# Patient Record
Sex: MALE | Race: WHITE | NOT HISPANIC OR LATINO | URBAN - METROPOLITAN AREA
[De-identification: names, ages, dates, MRNs, and addresses within clinical notes are randomized per-mention and may not be internally consistent; named-entity substitution may affect disease eponyms.]

---

## 2017-05-25 ENCOUNTER — OUTPATIENT (OUTPATIENT)
Dept: OUTPATIENT SERVICES | Facility: HOSPITAL | Age: 82
LOS: 1 days | End: 2017-05-25
Payer: MEDICARE

## 2017-05-25 DIAGNOSIS — Z95.1 PRESENCE OF AORTOCORONARY BYPASS GRAFT: Chronic | ICD-10-CM

## 2017-05-25 DIAGNOSIS — K86.89 OTHER SPECIFIED DISEASES OF PANCREAS: ICD-10-CM

## 2017-05-25 DIAGNOSIS — R63.4 ABNORMAL WEIGHT LOSS: ICD-10-CM

## 2017-05-25 DIAGNOSIS — Z98.89 OTHER SPECIFIED POSTPROCEDURAL STATES: Chronic | ICD-10-CM

## 2017-05-25 DIAGNOSIS — R86.8 OTHER ABNORMAL FINDINGS IN SPECIMENS FROM MALE GENITAL ORGANS: ICD-10-CM

## 2017-05-25 PROCEDURE — 74176 CT ABD & PELVIS W/O CONTRAST: CPT

## 2017-05-25 PROCEDURE — 74176 CT ABD & PELVIS W/O CONTRAST: CPT | Mod: 26

## 2020-01-17 ENCOUNTER — INPATIENT (INPATIENT)
Facility: HOSPITAL | Age: 85
LOS: 4 days | Discharge: NURSING, REHAB OR LONG-TERM CARE | DRG: 177 | End: 2020-01-22
Attending: FAMILY MEDICINE | Admitting: INTERNAL MEDICINE
Payer: MEDICARE

## 2020-01-17 VITALS
SYSTOLIC BLOOD PRESSURE: 170 MMHG | DIASTOLIC BLOOD PRESSURE: 100 MMHG | HEIGHT: 67 IN | RESPIRATION RATE: 14 BRPM | OXYGEN SATURATION: 97 % | WEIGHT: 149.91 LBS | HEART RATE: 87 BPM | TEMPERATURE: 98 F

## 2020-01-17 DIAGNOSIS — J44.9 CHRONIC OBSTRUCTIVE PULMONARY DISEASE, UNSPECIFIED: ICD-10-CM

## 2020-01-17 DIAGNOSIS — Z95.1 PRESENCE OF AORTOCORONARY BYPASS GRAFT: Chronic | ICD-10-CM

## 2020-01-17 DIAGNOSIS — N40.0 BENIGN PROSTATIC HYPERPLASIA WITHOUT LOWER URINARY TRACT SYMPTOMS: ICD-10-CM

## 2020-01-17 DIAGNOSIS — J18.9 PNEUMONIA, UNSPECIFIED ORGANISM: ICD-10-CM

## 2020-01-17 DIAGNOSIS — Z79.899 OTHER LONG TERM (CURRENT) DRUG THERAPY: ICD-10-CM

## 2020-01-17 DIAGNOSIS — I10 ESSENTIAL (PRIMARY) HYPERTENSION: ICD-10-CM

## 2020-01-17 DIAGNOSIS — I25.10 ATHEROSCLEROTIC HEART DISEASE OF NATIVE CORONARY ARTERY WITHOUT ANGINA PECTORIS: ICD-10-CM

## 2020-01-17 DIAGNOSIS — Z29.9 ENCOUNTER FOR PROPHYLACTIC MEASURES, UNSPECIFIED: ICD-10-CM

## 2020-01-17 DIAGNOSIS — E78.5 HYPERLIPIDEMIA, UNSPECIFIED: ICD-10-CM

## 2020-01-17 DIAGNOSIS — K21.9 GASTRO-ESOPHAGEAL REFLUX DISEASE WITHOUT ESOPHAGITIS: ICD-10-CM

## 2020-01-17 DIAGNOSIS — Z98.89 OTHER SPECIFIED POSTPROCEDURAL STATES: Chronic | ICD-10-CM

## 2020-01-17 DIAGNOSIS — F32.9 MAJOR DEPRESSIVE DISORDER, SINGLE EPISODE, UNSPECIFIED: ICD-10-CM

## 2020-01-17 LAB
ALBUMIN SERPL ELPH-MCNC: 2.5 G/DL — LOW (ref 3.3–5)
ALP SERPL-CCNC: 106 U/L — SIGNIFICANT CHANGE UP (ref 40–120)
ALT FLD-CCNC: 17 U/L — SIGNIFICANT CHANGE UP (ref 12–78)
ANION GAP SERPL CALC-SCNC: 9 MMOL/L — SIGNIFICANT CHANGE UP (ref 5–17)
APPEARANCE UR: CLEAR — SIGNIFICANT CHANGE UP
AST SERPL-CCNC: 24 U/L — SIGNIFICANT CHANGE UP (ref 15–37)
BASOPHILS # BLD AUTO: 0.04 K/UL — SIGNIFICANT CHANGE UP (ref 0–0.2)
BASOPHILS NFR BLD AUTO: 0.3 % — SIGNIFICANT CHANGE UP (ref 0–2)
BILIRUB SERPL-MCNC: 0.5 MG/DL — SIGNIFICANT CHANGE UP (ref 0.2–1.2)
BILIRUB UR-MCNC: NEGATIVE — SIGNIFICANT CHANGE UP
BUN SERPL-MCNC: 41 MG/DL — HIGH (ref 7–23)
CALCIUM SERPL-MCNC: 9 MG/DL — SIGNIFICANT CHANGE UP (ref 8.5–10.1)
CHLORIDE SERPL-SCNC: 109 MMOL/L — HIGH (ref 96–108)
CO2 SERPL-SCNC: 26 MMOL/L — SIGNIFICANT CHANGE UP (ref 22–31)
COLOR SPEC: YELLOW — SIGNIFICANT CHANGE UP
CREAT SERPL-MCNC: 2.1 MG/DL — HIGH (ref 0.5–1.3)
DIFF PNL FLD: NEGATIVE — SIGNIFICANT CHANGE UP
EOSINOPHIL # BLD AUTO: 0.11 K/UL — SIGNIFICANT CHANGE UP (ref 0–0.5)
EOSINOPHIL NFR BLD AUTO: 0.9 % — SIGNIFICANT CHANGE UP (ref 0–6)
FLU A RESULT: SIGNIFICANT CHANGE UP
FLU A RESULT: SIGNIFICANT CHANGE UP
FLUAV AG NPH QL: SIGNIFICANT CHANGE UP
FLUBV AG NPH QL: SIGNIFICANT CHANGE UP
GLUCOSE SERPL-MCNC: 141 MG/DL — HIGH (ref 70–99)
GLUCOSE UR QL: NEGATIVE — SIGNIFICANT CHANGE UP
HCT VFR BLD CALC: 44.2 % — SIGNIFICANT CHANGE UP (ref 39–50)
HGB BLD-MCNC: 13.8 G/DL — SIGNIFICANT CHANGE UP (ref 13–17)
IMM GRANULOCYTES NFR BLD AUTO: 0.3 % — SIGNIFICANT CHANGE UP (ref 0–1.5)
KETONES UR-MCNC: NEGATIVE — SIGNIFICANT CHANGE UP
LACTATE SERPL-SCNC: 1.7 MMOL/L — SIGNIFICANT CHANGE UP (ref 0.7–2)
LEUKOCYTE ESTERASE UR-ACNC: NEGATIVE — SIGNIFICANT CHANGE UP
LIDOCAIN IGE QN: 67 U/L — LOW (ref 73–393)
LYMPHOCYTES # BLD AUTO: 1.04 K/UL — SIGNIFICANT CHANGE UP (ref 1–3.3)
LYMPHOCYTES # BLD AUTO: 8.3 % — LOW (ref 13–44)
MCHC RBC-ENTMCNC: 27.3 PG — SIGNIFICANT CHANGE UP (ref 27–34)
MCHC RBC-ENTMCNC: 31.2 GM/DL — LOW (ref 32–36)
MCV RBC AUTO: 87.4 FL — SIGNIFICANT CHANGE UP (ref 80–100)
MONOCYTES # BLD AUTO: 1.19 K/UL — HIGH (ref 0–0.9)
MONOCYTES NFR BLD AUTO: 9.6 % — SIGNIFICANT CHANGE UP (ref 2–14)
NEUTROPHILS # BLD AUTO: 10.04 K/UL — HIGH (ref 1.8–7.4)
NEUTROPHILS NFR BLD AUTO: 80.6 % — HIGH (ref 43–77)
NITRITE UR-MCNC: NEGATIVE — SIGNIFICANT CHANGE UP
NRBC # BLD: 0 /100 WBCS — SIGNIFICANT CHANGE UP (ref 0–0)
PH UR: 5 — SIGNIFICANT CHANGE UP (ref 5–8)
PLATELET # BLD AUTO: 196 K/UL — SIGNIFICANT CHANGE UP (ref 150–400)
POTASSIUM SERPL-MCNC: 4.4 MMOL/L — SIGNIFICANT CHANGE UP (ref 3.5–5.3)
POTASSIUM SERPL-SCNC: 4.4 MMOL/L — SIGNIFICANT CHANGE UP (ref 3.5–5.3)
PROT SERPL-MCNC: 7.1 G/DL — SIGNIFICANT CHANGE UP (ref 6–8.3)
PROT UR-MCNC: 30 MG/DL
RBC # BLD: 5.06 M/UL — SIGNIFICANT CHANGE UP (ref 4.2–5.8)
RBC # FLD: 14.5 % — SIGNIFICANT CHANGE UP (ref 10.3–14.5)
RSV RESULT: SIGNIFICANT CHANGE UP
RSV RNA RESP QL NAA+PROBE: SIGNIFICANT CHANGE UP
SODIUM SERPL-SCNC: 144 MMOL/L — SIGNIFICANT CHANGE UP (ref 135–145)
SP GR SPEC: 1.01 — SIGNIFICANT CHANGE UP (ref 1.01–1.02)
UROBILINOGEN FLD QL: NEGATIVE — SIGNIFICANT CHANGE UP
WBC # BLD: 12.46 K/UL — HIGH (ref 3.8–10.5)
WBC # FLD AUTO: 12.46 K/UL — HIGH (ref 3.8–10.5)

## 2020-01-17 PROCEDURE — 93010 ELECTROCARDIOGRAM REPORT: CPT

## 2020-01-17 PROCEDURE — 71045 X-RAY EXAM CHEST 1 VIEW: CPT | Mod: 26

## 2020-01-17 PROCEDURE — 71250 CT THORAX DX C-: CPT | Mod: 26

## 2020-01-17 PROCEDURE — 70450 CT HEAD/BRAIN W/O DYE: CPT | Mod: 26

## 2020-01-17 PROCEDURE — 99284 EMERGENCY DEPT VISIT MOD MDM: CPT

## 2020-01-17 RX ORDER — AZTREONAM 2 G
VIAL (EA) INJECTION
Refills: 0 | Status: DISCONTINUED | OUTPATIENT
Start: 2020-01-17 | End: 2020-01-18

## 2020-01-17 RX ORDER — IPRATROPIUM/ALBUTEROL SULFATE 18-103MCG
3 AEROSOL WITH ADAPTER (GRAM) INHALATION
Refills: 0 | Status: COMPLETED | OUTPATIENT
Start: 2020-01-17 | End: 2020-01-17

## 2020-01-17 RX ORDER — AMLODIPINE BESYLATE 2.5 MG/1
5 TABLET ORAL DAILY
Refills: 0 | Status: DISCONTINUED | OUTPATIENT
Start: 2020-01-17 | End: 2020-01-21

## 2020-01-17 RX ORDER — FINASTERIDE 5 MG/1
5 TABLET, FILM COATED ORAL DAILY
Refills: 0 | Status: DISCONTINUED | OUTPATIENT
Start: 2020-01-17 | End: 2020-01-22

## 2020-01-17 RX ORDER — ACETAMINOPHEN 500 MG
650 TABLET ORAL EVERY 6 HOURS
Refills: 0 | Status: DISCONTINUED | OUTPATIENT
Start: 2020-01-17 | End: 2020-01-22

## 2020-01-17 RX ORDER — AZITHROMYCIN 500 MG/1
TABLET, FILM COATED ORAL
Refills: 0 | Status: DISCONTINUED | OUTPATIENT
Start: 2020-01-17 | End: 2020-01-18

## 2020-01-17 RX ORDER — VANCOMYCIN HCL 1 G
1000 VIAL (EA) INTRAVENOUS ONCE
Refills: 0 | Status: COMPLETED | OUTPATIENT
Start: 2020-01-17 | End: 2020-01-17

## 2020-01-17 RX ORDER — CILOSTAZOL 100 MG/1
50 TABLET ORAL
Refills: 0 | Status: DISCONTINUED | OUTPATIENT
Start: 2020-01-17 | End: 2020-01-20

## 2020-01-17 RX ORDER — AZTREONAM 2 G
1000 VIAL (EA) INJECTION EVERY 8 HOURS
Refills: 0 | Status: DISCONTINUED | OUTPATIENT
Start: 2020-01-18 | End: 2020-01-18

## 2020-01-17 RX ORDER — AZTREONAM 2 G
1000 VIAL (EA) INJECTION ONCE
Refills: 0 | Status: COMPLETED | OUTPATIENT
Start: 2020-01-17 | End: 2020-01-17

## 2020-01-17 RX ORDER — HEPARIN SODIUM 5000 [USP'U]/ML
5000 INJECTION INTRAVENOUS; SUBCUTANEOUS EVERY 12 HOURS
Refills: 0 | Status: DISCONTINUED | OUTPATIENT
Start: 2020-01-17 | End: 2020-01-22

## 2020-01-17 RX ORDER — ACETAMINOPHEN 500 MG
650 TABLET ORAL ONCE
Refills: 0 | Status: COMPLETED | OUTPATIENT
Start: 2020-01-17 | End: 2020-01-17

## 2020-01-17 RX ORDER — ATORVASTATIN CALCIUM 80 MG/1
80 TABLET, FILM COATED ORAL AT BEDTIME
Refills: 0 | Status: DISCONTINUED | OUTPATIENT
Start: 2020-01-17 | End: 2020-01-22

## 2020-01-17 RX ORDER — AZITHROMYCIN 500 MG/1
500 TABLET, FILM COATED ORAL EVERY 24 HOURS
Refills: 0 | Status: DISCONTINUED | OUTPATIENT
Start: 2020-01-18 | End: 2020-01-18

## 2020-01-17 RX ORDER — TAMSULOSIN HYDROCHLORIDE 0.4 MG/1
0.4 CAPSULE ORAL AT BEDTIME
Refills: 0 | Status: DISCONTINUED | OUTPATIENT
Start: 2020-01-17 | End: 2020-01-22

## 2020-01-17 RX ORDER — AZITHROMYCIN 500 MG/1
500 TABLET, FILM COATED ORAL ONCE
Refills: 0 | Status: COMPLETED | OUTPATIENT
Start: 2020-01-17 | End: 2020-01-17

## 2020-01-17 RX ORDER — SODIUM CHLORIDE 9 MG/ML
2000 INJECTION INTRAMUSCULAR; INTRAVENOUS; SUBCUTANEOUS ONCE
Refills: 0 | Status: COMPLETED | OUTPATIENT
Start: 2020-01-17 | End: 2020-01-17

## 2020-01-17 RX ADMIN — AZITHROMYCIN 255 MILLIGRAM(S): 500 TABLET, FILM COATED ORAL at 21:39

## 2020-01-17 RX ADMIN — SODIUM CHLORIDE 2000 MILLILITER(S): 9 INJECTION INTRAMUSCULAR; INTRAVENOUS; SUBCUTANEOUS at 16:45

## 2020-01-17 RX ADMIN — Medication 50 MILLIGRAM(S): at 19:12

## 2020-01-17 RX ADMIN — Medication 650 MILLIGRAM(S): at 16:45

## 2020-01-17 RX ADMIN — Medication 250 MILLIGRAM(S): at 19:42

## 2020-01-17 RX ADMIN — Medication 125 MILLIGRAM(S): at 18:23

## 2020-01-17 RX ADMIN — Medication 3 MILLILITER(S): at 19:12

## 2020-01-17 RX ADMIN — Medication 3 MILLILITER(S): at 18:27

## 2020-01-17 NOTE — H&P ADULT - PROBLEM SELECTOR PLAN 2
Unable to verify medications as wife and aide forgot medication list at home. [facility] pharmacy does not have history of medication refill since June 2019. Unable to verify medications as wife and aide forgot medication list at home. [facility] pharmacy does not have history of medication refill since June 2019. Called wife and aide to verify medications. Wife and aide will bring in medications in AM. Unable to verify medications as wife and aide forgot medication list at home. [facility] pharmacy does not have history of medication refill since June 2019. Called wife and aide to verify medications, however they could not provide a complete list. Wife and aide will bring in medications in AM. Unable to verify medications as wife and aide forgot medication list at home. [facility] pharmacy does not have history of medication refill since June 2019. Called wife and aide to verify medications, however they could not provide a complete list.   -Spoke with Wife and aide on 1/18 and was only presented with 3 bottles: Amlodipine 5mg, Rosuvastatin 10mg, and Cilastazol 50mg BID, all filled >5 months ago. Unclear how often medication is taken, but as per aide, wife is usually picking up medications for the patient and is giving it to him, but often forgets at times and is not consistent.

## 2020-01-17 NOTE — ED PROVIDER NOTE - CARE PLAN
Principal Discharge DX:	Pneumonia of right lower lobe due to infectious organism  Secondary Diagnosis:	Chronic obstructive pulmonary disease, unspecified COPD type

## 2020-01-17 NOTE — ED ADULT NURSE NOTE - OBJECTIVE STATEMENT
Received pt in bed alert and oriented.  C/O increased weakness and confusion since Monday.  Pt has had increased falls at home.  EKG complete.  Pt on monitor.  Ongoing nursing care and safety maintained.

## 2020-01-17 NOTE — H&P ADULT - NSICDXPASTMEDICALHX_GEN_ALL_CORE_FT
PAST MEDICAL HISTORY:  Benign prostatic hypertrophy     CAD (Coronary Artery Disease) MI, s/p CABG with 2 cardiac stents    COPD (chronic obstructive pulmonary disease)     Depression     GERD (gastroesophageal reflux disease)     Hyperlipidemia     Hypertension     Myocardial infarction     Osteoporosis     Peripheral Neuropathy     Seasonal allergies

## 2020-01-17 NOTE — H&P ADULT - HISTORY OF PRESENT ILLNESS
86 yo male with PMHx of Benign prostatic hypertrophy CAD  MI, s/p CABG with 2 cardiac stents COPD Depression GERD Hyperlipidemia  Hypertension, Osteoporosis Peripheral Neuropathy Dementia GERD    ED vitals: T: 98.1, HR: 98, BP: 157/70 RR: 17 O2: 93 on RA   Labs significant for: WBC: 12.46 with left shift, Chloride: 109, BUN: 41, Cr: 2.1, EGFR: 28   X-ray:  The cardiomediastinal silhouette is similar to the previous exam with dilated tortuous thoracic aorta. There is decreased right lung volume with shift of the mediastinum to the right side. There is right perihilar scarring again noted. No pleural effusion  CT Chest:  Right lower lobe pneumonia  CT Head: No acute intracranial findings INCOMPLETE   This is an 84 yo male with PMHx of Benign prostatic hypertrophy, CAD, MI, s/p CABG with 2 cardiac stents, COPD, Depressio, GERD, Hyperlipidemia  Hypertension, Osteoporosis, Peripheral Neuropathy, Dementia who presented to the ED with increased confusion, cough, fall    ED vitals: T: 98.1, HR: 98, BP: 157/70 RR: 17 O2: 93 on RA   Labs significant for: WBC: 12.46 with left shift, Chloride: 109, BUN: 41, Cr: 2.1, EGFR: 28   X-ray:  The cardiomediastinal silhouette is similar to the previous exam with dilated tortuous thoracic aorta. There is decreased right lung volume with shift of the mediastinum to the right side. There is right perihilar scarring again noted. No pleural effusion  CT Chest:  Right lower lobe pneumonia  CT Head: No acute intracranial findings  In the ED patient was given: Azactam and Vancomycin X1 INCOMPLETE   This is an 86 yo male with PMHx of Benign prostatic hypertrophy, CAD, MI, s/p CABG with 2 cardiac stents, COPD, Depression, GERD, Hyperlipidemia  Hypertension, Osteoporosis, Peripheral Neuropathy, Dementia who presented to the ED with increased confusion, cough, fall...    ED vitals: T: 98.1, HR: 98, BP: 157/70 RR: 17 O2: 93 on RA   Labs significant for: WBC: 12.46 with left shift, Chloride: 109, BUN: 41, Cr: 2.1, EGFR: 28   X-ray:  The cardiomediastinal silhouette is similar to the previous exam with dilated tortuous thoracic aorta. There is decreased right lung volume with shift of the mediastinum to the right side. There is right perihilar scarring again noted. No pleural effusion  CT Chest:  Right lower lobe pneumonia  CT Head: No acute intracranial findings  In the ED patient was given: Azactam and Vancomycin X1, tylenol, IVF bolus X1, albuterol/ipratropium X1, Solumederol 125 X1 86 yo male with PMHx of Benign prostatic hypertrophy, CAD, MI, s/p CABG with 2 cardiac stents, COPD, Depression, GERD, Hyperlipidemia  Hypertension, Osteoporosis, Peripheral Neuropathy, Dementia who presented to the ED s/p fall and increased confusion x1 day. Patient is accompanied by wife and health aide. Patient is a limited historian. Per aide, patient has a history of frequent falls, most recent fall was yesterday. Aide reports chronic cough for the past 3 months. Aide and wife noticed increasing confusion. Wife states "he could not remember how to make his way to the bedroom." Patient was brought to the ED for further evaluation. Patient admits to chills. Admits to cough x3 months, for which he takes cough syrup. Aide endorses frequent episodes of stool and urine incontinence. Patient lives with wife and 24 hour aid. He ambulates with walker and cane. Denies fever, chest pain, shortness of breath, abdominal pain, nausea or vomiting.     ED vitals: T: 98.1, HR: 98, BP: 157/70 RR: 17 O2: 93 on RA   Labs significant for: WBC: 12.46 with left shift, Chloride: 109, BUN: 41, Cr: 2.1, EGFR: 28   X-ray:  The cardiomediastinal silhouette is similar to the previous exam with dilated tortuous thoracic aorta. There is decreased right lung volume with shift of the mediastinum to the right side. There is right perihilar scarring again noted. No pleural effusion  CT Chest:  Right lower lobe pneumonia  CT Head: No acute intracranial findings  In the ED patient was given: Azactam and Vancomycin X1, tylenol, IVF bolus X1, albuterol/ipratropium X1, Solumederol 125 X1 86 yo male with PMHx of Benign prostatic hypertrophy, CAD, MI, s/p CABG with 2 cardiac stents, COPD, Depression, GERD, Hyperlipidemia  Hypertension, Osteoporosis, Peripheral Neuropathy, Dementia who presented to the ED s/p fall and increased confusion x1 day. Patient is accompanied by wife and health aide. Patient is a limited historian. Per aide, patient has a history of frequent falls, most recent fall was yesterday. Aide reports chronic cough for the past 3 months. Aide and wife noticed increasing confusion. Wife states "he could not remember how to make his way to the bedroom." Patient was brought to the ED for further evaluation. Per wife and aide, patient's mental status has returned to baseline. Patient admits to chills. Admits to cough x3 months, for which he takes cough syrup. Aide endorses frequent episodes of stool and urine incontinence. Patient lives with wife and 24 hour aid. He ambulates with walker and cane. Denies fever, chest pain, shortness of breath, abdominal pain, nausea or vomiting.     ED vitals: T: 98.1, HR: 98, BP: 157/70 RR: 17 O2: 93 on RA   Labs significant for: WBC: 12.46 with left shift, Chloride: 109, BUN: 41, Cr: 2.1, EGFR: 28   X-ray:  The cardiomediastinal silhouette is similar to the previous exam with dilated tortuous thoracic aorta. There is decreased right lung volume with shift of the mediastinum to the right side. There is right perihilar scarring again noted. No pleural effusion  CT Chest:  Right lower lobe pneumonia  CT Head: No acute intracranial findings  In the ED patient was given: Azactam and Vancomycin X1, tylenol, IVF bolus X1, albuterol/ipratropium X1, Solumederol 125 X1

## 2020-01-17 NOTE — H&P ADULT - NSICDXFAMILYHX_GEN_ALL_CORE_FT
FAMILY HISTORY:  Family history of amyotrophic lateral sclerosis,  at 67  Family history of MI (myocardial infarction),  at 65  Family history of stroke,  at 65  Family history of stroke,  at 67    Sibling  Still living? No  Family history of renal failure, Age at diagnosis: Age Unknown

## 2020-01-17 NOTE — H&P ADULT - PROBLEM SELECTOR PLAN 10
DVT PPx: Heparin 5000 mg subQ q12h    IMPROVE VTE Individual Risk Assessment          RISK                                                          Points  [  ] Previous VTE                                                3  [  ] Thrombophilia                                             2  [  ] Lower limb paralysis                                   2        (unable to hold up >15 seconds)    [  ] Current Cancer                                             2         (within 6 months)  [  ] Immobilization > 24 hrs                              1  [  ] ICU/CCU stay > 24 hours                             1  [ x ] Age > 60                                                         1    IMPROVE VTE Score:         [    1     ]

## 2020-01-17 NOTE — H&P ADULT - PROBLEM SELECTOR PLAN 3
Nebs, Oxygen. Nebs, Oxygen.  - Need to verify medications. Wife and aide will bring in medications in AM Nebs, Oxygen.  - Wife and aide only brought in 3 medications, none of them being inhalers and unclear whether patient is using any inhalers at home.

## 2020-01-17 NOTE — H&P ADULT - PROBLEM SELECTOR PLAN 1
Patient presents s/p fall and confusion x1 day  - WBC: 12.46 with left shift  - CT Chest: Right lower lobe pneumonia  - Start Azactam and Zithro, given allergies  - F/u BCx, UCx, sputum cx, strep pneumo, legionella  - Tylenol for fever  - F/u AM labs Patient presents s/p fall and confusion x1 day  - WBC: 12.46 with left shift  - CT Chest: Right lower lobe pneumonia  - Start Azactam and Zithro, given allergies  - F/u BCx, UCx, sputum cx, strep pneumo, legionella, MRSA  - Tylenol for fever  - F/u AM labs

## 2020-01-17 NOTE — H&P ADULT - PROBLEM SELECTOR PLAN 9
DVT PPx: Heparin 5000 mg subQ q12h    IMPROVE VTE Individual Risk Assessment          RISK                                                          Points  [  ] Previous VTE                                                3  [  ] Thrombophilia                                             2  [  ] Lower limb paralysis                                   2        (unable to hold up >15 seconds)    [  ] Current Cancer                                             2         (within 6 months)  [  ] Immobilization > 24 hrs                              1  [  ] ICU/CCU stay > 24 hours                             1  [ x ] Age > 60                                                         1    IMPROVE VTE Score:         [    1     ] - Need to verify medications. Wife and aide will bring in medications in AM - Aide was unable to verify medication. Unclear if patient is taking medications for GERD

## 2020-01-17 NOTE — H&P ADULT - NSHPREVIEWOFSYSTEMS_GEN_ALL_CORE
Patient is a limited historian.   Constitutional: denies fever, chills, diaphoresis   HEENT: denies blurry vision, double vision, eye pain, difficulty hearing  Respiratory: admits cough, denies SOB, sputum production  Cardiovascular: denies CP, palpitations, edema  Gastrointestinal: denies nausea, vomiting, diarrhea, constipation, abdominal pain  Genitourinary: denies dysuria, frequency, urgency, hematuria   Skin/Breast: denies rash, itching  Neurologic: denies headache, weakness, dizziness, paresthesias, numbness/tingling  Psychiatric: denies anxiety, depression, suicidal, homicidal thoughts  ROS negative except as noted above

## 2020-01-17 NOTE — ED PROVIDER NOTE - ATTENDING CONTRIBUTION TO CARE
86 y/o M with c/o fever, cough, lethargy and fall x 1 day.    PE: septic workup, XRay, CT  + PNA Rigth lobe.      admit for IV antibiotics, monitoring

## 2020-01-17 NOTE — H&P ADULT - ASSESSMENT
his is an 84 yo male with PMHx of Benign prostatic hypertrophy, CAD, MI, s/p CABG with 2 cardiac stents, COPD, Depression, GERD, Hyperlipidemia  Hypertension, Osteoporosis, Peripheral Neuropathy, Dementia who presented to the ED with ___________ admitted with ALEX HAYES. 86 yo male with PMHx of Benign prostatic hypertrophy, CAD, MI, s/p CABG with 2 cardiac stents, COPD, Depression, GERD, Hyperlipidemia  Hypertension, Osteoporosis, Peripheral Neuropathy, Dementia who presented to the ED s/p fall and increased confusion x1 day admitted with RLL pneumonia.

## 2020-01-17 NOTE — H&P ADULT - PROBLEM SELECTOR PLAN 8
- Need to verify medications. Wife and aide will bring in medications in AM - Aide was unable to verify medication. Unclear if patient is taking medications for Depression

## 2020-01-17 NOTE — H&P ADULT - NSHPSOCIALHISTORY_GEN_ALL_CORE
Marital Status:  ( x  )    (   ) Single    (   )    (  )   Lives with: (  ) alone  (  ) children   ( x ) spouse   (  ) parents  (  ) other  Recent Travel: No recent travel  Occupation: Retired  Mobility: Uses walker and cane  Substance Use (street drugs): ( x ) never used  (  ) other:  Tobacco Usage:  Quit smoking 1987, smoked ~1/2 ppd for 10 years  Alcohol Usage: Occasional

## 2020-01-17 NOTE — H&P ADULT - PROBLEM SELECTOR PLAN 6
Chronic, stable  - Continue home med Finasteride and Silodosin  - Need to verify medications. Wife and aide will bring in medications in AM Chronic, stable  - Continue home med Finasteride and Silodosin  - Aide was unable to verify medication. Unclear if patient is taking medications for BPH

## 2020-01-17 NOTE — H&P ADULT - NSHPPHYSICALEXAM_GEN_ALL_CORE
T(C): 36.7 (01-17-20 @ 19:44), Max: 38.4 (01-17-20 @ 16:05)  HR: 98 (01-17-20 @ 19:44) (87 - 98)  BP: 157/70 (01-17-20 @ 19:44) (157/70 - 170/100)  RR: 17 (01-17-20 @ 19:44) (14 - 17)  SpO2: 93% (01-17-20 @ 19:44) (93% - 97%)    GENERAL: patient appears well, no acute distress, appropriate, pleasant  EYES: sclera clear, no exudates  ENMT: oropharynx clear without erythema, no exudates, moist mucous membranes  NECK: supple, soft, no thyromegaly noted  LUNGS: decreased breath sounds R>L, no wheezing or rhonchi appreciated  HEART: S1/S2, regular rate and rhythm, no murmurs noted, no lower extremity edema  GASTROINTESTINAL: abdomen is soft, nontender, nondistended, normoactive bowel sounds, no palpable masses  INTEGUMENT: good skin turgor, no lesions noted  MUSCULOSKELETAL: no clubbing or cyanosis, no obvious deformity  NEUROLOGIC: awake, alert, oriented x3, good muscle tone in 4 extremities, no obvious sensory deficits  PSYCHIATRIC: mood is good, affect is congruent, linear and logical thought process  HEME/LYMPH: no palpable supraclavicular nodules, no obvious ecchymosis or petechiae

## 2020-01-17 NOTE — H&P ADULT - NSICDXPASTSURGICALHX_GEN_ALL_CORE_FT
PAST SURGICAL HISTORY:  S/P appendectomy     S/P AVR (Aortic Valve Replacement) Pig valve (bioprosthetic); done 2010 at [place]    S/P CABG (coronary artery bypass graft) Done in 1996; 2 cardiac stents in 1998    S/P inguinal hernia repair On right side
detailed exam

## 2020-01-17 NOTE — H&P ADULT - ATTENDING COMMENTS
Patient seen and examined.   85 M with pmhx cad s/p stents, htn, hld, COPD, depression presenting with confusion s/p fall, noted to have pneumonia on CT chest.   Currently patient AAO x3, reports cough.     T(C): 36.7 (01-17-20 @ 19:44), Max: 38.4 (01-17-20 @ 16:05)  HR: 98 (01-17-20 @ 19:44) (87 - 98)  BP: 157/70 (01-17-20 @ 19:44) (157/70 - 170/100)  RR: 17 (01-17-20 @ 19:44) (14 - 17)  SpO2: 93% (01-17-20 @ 19:44) (93% - 97%)  Reviewed labs, imaging  CT head negative for CVA/ Bleed     Patient allergic to Levaquin, PCN, will continue with Aztreonam, add Azithromax, received Vanc x 1 dose in the ed.   Follow up Blood cultures.  YOU on ckd 3 unknown baseline Creatinine, monitor renal function.

## 2020-01-17 NOTE — H&P ADULT - NSHPSOURCEINFORD_GEN_ALL_CORE
Patient/Chart(s) Patient/Spouse/Significant Other/Home Health Aide or Other Non-Family Caregiver/Chart(s)

## 2020-01-17 NOTE — H&P ADULT - PROBLEM SELECTOR PLAN 7
Chronic, stable  - Home med Rosuvastatin 80 mg qd - therapeutic interchange Atorvastatin 80 mg qd  - Need to verify medications. Wife and aide will bring in medications in AM Chronic, stable  - Home med Rosuvastatin 80 mg qd - therapeutic interchange Atorvastatin 80 mg qd  - Aide did show Rosuvastatin 10mg daily, but was last filled 5+ months ago

## 2020-01-17 NOTE — ED PROVIDER NOTE - CLINICAL SUMMARY MEDICAL DECISION MAKING FREE TEXT BOX
Pt is a 84 yo male with fever weakness confusion but A&OX3 in er will get lab cultures flu cxr given fluids tylenol neb tx and steroids     cxr abnormal will get ct

## 2020-01-17 NOTE — ED PROVIDER NOTE - OBJECTIVE STATEMENT
Pt is a 84 yo male with pmhx of Benign prostatic hypertrophy CAD  MI, s/p CABG with 2 cardiac stents COPD Depression GERD Hyperlipidemia  Hypertension    Osteoporosis Peripheral Neuropathy Dementia GERD brought in by wife and aid from home for increased confusion cough and fall yesterday. Pt was found by closet yesterday unsure how he fell pt urinated and had bm on himself no episodes after that no loc no blood thinners. Pt denies any pain no nvd abdominal pain chest pain sob no fever noted at home.     pcp [facility] rosenstein

## 2020-01-17 NOTE — ED ADULT NURSE NOTE - NSIMPLEMENTINTERV_GEN_ALL_ED
Implemented All Fall with Harm Risk Interventions:  [place] to call system. Call bell, personal items and telephone within reach. Instruct patient to call for assistance. Room bathroom lighting operational. Non-slip footwear when patient is off stretcher. Physically safe environment: no spills, clutter or unnecessary equipment. Stretcher in lowest position, wheels locked, appropriate side rails in place. Provide visual cue, wrist band, yellow gown, etc. Monitor gait and stability. Monitor for mental status changes and reorient to person, place, and time. Review medications for side effects contributing to fall risk. Reinforce activity limits and safety measures with patient and family. Provide visual clues: red socks.

## 2020-01-17 NOTE — ED PROVIDER NOTE - PROGRESS NOTE DETAILS
wbc 12 bun cr elevated cxr abnormal ct scan right lower lobe pna will admit with copd pna and ams fever   given abx

## 2020-01-17 NOTE — H&P ADULT - PROBLEM SELECTOR PLAN 5
Chronic, stable  - Home med Rosuvastatin 80 mg qd - therapeutic interchange Atorvastatin 80 mg qd  - Continue home med Cilostazol   - Need to verify medications. Wife and aide will bring in medications in AM Chronic, stable  - Home med Rosuvastatin 80 mg qd - therapeutic interchange Atorvastatin 80 mg qd  - Continue home med Cilostazol   - Aide did show Cilastazol 10mg BID, but was last filled 5+ months ago

## 2020-01-18 DIAGNOSIS — R41.0 DISORIENTATION, UNSPECIFIED: ICD-10-CM

## 2020-01-18 DIAGNOSIS — J30.2 OTHER SEASONAL ALLERGIC RHINITIS: ICD-10-CM

## 2020-01-18 DIAGNOSIS — R91.1 SOLITARY PULMONARY NODULE: ICD-10-CM

## 2020-01-18 DIAGNOSIS — J44.9 CHRONIC OBSTRUCTIVE PULMONARY DISEASE, UNSPECIFIED: ICD-10-CM

## 2020-01-18 LAB
ALBUMIN SERPL ELPH-MCNC: 2.2 G/DL — LOW (ref 3.3–5)
ALP SERPL-CCNC: 96 U/L — SIGNIFICANT CHANGE UP (ref 40–120)
ALT FLD-CCNC: 19 U/L — SIGNIFICANT CHANGE UP (ref 12–78)
ANION GAP SERPL CALC-SCNC: 9 MMOL/L — SIGNIFICANT CHANGE UP (ref 5–17)
AST SERPL-CCNC: 17 U/L — SIGNIFICANT CHANGE UP (ref 15–37)
BASE EXCESS BLDV CALC-SCNC: -1.1 MMOL/L — SIGNIFICANT CHANGE UP (ref -2–2)
BASOPHILS # BLD AUTO: 0.01 K/UL — SIGNIFICANT CHANGE UP (ref 0–0.2)
BASOPHILS NFR BLD AUTO: 0.1 % — SIGNIFICANT CHANGE UP (ref 0–2)
BILIRUB SERPL-MCNC: 0.2 MG/DL — SIGNIFICANT CHANGE UP (ref 0.2–1.2)
BLOOD GAS COMMENTS, VENOUS: SIGNIFICANT CHANGE UP
BUN SERPL-MCNC: 39 MG/DL — HIGH (ref 7–23)
CALCIUM SERPL-MCNC: 9.4 MG/DL — SIGNIFICANT CHANGE UP (ref 8.5–10.1)
CHLORIDE SERPL-SCNC: 112 MMOL/L — HIGH (ref 96–108)
CO2 SERPL-SCNC: 23 MMOL/L — SIGNIFICANT CHANGE UP (ref 22–31)
CREAT SERPL-MCNC: 2 MG/DL — HIGH (ref 0.5–1.3)
EOSINOPHIL # BLD AUTO: 0 K/UL — SIGNIFICANT CHANGE UP (ref 0–0.5)
EOSINOPHIL NFR BLD AUTO: 0 % — SIGNIFICANT CHANGE UP (ref 0–6)
GLUCOSE SERPL-MCNC: 182 MG/DL — HIGH (ref 70–99)
HCO3 BLDV-SCNC: 23 MMOL/L — SIGNIFICANT CHANGE UP (ref 21–29)
HCT VFR BLD CALC: 39.8 % — SIGNIFICANT CHANGE UP (ref 39–50)
HGB BLD-MCNC: 12.3 G/DL — LOW (ref 13–17)
HOROWITZ INDEX BLDV+IHG-RTO: 21 — SIGNIFICANT CHANGE UP
IMM GRANULOCYTES NFR BLD AUTO: 0.4 % — SIGNIFICANT CHANGE UP (ref 0–1.5)
LYMPHOCYTES # BLD AUTO: 0.5 K/UL — LOW (ref 1–3.3)
LYMPHOCYTES # BLD AUTO: 5.5 % — LOW (ref 13–44)
MCHC RBC-ENTMCNC: 27.1 PG — SIGNIFICANT CHANGE UP (ref 27–34)
MCHC RBC-ENTMCNC: 30.9 GM/DL — LOW (ref 32–36)
MCV RBC AUTO: 87.7 FL — SIGNIFICANT CHANGE UP (ref 80–100)
MONOCYTES # BLD AUTO: 0.11 K/UL — SIGNIFICANT CHANGE UP (ref 0–0.9)
MONOCYTES NFR BLD AUTO: 1.2 % — LOW (ref 2–14)
MRSA PCR RESULT.: SIGNIFICANT CHANGE UP
NEUTROPHILS # BLD AUTO: 8.5 K/UL — HIGH (ref 1.8–7.4)
NEUTROPHILS NFR BLD AUTO: 92.8 % — HIGH (ref 43–77)
NRBC # BLD: 0 /100 WBCS — SIGNIFICANT CHANGE UP (ref 0–0)
PCO2 BLDV: 45 MMHG — SIGNIFICANT CHANGE UP (ref 35–50)
PH BLDV: 7.34 — LOW (ref 7.35–7.45)
PLATELET # BLD AUTO: 186 K/UL — SIGNIFICANT CHANGE UP (ref 150–400)
PO2 BLDV: 57 MMHG — HIGH (ref 25–45)
POTASSIUM SERPL-MCNC: 5 MMOL/L — SIGNIFICANT CHANGE UP (ref 3.5–5.3)
POTASSIUM SERPL-SCNC: 5 MMOL/L — SIGNIFICANT CHANGE UP (ref 3.5–5.3)
PROT SERPL-MCNC: 6.5 G/DL — SIGNIFICANT CHANGE UP (ref 6–8.3)
RBC # BLD: 4.54 M/UL — SIGNIFICANT CHANGE UP (ref 4.2–5.8)
RBC # FLD: 14.5 % — SIGNIFICANT CHANGE UP (ref 10.3–14.5)
S AUREUS DNA NOSE QL NAA+PROBE: SIGNIFICANT CHANGE UP
SAO2 % BLDV: 87 % — SIGNIFICANT CHANGE UP (ref 67–88)
SODIUM SERPL-SCNC: 144 MMOL/L — SIGNIFICANT CHANGE UP (ref 135–145)
WBC # BLD: 9.16 K/UL — SIGNIFICANT CHANGE UP (ref 3.8–10.5)
WBC # FLD AUTO: 9.16 K/UL — SIGNIFICANT CHANGE UP (ref 3.8–10.5)

## 2020-01-18 PROCEDURE — 99233 SBSQ HOSP IP/OBS HIGH 50: CPT

## 2020-01-18 RX ORDER — DONEPEZIL HYDROCHLORIDE 10 MG/1
10 TABLET, FILM COATED ORAL AT BEDTIME
Refills: 0 | Status: DISCONTINUED | OUTPATIENT
Start: 2020-01-18 | End: 2020-01-22

## 2020-01-18 RX ORDER — IPRATROPIUM/ALBUTEROL SULFATE 18-103MCG
3 AEROSOL WITH ADAPTER (GRAM) INHALATION EVERY 4 HOURS
Refills: 0 | Status: DISCONTINUED | OUTPATIENT
Start: 2020-01-18 | End: 2020-01-22

## 2020-01-18 RX ORDER — CEFTRIAXONE 500 MG/1
1000 INJECTION, POWDER, FOR SOLUTION INTRAMUSCULAR; INTRAVENOUS EVERY 24 HOURS
Refills: 0 | Status: DISCONTINUED | OUTPATIENT
Start: 2020-01-18 | End: 2020-01-21

## 2020-01-18 RX ADMIN — Medication 50 MILLIGRAM(S): at 06:15

## 2020-01-18 RX ADMIN — HEPARIN SODIUM 5000 UNIT(S): 5000 INJECTION INTRAVENOUS; SUBCUTANEOUS at 18:27

## 2020-01-18 RX ADMIN — Medication 50 MILLIGRAM(S): at 13:27

## 2020-01-18 RX ADMIN — CILOSTAZOL 50 MILLIGRAM(S): 100 TABLET ORAL at 18:27

## 2020-01-18 RX ADMIN — DONEPEZIL HYDROCHLORIDE 10 MILLIGRAM(S): 10 TABLET, FILM COATED ORAL at 21:34

## 2020-01-18 RX ADMIN — HEPARIN SODIUM 5000 UNIT(S): 5000 INJECTION INTRAVENOUS; SUBCUTANEOUS at 05:45

## 2020-01-18 RX ADMIN — CEFTRIAXONE 100 MILLIGRAM(S): 500 INJECTION, POWDER, FOR SOLUTION INTRAMUSCULAR; INTRAVENOUS at 18:27

## 2020-01-18 RX ADMIN — FINASTERIDE 5 MILLIGRAM(S): 5 TABLET, FILM COATED ORAL at 12:01

## 2020-01-18 RX ADMIN — TAMSULOSIN HYDROCHLORIDE 0.4 MILLIGRAM(S): 0.4 CAPSULE ORAL at 21:34

## 2020-01-18 RX ADMIN — ATORVASTATIN CALCIUM 80 MILLIGRAM(S): 80 TABLET, FILM COATED ORAL at 21:34

## 2020-01-18 RX ADMIN — CILOSTAZOL 50 MILLIGRAM(S): 100 TABLET ORAL at 05:44

## 2020-01-18 RX ADMIN — AMLODIPINE BESYLATE 5 MILLIGRAM(S): 2.5 TABLET ORAL at 05:46

## 2020-01-18 NOTE — CONSULT NOTE ADULT - SUBJECTIVE AND OBJECTIVE BOX
Date/Time Patient Seen:  		  Referring MD:   Data Reviewed	       Patient is a 85y old  Male who presents with a chief complaint of Pneumonia (2020 09:31)      Subjective/HPI    in bed  seen and examined  vs and meds reviewed    labs reviewed    h and p reviewed    er provider note reviewed    ex smoker    History and Physical:   Source of Information	Chart(s), Patient, Spouse/Significant Other, Home Health Aide or Other Non-Family Caregiver  Outpatient Providers	PCP: Dank Ty   Pulm: [facility]       History of Present Illness:  Reason for Admission: Pneumonia  History of Present Illness:   84 yo male with PMHx of Benign prostatic hypertrophy, CAD, MI, s/p CABG with 2 cardiac stents, COPD, Depression, GERD, Hyperlipidemia  Hypertension, Osteoporosis, Peripheral Neuropathy, Dementia who presented to the ED s/p fall and increased confusion x1 day. Patient is accompanied by wife and health aide. Patient is a limited historian. Per aide, patient has a history of frequent falls, most recent fall was yesterday. Aide reports chronic cough for the past 3 months. Aide and wife noticed increasing confusion. Wife states "he could not remember how to make his way to the bedroom." Patient was brought to the ED for further evaluation. Per wife and aide, patient's mental status has returned to baseline. Patient admits to chills. Admits to cough x3 months, for which he takes cough syrup. Aide endorses frequent episodes of stool and urine incontinence. Patient lives with wife and 24 hour aid. He ambulates with walker and cane. Denies fever, chest pain, shortness of breath, abdominal pain, nausea or vomiting.     ED vitals: T: 98.1, HR: 98, BP: 157/70 RR: 17 O2: 93 on RA   Labs significant for: WBC: 12.46 with left shift, Chloride: 109, BUN: 41, Cr: 2.1, EGFR: 28   X-ray:  The cardiomediastinal silhouette is similar to the previous exam with dilated tortuous thoracic aorta. There is decreased right lung volume with shift of the mediastinum to the right side. There is right perihilar scarring again noted. No pleural effusion  CT Chest:  Right lower lobe pneumonia  CT Head: No acute intracranial findings  In the ED patient was given: Azactam and Vancomycin X1, tylenol, IVF bolus X1, albuterol/ipratropium X1, Solumederol 125 X1     FAMILY HISTORY:  Family history of amyotrophic lateral sclerosis,  at 67  Family history of MI (myocardial infarction),  at 65  Family history of stroke,  at 65  Family history of stroke,  at 67    Sibling  Still living? No  Family history of renal failure, Age at diagnosis: Age Unknown.     Social History:  Social History (marital status, living situation, occupation, tobacco use, alcohol and drug use, and sexual history): Marital Status:  ( x  )    (   ) Single    (   )    (  )   	Lives with: (  ) alone  (  ) children   ( x ) spouse   (  ) parents  (  ) other  	Recent Travel: No recent travel  	Occupation: Retired  	Mobility: Uses walker and cane  	Substance Use (street drugs): ( x ) never used  (  ) other:  	Tobacco Usage:  Quit smoking , smoked ~1/2 ppd for 10 years  Alcohol Usage: Occasional     Tobacco Screening:  · Core Measure Site	Yes  · Has the patient used tobacco in the past 30 days?	No    Risk Assessment:    Present on Admission:  Deep Venous Thrombosis	no  Pulmonary Embolus	no     Heart Failure:  Does this patient have a history of or has been diagnosed with heart failure? no.     PAST MEDICAL & SURGICAL HISTORY:  Myocardial infarction  COPD (chronic obstructive pulmonary disease)  Seasonal allergies  GERD (gastroesophageal reflux disease)  Benign prostatic hypertrophy  Hyperlipidemia  Hypertension  Asthma: Also COPD component  Depression  Peripheral Neuropathy  Asthma  S/P AVR (Aortic Valve Replacement)  S/P CABG (Coronary Artery Bypass Graft)  Osteoporosis  CAD (Coronary Artery Disease): MI, s/p CABG with 2 cardiac stents  HTN (Hypertension)  Dyslipidemia  S/P inguinal hernia repair: On right side  S/P appendectomy  S/P CABG (coronary artery bypass graft): Done in ; 2 cardiac stents in   S/P CABG (Coronary Artery Bypass Graft)  S/P AVR (Aortic Valve Replacement): Pig valve (bioprosthetic); done  at [place]        Medication list         MEDICATIONS  (STANDING):  amLODIPine   Tablet 5 milliGRAM(s) Oral daily  atorvastatin 80 milliGRAM(s) Oral at bedtime  azithromycin  IVPB 500 milliGRAM(s) IV Intermittent every 24 hours  azithromycin  IVPB      aztreonam  IVPB      aztreonam  IVPB 1000 milliGRAM(s) IV Intermittent every 8 hours  cilostazol 50 milliGRAM(s) Oral two times a day  finasteride 5 milliGRAM(s) Oral daily  heparin  Injectable 5000 Unit(s) SubCutaneous every 12 hours  tamsulosin 0.4 milliGRAM(s) Oral at bedtime    MEDICATIONS  (PRN):  acetaminophen   Tablet .. 650 milliGRAM(s) Oral every 6 hours PRN Temp greater or equal to 38C (100.4F)         Vitals log        ICU Vital Signs Last 24 Hrs  T(C): 36.3 (2020 07:43), Max: 38.4 (2020 16:05)  T(F): 97.4 (2020 07:43), Max: 101.1 (2020 16:05)  HR: 60 (2020 07:43) (60 - 98)  BP: 131/78 (2020 07:43) (117/57 - 170/100)  BP(mean): --  ABP: --  ABP(mean): --  RR: 16 (2020 07:43) (14 - 17)  SpO2: 93% (2020 07:43) (93% - 97%)           Input and Output:  I&O's Detail    2020 07:01  -  2020 07:00  --------------------------------------------------------  IN:  Total IN: 0 mL    OUT:    Voided: 175 mL  Total OUT: 175 mL    Total NET: -175 mL          Lab Data                        12.3   9.16  )-----------( 186      ( 2020 07:09 )             39.8     -18    144  |  112<H>  |  39<H>  ----------------------------<  182<H>  5.0   |  23  |  2.00<H>    Ca    9.4      2020 07:09    TPro  6.5  /  Alb  2.2<L>  /  TBili  0.2  /  DBili  x   /  AST  17  /  ALT  19  /  AlkPhos  96              Review of Systems	  on room air      Objective     Physical Examination    heart s1s2  lung dec BS  abd dec BS       Pertinent Lab findings & Imaging      Ava:  NO   Adequate UO     I&O's Detail    2020 07:01  -  2020 07:00  --------------------------------------------------------  IN:  Total IN: 0 mL    OUT:    Voided: 175 mL  Total OUT: 175 mL    Total NET: -175 mL               Discussed with:     Cultures:	        Radiology    EXAM:  CT CHEST                            PROCEDURE DATE:  2020          INTERPRETATION:  .    CLINICAL INFORMATION: Cough. Fever. Evaluate for pneumonia.    TECHNIQUE: Helical axial images of the chest were obtained from the thoracic inlet to the adrenal glands without the administration of intravenous contrast. Sagittal and coronal reformations were obtained from the source data.     COMPARISON: Prior chest CT examination from 2015. Prior chest x-ray examination from earlier today.    FINDINGS: There is no axillary, mediastinal, or hilar lymphadenopathy.    The heart size is at the upper limits of normal. The pericardium appears unremarkable. The patient is status post coronary artery bypass graft. The patient is status post median sternotomy. There are atherosclerotic calcifications of the imaged coronary arteries, aorta, and branch vessels. The imaged portions of the aorta are normal in caliber. Aortic valve replacement is noted.    Small amount of layering secretions are noted within the trachea. There is patchy consolidation within the right lower lobe. Scattered areas of linear atelectasis versus scarring are notable throughout both lungs, most pronounced within the right upper lobe. Emphysema is noted. An 8 mm left lower lobe pulmonary nodule appears unchanged (series 2, image 87). A 2 mm left lower lobe pulmonary nodule also appears unchanged (series 2, image 61). There is a 5 mm triangular shaped nodule within the right fissure (series 2, image 61). This may represent intrapulmonary lymph node and appears unchanged when compared to the prior study.    Cholelithiasis is notable. A left-sided renal cyst is noted. There is a subcentimeter rounded hypodense focus within the right kidney which is too small to characterize.    The stomach is underdistended, limiting evaluation.    There is generalized osteopenia. Multilevel degenerative changes are noted within the imaged potions of the spine. An old T10 vertebral body compression deformity appears unchanged.    IMPRESSION: Right lower lobe pneumonia. Recommend imaging follow-up to resolution.    Similar-appearing 8 mm dominant left lower lobe pulmonary nodule compared with 2015.                ASTRID RICKS M.D., ATTENDING RADIOLOGIST  This document has been electronically signed. 2020  6:21PM

## 2020-01-18 NOTE — CONSULT NOTE ADULT - PROBLEM SELECTOR RECOMMENDATION 9
pulm nodule - 8 mm - ex smoker - COPD - pna  cad - hx -   duoneb PRN  emp ABX -   likely asp pna - swallow eval pending  CT chest reviewed  supportive medical regimen and [facility] rx regimen  will need outpatient follow up with his Pulm - for Nodule monitoring and CT chest repeat in 6 - 12 months  pt is on room air - vs and HD and Sat reviewed  will follow
certainly concerning with pt being born in [place] in the 1930s and having 4 mos of cough with 50lb weight loss but localization and acute worsening most suggestive of bacterial PNA. Not a compelling PCN allergy so will change pt to ceftriaxone with recs to follow regarding ultimate duration

## 2020-01-18 NOTE — PROGRESS NOTE ADULT - SUBJECTIVE AND OBJECTIVE BOX
Patient is a 85y old  Male who presents with a chief complaint of Pneumonia (17 Jan 2020 19:47)      INTERVAL HPI/OVERNIGHT EVENTS: 86 yo male with PMHx of Benign prostatic hypertrophy, CAD, MI, s/p CABG with 2 cardiac stents, COPD, Depression, GERD, Hyperlipidemia  Hypertension, Osteoporosis, Peripheral Neuropathy, Dementia who presented to the ED s/p fall and increased confusion x1 day admitted with RLL pneumonia. Seen and examined at bedside. C/o cough. Denies chest pain, palpitation, sob       MEDICATIONS  (STANDING):  amLODIPine   Tablet 5 milliGRAM(s) Oral daily  atorvastatin 80 milliGRAM(s) Oral at bedtime  azithromycin  IVPB 500 milliGRAM(s) IV Intermittent every 24 hours  azithromycin  IVPB      aztreonam  IVPB      aztreonam  IVPB 1000 milliGRAM(s) IV Intermittent every 8 hours  cilostazol 50 milliGRAM(s) Oral two times a day  finasteride 5 milliGRAM(s) Oral daily  heparin  Injectable 5000 Unit(s) SubCutaneous every 12 hours  tamsulosin 0.4 milliGRAM(s) Oral at bedtime    MEDICATIONS  (PRN):  acetaminophen   Tablet .. 650 milliGRAM(s) Oral every 6 hours PRN Temp greater or equal to 38C (100.4F)      Allergies    amoxicillin (Unknown)  Levaquin (Unknown)  penicillin (Unknown)    Intolerances        REVIEW OF SYSTEMS:  CONSTITUTIONAL: No fever,  or fatigue  EYES: No eye pain, visual disturbances, or discharge  ENMT:  No difficulty hearing, tinnitus, vertigo; No sinus or throat pain  NECK: No pain or stiffness  RESPIRATORY: + cough, No wheezing, chills or hemoptysis; No shortness of breath  CARDIOVASCULAR: No chest pain, palpitations, dizziness, or leg swelling  GASTROINTESTINAL: No abdominal or epigastric pain. No nausea, vomiting, or hematemesis; No diarrhea or constipation.   GENITOURINARY: No dysuria, frequency, hematuria, or incontinence  NEUROLOGICAL: No headaches,  loss of strength, numbness, or tremors  SKIN: No itching, burning, rashes, or lesions   LYMPH NODES: No enlarged glands  ENDOCRINE: No heat or cold intolerance; No hair loss; No polydipsia or polyuria  MUSCULOSKELETAL: No joint pain or swelling; No muscle, back, or extremity pain  HEME/LYMPH: No easy bruising, or bleeding gums  ALLERGY AND IMMUNOLOGIC: No hives or eczema    Vital Signs Last 24 Hrs  T(C): 36.3 (18 Jan 2020 07:43), Max: 38.4 (17 Jan 2020 16:05)  T(F): 97.4 (18 Jan 2020 07:43), Max: 101.1 (17 Jan 2020 16:05)  HR: 60 (18 Jan 2020 07:43) (60 - 98)  BP: 131/78 (18 Jan 2020 07:43) (117/57 - 170/100)  BP(mean): --  RR: 16 (18 Jan 2020 07:43) (14 - 17)  SpO2: 93% (18 Jan 2020 07:43) (93% - 97%)    PHYSICAL EXAM:  GENERAL: NAD, Awake, Alert   HEAD:  Atraumatic, Normocephalic  EYES: EOMI, PERRLA, conjunctiva and sclera clear  ENMT: No tonsillar erythema, exudates, or enlargement; Moist mucous membranes  NECK: Supple, No JVD, Normal thyroid  NERVOUS SYSTEM:  Alert & Oriented X3,  Motor Strength 5/5 B/L upper and lower extremities  CHEST/LUNG: Clear to auscultation bilaterally; No rales, rhonchi, wheezing, or rubs  HEART: S1S2+, Regular rate and rhythm  ABDOMEN: Soft, Nontender, Nondistended; Bowel sounds present  EXTREMITIES:  2+ Peripheral Pulses, No clubbing, cyanosis, or edema  LYMPH: No lymphadenopathy noted  SKIN: No rashes or lesions    LABS:                        12.3   9.16  )-----------( 186      ( 18 Jan 2020 07:09 )             39.8     18 Jan 2020 07:09    144    |  112    |  39     ----------------------------<  182    5.0     |  23     |  2.00     Ca    9.4        18 Jan 2020 07:09    TPro  6.5    /  Alb  2.2    /  TBili  0.2    /  DBili  x      /  AST  17     /  ALT  19     /  AlkPhos  96     18 Jan 2020 07:09      CAPILLARY BLOOD GLUCOSE        BLOOD CULTURE    RADIOLOGY & ADDITIONAL TESTS:    Imaging Personally Reviewed:  [ ] YES     Consultant(s) Notes Reviewed:      Care Discussed with Consultants/Other Providers:

## 2020-01-18 NOTE — PROGRESS NOTE ADULT - PROBLEM SELECTOR PLAN 3
Nebs, Oxygen.  - Need to verify medications. Wife and aide will bring in medications in AM Suspect acute encephalopathy due to pneumonia  CT head negative for acute findings  No focal motor/ sensory deficits   Neuro consult Suspect acute encephalopathy due to pneumonia.  Wife reports h/o dementia, not on meds needs refill prescription from neuro.  CT head negative for acute findings  No focal motor/ sensory deficits   Neuro consult

## 2020-01-18 NOTE — PROGRESS NOTE ADULT - PROBLEM SELECTOR PLAN 6
Chronic, stable  - Continue home med Finasteride and Silodosin  - Need to verify medications. Wife and aide will bring in medications in AM Chronic, stable  - Home med Rosuvastatin 80 mg qd - therapeutic interchange Atorvastatin 80 mg qd  - Continue home med Cilostazol   - Need to verify medications. Wife and aide will bring in medications in AM

## 2020-01-18 NOTE — PROGRESS NOTE ADULT - PROBLEM SELECTOR PLAN 4
Chronic, stable  - Continue home med Amlodipine  - Monitor routine hemodynamics Nebs, Oxygen.  - Need to verify medications. Wife and aide will bring in medications in AM

## 2020-01-18 NOTE — PROGRESS NOTE ADULT - PROBLEM SELECTOR PLAN 5
Chronic, stable  - Home med Rosuvastatin 80 mg qd - therapeutic interchange Atorvastatin 80 mg qd  - Continue home med Cilostazol   - Need to verify medications. Wife and aide will bring in medications in AM Chronic, stable  - Continue home med Amlodipine  - Monitor routine hemodynamics

## 2020-01-18 NOTE — SWALLOW BEDSIDE ASSESSMENT ADULT - ASR SWALLOW ASPIRATION MONITOR
oral hygiene/fever/pneumonia/throat clearing/gurgly voice/cough/upper respiratory infection/change of breathing pattern/position upright (89Y)

## 2020-01-18 NOTE — PROGRESS NOTE ADULT - PROBLEM SELECTOR PLAN 9
DVT PPx: Heparin 5000 mg subQ q12h    IMPROVE VTE Individual Risk Assessment          RISK                                                          Points  [  ] Previous VTE                                                3  [  ] Thrombophilia                                             2  [  ] Lower limb paralysis                                   2        (unable to hold up >15 seconds)    [  ] Current Cancer                                             2         (within 6 months)  [  ] Immobilization > 24 hrs                              1  [  ] ICU/CCU stay > 24 hours                             1  [ x ] Age > 60                                                         1    IMPROVE VTE Score:         [    1     ] - Need to verify medications. Wife and aide will bring in medications in AM

## 2020-01-18 NOTE — SWALLOW BEDSIDE ASSESSMENT ADULT - SWALLOW EVAL: DIAGNOSIS
Oral & pharyngeal stages WFL, no overt s/s penetration/aspiration.  However, given chest CT findings noted above, pt would benefit from MBS for objective view of pharyngeal stage to rule out silent aspiration.

## 2020-01-18 NOTE — CONSULT NOTE ADULT - ASSESSMENT
86 yo male with PMHx of Benign prostatic hypertrophy, CAD, MI, s/p CABG with 2 cardiac stents, COPD, Depression, GERD, Hyperlipidemia  Hypertension, Osteoporosis, Peripheral Neuropathy, Dementia who presented to the ED s/p fall and increased confusion x1 day. with cough x3 months, with associated 50lb weight loss and RLL localization on imaging and exam

## 2020-01-18 NOTE — PROGRESS NOTE ADULT - SUBJECTIVE AND OBJECTIVE BOX
neuro cons dict  AMS LIKELY  METABOLIC  YOU  PNEUMONIA  DEMENTIA  START ARICEPT 10 MG HS  WOULD FOLLOW UP,

## 2020-01-18 NOTE — CONSULT NOTE ADULT - PROBLEM SELECTOR RECOMMENDATION 3
suspect etiology of long term cough    Thank you for consulting us and involving us in the management of this most interesting and challenging case.     We will follow along in the care of this patient.

## 2020-01-18 NOTE — SWALLOW BEDSIDE ASSESSMENT ADULT - COMMENTS
Chart reviewed, order received for swallow evaluation.  Pt seen this morning for initial swallow assessment, received in bed A&A Ox4, on RA, pain scale 0/10 pre & post eval.  Pt left as received NAD, RN & MD notified of rx's.  Will follow for MBS as schedule permits.    As per charting, pt is a "84 yo male with PMHx of Benign prostatic hypertrophy, CAD, MI, s/p CABG with 2 cardiac stents, COPD, Depression, GERD, Hyperlipidemia  Hypertension, Osteoporosis, Peripheral Neuropathy, Dementia who presented to the ED s/p fall and increased confusion x1 day admitted with RLL pneumonia. Seen and examined at bedside. C/o cough. Denies chest pain, palpitation, sob".    CT Chest 1/17/20: "Right lower lobe pneumonia..."

## 2020-01-18 NOTE — PROGRESS NOTE ADULT - PROBLEM SELECTOR PLAN 7
- Need to verify medications. Wife and aide will bring in medications in AM Chronic, stable  - Continue home med Finasteride and Silodosin  - Need to verify medications. Wife and aide will bring in medications in AM

## 2020-01-18 NOTE — CONSULT NOTE ADULT - SUBJECTIVE AND OBJECTIVE BOX
HPI:  86 yo male with PMHx of Benign prostatic hypertrophy, CAD, MI, s/p CABG with 2 cardiac stents, COPD, Depression, GERD, Hyperlipidemia  Hypertension, Osteoporosis, Peripheral Neuropathy, Dementia who presented to the ED s/p fall and increased confusion x1 day. Patient is accompanied by wife and health aide. Per aide, patient has a history of frequent falls, most recent fall was yesterday. Aide reports chronic cough for the past 3 months. Aide and wife noticed increasing confusion. Wife states "he could not remember how to make his way to the bedroom." Patient was brought to the ED for further evaluation. Per wife and aide, patient's mental status has returned to baseline. Patient admits to chills. Admits to cough x3 months, with associated 50lb weight loss.      PAST MEDICAL & SURGICAL HISTORY:  Myocardial infarction  COPD (chronic obstructive pulmonary disease)  Seasonal allergies  GERD (gastroesophageal reflux disease)  Benign prostatic hypertrophy  Hyperlipidemia  Hypertension  Depression  Peripheral Neuropathy  Osteoporosis  CAD (Coronary Artery Disease): MI, s/p CABG with 2 cardiac stents  S/P inguinal hernia repair: On right side  S/P appendectomy  S/P CABG (coronary artery bypass graft): Done in ; 2 cardiac stents in   S/P AVR (Aortic Valve Replacement): Pig valve (bioprosthetic); done  at [place]      Antimicrobials  azithromycin  IVPB 500 milliGRAM(s) IV Intermittent every 24 hours  azithromycin  IVPB      aztreonam  IVPB      aztreonam  IVPB 1000 milliGRAM(s) IV Intermittent every 8 hours      Immunological      Other  acetaminophen   Tablet .. 650 milliGRAM(s) Oral every 6 hours PRN  albuterol/ipratropium for Nebulization 3 milliLiter(s) Nebulizer every 4 hours PRN  amLODIPine   Tablet 5 milliGRAM(s) Oral daily  atorvastatin 80 milliGRAM(s) Oral at bedtime  cilostazol 50 milliGRAM(s) Oral two times a day  finasteride 5 milliGRAM(s) Oral daily  heparin  Injectable 5000 Unit(s) SubCutaneous every 12 hours  tamsulosin 0.4 milliGRAM(s) Oral at bedtime      Allergies    amoxicillin (Unknown)  Levaquin (Unknown)  penicillin (Unknown)    Intolerances        SOCIAL HISTORY:  Marital Status:  ( x  )    (   ) Single    (   )    (  )   Lives with: (  ) alone  (  ) children   ( x ) spouse   (  ) parents  (  ) other  Recent Travel: No recent travel  Occupation: Retired  Mobility: Uses walker and cane  Substance Use (street drugs): ( x ) never used  (  ) other:  Tobacco Usage:  Quit smoking , smoked ~1/2 ppd for 10 years  Alcohol Usage: Occasional (2020 19:47)      FAMILY HISTORY:  Family history of renal failure (Sibling)  Family history of MI (myocardial infarction):  at 65  Family history of stroke:  at 65  Family history of amyotrophic lateral sclerosis:  at 67  Family history of stroke:  at 67      ROS:    EYES:  Negative  blurry vision or double vision  GASTROINTESTINAL:  Negative for nausea, vomiting, diarrhea  -otherwise negative except for subjective    Vital Signs Last 24 Hrs  T(C): 36.3 (2020 07:43), Max: 38.4 (2020 16:05)  T(F): 97.4 (2020 07:43), Max: 101.1 (2020 16:05)  HR: 60 (2020 07:43) (60 - 98)  BP: 131/78 (2020 07:43) (117/57 - 170/100)  BP(mean): --  RR: 16 (2020 07:43) (14 - 17)  SpO2: 93% (2020 07:43) (93% - 97%)    PE:  WDWN in no distress  HEENT:  NC, PERRL, sclerae anicteric, conjunctivae clear, EOMI.  Sinuses nontender, no nasal exudate.  No buccal or pharyngeal lesions, erythema or exudate  Neck:  Supple, no adenopathy  Lungs:  Noted crackle and ronchi in RLL  Cor:  RRR, S1, S2, no murmur appreciated  Abd:  Symmetric, normoactive BS.  Soft, nontender, no masses, guarding or rebound.  Liver and spleen not enlarged  Extrem:  No cyanosis or edema  Skin:  No rashes.  Neuro: grossly intact  Musc: moving all limbs freely, no focal deficits    LABS:                        12.3   9.16  )-----------( 186      ( 2020 07:09 )             39.8       WBC Count: 9.16 K/uL (20 @ 07:09)  WBC Count: 12.46 K/uL (20 @ 16:44)          144  |  112<H>  |  39<H>  ----------------------------<  182<H>  5.0   |  23  |  2.00<H>    Ca    9.4      2020 07:09    TPro  6.5  /  Alb  2.2<L>  /  TBili  0.2  /  DBili  x   /  AST  17  /  ALT  19  /  AlkPhos  96        Creatinine, Serum: 2.00 mg/dL (20 @ 07:09)  Creatinine, Serum: 2.10 mg/dL (20 @ 16:44)      Urinalysis Basic - ( 2020 16:45 )    Color: Yellow / Appearance: Clear / S.015 / pH: x  Gluc: x / Ketone: Negative  / Bili: Negative / Urobili: Negative   Blood: x / Protein: 30 mg/dL / Nitrite: Negative   Leuk Esterase: Negative / RBC: x / WBC 0-2   Sq Epi: x / Non Sq Epi: Few / Bacteria: Few      MICROBIOLOGY:      RADIOLOGY & ADDITIONAL STUDIES:    --< from: CT Chest No Cont (20 @ 18:13) >    EXAM:  CT CHEST                            PROCEDURE DATE:  2020          INTERPRETATION:  .    CLINICAL INFORMATION: Cough. Fever. Evaluate for pneumonia.    TECHNIQUE: Helical axial images of the chest were obtained from the thoracic inlet to the adrenal glands without the administration of intravenous contrast. Sagittal and coronal reformations were obtained from the source data.     COMPARISON: Prior chest CT examination from 2015. Prior chest x-ray examination from earlier today.    FINDINGS: There is no axillary, mediastinal, or hilar lymphadenopathy.    The heart size is at the upper limits of normal. The pericardium appears unremarkable. The patient is status post coronary artery bypass graft. The patient is status post median sternotomy. There are atherosclerotic calcifications of the imaged coronary arteries, aorta, and branch vessels. The imaged portions of the aorta are normal in caliber. Aortic valve replacement is noted.    Small amount of layering secretions are noted within the trachea. There is patchy consolidation within the right lower lobe. Scattered areas of linear atelectasis versus scarring are notable throughout both lungs, most pronounced within the right upper lobe. Emphysema is noted. An 8 mm left lower lobe pulmonary nodule appears unchanged (series 2, image 87). A 2 mm left lower lobe pulmonary nodule also appears unchanged (series 2, image 61). There is a 5 mm triangular shaped nodule within the right fissure (series 2, image 61). This may represent intrapulmonary lymph node and appears unchanged when compared to the prior study.    Cholelithiasis is notable. A left-sided renal cyst is noted. There is a subcentimeter rounded hypodense focus within the right kidney which is too small to characterize.    The stomach is underdistended, limiting evaluation.    There is generalized osteopenia. Multilevel degenerative changes are noted within the imaged potions of the spine. An old T10 vertebral body compression deformity appears unchanged.    IMPRESSION: Right lower lobe pneumonia. Recommend imaging follow-up to resolution.    Similar-appearing 8 mm dominant left lower lobe pulmonary nodule compared with 2015.

## 2020-01-18 NOTE — CHART NOTE - NSCHARTNOTEFT_GEN_A_CORE
Spoke with Wife and aide on 1/18 and was only presented with 3 bottles: Amlodipine 5mg, Rosuvastatin 10mg, and Cilastazol 50mg BID, all filled >5 months ago. Unclear how often medication is taken, but as per aide, wife is usually picking up medications for the patient and is giving it to him, but often forgets at times and is not consistent. Checked outpatient Spoke with Wife and aide on 1/18 and was only presented with 3 bottles: Amlodipine 5mg, Rosuvastatin 10mg, and Cilastazol 50mg BID, all filled >5 months ago. Unclear how often medication is taken, but as per aide, wife is usually picking up medications for the patient and is giving it to him, but often forgets at times and is not consistent. Checked outpatient record and shows Albuterol inhaler picked up in March 2019. Called [facility], and there are no records of medications other than Promethazine cough syrup being filled within the past 5 months.

## 2020-01-18 NOTE — PATIENT PROFILE ADULT - VISION (WITH CORRECTIVE LENSES IF THE PATIENT USUALLY WEARS THEM):
Wears Glasses/Partially impaired: cannot see medication labels or newsprint, but can see obstacles in path, and the surrounding layout; can count fingers at arm's length

## 2020-01-18 NOTE — PROGRESS NOTE ADULT - ATTENDING COMMENTS
F/u cultures  PT evaluation.  Continue antibiotics  Lung  nodule. Pulm consult F/u cultures  PT evaluation.  Continue antibiotics  Lung  nodule. Pulm consult  D/w wife, all questions answered

## 2020-01-18 NOTE — PROGRESS NOTE ADULT - ASSESSMENT
84 yo male with PMHx of Benign prostatic hypertrophy, CAD, MI, s/p CABG with 2 cardiac stents, COPD, Depression, GERD, Hyperlipidemia  Hypertension, Osteoporosis, Peripheral Neuropathy, Dementia who presented to the ED s/p fall and increased confusion x1 day admitted with RLL pneumonia. 84 yo male with PMHx of Benign prostatic hypertrophy, CAD, MI, s/p CABG with 2 cardiac stents, COPD, Depression, GERD, Hyperlipidemia  Hypertension, Osteoporosis, Peripheral Neuropathy, Dementia who presented to the ED s/p fall and increased confusion x1 day admitted with RLL pneumonia, confusion suspect encephalopathy

## 2020-01-19 LAB
ANION GAP SERPL CALC-SCNC: 8 MMOL/L — SIGNIFICANT CHANGE UP (ref 5–17)
BUN SERPL-MCNC: 58 MG/DL — HIGH (ref 7–23)
CALCIUM SERPL-MCNC: 8.8 MG/DL — SIGNIFICANT CHANGE UP (ref 8.5–10.1)
CHLORIDE SERPL-SCNC: 114 MMOL/L — HIGH (ref 96–108)
CK MB BLD-MCNC: 6.4 % — HIGH (ref 0–3.5)
CK MB CFR SERPL CALC: 1.4 NG/ML — SIGNIFICANT CHANGE UP (ref 0–3.6)
CK SERPL-CCNC: 22 U/L — LOW (ref 26–308)
CO2 SERPL-SCNC: 23 MMOL/L — SIGNIFICANT CHANGE UP (ref 22–31)
CREAT SERPL-MCNC: 1.9 MG/DL — HIGH (ref 0.5–1.3)
GLUCOSE SERPL-MCNC: 125 MG/DL — HIGH (ref 70–99)
GRAM STN FLD: SIGNIFICANT CHANGE UP
HCT VFR BLD CALC: 36.2 % — LOW (ref 39–50)
HGB BLD-MCNC: 11.5 G/DL — LOW (ref 13–17)
LEGIONELLA AG UR QL: NEGATIVE — SIGNIFICANT CHANGE UP
MCHC RBC-ENTMCNC: 27.4 PG — SIGNIFICANT CHANGE UP (ref 27–34)
MCHC RBC-ENTMCNC: 31.8 GM/DL — LOW (ref 32–36)
MCV RBC AUTO: 86.4 FL — SIGNIFICANT CHANGE UP (ref 80–100)
NRBC # BLD: 0 /100 WBCS — SIGNIFICANT CHANGE UP (ref 0–0)
PLATELET # BLD AUTO: 222 K/UL — SIGNIFICANT CHANGE UP (ref 150–400)
POTASSIUM SERPL-MCNC: 4.4 MMOL/L — SIGNIFICANT CHANGE UP (ref 3.5–5.3)
POTASSIUM SERPL-SCNC: 4.4 MMOL/L — SIGNIFICANT CHANGE UP (ref 3.5–5.3)
RBC # BLD: 4.19 M/UL — LOW (ref 4.2–5.8)
RBC # FLD: 14.8 % — HIGH (ref 10.3–14.5)
SODIUM SERPL-SCNC: 145 MMOL/L — SIGNIFICANT CHANGE UP (ref 135–145)
SPECIMEN SOURCE: SIGNIFICANT CHANGE UP
TROPONIN I SERPL-MCNC: 0.16 NG/ML — HIGH (ref 0.01–0.04)
WBC # BLD: 15.67 K/UL — HIGH (ref 3.8–10.5)
WBC # FLD AUTO: 15.67 K/UL — HIGH (ref 3.8–10.5)

## 2020-01-19 PROCEDURE — 99233 SBSQ HOSP IP/OBS HIGH 50: CPT

## 2020-01-19 PROCEDURE — 93010 ELECTROCARDIOGRAM REPORT: CPT

## 2020-01-19 RX ORDER — ONDANSETRON 8 MG/1
4 TABLET, FILM COATED ORAL ONCE
Refills: 0 | Status: COMPLETED | OUTPATIENT
Start: 2020-01-19 | End: 2020-01-19

## 2020-01-19 RX ORDER — CALCIUM CARBONATE 500(1250)
2 TABLET ORAL EVERY 6 HOURS
Refills: 0 | Status: DISCONTINUED | OUTPATIENT
Start: 2020-01-19 | End: 2020-01-22

## 2020-01-19 RX ORDER — SIMETHICONE 80 MG/1
80 TABLET, CHEWABLE ORAL ONCE
Refills: 0 | Status: COMPLETED | OUTPATIENT
Start: 2020-01-19 | End: 2020-01-19

## 2020-01-19 RX ADMIN — CILOSTAZOL 50 MILLIGRAM(S): 100 TABLET ORAL at 05:26

## 2020-01-19 RX ADMIN — HEPARIN SODIUM 5000 UNIT(S): 5000 INJECTION INTRAVENOUS; SUBCUTANEOUS at 18:03

## 2020-01-19 RX ADMIN — HEPARIN SODIUM 5000 UNIT(S): 5000 INJECTION INTRAVENOUS; SUBCUTANEOUS at 05:26

## 2020-01-19 RX ADMIN — FINASTERIDE 5 MILLIGRAM(S): 5 TABLET, FILM COATED ORAL at 11:15

## 2020-01-19 RX ADMIN — CEFTRIAXONE 100 MILLIGRAM(S): 500 INJECTION, POWDER, FOR SOLUTION INTRAMUSCULAR; INTRAVENOUS at 18:03

## 2020-01-19 RX ADMIN — CILOSTAZOL 50 MILLIGRAM(S): 100 TABLET ORAL at 18:28

## 2020-01-19 RX ADMIN — TAMSULOSIN HYDROCHLORIDE 0.4 MILLIGRAM(S): 0.4 CAPSULE ORAL at 21:41

## 2020-01-19 RX ADMIN — Medication 2 TABLET(S): at 17:29

## 2020-01-19 RX ADMIN — DONEPEZIL HYDROCHLORIDE 10 MILLIGRAM(S): 10 TABLET, FILM COATED ORAL at 21:41

## 2020-01-19 RX ADMIN — ATORVASTATIN CALCIUM 80 MILLIGRAM(S): 80 TABLET, FILM COATED ORAL at 21:41

## 2020-01-19 RX ADMIN — ONDANSETRON 4 MILLIGRAM(S): 8 TABLET, FILM COATED ORAL at 17:29

## 2020-01-19 RX ADMIN — AMLODIPINE BESYLATE 5 MILLIGRAM(S): 2.5 TABLET ORAL at 05:26

## 2020-01-19 NOTE — PROGRESS NOTE ADULT - SUBJECTIVE AND OBJECTIVE BOX
Date/Time Patient Seen:  		  Referring MD:   Data Reviewed	       Patient is a 85y old  Male who presents with a chief complaint of Pneumonia (18 Jan 2020 16:43)      Subjective/HPI     PAST MEDICAL & SURGICAL HISTORY:  Myocardial infarction  COPD (chronic obstructive pulmonary disease)  Seasonal allergies  GERD (gastroesophageal reflux disease)  Benign prostatic hypertrophy  Hyperlipidemia  Hypertension  Asthma: Also COPD component  Depression  Peripheral Neuropathy  Asthma  S/P AVR (Aortic Valve Replacement)  S/P CABG (Coronary Artery Bypass Graft)  Osteoporosis  CAD (Coronary Artery Disease): MI, s/p CABG with 2 cardiac stents  HTN (Hypertension)  Dyslipidemia  S/P inguinal hernia repair: On right side  S/P appendectomy  S/P CABG (coronary artery bypass graft): Done in 1996; 2 cardiac stents in 1998  S/P CABG (Coronary Artery Bypass Graft)  S/P AVR (Aortic Valve Replacement): Pig valve (bioprosthetic); done 2010 at [place]        Medication list         MEDICATIONS  (STANDING):  amLODIPine   Tablet 5 milliGRAM(s) Oral daily  atorvastatin 80 milliGRAM(s) Oral at bedtime  cefTRIAXone   IVPB 1000 milliGRAM(s) IV Intermittent every 24 hours  cilostazol 50 milliGRAM(s) Oral two times a day  donepezil 10 milliGRAM(s) Oral at bedtime  finasteride 5 milliGRAM(s) Oral daily  heparin  Injectable 5000 Unit(s) SubCutaneous every 12 hours  tamsulosin 0.4 milliGRAM(s) Oral at bedtime    MEDICATIONS  (PRN):  acetaminophen   Tablet .. 650 milliGRAM(s) Oral every 6 hours PRN Temp greater or equal to 38C (100.4F)  albuterol/ipratropium for Nebulization 3 milliLiter(s) Nebulizer every 4 hours PRN Shortness of Breath and/or Wheezing         Vitals log        ICU Vital Signs Last 24 Hrs  T(C): 36.3 (19 Jan 2020 00:19), Max: 36.4 (18 Jan 2020 15:32)  T(F): 97.3 (19 Jan 2020 00:19), Max: 97.5 (18 Jan 2020 15:32)  HR: 73 (19 Jan 2020 05:25) (60 - 73)  BP: 128/86 (19 Jan 2020 05:25) (114/66 - 131/78)  BP(mean): --  ABP: --  ABP(mean): --  RR: 17 (19 Jan 2020 05:25) (16 - 18)  SpO2: 92% (19 Jan 2020 05:25) (91% - 93%)           Input and Output:  I&O's Detail    17 Jan 2020 07:01  -  18 Jan 2020 07:00  --------------------------------------------------------  IN:  Total IN: 0 mL    OUT:    Voided: 175 mL  Total OUT: 175 mL    Total NET: -175 mL      18 Jan 2020 07:01  -  19 Jan 2020 06:07  --------------------------------------------------------  IN:    Oral Fluid: 240 mL  Total IN: 240 mL    OUT:    Voided: 500 mL  Total OUT: 500 mL    Total NET: -260 mL          Lab Data                        12.3   9.16  )-----------( 186      ( 18 Jan 2020 07:09 )             39.8     01-18    144  |  112<H>  |  39<H>  ----------------------------<  182<H>  5.0   |  23  |  2.00<H>    Ca    9.4      18 Jan 2020 07:09    TPro  6.5  /  Alb  2.2<L>  /  TBili  0.2  /  DBili  x   /  AST  17  /  ALT  19  /  AlkPhos  96  01-18            Review of Systems	      Objective     Physical Examination    heart s1s2  lung dec BS  abd soft  on room air      Pertinent Lab findings & Imaging      Ava:  NO   Adequate UO     I&O's Detail    17 Jan 2020 07:01  -  18 Jan 2020 07:00  --------------------------------------------------------  IN:  Total IN: 0 mL    OUT:    Voided: 175 mL  Total OUT: 175 mL    Total NET: -175 mL      18 Jan 2020 07:01  -  19 Jan 2020 06:07  --------------------------------------------------------  IN:    Oral Fluid: 240 mL  Total IN: 240 mL    OUT:    Voided: 500 mL  Total OUT: 500 mL    Total NET: -260 mL               Discussed with:     Cultures:	        Radiology

## 2020-01-19 NOTE — PHYSICAL THERAPY INITIAL EVALUATION ADULT - CRITERIA FOR SKILLED THERAPEUTIC INTERVENTIONS
anticipated discharge recommendation/risk reduction/prevention/rehab potential/impairments found/functional limitations in following categories/therapy frequency/predicted duration of therapy intervention

## 2020-01-19 NOTE — PROGRESS NOTE ADULT - SUBJECTIVE AND OBJECTIVE BOX
Neurology Follow up note    ARABELLA ESPARZA85yMale    HPI:  84 yo male with PMHx of Benign prostatic hypertrophy, CAD, MI, s/p CABG with 2 cardiac stents, COPD, Depression, GERD, Hyperlipidemia  Hypertension, Osteoporosis, Peripheral Neuropathy, Dementia who presented to the ED s/p fall and increased confusion x1 day. Patient is accompanied by wife and health aide. Patient is a limited historian. Per aide, patient has a history of frequent falls, most recent fall was yesterday. Aide reports chronic cough for the past 3 months. Aide and wife noticed increasing confusion. Wife states "he could not remember how to make his way to the bedroom." Patient was brought to the ED for further evaluation. Per wife and aide, patient's mental status has returned to baseline. Patient admits to chills. Admits to cough x3 months, for which he takes cough syrup. Aide endorses frequent episodes of stool and urine incontinence. Patient lives with wife and 24 hour aid. He ambulates with walker and cane. Denies fever, chest pain, shortness of breath, abdominal pain, nausea or vomiting.     ED vitals: T: 98.1, HR: 98, BP: 157/70 RR: 17 O2: 93 on RA   Labs significant for: WBC: 12.46 with left shift, Chloride: 109, BUN: 41, Cr: 2.1, EGFR: 28   X-ray:  The cardiomediastinal silhouette is similar to the previous exam with dilated tortuous thoracic aorta. There is decreased right lung volume with shift of the mediastinum to the right side. There is right perihilar scarring again noted. No pleural effusion  CT Chest:  Right lower lobe pneumonia  CT Head: No acute intracranial findings  In the ED patient was given: Azactam and Vancomycin X1, tylenol, IVF bolus X1, albuterol/ipratropium X1, Solumederol 125 X1 (17 Jan 2020 19:47)      Interval History -no new events    Patient is seen, chart was reviewed and case was discussed with the treatment team.  Pt is not in any distress.   Lying on bed comfortably.   No events reported overnight.   No clinical seizure was reported.      Vital Signs Last 24 Hrs  T(C): 36.4 (19 Jan 2020 16:05), Max: 36.4 (19 Jan 2020 16:05)  T(F): 97.5 (19 Jan 2020 16:05), Max: 97.5 (19 Jan 2020 16:05)  HR: 79 (19 Jan 2020 16:05) (62 - 79)  BP: 142/81 (19 Jan 2020 16:05) (111/61 - 142/81)  BP(mean): --  RR: 17 (19 Jan 2020 16:05) (17 - 18)  SpO2: 94% (19 Jan 2020 16:05) (91% - 94%)        REVIEW OF SYSTEMS:    Constitutional: No fever, weight loss or fatigue  Eyes: No eye pain, visual disturbances, or discharge  ENT:  No difficulty hearing, tinnitus, vertigo; No sinus or throat pain  Neck: No pain or stiffness  Respiratory: No cough, wheezing, chills or hemoptysis  Cardiovascular: No chest pain, palpitations, shortness of breath, dizziness or leg swelling  Gastrointestinal: No abdominal or epigastric pain. No nausea, vomiting or hematemesis;   Genitourinary: No dysuria, frequency, hematuria or incontinence  Neurological: No headaches, memory loss, loss of strength, numbness or tremors  Psychiatric: No depression, anxiety, mood swings or difficulty sleeping  Musculoskeletal: No joint pain or swelling; No muscle, back or extremity pain  Skin: No itching, burning, rashes or lesions   Lymph Nodes: No enlarged glands  Endocrine: No heat or cold intolerance;   Allergy and Immunologic: No hives or eczema    On Neurological Examination:    Mental Status - Pt is alert, awake, oriented X2   Follows commands well and able to answer questions appropriately  .Mood and affect  normal    Speech -  Normal.     Cranial Nerves - Pupils 3 mm equal and reactive to light, extraocular eye movements intact.   Pt has no facial asymmetry. Facial sensation is intact.Tongue - is in midline.    Muscle tone - is normal all over. Moves all extremities equally.     Motor Exam - 5/5 of UE  LE 4+/5    Sensory Exam -  Pt withdraws all extremities equally on stimulation. No asymmetry seen. No complaints of tingling, numbness.    coordination:    Finger to nose: normal      Deep tendon Reflexes - 2 plus all over.        Neck Supple -  Yes.     MEDICATIONS    acetaminophen   Tablet .. 650 milliGRAM(s) Oral every 6 hours PRN  albuterol/ipratropium for Nebulization 3 milliLiter(s) Nebulizer every 4 hours PRN  amLODIPine   Tablet 5 milliGRAM(s) Oral daily  atorvastatin 80 milliGRAM(s) Oral at bedtime  calcium carbonate    500 mG (Tums) Chewable 2 Tablet(s) Chew every 6 hours PRN  cefTRIAXone   IVPB 1000 milliGRAM(s) IV Intermittent every 24 hours  cilostazol 50 milliGRAM(s) Oral two times a day  donepezil 10 milliGRAM(s) Oral at bedtime  finasteride 5 milliGRAM(s) Oral daily  heparin  Injectable 5000 Unit(s) SubCutaneous every 12 hours  ondansetron Injectable 4 milliGRAM(s) IV Push once  tamsulosin 0.4 milliGRAM(s) Oral at bedtime      Allergies    amoxicillin (Unknown)  Levaquin (Unknown)  penicillin (Unknown)    Intolerances        LABS:  CBC Full  -  ( 19 Jan 2020 06:26 )  WBC Count : 15.67 K/uL  RBC Count : 4.19 M/uL  Hemoglobin : 11.5 g/dL  Hematocrit : 36.2 %  Platelet Count - Automated : 222 K/uL  Mean Cell Volume : 86.4 fl  Mean Cell Hemoglobin : 27.4 pg  : x  Auto Basophil % : x      01-19    145  |  114<H>  |  58<H>  ----------------------------<  125<H>  4.4   |  23  |  1.90<H>    Ca    8.8      19 Jan 2020 06:26    TPro  6.5  /  Alb  2.2<L>  /  TBili  0.2  /  DBili  x   /  AST  17  /  ALT  19  /  AlkPhos  96  01-18    Hemoglobin A1C:     Vitamin B12     RADIOLOGY    ASSESSMENT AND PLAN:      SEEN FOR AMS; LIKELY METABOLIC ENCEPHALOPATHY  DEMENTIA  PNEUMONIA    ANTIBIOTIC AS MEDICAL TEAM  CONTINUE ARICEPT.  Physical therapy evaluation.  OOB to chair/ambulation with assistance only  Pain is accessed and addressed.  Plan of care was discussed with family. Questions answered.  Would continue to follow.

## 2020-01-19 NOTE — PROGRESS NOTE ADULT - PROBLEM SELECTOR PLAN 6
Chronic, stable  - Home med Rosuvastatin 80 mg qd - therapeutic interchange Atorvastatin 80 mg qd  - Continue home med Cilostazol   - Need to verify medications. Wife and aide will bring in medications in AM

## 2020-01-19 NOTE — PROGRESS NOTE ADULT - PROBLEM SELECTOR PLAN 3
Suspect acute encephalopathy due to pneumonia.  Wife reports h/o dementia, not on meds needs refill prescription from neuro. On Aricept now.   CT head negative for acute findings  No focal motor/ sensory deficits   Neuro consult appreciated

## 2020-01-19 NOTE — PROGRESS NOTE ADULT - SUBJECTIVE AND OBJECTIVE BOX
infectious diseases progress note:    ARABELLA ESPARZA is a 85y y. o. Male patient    Patient reports: "just really tired because I did not sleep last night"    ROS:    EYES:  Negative  blurry vision or double vision  GASTROINTESTINAL:  Negative for nausea, vomiting, diarrhea  -otherwise negative except for subjective    Allergies    amoxicillin (Unknown)  Levaquin (Unknown)  penicillin (Unknown)    Intolerances        ANTIBIOTICS/RELEVANT:  antimicrobials  cefTRIAXone   IVPB 1000 milliGRAM(s) IV Intermittent every 24 hours    immunologic:    OTHER:  acetaminophen   Tablet .. 650 milliGRAM(s) Oral every 6 hours PRN  albuterol/ipratropium for Nebulization 3 milliLiter(s) Nebulizer every 4 hours PRN  amLODIPine   Tablet 5 milliGRAM(s) Oral daily  atorvastatin 80 milliGRAM(s) Oral at bedtime  cilostazol 50 milliGRAM(s) Oral two times a day  donepezil 10 milliGRAM(s) Oral at bedtime  finasteride 5 milliGRAM(s) Oral daily  heparin  Injectable 5000 Unit(s) SubCutaneous every 12 hours  tamsulosin 0.4 milliGRAM(s) Oral at bedtime      Objective:  Vital Signs Last 24 Hrs  T(C): 36.3 (2020 07:33), Max: 36.4 (2020 15:32)  T(F): 97.4 (2020 07:33), Max: 97.5 (2020 15:32)  HR: 62 (2020 07:33) (62 - 73)  BP: 111/61 (2020 07:33) (111/61 - 128/86)  BP(mean): --  RR: 18 (2020 07:33) (16 - 18)  SpO2: 93% (2020 07:33) (91% - 93%)    T(C): 36.3 (20 @ 07:33), Max: 38.4 (20 @ 16:05)  T(C): 36.3 (20 @ 07:33), Max: 38.4 (20 @ 16:05)  T(C): 36.3 (20 @ 07:33), Max: 38.4 (20 @ 16:05)    PHYSICAL EXAM:  Constitutional: Well-developed, well nourished  Eyes: PERRLA, EOMI  Ear/Nose/Throat: oropharynx normal	  Neck: no JVD, no lymphadenopathy, supple  Respiratory: no accessory muscle use  Cardiovascular: RRR,   Gastrointestinal: soft, NT  Extremities: no clubbing, no cyanosis, edema absent      LABS:                        11.5   15.67 )-----------( 222      ( 2020 06:26 )             36.2       15.67  @ 06:26  9.16  @ 07:09  12.46  @ 16:44          145  |  114<H>  |  58<H>  ----------------------------<  125<H>  4.4   |  23  |  1.90<H>    Ca    8.8      2020 06:26    TPro  6.5  /  Alb  2.2<L>  /  TBili  0.2  /  DBili  x   /  AST  17  /  ALT  19  /  AlkPhos  96        Creatinine, Serum: 1.90 mg/dL (20 @ 06:26)  Creatinine, Serum: 2.00 mg/dL (20 @ 07:09)  Creatinine, Serum: 2.10 mg/dL (20 @ 16:44)        Urinalysis Basic - ( 2020 16:45 )    Color: Yellow / Appearance: Clear / S.015 / pH: x  Gluc: x / Ketone: Negative  / Bili: Negative / Urobili: Negative   Blood: x / Protein: 30 mg/dL / Nitrite: Negative   Leuk Esterase: Negative / RBC: x / WBC 0-2   Sq Epi: x / Non Sq Epi: Few / Bacteria: Few            MICROBIOLOGY:    MRSA/MSSA PCR (20 @ 11:20)    MRSA PCR Result.: NotDetec:    Staph Aureus PCR Result: NotDetec        RADIOLOGY & ADDITIONAL STUDIES:

## 2020-01-19 NOTE — PROGRESS NOTE ADULT - ASSESSMENT
84 yo male with PMHx of Benign prostatic hypertrophy, CAD, MI, s/p CABG with 2 cardiac stents, COPD, Depression, GERD, Hyperlipidemia  Hypertension, Osteoporosis, Peripheral Neuropathy, Dementia who presented to the ED s/p fall and increased confusion x1 day admitted with RLL pneumonia, confusion suspect encephalopathy, now resolved

## 2020-01-19 NOTE — PHYSICAL THERAPY INITIAL EVALUATION ADULT - PERTINENT HX OF CURRENT PROBLEM, REHAB EVAL
Pt is 85 y.o. M with hx benign prostatic hypertrophy presented to ED s/p fall with increased confusion x1day, hx frequent falls, chronic cough ~3 months, admitted with RLL PNA.

## 2020-01-19 NOTE — CHART NOTE - NSCHARTNOTEFT_GEN_A_CORE
Called by RN for Pt c/o chest discomfort. Patient stated that he was drinking from a can of ginger ale when he felt a mild chest discomfort. Patient states that the pain is non-radiating, with no other symptoms besides chest discomfort. Patient states that this pain is new, and feels like a pressure on his mid-sternum. Patient with cardiac history of CAD, MI, s/p CABG with 2 cardiac stents.        T(C): 36.6 (01-19-20 @ 22:53), Max: 36.6 (01-19-20 @ 22:53)  HR: 88 (01-19-20 @ 22:53) (62 - 88)  BP: 156/87 (01-19-20 @ 22:53) (111/61 - 156/87)  RR: 17 (01-19-20 @ 22:53) (17 - 18)  SpO2: 94% (01-19-20 @ 22:53) (91% - 94%)  Wt(kg): --    Physical :  Gen- NAD, ncat  Cardio - s+1,s+2, rrr, no murmur  Lung - cta b/l, no wheeze, no rhonchi, no rales   Abdomen- +BS, NT/ND, no guarding, no rebound, no masses  Ext- no edema, 2+ pulses b/l  Neuro- CN grossly intact, strength 5/5 b/l extrem    LABS:                        11.5   15.67 )-----------( 222      ( 19 Jan 2020 06:26 )             36.2     01-19    145  |  114<H>  |  58<H>  ----------------------------<  125<H>  4.4   |  23  |  1.90<H>    Ca    8.8      19 Jan 2020 06:26    TPro  6.5  /  Alb  2.2<L>  /  TBili  0.2  /  DBili  x   /  AST  17  /  ALT  19  /  AlkPhos  96  01-18                Assessment/Plan  85yMale admitted for   1. Called by RN for Pt c/o chest discomfort. Patient stated that he was drinking from a can of ginger ale when he felt a mild chest discomfort. Patient states that the pain is non-radiating, with no other symptoms besides chest discomfort. Patient states that this pain is new, and feels like a pressure on his mid-sternum. Patient with cardiac history of CAD, MI, s/p CABG with 2 cardiac stents.        T(C): 36.6 (01-19-20 @ 22:53), Max: 36.6 (01-19-20 @ 22:53)  HR: 88 (01-19-20 @ 22:53) (62 - 88)  BP: 156/87 (01-19-20 @ 22:53) (111/61 - 156/87)  RR: 17 (01-19-20 @ 22:53) (17 - 18)  SpO2: 94% (01-19-20 @ 22:53) (91% - 94%)  Wt(kg): --    Physical :  Gen- NAD, ncat  Cardio - s+1,s+2, rrr, no murmur  Lung - cta b/l, no wheeze, no rhonchi, no rales   Abdomen- +BS, NT/ND, no guarding, no rebound, no masses  Ext- no edema, 2+ pulses b/l      Assessment/Plan  85yMale admitted for RLL pneumonia w/ encephalopathy, now resolved    1. Chest discomfort. R/o new MI vs. acid reflux  - EKG shows NSR with infrequent PVCs. Unchanged from prior EKG  - Cardiac enzymes ordered, will f/u  - Patient with hx of GERD, patient has not received home dose medications. Day team to reverify.   - Simethicone ordered and given. Patient instructed to reduce fizzy drinks like soda.  - RN to call with any changes. Called by RN for Pt c/o chest discomfort. Patient stated that he was drinking from a can of ginger ale when he felt a mild chest discomfort. Patient states that the pain is non-radiating, with no other symptoms besides chest discomfort. Patient states that this pain is new, and feels like a pressure on his mid-sternum. Patient with cardiac history of CAD, MI, s/p CABG with 2 cardiac stents.        T(C): 36.6 (01-19-20 @ 22:53), Max: 36.6 (01-19-20 @ 22:53)  HR: 88 (01-19-20 @ 22:53) (62 - 88)  BP: 156/87 (01-19-20 @ 22:53) (111/61 - 156/87)  RR: 17 (01-19-20 @ 22:53) (17 - 18)  SpO2: 94% (01-19-20 @ 22:53) (91% - 94%)  Wt(kg): --    Physical :  Gen- NAD, ncat  Cardio - s+1,s+2, rrr, no murmur  Lung - cta b/l, no wheeze, no rhonchi, no rales   Abdomen- +BS, NT/ND, no guarding, no rebound, no masses  Ext- no edema, 2+ pulses b/l      Assessment/Plan  85yMale admitted for RLL pneumonia w/ encephalopathy, now resolved    1. Chest discomfort. R/o new MI vs. acid reflux  - EKG shows NSR with infrequent PVCs. Unchanged from prior EKG  - Cardiac enzymes ordered. Will continue to trend.   - Patient with hx of GERD, patient has not received home dose Protonix. Day team to reverify.   - Simethicone ordered and given. Patient instructed to reduce fizzy drinks like soda.  - RN to call with any changes.    Addendum: Called by RN for elevated cardiac enzymes.   - Will continue to trend cardiac enzymes  - Full dose Aspirin  - Cardiology to be consulted in the AM  - If chest discomfort worsens, can give nitroglycerin.   - Will continue to monitor. Called by RN for Pt c/o chest discomfort. Patient stated that he was drinking from a can of ginger ale when he felt a mild chest discomfort. Patient states that the pain is non-radiating, with no other symptoms besides chest discomfort. Patient states that this pain is new, and feels like a pressure on his mid-sternum. Patient denies SOB, Abdominal pain, no N/V/D, fever, chills.        T(C): 36.6 (01-19-20 @ 22:53), Max: 36.6 (01-19-20 @ 22:53)  HR: 88 (01-19-20 @ 22:53) (62 - 88)  BP: 156/87 (01-19-20 @ 22:53) (111/61 - 156/87)  RR: 17 (01-19-20 @ 22:53) (17 - 18)  SpO2: 94% (01-19-20 @ 22:53) (91% - 94%)  Wt(kg): --    Physical :  Gen- NAD, ncat  Cardio - s+1,s+2, rrr, no murmur  Lung - cta b/l, no wheeze, no rhonchi, no rales   Abdomen- +BS, NT/ND, no guarding, no rebound, no masses  Ext- no edema, 2+ pulses b/l      Assessment/Plan  85yMale admitted with cardiac history of CAD, MI, s/p CABG with 2 cardiac stents. admitted for RLL pneumonia w/ encephalopathy now resolved    1. Chest discomfort. R/o new MI vs. acid reflux  - EKG shows NSR with infrequent PVCs. Unchanged from prior EKG  - Cardiac enzymes ordered. Will continue to trend.   - Patient with hx of GERD, patient has not received home dose Protonix. Day team to reverify.   - Simethicone ordered and given. Patient instructed to reduce fizzy drinks like soda.  - RN to call with any changes.      Addendum: Called by RN for elevated cardiac enzymes.   - Will continue to trend cardiac enzymes  - Full dose Aspirin  - Cardiology to be consulted in the AM  - If chest discomfort worsens, can give nitroglycerin.   - Will continue to monitor. Called by RN for Pt c/o chest discomfort. Patient stated that he was drinking from a can of ginger ale when he felt a mild chest discomfort. Patient states that the pain is non-radiating, with no other symptoms besides chest discomfort. Patient states that this pain is new, and feels like a pressure on his mid-sternum. Patient denies SOB, Abdominal pain, no N/V/D, fever, chills.        T(C): 36.6 (01-19-20 @ 22:53), Max: 36.6 (01-19-20 @ 22:53)  HR: 88 (01-19-20 @ 22:53) (62 - 88)  BP: 156/87 (01-19-20 @ 22:53) (111/61 - 156/87)  RR: 17 (01-19-20 @ 22:53) (17 - 18)  SpO2: 94% (01-19-20 @ 22:53) (91% - 94%)  Wt(kg): --    Physical :  Gen- NAD, ncat  Cardio - s+1,s+2, rrr, no murmur  Lung - cta b/l, no wheeze, no rhonchi, no rales   Abdomen- +BS, NT/ND, no guarding, no rebound, no masses  Ext- no edema, 2+ pulses b/l      Assessment/Plan  85yMale admitted with cardiac history of CAD, MI, s/p CABG with 2 cardiac stents. admitted for RLL pneumonia w/ encephalopathy now resolved    1. Chest discomfort. R/o new MI vs. acid reflux  - EKG shows NSR with infrequent PVCs. Unchanged from prior EKG  - Cardiac enzymes ordered. Will continue to trend.   - Patient with hx of GERD, patient has not received home dose Protonix. Day team to reverify.   - Simethicone ordered and given. Patient instructed to reduce fizzy drinks like soda.  - RN to call with any changes.      Addendum: Called by RN for elevated cardiac enzymes.   - Will continue to trend cardiac enzymes q6 hours  - Full dose Aspirin given x 1   - Ordered remote telemetry monitoring  - [facility] group consulted in the AM  - If chest discomfort worsens, can give nitroglycerin.   - Will continue to monitor. Called by RN for Pt c/o chest discomfort. Patient stated that he was drinking from a can of ginger ale when he felt a mild chest discomfort. Patient states that the pain is non-radiating, with no other symptoms besides chest discomfort. Patient states that this pain is new, and feels like a pressure on his mid-sternum. Patient denies SOB, Abdominal pain, no N/V/D, fever, chills.        T(C): 36.6 (01-19-20 @ 22:53), Max: 36.6 (01-19-20 @ 22:53)  HR: 88 (01-19-20 @ 22:53) (62 - 88)  BP: 156/87 (01-19-20 @ 22:53) (111/61 - 156/87)  RR: 17 (01-19-20 @ 22:53) (17 - 18)  SpO2: 94% (01-19-20 @ 22:53) (91% - 94%)  Wt(kg): --    Physical :  Gen- NAD, ncat  Cardio - s+1,s+2, rrr, no murmur  Lung - cta b/l, no wheeze, no rhonchi, no rales   Abdomen- +BS, NT/ND, no guarding, no rebound, no masses  Ext- no edema, 2+ pulses b/l      Assessment/Plan  85yMale admitted with cardiac history of CAD, MI, s/p CABG with 2 cardiac stents. admitted for RLL pneumonia w/ encephalopathy now resolved    1. Chest discomfort. R/o new MI vs. acid reflux  - EKG shows NSR with infrequent PVCs. Unchanged from prior EKG  - Cardiac enzymes ordered. Will continue to trend.   - Patient with hx of GERD, patient has not received home dose Protonix. Day team to reverify.   - Simethicone ordered and given. Patient instructed to reduce fizzy drinks like soda.  - RN to call with any changes.      Addendum: Called by RN for elevated cardiac enzymes.   - On reexamination of patient, patient states that chest pain is better and the "pressure" pain is nearly absent.  - Will continue to trend cardiac enzymes q6 hours  - Full dose Aspirin given x 1   - Transfer to telemetry  - [facility] group consulted in the AM  - If chest discomfort worsens, can give nitroglycerin.   - Will continue to monitor.

## 2020-01-19 NOTE — PROGRESS NOTE ADULT - PROBLEM SELECTOR PLAN 7
Chronic, stable  - Continue home med Finasteride and Silodosin  - Need to verify medications. Wife and aide will bring in medications in AM

## 2020-01-19 NOTE — PROGRESS NOTE ADULT - SUBJECTIVE AND OBJECTIVE BOX
Patient is a 85y old  Male who presents with a chief complaint of Pneumonia (19 Jan 2020 06:07)       INTERVAL HPI/OVERNIGHT EVENTS: 86 yo male with PMHx of Benign prostatic hypertrophy, CAD, MI, s/p CABG with 2 cardiac stents, COPD, Depression, GERD, Hyperlipidemia  Hypertension, Osteoporosis, Peripheral Neuropathy, Dementia who presented to the ED s/p fall and increased confusion x1 day admitted with RLL pneumonia, confusion suspect encephalopathy, now resolved . Seen and examined at bedside. Denies any new symptoms, complaints, events       MEDICATIONS  (STANDING):  amLODIPine   Tablet 5 milliGRAM(s) Oral daily  atorvastatin 80 milliGRAM(s) Oral at bedtime  cefTRIAXone   IVPB 1000 milliGRAM(s) IV Intermittent every 24 hours  cilostazol 50 milliGRAM(s) Oral two times a day  donepezil 10 milliGRAM(s) Oral at bedtime  finasteride 5 milliGRAM(s) Oral daily  heparin  Injectable 5000 Unit(s) SubCutaneous every 12 hours  tamsulosin 0.4 milliGRAM(s) Oral at bedtime    MEDICATIONS  (PRN):  acetaminophen   Tablet .. 650 milliGRAM(s) Oral every 6 hours PRN Temp greater or equal to 38C (100.4F)  albuterol/ipratropium for Nebulization 3 milliLiter(s) Nebulizer every 4 hours PRN Shortness of Breath and/or Wheezing      Allergies    amoxicillin (Unknown)  Levaquin (Unknown)  penicillin (Unknown)    Intolerances        REVIEW OF SYSTEMS:  CONSTITUTIONAL: No fever,  or fatigue  EYES: No eye pain, visual disturbances, or discharge  ENMT:  No difficulty hearing, tinnitus, vertigo; No sinus or throat pain  NECK: No pain or stiffness  RESPIRATORY: No cough, wheezing, chills or hemoptysis; No shortness of breath  CARDIOVASCULAR: No chest pain, palpitations, dizziness, or leg swelling  GASTROINTESTINAL: No abdominal or epigastric pain. No nausea, vomiting, or hematemesis; No diarrhea or constipation.   GENITOURINARY: No dysuria, frequency, hematuria, or incontinence  NEUROLOGICAL: No headaches,  loss of strength, numbness, or tremors  SKIN: No itching, burning, rashes, or lesions   LYMPH NODES: No enlarged glands  ENDOCRINE: No heat or cold intolerance; No hair loss; No polydipsia or polyuria  MUSCULOSKELETAL: No joint pain or swelling; No muscle, back, or extremity pain  HEME/LYMPH: No easy bruising, or bleeding gums  ALLERGY AND IMMUNOLOGIC: No hives or eczema    Vital Signs Last 24 Hrs  T(C): 36.3 (19 Jan 2020 07:33), Max: 36.4 (18 Jan 2020 15:32)  T(F): 97.4 (19 Jan 2020 07:33), Max: 97.5 (18 Jan 2020 15:32)  HR: 62 (19 Jan 2020 07:33) (62 - 73)  BP: 111/61 (19 Jan 2020 07:33) (111/61 - 128/86)  BP(mean): --  RR: 18 (19 Jan 2020 07:33) (16 - 18)  SpO2: 93% (19 Jan 2020 07:33) (91% - 93%)    PHYSICAL EXAM:  GENERAL: NAD, Awake, Alert   HEAD:  Atraumatic, Normocephalic  EYES: EOMI, PERRLA, conjunctiva and sclera clear  ENMT: No tonsillar erythema, exudates, or enlargement; Moist mucous membranes  NECK: Supple, No JVD, Normal thyroid  NERVOUS SYSTEM:  Alert & Awake,  Motor Strength 5/5 B/L upper and lower extremities  CHEST/LUNG: Clear to auscultation bilaterally; No rales, rhonchi, wheezing, or rubs  HEART: S1S2+,  Regular rate and rhythm  ABDOMEN: Soft, Nontender, Nondistended; Bowel sounds present  EXTREMITIES:  2+ Peripheral Pulses, No clubbing, cyanosis  LYMPH: No lymphadenopathy noted    LABS:                        11.5   15.67 )-----------( 222      ( 19 Jan 2020 06:26 )             36.2     19 Jan 2020 06:26    145    |  114    |  58     ----------------------------<  125    4.4     |  23     |  1.90     Ca    8.8        19 Jan 2020 06:26        CAPILLARY BLOOD GLUCOSE        BLOOD CULTURE  01-17 @ 22:25   No growth to date.  --  --    RADIOLOGY & ADDITIONAL TESTS:    Imaging Personally Reviewed:  [ ] YES     Consultant(s) Notes Reviewed:      Care Discussed with Consultants/Other Providers:

## 2020-01-19 NOTE — PHYSICAL THERAPY INITIAL EVALUATION ADULT - ADDITIONAL COMMENTS
Pt states he lives with wife on 3rd floor apt with elevator access, uses SAC and RW intermittently, walk in shower with grab bars, was independent with transfers and ambulation, states he required help putting on socks and certain ADL's.

## 2020-01-19 NOTE — PROGRESS NOTE ADULT - ATTENDING COMMENTS
- Noted worsening leukocytosis, likely secondary to steroid given in ER  - CT Chest: Right lower lobe pneumonia  - On ceftriaxone now, no allergic reaction noted   - Blood cultures are negative so far   - Tylenol for fever  - F/u AM labs

## 2020-01-20 ENCOUNTER — TRANSCRIPTION ENCOUNTER (OUTPATIENT)
Age: 85
End: 2020-01-20

## 2020-01-20 DIAGNOSIS — R07.89 OTHER CHEST PAIN: ICD-10-CM

## 2020-01-20 LAB
ANION GAP SERPL CALC-SCNC: 8 MMOL/L — SIGNIFICANT CHANGE UP (ref 5–17)
BUN SERPL-MCNC: 56 MG/DL — HIGH (ref 7–23)
CALCIUM SERPL-MCNC: 8.4 MG/DL — LOW (ref 8.5–10.1)
CHLORIDE SERPL-SCNC: 116 MMOL/L — HIGH (ref 96–108)
CK MB BLD-MCNC: <5.6 % — HIGH (ref 0–3.5)
CK MB CFR SERPL CALC: <1 NG/ML — SIGNIFICANT CHANGE UP (ref 0–3.6)
CK SERPL-CCNC: 18 U/L — LOW (ref 26–308)
CO2 SERPL-SCNC: 23 MMOL/L — SIGNIFICANT CHANGE UP (ref 22–31)
CREAT SERPL-MCNC: 1.9 MG/DL — HIGH (ref 0.5–1.3)
CULTURE RESULTS: NO GROWTH — SIGNIFICANT CHANGE UP
GLUCOSE SERPL-MCNC: 85 MG/DL — SIGNIFICANT CHANGE UP (ref 70–99)
HCT VFR BLD CALC: 37 % — LOW (ref 39–50)
HGB BLD-MCNC: 11.8 G/DL — LOW (ref 13–17)
MCHC RBC-ENTMCNC: 27 PG — SIGNIFICANT CHANGE UP (ref 27–34)
MCHC RBC-ENTMCNC: 31.9 GM/DL — LOW (ref 32–36)
MCV RBC AUTO: 84.7 FL — SIGNIFICANT CHANGE UP (ref 80–100)
NRBC # BLD: 0 /100 WBCS — SIGNIFICANT CHANGE UP (ref 0–0)
PLATELET # BLD AUTO: 225 K/UL — SIGNIFICANT CHANGE UP (ref 150–400)
POTASSIUM SERPL-MCNC: 4.1 MMOL/L — SIGNIFICANT CHANGE UP (ref 3.5–5.3)
POTASSIUM SERPL-SCNC: 4.1 MMOL/L — SIGNIFICANT CHANGE UP (ref 3.5–5.3)
RBC # BLD: 4.37 M/UL — SIGNIFICANT CHANGE UP (ref 4.2–5.8)
RBC # FLD: 14.8 % — HIGH (ref 10.3–14.5)
S PNEUM AG UR QL: NEGATIVE — SIGNIFICANT CHANGE UP
SODIUM SERPL-SCNC: 147 MMOL/L — HIGH (ref 135–145)
SPECIMEN SOURCE: SIGNIFICANT CHANGE UP
TROPONIN I SERPL-MCNC: 0.1 NG/ML — HIGH (ref 0.01–0.04)
TROPONIN I SERPL-MCNC: 0.16 NG/ML — HIGH (ref 0.01–0.04)
WBC # BLD: 6.9 K/UL — SIGNIFICANT CHANGE UP (ref 3.8–10.5)
WBC # FLD AUTO: 6.9 K/UL — SIGNIFICANT CHANGE UP (ref 3.8–10.5)

## 2020-01-20 PROCEDURE — 93306 TTE W/DOPPLER COMPLETE: CPT | Mod: 26

## 2020-01-20 PROCEDURE — 99223 1ST HOSP IP/OBS HIGH 75: CPT

## 2020-01-20 PROCEDURE — 93010 ELECTROCARDIOGRAM REPORT: CPT

## 2020-01-20 PROCEDURE — 99233 SBSQ HOSP IP/OBS HIGH 50: CPT

## 2020-01-20 RX ORDER — ASPIRIN/CALCIUM CARB/MAGNESIUM 324 MG
81 TABLET ORAL DAILY
Refills: 0 | Status: DISCONTINUED | OUTPATIENT
Start: 2020-01-20 | End: 2020-01-22

## 2020-01-20 RX ORDER — ASPIRIN/CALCIUM CARB/MAGNESIUM 324 MG
325 TABLET ORAL ONCE
Refills: 0 | Status: COMPLETED | OUTPATIENT
Start: 2020-01-20 | End: 2020-01-20

## 2020-01-20 RX ORDER — PANTOPRAZOLE SODIUM 20 MG/1
40 TABLET, DELAYED RELEASE ORAL
Refills: 0 | Status: DISCONTINUED | OUTPATIENT
Start: 2020-01-20 | End: 2020-01-22

## 2020-01-20 RX ADMIN — DONEPEZIL HYDROCHLORIDE 10 MILLIGRAM(S): 10 TABLET, FILM COATED ORAL at 21:14

## 2020-01-20 RX ADMIN — AMLODIPINE BESYLATE 5 MILLIGRAM(S): 2.5 TABLET ORAL at 05:54

## 2020-01-20 RX ADMIN — FINASTERIDE 5 MILLIGRAM(S): 5 TABLET, FILM COATED ORAL at 11:24

## 2020-01-20 RX ADMIN — HEPARIN SODIUM 5000 UNIT(S): 5000 INJECTION INTRAVENOUS; SUBCUTANEOUS at 17:12

## 2020-01-20 RX ADMIN — CILOSTAZOL 50 MILLIGRAM(S): 100 TABLET ORAL at 05:54

## 2020-01-20 RX ADMIN — ATORVASTATIN CALCIUM 80 MILLIGRAM(S): 80 TABLET, FILM COATED ORAL at 21:14

## 2020-01-20 RX ADMIN — HEPARIN SODIUM 5000 UNIT(S): 5000 INJECTION INTRAVENOUS; SUBCUTANEOUS at 05:53

## 2020-01-20 RX ADMIN — Medication 325 MILLIGRAM(S): at 00:28

## 2020-01-20 RX ADMIN — Medication 81 MILLIGRAM(S): at 11:24

## 2020-01-20 RX ADMIN — CEFTRIAXONE 100 MILLIGRAM(S): 500 INJECTION, POWDER, FOR SOLUTION INTRAMUSCULAR; INTRAVENOUS at 18:05

## 2020-01-20 RX ADMIN — TAMSULOSIN HYDROCHLORIDE 0.4 MILLIGRAM(S): 0.4 CAPSULE ORAL at 21:14

## 2020-01-20 RX ADMIN — SIMETHICONE 80 MILLIGRAM(S): 80 TABLET, CHEWABLE ORAL at 00:29

## 2020-01-20 NOTE — PROGRESS NOTE ADULT - SUBJECTIVE AND OBJECTIVE BOX
infectious diseases progress note:    ARABELLA ESPARZA is a 85y y. o. Male patient    Patient reports: feeling better    ROS:    EYES:  Negative  blurry vision or double vision  GASTROINTESTINAL:  Negative for nausea, vomiting, diarrhea  -otherwise negative except for subjective    Allergies    amoxicillin (Unknown)  Levaquin (Unknown)  penicillin (Unknown)    Intolerances        ANTIBIOTICS/RELEVANT:  antimicrobials  cefTRIAXone   IVPB 1000 milliGRAM(s) IV Intermittent every 24 hours    immunologic:    OTHER:  acetaminophen   Tablet .. 650 milliGRAM(s) Oral every 6 hours PRN  albuterol/ipratropium for Nebulization 3 milliLiter(s) Nebulizer every 4 hours PRN  amLODIPine   Tablet 5 milliGRAM(s) Oral daily  aspirin  chewable 81 milliGRAM(s) Oral daily  atorvastatin 80 milliGRAM(s) Oral at bedtime  calcium carbonate    500 mG (Tums) Chewable 2 Tablet(s) Chew every 6 hours PRN  donepezil 10 milliGRAM(s) Oral at bedtime  finasteride 5 milliGRAM(s) Oral daily  heparin  Injectable 5000 Unit(s) SubCutaneous every 12 hours  pantoprazole    Tablet 40 milliGRAM(s) Oral before breakfast  tamsulosin 0.4 milliGRAM(s) Oral at bedtime      Objective:  Last 24-Vital Signs Last 24 Hrs  T(C): 36.3 (20 Jan 2020 11:42), Max: 36.7 (19 Jan 2020 23:38)  T(F): 97.4 (20 Jan 2020 11:42), Max: 98 (19 Jan 2020 23:38)  HR: 78 (20 Jan 2020 11:42) (77 - 88)  BP: 149/96 (20 Jan 2020 11:42) (124/74 - 156/87)  BP(mean): --  RR: 23 (20 Jan 2020 11:42) (16 - 23)  SpO2: 95% (20 Jan 2020 11:42) (90% - 95%)    T(C): 36.3 (01-20-20 @ 11:42), Max: 36.7 (01-19-20 @ 23:38)  T(F): 97.4 (01-20-20 @ 11:42), Max: 98 (01-19-20 @ 23:38)  T(C): 36.3 (01-20-20 @ 11:42), Max: 38.4 (01-17-20 @ 16:05)  T(F): 97.4 (01-20-20 @ 11:42), Max: 101.1 (01-17-20 @ 16:05)  T(C): 36.3 (01-20-20 @ 11:42), Max: 38.4 (01-17-20 @ 16:05)  T(F): 97.4 (01-20-20 @ 11:42), Max: 101.1 (01-17-20 @ 16:05)    PHYSICAL EXAM:  Constitutional: Well-developed, well nourished  Eyes: PERRLA, EOMI  Ear/Nose/Throat: oropharynx normal	  Neck: no JVD, no lymphadenopathy, supple  Respiratory: no accessory muscle use, lung fields bilaterally decreased in bases but improved  Cardiovascular: RRR, normal S1, S2 no m/r/g  Gastrointestinal: soft, NT, no HSM, BS-normal  Extremities: no clubbing, no cyanosis, edema absent  Neuro: patient alert, oriented and appropriate  Skin: no sig lesions      LABS:                        11.8   6.90  )-----------( 225      ( 20 Jan 2020 06:04 )             37.0       WBC 6.90  01-20 @ 06:04  WBC 15.67  01-19 @ 06:26  WBC 9.16  01-18 @ 07:09  WBC 12.46  01-17 @ 16:44      01-20    147<H>  |  115<H>  |  56<H>  ----------------------------<  83  4.0   |  23  |  1.90<H>    Ca    8.3<L>      20 Jan 2020 06:14    TPro  5.6<L>  /  Alb  2.0<L>  /  TBili  0.2  /  DBili  x   /  AST  22  /  ALT  24  /  AlkPhos  78  01-20      Creatinine, Serum: 1.90 mg/dL (01-20-20 @ 06:14)  Creatinine, Serum: 1.90 mg/dL (01-20-20 @ 06:04)  Creatinine, Serum: 1.90 mg/dL (01-19-20 @ 06:26)  Creatinine, Serum: 2.00 mg/dL (01-18-20 @ 07:09)  Creatinine, Serum: 2.10 mg/dL (01-17-20 @ 16:44)      MICROBIOLOGY:        RADIOLOGY & ADDITIONAL STUDIES:

## 2020-01-20 NOTE — DISCHARGE NOTE PROVIDER - HOSPITAL COURSE
84 yo male with PMHx of Benign prostatic hypertrophy, CAD, MI, s/p CABG with 2 cardiac stents, COPD, Depression, GERD, Hyperlipidemia  Hypertension, Osteoporosis, Peripheral Neuropathy, Dementia who presented to the ED s/p fall and increased confusion x1 day admitted with RLL pneumonia, confusion suspect metabolic encephalopathy, now resolved. Blood cultures are negative. Seen by ID. Was treated with IV Ceftriaxone, without any allergic reaction. Had an episode of chest discomfort with mild troponin leak. Cardio saw patient suggested not ACS. TTE done showed 86 yo male with PMHx of Benign prostatic hypertrophy, CAD, MI, s/p CABG with 2 cardiac stents, COPD, Depression, GERD, Hyperlipidemia  Hypertension, Osteoporosis, Peripheral Neuropathy, Dementia who presented to the ED s/p fall and increased confusion x1 day admitted with RLL pneumonia, confusion suspect acute metabolic encephalopathy,    later  resolved    after iv abx  tt . Blood cultures are negative , no sepsis this time . Seen by ID. Was treated with IV Ceftriaxone, without any allergic reaction. Had an episode of chest discomfort with mild troponin leak  possible demand ischemia . Cardio saw patient suggested not ACS. TTE done showed ef wnl ,  Lung nodule seen on imaging study.   8mm nodule noted on CT.     Pulm Dr. Isidro  .      physical exam  1-22-20 day of dc  Vital Signs Last 24 Hrs    T(C): 36.3 (22 Jan 2020 10:06), Max: 36.6 (22 Jan 2020 04:20)    T(F): 97.4 (22 Jan 2020 10:06), Max: 97.9 (22 Jan 2020 08:06)    HR: 68 (22 Jan 2020 10:06) (54 - 91)    BP: 155/90 (22 Jan 2020 10:06) (132/83 - 175/89)    BP(mean): --    RR: 16 (22 Jan 2020 10:06) (16 - 19)    SpO2: 96% (22 Jan 2020 10:06) (94% - 96%)GEN no distress , HEENT nt/nc , perrla , CVS s1s2 no tachy , CHEST bl air entery  no  wheezing present  , CNS aao/3  , no focal deficit , motor  intact 5/5 all 4ext  , GI soft , bs present ,  intact , EXT no edema, pedal pulse present ,

## 2020-01-20 NOTE — CONSULT NOTE ADULT - SUBJECTIVE AND OBJECTIVE BOX
[facility] - Serina Poole, Jesse, Rich, Mayelin, Antony Morris  Office Number: [phone]    Initial Consult Note    CHIEF COMPLAINT: Patient is a 85y old  Male who presents with a chief complaint of Pneumonia (2020 10:03)      HPI:  86 yo male with PMHx of Benign prostatic hypertrophy, CAD, MI, s/p CABG with 2 cardiac stents, COPD, Depression, GERD, Hyperlipidemia  Hypertension, Osteoporosis, Peripheral Neuropathy, Dementia who presented to the ED s/p fall and increased confusion x1 day. Patient is accompanied by wife and health aide. Patient is a limited historian. Per aide, patient has a history of frequent falls, most recent fall was yesterday. Aide reports chronic cough for the past 3 months. Aide and wife noticed increasing confusion. Wife states "he could not remember how to make his way to the bedroom." Patient was brought to the ED for further evaluation. Per wife and aide, patient's mental status has returned to baseline. Patient admits to chills. Admits to cough x3 months, for which he takes cough syrup. Aide endorses frequent episodes of stool and urine incontinence. Patient lives with wife and 24 hour aid. He ambulates with walker and cane. Denies fever, chest pain, shortness of breath, abdominal pain, nausea or vomiting.     ED vitals: T: 98.1, HR: 98, BP: 157/70 RR: 17 O2: 93 on RA   Labs significant for: WBC: 12.46 with left shift, Chloride: 109, BUN: 41, Cr: 2.1, EGFR: 28   X-ray:  The cardiomediastinal silhouette is similar to the previous exam with dilated tortuous thoracic aorta. There is decreased right lung volume with shift of the mediastinum to the right side. There is right perihilar scarring again noted. No pleural effusion  CT Chest:  Right lower lobe pneumonia  CT Head: No acute intracranial findings  In the ED patient was given: Azactam and Vancomycin X1, tylenol, IVF bolus X1, albuterol/ipratropium X1, Solumederol 125 X1 (2020 19:47)    Called to evaluate for an episode of chest pain last night. He was drinking ginger ale prior to the pain, which has now resolved.  Per records he had an echo in  that was not interpretable.  He is feeling better this morning.  He had myocardial infarction at some point, for which PCI was performed.  He had coronary bypass surgery in  and bioprosthetic aortic valve replacement in .  According to his cardiologist, he was recatheterized prior to aortic valve replacement, with some stents having been found to be patent, some stents with significant disease, but overall he was found to have predominantly small vessel disease.  He is now incompletely revascularized.    PAST MEDICAL & SURGICAL HISTORY:  Myocardial infarction  COPD (chronic obstructive pulmonary disease)  Seasonal allergies  GERD (gastroesophageal reflux disease)  Benign prostatic hypertrophy  Hyperlipidemia  Hypertension  Depression  Peripheral Neuropathy  Osteoporosis  CAD (Coronary Artery Disease): MI, s/p CABG with 2 cardiac stents  S/P inguinal hernia repair: On right side  S/P appendectomy  S/P CABG (coronary artery bypass graft): Done in ; 2 cardiac stents in   S/P AVR (Aortic Valve Replacement): Pig valve (bioprosthetic); done  at [place]      SOCIAL HISTORY:  No tobacco, ethanol, or drug abuse.    FAMILY HISTORY:  Family history of renal failure (Sibling)  Family history of MI (myocardial infarction):  at 65  Family history of stroke:  at 65  Family history of amyotrophic lateral sclerosis:  at 67  Family history of stroke:  at 67    MEDICATIONS  (STANDING):  amLODIPine   Tablet 5 milliGRAM(s) Oral daily  aspirin  chewable 81 milliGRAM(s) Oral daily  atorvastatin 80 milliGRAM(s) Oral at bedtime  cefTRIAXone   IVPB 1000 milliGRAM(s) IV Intermittent every 24 hours  cilostazol 50 milliGRAM(s) Oral two times a day  donepezil 10 milliGRAM(s) Oral at bedtime  finasteride 5 milliGRAM(s) Oral daily  heparin  Injectable 5000 Unit(s) SubCutaneous every 12 hours  pantoprazole    Tablet 40 milliGRAM(s) Oral before breakfast  tamsulosin 0.4 milliGRAM(s) Oral at bedtime    MEDICATIONS  (PRN):  acetaminophen   Tablet .. 650 milliGRAM(s) Oral every 6 hours PRN Temp greater or equal to 38C (100.4F)  albuterol/ipratropium for Nebulization 3 milliLiter(s) Nebulizer every 4 hours PRN Shortness of Breath and/or Wheezing  calcium carbonate    500 mG (Tums) Chewable 2 Tablet(s) Chew every 6 hours PRN Indigestion      Allergies    amoxicillin (Unknown)  Levaquin (Unknown)  penicillin (Unknown)    Intolerances        REVIEW OF SYSTEMS:    CONSTITUTIONAL: No weakness, fevers or chills  EYES/ENT: No visual changes;  No vertigo or throat pain   NECK: No pain or stiffness  RESPIRATORY: No cough, wheezing, hemoptysis; + shortness of breath  CARDIOVASCULAR: + chest pain, no palpitations  GASTROINTESTINAL: No abdominal pain. No nausea, vomiting, or hematemesis; No diarrhea or constipation. No melena or hematochezia.  GENITOURINARY: No dysuria, frequency or hematuria  NEUROLOGICAL: No numbness or weakness  SKIN: No itching or rash  All other review of systems is negative unless indicated above    VITAL SIGNS:   Vital Signs Last 24 Hrs  T(C): 36.6 (2020 07:30), Max: 36.7 (2020 23:38)  T(F): 97.8 (2020 07:30), Max: 98 (2020 23:38)  HR: 77 (2020 07:30) (77 - 88)  BP: 153/83 (2020 07:30) (124/74 - 156/87)  BP(mean): --  RR: 17 (2020 07:30) (16 - 17)  SpO2: 92% (2020 07:30) (90% - 94%)    I&O's Summary    2020 07:01  -  2020 07:00  --------------------------------------------------------  IN: 240 mL / OUT: 350 mL / NET: -110 mL    2020 07:01  -  2020 11:15  --------------------------------------------------------  IN: 240 mL / OUT: 0 mL / NET: 240 mL        On Exam:    Constitutional: NAD, alert and oriented x 2  Lungs:  Non-labored, breath sounds are clear bilaterally, No wheezing, rales or rhonchi  Cardiovascular: RRR.  S1 and S2 positive.  No murmurs, rubs, gallops or clicks  Gastrointestinal: Bowel Sounds present, soft, nontender.   Lymph: No peripheral edema. No cervical lymphadenopathy.  Neurological: Alert, no focal deficits  Skin: No rashes or ulcers   Psych:  Mood & affect appropriate.    LABS: All Labs Reviewed:                        11.8   6.90  )-----------( 225      ( 2020 06:04 )             37.0                         11.5   15.67 )-----------( 222      ( 2020 06:26 )             36.2                         12.3   9.16  )-----------( 186      ( 2020 07:09 )             39.8     2020 06:14    147    |  115    |  56     ----------------------------<  83     4.0     |  23     |  1.90   2020 06:04    147    |  116    |  56     ----------------------------<  85     4.1     |  23     |  1.90   2020 06:26    145    |  114    |  58     ----------------------------<  125    4.4     |  23     |  1.90     Ca    8.3        2020 06:14  Ca    8.4        2020 06:04  Ca    8.8        2020 06:26    TPro  5.6    /  Alb  2.0    /  TBili  0.2    /  DBili  x      /  AST  22     /  ALT  24     /  AlkPhos  78     2020 06:14  TPro  6.5    /  Alb  2.2    /  TBili  0.2    /  DBili  x      /  AST  17     /  ALT  19     /  AlkPhos  96     2020 07:09  TPro  7.1    /  Alb  2.5    /  TBili  0.5    /  DBili  x      /  AST  24     /  ALT  17     /  AlkPhos  106    2020 16:44      CARDIAC MARKERS ( 2020 06:14 )  .164 ng/mL / x     / 18 U/L / x     / <1.0 ng/mL  CARDIAC MARKERS ( 2020 23:34 )  .162 ng/mL / x     / 22 U/L / x     / 1.4 ng/mL      Blood Culture: Organism --  Gram Stain Blood -- Gram Stain   Few polymorphonuclear leukocytes per oil power field  Moderate Gram Negative Rods per oil power field  Few Gram Positive Cocci in Pairs and Chains per oil power field  Results consistent with oropharyngeal contamination  Specimen Source .Sputum Sputum  Culture-Blood --    Organism --  Gram Stain Blood -- Gram Stain --  Specimen Source .Urine Clean Catch (Midstream)  Culture-Blood --    Organism --  Gram Stain Blood -- Gram Stain --  Specimen Source .Blood Blood  Culture-Blood --            RADIOLOGY:    EKG: sr, rbbb, pvc  TELE: sr, apcs, vpcs

## 2020-01-20 NOTE — PROGRESS NOTE ADULT - ASSESSMENT
84 yo male with PMHx of Benign prostatic hypertrophy, CAD, MI, s/p CABG with 2 cardiac stents, COPD, Depression, GERD, Hyperlipidemia  Hypertension, Osteoporosis, Peripheral Neuropathy, Dementia who presented to the ED s/p fall and increased confusion x1 day. with cough x3 months, with associated 50lb weight loss and RLL localization on imaging and exam

## 2020-01-20 NOTE — DISCHARGE NOTE PROVIDER - CARE PROVIDER_API CALL
Sandeep Dixon; PhD)  Infectious Disease; Internal Medicine  [address] [place] [address]  Phone: [phone]  Fax: [phone]  Follow Up Time:

## 2020-01-20 NOTE — PROGRESS NOTE ADULT - ATTENDING COMMENTS
D/w ID , will continue IV Ceftriaxone for now  Check Quantiferon Gold test.  EKG with out any new changes. No chest pain now  + mild Trop noted.  Cardio Consult  baby aspirin, statin D/w ID , will continue IV Ceftriaxone for now  Check Quantiferon Gold test.  EKG with out any new changes. No chest pain now  + mild Trop noted.  Cardio Consult  baby aspirin, statin  D/w wife, ID

## 2020-01-20 NOTE — DISCHARGE NOTE PROVIDER - NSDCFUADDAPPT_GEN_ALL_CORE_FT
F/u with ID Dr. Dixon in 1 week  F/u with PCP with in 3 to 5 days  F/u with all your doctors F/u with ID Dr. Dixon ID to f/u results of QuantiFeron Gold test ( TB test )   F/u with PCP with in 3 to 5 days  F/u with all your doctors

## 2020-01-20 NOTE — DISCHARGE NOTE PROVIDER - NSDCCPCAREPLAN_GEN_ALL_CORE_FT
PRINCIPAL DISCHARGE DIAGNOSIS  Diagnosis: Pneumonia of right lower lobe due to infectious organism  Assessment and Plan of Treatment: Complete course of antibiotics as prescribed  F/u with PCP jade 3 to 5 days      SECONDARY DISCHARGE DIAGNOSES  Diagnosis: Chronic obstructive pulmonary disease, unspecified COPD type  Assessment and Plan of Treatment: Continue home meds if any  F/u with PCP PRINCIPAL DISCHARGE DIAGNOSIS  Diagnosis: Pneumonia of right lower lobe due to infectious organism  Assessment and Plan of Treatment: Complete course of antibiotics as prescribed, ceftin for 3 days  F/u with PCP witihn 3 to 5 days      SECONDARY DISCHARGE DIAGNOSES  Diagnosis: Chronic obstructive pulmonary disease, unspecified COPD type  Assessment and Plan of Treatment: Continue home meds if any  F/u with PCP

## 2020-01-20 NOTE — PROGRESS NOTE ADULT - PROBLEM SELECTOR PLAN 2
EKG with out any new changes. No chest pain now  + mild Trop noted.  Cardio Consult  baby aspirin, statin

## 2020-01-20 NOTE — DISCHARGE NOTE PROVIDER - NSDCMRMEDTOKEN_GEN_ALL_CORE_FT
amLODIPine 5 mg oral tablet: 1 tab(s) orally once a day  cilostazol 50 mg oral tablet: 1 tab(s) orally 2 times a day  finasteride 5 mg oral tablet: 1 tab(s) orally once a day  promethazine 6.25 mg/5 mL oral syrup: 5 milliliter(s) orally 4 times a day  rosuvastatin 40 mg oral tablet: 1 tab(s) orally once a day  silodosin 8 mg oral capsule: 1 cap(s) orally once a day acetaminophen 325 mg oral tablet: 2 tab(s) orally every 6 hours, As needed, Temp greater or equal to 38C (100.4F)  amLODIPine 5 mg oral tablet: 1 tab(s) orally once a day  aspirin 81 mg oral tablet, chewable: 1 tab(s) orally once a day  cefuroxime 500 mg oral tablet: 1 tab(s) orally every 12 hours  donepezil 10 mg oral tablet: 1 tab(s) orally once a day (at bedtime)  finasteride 5 mg oral tablet: 1 tab(s) orally once a day  ipratropium-albuterol 0.5 mg-2.5 mg/3 mLinhalation solution: 3 milliliter(s) inhaled every 4 hours, As needed, Shortness of Breath and/or Wheezing  metoprolol succinate 25 mg oral tablet, extended release: 1 tab(s) orally once a day  rosuvastatin 40 mg oral tablet: 1 tab(s) orally once a day  silodosin 8 mg oral capsule: 1 cap(s) orally once a day amLODIPine 5 mg oral tablet: 1 tab(s) orally once a day  aspirin 81 mg oral tablet, chewable: 1 tab(s) orally once a day  cefuroxime 500 mg oral tablet: 1 tab(s) orally every 12 hours  donepezil 10 mg oral tablet: 1 tab(s) orally once a day (at bedtime)  finasteride 5 mg oral tablet: 1 tab(s) orally once a day  ipratropium-albuterol 0.5 mg-2.5 mg/3 mLinhalation solution: 3 milliliter(s) inhaled every 4 hours, As needed, Shortness of Breath and/or Wheezing  metoprolol succinate 25 mg oral tablet, extended release: 1 tab(s) orally once a day  rosuvastatin 40 mg oral tablet: 1 tab(s) orally once a day  silodosin 8 mg oral capsule: 1 cap(s) orally once a day

## 2020-01-20 NOTE — PROGRESS NOTE ADULT - SUBJECTIVE AND OBJECTIVE BOX
Date/Time Patient Seen:  		  Referring MD:   Data Reviewed	       Patient is a 85y old  Male who presents with a chief complaint of Pneumonia (19 Jan 2020 17:29)      Subjective/HPI     PAST MEDICAL & SURGICAL HISTORY:  Myocardial infarction  COPD (chronic obstructive pulmonary disease)  Seasonal allergies  GERD (gastroesophageal reflux disease)  Benign prostatic hypertrophy  Hyperlipidemia  Hypertension  Asthma: Also COPD component  Depression  Peripheral Neuropathy  Asthma  S/P AVR (Aortic Valve Replacement)  S/P CABG (Coronary Artery Bypass Graft)  Osteoporosis  CAD (Coronary Artery Disease): MI, s/p CABG with 2 cardiac stents  HTN (Hypertension)  Dyslipidemia  S/P inguinal hernia repair: On right side  S/P appendectomy  S/P CABG (coronary artery bypass graft): Done in 1996; 2 cardiac stents in 1998  S/P CABG (Coronary Artery Bypass Graft)  S/P AVR (Aortic Valve Replacement): Pig valve (bioprosthetic); done 2010 at [employer]        Medication list         MEDICATIONS  (STANDING):  amLODIPine   Tablet 5 milliGRAM(s) Oral daily  atorvastatin 80 milliGRAM(s) Oral at bedtime  cefTRIAXone   IVPB 1000 milliGRAM(s) IV Intermittent every 24 hours  cilostazol 50 milliGRAM(s) Oral two times a day  donepezil 10 milliGRAM(s) Oral at bedtime  finasteride 5 milliGRAM(s) Oral daily  heparin  Injectable 5000 Unit(s) SubCutaneous every 12 hours  tamsulosin 0.4 milliGRAM(s) Oral at bedtime    MEDICATIONS  (PRN):  acetaminophen   Tablet .. 650 milliGRAM(s) Oral every 6 hours PRN Temp greater or equal to 38C (100.4F)  albuterol/ipratropium for Nebulization 3 milliLiter(s) Nebulizer every 4 hours PRN Shortness of Breath and/or Wheezing  calcium carbonate    500 mG (Tums) Chewable 2 Tablet(s) Chew every 6 hours PRN Indigestion         Vitals log        ICU Vital Signs Last 24 Hrs  T(C): 36.6 (20 Jan 2020 05:45), Max: 36.7 (19 Jan 2020 23:38)  T(F): 97.8 (20 Jan 2020 05:45), Max: 98 (19 Jan 2020 23:38)  HR: 83 (20 Jan 2020 05:45) (62 - 88)  BP: 152/74 (20 Jan 2020 05:45) (111/61 - 156/87)  BP(mean): --  ABP: --  ABP(mean): --  RR: 16 (20 Jan 2020 05:45) (16 - 18)  SpO2: 92% (20 Jan 2020 05:45) (90% - 94%)           Input and Output:  I&O's Detail    19 Jan 2020 07:01  -  20 Jan 2020 07:00  --------------------------------------------------------  IN:    Oral Fluid: 240 mL  Total IN: 240 mL    OUT:    Voided: 350 mL  Total OUT: 350 mL    Total NET: -110 mL          Lab Data                        11.8   6.90  )-----------( 225      ( 20 Jan 2020 06:04 )             37.0     01-20    147<H>  |  115<H>  |  56<H>  ----------------------------<  83  4.0   |  23  |  1.90<H>    Ca    8.3<L>      20 Jan 2020 06:14    TPro  5.6<L>  /  Alb  2.0<L>  /  TBili  0.2  /  DBili  x   /  AST  22  /  ALT  24  /  AlkPhos  78  01-20      CARDIAC MARKERS ( 20 Jan 2020 06:14 )  .164 ng/mL / x     / 18 U/L / x     / <1.0 ng/mL  CARDIAC MARKERS ( 19 Jan 2020 23:34 )  .162 ng/mL / x     / 22 U/L / x     / 1.4 ng/mL        Review of Systems	      Objective     Physical Examination    heart s1s2  lung dec BS  abd soft  head nc      Pertinent Lab findings & Imaging      Ava:  NO   Adequate UO     I&O's Detail    19 Jan 2020 07:01  -  20 Jan 2020 07:00  --------------------------------------------------------  IN:    Oral Fluid: 240 mL  Total IN: 240 mL    OUT:    Voided: 350 mL  Total OUT: 350 mL    Total NET: -110 mL               Discussed with:     Cultures:	  Culture Results:   No growth (01-19 @ 10:52)        Radiology

## 2020-01-20 NOTE — PROGRESS NOTE ADULT - SUBJECTIVE AND OBJECTIVE BOX
Neurology Follow up note    ARABELLA ESPARZA85yMale    HPI:  84 yo male with PMHx of Benign prostatic hypertrophy, CAD, MI, s/p CABG with 2 cardiac stents, COPD, Depression, GERD, Hyperlipidemia  Hypertension, Osteoporosis, Peripheral Neuropathy, Dementia who presented to the ED s/p fall and increased confusion x1 day. Patient is accompanied by wife and health aide. Patient is a limited historian. Per aide, patient has a history of frequent falls, most recent fall was yesterday. Aide reports chronic cough for the past 3 months. Aide and wife noticed increasing confusion. Wife states "he could not remember how to make his way to the bedroom." Patient was brought to the ED for further evaluation. Per wife and aide, patient's mental status has returned to baseline. Patient admits to chills. Admits to cough x3 months, for which he takes cough syrup. Aide endorses frequent episodes of stool and urine incontinence. Patient lives with wife and 24 hour aid. He ambulates with walker and cane. Denies fever, chest pain, shortness of breath, abdominal pain, nausea or vomiting.     ED vitals: T: 98.1, HR: 98, BP: 157/70 RR: 17 O2: 93 on RA   Labs significant for: WBC: 12.46 with left shift, Chloride: 109, BUN: 41, Cr: 2.1, EGFR: 28   X-ray:  The cardiomediastinal silhouette is similar to the previous exam with dilated tortuous thoracic aorta. There is decreased right lung volume with shift of the mediastinum to the right side. There is right perihilar scarring again noted. No pleural effusion  CT Chest:  Right lower lobe pneumonia  CT Head: No acute intracranial findings  In the ED patient was given: Azactam and Vancomycin X1, tylenol, IVF bolus X1, albuterol/ipratropium X1, Solumederol 125 X1 (17 Jan 2020 19:47)      Interval History -upgraded to Tel; c/o chest pain    Patient is seen, chart was reviewed and case was discussed with the treatment team.  Pt is not in any distress.   Lying on bed comfortably.   No events reported overnight.   No clinical seizure was reported.      Vital Signs Last 24 Hrs  T(C): 36.6 (20 Jan 2020 07:30), Max: 36.7 (19 Jan 2020 23:38)  T(F): 97.8 (20 Jan 2020 07:30), Max: 98 (19 Jan 2020 23:38)  HR: 77 (20 Jan 2020 07:30) (77 - 88)  BP: 153/83 (20 Jan 2020 07:30) (124/74 - 156/87)  BP(mean): --  RR: 17 (20 Jan 2020 07:30) (16 - 17)  SpO2: 92% (20 Jan 2020 07:30) (90% - 94%)        REVIEW OF SYSTEMS:    Constitutional: No fever, weight loss or fatigue  Eyes: No eye pain, visual disturbances, or discharge  ENT:  No difficulty hearing, tinnitus, vertigo; No sinus or throat pain  Neck: No pain or stiffness  Respiratory: No cough, wheezing, chills or hemoptysis  Cardiovascular: No chest pain, palpitations, shortness of breath, dizziness or leg swelling  Gastrointestinal: No abdominal or epigastric pain. No nausea, vomiting or hematemesis;   Genitourinary: No dysuria, frequency, hematuria or incontinence  Neurological: No headaches, memory loss, loss of strength, numbness or tremors  Psychiatric: No depression, anxiety, mood swings or difficulty sleeping  Musculoskeletal: No joint pain or swelling; No muscle, back or extremity pain  Skin: No itching, burning, rashes or lesions   Lymph Nodes: No enlarged glands  Endocrine: No heat or cold intolerance;   Allergy and Immunologic: No hives or eczema    On Neurological Examination:    Mental Status - Pt is alert, awake, oriented X2   Follows commands well and able to answer questions appropriately  .Mood and affect  normal    Speech -  Normal.     Cranial Nerves - Pupils 3 mm equal and reactive to light, extraocular eye movements intact.   Pt has no facial asymmetry. Facial sensation is intact.  Tongue - is in midline.    Muscle tone - is normal all over. Moves all extremities equally.     Motor Exam - 5/5 of UE  LE 4+/5    Sensory Exam -  Pt withdraws all extremities equally on stimulation. No asymmetry seen. No complaints of tingling, numbness.    coordination:    Finger to nose: normal      Deep tendon Reflexes - 2 plus all over.        Neck Supple -  Yes.     MEDICATIONS    acetaminophen   Tablet .. 650 milliGRAM(s) Oral every 6 hours PRN  albuterol/ipratropium for Nebulization 3 milliLiter(s) Nebulizer every 4 hours PRN  amLODIPine   Tablet 5 milliGRAM(s) Oral daily  atorvastatin 80 milliGRAM(s) Oral at bedtime  calcium carbonate    500 mG (Tums) Chewable 2 Tablet(s) Chew every 6 hours PRN  cefTRIAXone   IVPB 1000 milliGRAM(s) IV Intermittent every 24 hours  cilostazol 50 milliGRAM(s) Oral two times a day  donepezil 10 milliGRAM(s) Oral at bedtime  finasteride 5 milliGRAM(s) Oral daily  heparin  Injectable 5000 Unit(s) SubCutaneous every 12 hours  ondansetron Injectable 4 milliGRAM(s) IV Push once  tamsulosin 0.4 milliGRAM(s) Oral at bedtime      Allergies    amoxicillin (Unknown)  Levaquin (Unknown)  penicillin (Unknown)    Intolerances                              11.8   6.90  )-----------( 225      ( 20 Jan 2020 06:04 )             37.0       01-20    147<H>  |  115<H>  |  56<H>  ----------------------------<  83  4.0   |  23  |  1.90<H>    Ca    8.3<L>      20 Jan 2020 06:14    TPro  5.6<L>  /  Alb  2.0<L>  /  TBili  0.2  /  DBili  x   /  AST  22  /  ALT  24  /  AlkPhos  78  01-20      TPro  6.5  /  Alb  2.2<L>  /  TBili  0.2  /  DBili  x   /  AST  17  /  ALT  19  /  AlkPhos  96  01-18    Hemoglobin A1C:     Vitamin B12     RADIOLOGY    ASSESSMENT AND PLAN:      SEEN FOR AMS; LIKELY METABOLIC ENCEPHALOPATHY  DEMENTIA  PNEUMONIA    NO FURTHER NEURO COX.  ANTIBIOTIC AS MEDICAL TEAM  CONTINUE ARICEPT.  Physical therapy evaluation.  OOB to chair/ambulation with assistance only  Pain is accessed and addressed.  Plan of care was discussed with family. Questions answered.  Would continue to follow.                    Electronic Signatures:  John Avalos)  (Signed 19-Jan-2020 17:32)  	Authored: Progress Note, Reason for Admission, Subjective and Objective      Last Updated: 19-Jan-2020 17:32 by John Avalos)

## 2020-01-20 NOTE — PROGRESS NOTE ADULT - SUBJECTIVE AND OBJECTIVE BOX
Patient is a 85y old  Male who presents with a chief complaint of Pneumonia (20 Jan 2020 07:15)      INTERVAL HPI/OVERNIGHT EVENTS:84 yo male with PMHx of Benign prostatic hypertrophy, CAD, MI, s/p CABG with 2 cardiac stents, COPD, Depression, GERD, Hyperlipidemia  Hypertension, Osteoporosis, Peripheral Neuropathy, Dementia who presented to the ED s/p fall and increased confusion x1 day admitted with RLL pneumonia, confusion suspect encephalopathy, now resolved . Overnight events noted for chest discomfort with mildly + Trop. Denies chest pain now. Denies sob.       MEDICATIONS  (STANDING):  amLODIPine   Tablet 5 milliGRAM(s) Oral daily  atorvastatin 80 milliGRAM(s) Oral at bedtime  cefTRIAXone   IVPB 1000 milliGRAM(s) IV Intermittent every 24 hours  cilostazol 50 milliGRAM(s) Oral two times a day  donepezil 10 milliGRAM(s) Oral at bedtime  finasteride 5 milliGRAM(s) Oral daily  heparin  Injectable 5000 Unit(s) SubCutaneous every 12 hours  tamsulosin 0.4 milliGRAM(s) Oral at bedtime    MEDICATIONS  (PRN):  acetaminophen   Tablet .. 650 milliGRAM(s) Oral every 6 hours PRN Temp greater or equal to 38C (100.4F)  albuterol/ipratropium for Nebulization 3 milliLiter(s) Nebulizer every 4 hours PRN Shortness of Breath and/or Wheezing  calcium carbonate    500 mG (Tums) Chewable 2 Tablet(s) Chew every 6 hours PRN Indigestion      Allergies    amoxicillin (Unknown)  Levaquin (Unknown)  penicillin (Unknown)    Intolerances        REVIEW OF SYSTEMS:  CONSTITUTIONAL: No fever,  or fatigue  EYES: No eye pain, visual disturbances, or discharge  ENMT:  No difficulty hearing, tinnitus, vertigo; No sinus or throat pain  NECK: No pain or stiffness    RESPIRATORY: No cough, wheezing, chills or hemoptysis; No shortness of breath  CARDIOVASCULAR: No chest pain, palpitations, dizziness, or leg swelling  GASTROINTESTINAL: No abdominal or epigastric pain. No nausea, vomiting, or hematemesis; No diarrhea or constipation  GENITOURINARY: No dysuria, frequency, hematuria, or incontinence  NEUROLOGICAL: No headaches, loss of strength, numbness, or tremors  SKIN: No itching, burning, rashes, or lesions   LYMPH NODES: No enlarged glands  ENDOCRINE: No heat or cold intolerance; No hair loss; No polydipsia or polyuria  MUSCULOSKELETAL: No joint pain or swelling; No muscle, back, or extremity pain  PSYCHIATRIC: No depression, anxiety, mood swings, or difficulty sleeping  HEME/LYMPH: No easy bruising, or bleeding gums  ALLERGY AND IMMUNOLOGIC: No hives or eczema    Vital Signs Last 24 Hrs  T(C): 36.6 (20 Jan 2020 07:30), Max: 36.7 (19 Jan 2020 23:38)  T(F): 97.8 (20 Jan 2020 07:30), Max: 98 (19 Jan 2020 23:38)  HR: 77 (20 Jan 2020 07:30) (77 - 88)  BP: 153/83 (20 Jan 2020 07:30) (117/61 - 156/87)  BP(mean): --  RR: 17 (20 Jan 2020 07:30) (16 - 17)  SpO2: 92% (20 Jan 2020 07:30) (90% - 94%)    PHYSICAL EXAM:  GENERAL: NAD, Awake, Alert   HEAD:  Atraumatic, Normocephalic  EYES: EOMI, PERRLA, conjunctiva and sclera clear  ENMT: No tonsillar erythema, exudates, or enlargement; Moist mucous membranes  NECK: Supple, No JVD, Normal thyroid  NERVOUS SYSTEM:  Alert & Awake, confused at baseline, Motor Strength 5/5 B/L upper and lower extremities  CHEST/LUNG: Clear to auscultation bilaterally; No rales, rhonchi, wheezing, or rubs  HEART: Regular rate and rhythm; No murmurs, rubs, or gallops  ABDOMEN: Soft, Nontender, Nondistended; Bowel sounds present  EXTREMITIES:  2+ Peripheral Pulses, No clubbing, cyanosis, or edema  LYMPH: No lymphadenopathy noted  SKIN: No rashes or lesions    LABS:                        11.8   6.90  )-----------( 225      ( 20 Jan 2020 06:04 )             37.0     20 Jan 2020 06:14    147    |  115    |  56     ----------------------------<  83     4.0     |  23     |  1.90     Ca    8.3        20 Jan 2020 06:14    TPro  5.6    /  Alb  2.0    /  TBili  0.2    /  DBili  x      /  AST  22     /  ALT  24     /  AlkPhos  78     20 Jan 2020 06:14      CAPILLARY BLOOD GLUCOSE        BLOOD CULTURE  01-19 @ 10:52   No growth  --  --  01-17 @ 22:25   No growth to date.  --  --    RADIOLOGY & ADDITIONAL TESTS:    Imaging Personally Reviewed:  [ ] YES     Consultant(s) Notes Reviewed:      Care Discussed with Consultants/Other Providers:

## 2020-01-20 NOTE — CONSULT NOTE ADULT - ASSESSMENT
Mr. Shelley is an 85 year old male with CAD with stents, and CABG in 1996, bio avr in 2010 with predominantly small vessel disease at this time, HTN and COPD, who presents with fall and confusion.  CT with rll pneumonia, and he has been treated with antibiotics.    - chest pain is not typical in description, though he does have known incompletely revascularized CAD  - his troponin is mildly elevated, with a normal CK, suggestive against ACS.  - Ekg is a sr with rbbb, unchanged from 2015. He does have apcs and vpcs. Can add a low dose bb if this continues (per records, he was on toprol xl 25 in past)  - cont asa and statin for cad  - stop cilostazol in this setting  - continue to watch on telemetry    - can check echocardiogram. His last echo in 2015 was uninterpretable.  - his exam is not consistent with congestive failure.    - remains in a sr on telemetry with occasional ectopy  - continue to watch on telemetry    - BP acceptable. Continue with norvasc.    - watch creatinine and electrolytes. keep K>4, Mg>2  - will follow with you.

## 2020-01-21 LAB
ANION GAP SERPL CALC-SCNC: 8 MMOL/L — SIGNIFICANT CHANGE UP (ref 5–17)
BUN SERPL-MCNC: 44 MG/DL — HIGH (ref 7–23)
CALCIUM SERPL-MCNC: 9.1 MG/DL — SIGNIFICANT CHANGE UP (ref 8.5–10.1)
CHLORIDE SERPL-SCNC: 112 MMOL/L — HIGH (ref 96–108)
CO2 SERPL-SCNC: 26 MMOL/L — SIGNIFICANT CHANGE UP (ref 22–31)
CREAT SERPL-MCNC: 1.8 MG/DL — HIGH (ref 0.5–1.3)
CULTURE RESULTS: SIGNIFICANT CHANGE UP
GLUCOSE SERPL-MCNC: 121 MG/DL — HIGH (ref 70–99)
HCT VFR BLD CALC: 40.8 % — SIGNIFICANT CHANGE UP (ref 39–50)
HGB BLD-MCNC: 13.1 G/DL — SIGNIFICANT CHANGE UP (ref 13–17)
MCHC RBC-ENTMCNC: 27.1 PG — SIGNIFICANT CHANGE UP (ref 27–34)
MCHC RBC-ENTMCNC: 32.1 GM/DL — SIGNIFICANT CHANGE UP (ref 32–36)
MCV RBC AUTO: 84.5 FL — SIGNIFICANT CHANGE UP (ref 80–100)
NRBC # BLD: 0 /100 WBCS — SIGNIFICANT CHANGE UP (ref 0–0)
PLATELET # BLD AUTO: 276 K/UL — SIGNIFICANT CHANGE UP (ref 150–400)
POTASSIUM SERPL-MCNC: 4.4 MMOL/L — SIGNIFICANT CHANGE UP (ref 3.5–5.3)
POTASSIUM SERPL-SCNC: 4.4 MMOL/L — SIGNIFICANT CHANGE UP (ref 3.5–5.3)
RBC # BLD: 4.83 M/UL — SIGNIFICANT CHANGE UP (ref 4.2–5.8)
RBC # FLD: 14.7 % — HIGH (ref 10.3–14.5)
SODIUM SERPL-SCNC: 146 MMOL/L — HIGH (ref 135–145)
SPECIMEN SOURCE: SIGNIFICANT CHANGE UP
WBC # BLD: 8.56 K/UL — SIGNIFICANT CHANGE UP (ref 3.8–10.5)
WBC # FLD AUTO: 8.56 K/UL — SIGNIFICANT CHANGE UP (ref 3.8–10.5)

## 2020-01-21 PROCEDURE — 99232 SBSQ HOSP IP/OBS MODERATE 35: CPT

## 2020-01-21 PROCEDURE — 99233 SBSQ HOSP IP/OBS HIGH 50: CPT

## 2020-01-21 RX ORDER — IPRATROPIUM/ALBUTEROL SULFATE 18-103MCG
3 AEROSOL WITH ADAPTER (GRAM) INHALATION
Qty: 0 | Refills: 0 | DISCHARGE
Start: 2020-01-21

## 2020-01-21 RX ORDER — CILOSTAZOL 100 MG/1
1 TABLET ORAL
Qty: 0 | Refills: 0 | DISCHARGE

## 2020-01-21 RX ORDER — ASPIRIN/CALCIUM CARB/MAGNESIUM 324 MG
1 TABLET ORAL
Qty: 0 | Refills: 0 | DISCHARGE
Start: 2020-01-21

## 2020-01-21 RX ORDER — DONEPEZIL HYDROCHLORIDE 10 MG/1
1 TABLET, FILM COATED ORAL
Qty: 0 | Refills: 0 | DISCHARGE
Start: 2020-01-21

## 2020-01-21 RX ORDER — METOPROLOL TARTRATE 50 MG
1 TABLET ORAL
Qty: 0 | Refills: 0 | DISCHARGE
Start: 2020-01-21

## 2020-01-21 RX ORDER — CEFUROXIME AXETIL 250 MG
1 TABLET ORAL
Qty: 6 | Refills: 0
Start: 2020-01-21 | End: 2020-01-23

## 2020-01-21 RX ORDER — DONEPEZIL HYDROCHLORIDE 10 MG/1
1 TABLET, FILM COATED ORAL
Qty: 15 | Refills: 0
Start: 2020-01-21 | End: 2020-02-04

## 2020-01-21 RX ORDER — AMLODIPINE BESYLATE 2.5 MG/1
10 TABLET ORAL DAILY
Refills: 0 | Status: DISCONTINUED | OUTPATIENT
Start: 2020-01-22 | End: 2020-01-22

## 2020-01-21 RX ORDER — CEFUROXIME AXETIL 250 MG
1 TABLET ORAL
Qty: 0 | Refills: 0 | DISCHARGE
Start: 2020-01-21

## 2020-01-21 RX ORDER — METOPROLOL TARTRATE 50 MG
25 TABLET ORAL DAILY
Refills: 0 | Status: DISCONTINUED | OUTPATIENT
Start: 2020-01-21 | End: 2020-01-22

## 2020-01-21 RX ORDER — CEFTRIAXONE 500 MG/1
1000 INJECTION, POWDER, FOR SOLUTION INTRAMUSCULAR; INTRAVENOUS EVERY 24 HOURS
Refills: 0 | Status: COMPLETED | OUTPATIENT
Start: 2020-01-21 | End: 2020-01-21

## 2020-01-21 RX ORDER — ACETAMINOPHEN 500 MG
2 TABLET ORAL
Qty: 0 | Refills: 0 | DISCHARGE
Start: 2020-01-21

## 2020-01-21 RX ORDER — AMLODIPINE BESYLATE 2.5 MG/1
5 TABLET ORAL ONCE
Refills: 0 | Status: COMPLETED | OUTPATIENT
Start: 2020-01-21 | End: 2020-01-21

## 2020-01-21 RX ORDER — CEFUROXIME AXETIL 250 MG
500 TABLET ORAL EVERY 12 HOURS
Refills: 0 | Status: DISCONTINUED | OUTPATIENT
Start: 2020-01-22 | End: 2020-01-22

## 2020-01-21 RX ADMIN — Medication 25 MILLIGRAM(S): at 10:04

## 2020-01-21 RX ADMIN — PANTOPRAZOLE SODIUM 40 MILLIGRAM(S): 20 TABLET, DELAYED RELEASE ORAL at 05:26

## 2020-01-21 RX ADMIN — ATORVASTATIN CALCIUM 80 MILLIGRAM(S): 80 TABLET, FILM COATED ORAL at 21:02

## 2020-01-21 RX ADMIN — FINASTERIDE 5 MILLIGRAM(S): 5 TABLET, FILM COATED ORAL at 11:01

## 2020-01-21 RX ADMIN — DONEPEZIL HYDROCHLORIDE 10 MILLIGRAM(S): 10 TABLET, FILM COATED ORAL at 21:04

## 2020-01-21 RX ADMIN — TAMSULOSIN HYDROCHLORIDE 0.4 MILLIGRAM(S): 0.4 CAPSULE ORAL at 21:02

## 2020-01-21 RX ADMIN — AMLODIPINE BESYLATE 5 MILLIGRAM(S): 2.5 TABLET ORAL at 10:04

## 2020-01-21 RX ADMIN — CEFTRIAXONE 100 MILLIGRAM(S): 500 INJECTION, POWDER, FOR SOLUTION INTRAMUSCULAR; INTRAVENOUS at 10:52

## 2020-01-21 RX ADMIN — HEPARIN SODIUM 5000 UNIT(S): 5000 INJECTION INTRAVENOUS; SUBCUTANEOUS at 05:26

## 2020-01-21 RX ADMIN — AMLODIPINE BESYLATE 5 MILLIGRAM(S): 2.5 TABLET ORAL at 05:26

## 2020-01-21 RX ADMIN — HEPARIN SODIUM 5000 UNIT(S): 5000 INJECTION INTRAVENOUS; SUBCUTANEOUS at 17:28

## 2020-01-21 RX ADMIN — Medication 81 MILLIGRAM(S): at 11:01

## 2020-01-21 NOTE — PROGRESS NOTE ADULT - ASSESSMENT
85 year old male with CAD with stents, and CABG in 1996, bio avr in 2010 with predominantly small vessel disease at this time, HTN and COPD, who presents with fall and confusion.  CT with rll pneumonia, and he has been treated with antibiotics.    Atypical CP Hx CAD s/p CABG  - His CP chest pain is atypical in nature, more pleuritic, though he does have known incompletely revascularized CAD.  His CT chest showed RLL Pna  - He has troponin leaks with a normal CK, suggestive against ACS.  - EKG showed with rbbb, unchanged from 2015.  He does have apcs and vpcs.  Tele showed NSR  - Start Toprol XL 25 mg daily  - Continue ASA and statin for cad  - No need for Cilostazol  - Continue to watch on telemetry.  If no arrhythmias in 24 hrs, can discosntinue  - Follow up repeat echocardiogram. His last echo in 2015 was uninterpretable.  - No clinical evidence of volume overload  - Monitor creatinine and electrolytes. keep K>4, Mg>2  Bio AVR  - Follow up TTE.  No significant murmur noted that can indicate an elevated gradient across the valve    HTN  - BP is suboptimal at systolic 150-170.  Increase Norvasc to 10 mg daily and give an extra dose of 5 mg this morning  - Start Toprol XL 25 mg.    - Continue to monitor routine hemodynamics    Will follow with you.    Arlin Patterson DNP, NP-C  Cardiology   Spectra #[phone]

## 2020-01-21 NOTE — PROGRESS NOTE ADULT - SUBJECTIVE AND OBJECTIVE BOX
infectious diseases progress note:    ARABELLA ESPARZA is a 85y y. o. Male patient    Patient with no concerning overnight events    Allergies    amoxicillin (Unknown)  Levaquin (Unknown)  penicillin (Unknown)    Intolerances        ANTIBIOTICS/RELEVANT:  antimicrobials  cefTRIAXone   IVPB 1000 milliGRAM(s) IV Intermittent every 24 hours    immunologic:    OTHER:  acetaminophen   Tablet .. 650 milliGRAM(s) Oral every 6 hours PRN  albuterol/ipratropium for Nebulization 3 milliLiter(s) Nebulizer every 4 hours PRN  amLODIPine   Tablet 10 milliGRAM(s) Oral daily  amLODIPine   Tablet 5 milliGRAM(s) Oral once  aspirin  chewable 81 milliGRAM(s) Oral daily  atorvastatin 80 milliGRAM(s) Oral at bedtime  calcium carbonate    500 mG (Tums) Chewable 2 Tablet(s) Chew every 6 hours PRN  donepezil 10 milliGRAM(s) Oral at bedtime  finasteride 5 milliGRAM(s) Oral daily  heparin  Injectable 5000 Unit(s) SubCutaneous every 12 hours  metoprolol succinate ER 25 milliGRAM(s) Oral daily  pantoprazole    Tablet 40 milliGRAM(s) Oral before breakfast  tamsulosin 0.4 milliGRAM(s) Oral at bedtime      Objective:  Vital Signs Last 24 Hrs  T(C): 36.4 (21 Jan 2020 07:44), Max: 36.7 (20 Jan 2020 19:15)  T(F): 97.6 (21 Jan 2020 07:44), Max: 98 (20 Jan 2020 19:15)  HR: 86 (21 Jan 2020 07:44) (78 - 91)  BP: 170/95 (21 Jan 2020 07:44) (149/96 - 176/88)  BP(mean): --  RR: 19 (21 Jan 2020 07:44) (18 - 23)  SpO2: 94% (21 Jan 2020 07:44) (91% - 96%)    T(C): 36.4 (01-21-20 @ 07:44), Max: 36.7 (01-19-20 @ 23:38)  T(C): 36.4 (01-21-20 @ 07:44), Max: 36.7 (01-19-20 @ 23:38)  T(C): 36.4 (01-21-20 @ 07:44), Max: 38.4 (01-17-20 @ 16:05)    PHYSICAL EXAM:  Constitutional: Well-developed, well nourished  Eyes: PERRLA, EOMI  Ear/Nose/Throat: oropharynx normal	  Neck: no JVD, no lymphadenopathy, supple  Respiratory: no accessory muscle use  Cardiovascular: RRR,   Gastrointestinal: soft, NT  Extremities: no clubbing, no cyanosis, edema absent      LABS:                        13.1   8.56  )-----------( 276      ( 21 Jan 2020 06:31 )             40.8       8.56 01-21 @ 06:31  6.90 01-20 @ 06:04  15.67 01-19 @ 06:26  9.16 01-18 @ 07:09  12.46 01-17 @ 16:44      01-21    146<H>  |  112<H>  |  44<H>  ----------------------------<  121<H>  4.4   |  26  |  1.80<H>    Ca    9.1      21 Jan 2020 06:31    TPro  5.6<L>  /  Alb  2.0<L>  /  TBili  0.2  /  DBili  x   /  AST  22  /  ALT  24  /  AlkPhos  78  01-20      Creatinine, Serum: 1.80 mg/dL (01-21-20 @ 06:31)  Creatinine, Serum: 1.90 mg/dL (01-20-20 @ 06:14)  Creatinine, Serum: 1.90 mg/dL (01-20-20 @ 06:04)  Creatinine, Serum: 1.90 mg/dL (01-19-20 @ 06:26)  Creatinine, Serum: 2.00 mg/dL (01-18-20 @ 07:09)  Creatinine, Serum: 2.10 mg/dL (01-17-20 @ 16:44)                MICROBIOLOGY:              RADIOLOGY & ADDITIONAL STUDIES:

## 2020-01-21 NOTE — PROGRESS NOTE ADULT - SUBJECTIVE AND OBJECTIVE BOX
Patient is a 85y old  Male who presents with a chief complaint of Pneumonia (21 Jan 2020 09:31)       INTERVAL HPI/OVERNIGHT EVENTS: 84 yo male with PMHx of Benign prostatic hypertrophy, CAD, MI, s/p CABG with 2 cardiac stents, COPD, Depression, GERD, Hyperlipidemia  Hypertension, Osteoporosis, Peripheral Neuropathy, Dementia who presented to the ED s/p fall and increased confusion x1 day admitted with RLL pneumonia, confusion suspect encephalopathy, now resolved . Denies any new symptoms, complaints       MEDICATIONS  (STANDING):  amLODIPine   Tablet 10 milliGRAM(s) Oral daily  aspirin  chewable 81 milliGRAM(s) Oral daily  atorvastatin 80 milliGRAM(s) Oral at bedtime  donepezil 10 milliGRAM(s) Oral at bedtime  finasteride 5 milliGRAM(s) Oral daily  heparin  Injectable 5000 Unit(s) SubCutaneous every 12 hours  metoprolol succinate ER 25 milliGRAM(s) Oral daily  pantoprazole    Tablet 40 milliGRAM(s) Oral before breakfast  tamsulosin 0.4 milliGRAM(s) Oral at bedtime    MEDICATIONS  (PRN):  acetaminophen   Tablet .. 650 milliGRAM(s) Oral every 6 hours PRN Temp greater or equal to 38C (100.4F)  albuterol/ipratropium for Nebulization 3 milliLiter(s) Nebulizer every 4 hours PRN Shortness of Breath and/or Wheezing  calcium carbonate    500 mG (Tums) Chewable 2 Tablet(s) Chew every 6 hours PRN Indigestion      Allergies    amoxicillin (Unknown)  Levaquin (Unknown)  penicillin (Unknown)    Intolerances        REVIEW OF SYSTEMS:  CONSTITUTIONAL: No fever,  or fatigue  EYES: No eye pain, visual disturbances, or discharge  ENMT:  No difficulty hearing, tinnitus, vertigo; No sinus or throat pain  NECK: No pain or stiffness  RESPIRATORY: No cough, wheezing, chills or hemoptysis; No shortness of breath  CARDIOVASCULAR: No chest pain, palpitations, dizziness, or leg swelling  GASTROINTESTINAL: No abdominal or epigastric pain. No nausea, vomiting, or hematemesis; No diarrhea or constipation.   GENITOURINARY: No dysuria, frequency, hematuria, or incontinence  NEUROLOGICAL: No headaches,  loss of strength, numbness, or tremors  SKIN: No itching, burning, rashes, or lesions   LYMPH NODES: No enlarged glands  ENDOCRINE: No heat or cold intolerance; No hair loss; No polydipsia or polyuria  MUSCULOSKELETAL: No joint pain or swelling; No muscle, back, or extremity pain  PSYCHIATRIC: No depression, anxiety, mood swings, or difficulty sleeping  HEME/LYMPH: No easy bruising, or bleeding gums  ALLERGY AND IMMUNOLOGIC: No hives or eczema    Vital Signs Last 24 Hrs  T(C): 36.4 (21 Jan 2020 12:29), Max: 36.7 (20 Jan 2020 19:15)  T(F): 97.6 (21 Jan 2020 12:29), Max: 98 (20 Jan 2020 19:15)  HR: 91 (21 Jan 2020 12:29) (82 - 91)  BP: 156/96 (21 Jan 2020 12:29) (134/89 - 176/88)  BP(mean): --  RR: 18 (21 Jan 2020 12:29) (18 - 21)  SpO2: 96% (21 Jan 2020 12:29) (91% - 96%)    PHYSICAL EXAM:  GENERAL: NAD, Awake, Alert   HEAD:  Atraumatic, Normocephalic  EYES: EOMI, PERRLA, conjunctiva and sclera clear  ENMT: No tonsillar erythema, exudates, or enlargement; Moist mucous membranes  NECK: Supple, No JVD, Normal thyroid  NERVOUS SYSTEM:  Alert & Awake,  Motor Strength 5/5 B/L upper and lower extremities  CHEST/LUNG: Clear to auscultation bilaterally; No rales, rhonchi, wheezing, or rubs  HEART: S1S2+, Regular rate and rhythm  ABDOMEN: Soft, Nontender, Nondistended; Bowel sounds present  EXTREMITIES:  2+ Peripheral Pulses, No clubbing, cyanosis, or edema  LYMPH: No lymphadenopathy noted  SKIN: No rashes or lesions    LABS:                        13.1   8.56  )-----------( 276      ( 21 Jan 2020 06:31 )             40.8     21 Jan 2020 06:31    146    |  112    |  44     ----------------------------<  121    4.4     |  26     |  1.80     Ca    9.1        21 Jan 2020 06:31        CAPILLARY BLOOD GLUCOSE        BLOOD CULTURE  01-19 @ 15:03   Normal Respiratory Alisa present  --  --  01-19 @ 10:52   No growth  --  --  01-17 @ 22:25   No growth to date.  --  --    RADIOLOGY & ADDITIONAL TESTS:    Imaging Personally Reviewed:  [ ] YES     Consultant(s) Notes Reviewed:      Care Discussed with Consultants/Other Providers:

## 2020-01-21 NOTE — PROGRESS NOTE ADULT - SUBJECTIVE AND OBJECTIVE BOX
Date/Time Patient Seen:  		  Referring MD:   Data Reviewed	       Patient is a 85y old  Male who presents with a chief complaint of Pneumonia (20 Jan 2020 13:46)      Subjective/HPI     PAST MEDICAL & SURGICAL HISTORY:  Myocardial infarction  COPD (chronic obstructive pulmonary disease)  Seasonal allergies  GERD (gastroesophageal reflux disease)  Benign prostatic hypertrophy  Hyperlipidemia  Hypertension  Asthma: Also COPD component  Depression  Peripheral Neuropathy  Asthma  S/P AVR (Aortic Valve Replacement)  S/P CABG (Coronary Artery Bypass Graft)  Osteoporosis  CAD (Coronary Artery Disease): MI, s/p CABG with 2 cardiac stents  HTN (Hypertension)  Dyslipidemia  S/P inguinal hernia repair: On right side  S/P appendectomy  S/P CABG (coronary artery bypass graft): Done in 1996; 2 cardiac stents in 1998  S/P CABG (Coronary Artery Bypass Graft)  S/P AVR (Aortic Valve Replacement): Pig valve (bioprosthetic); done 2010 at [place]        Medication list         MEDICATIONS  (STANDING):  amLODIPine   Tablet 5 milliGRAM(s) Oral daily  aspirin  chewable 81 milliGRAM(s) Oral daily  atorvastatin 80 milliGRAM(s) Oral at bedtime  cefTRIAXone   IVPB 1000 milliGRAM(s) IV Intermittent every 24 hours  donepezil 10 milliGRAM(s) Oral at bedtime  finasteride 5 milliGRAM(s) Oral daily  heparin  Injectable 5000 Unit(s) SubCutaneous every 12 hours  pantoprazole    Tablet 40 milliGRAM(s) Oral before breakfast  tamsulosin 0.4 milliGRAM(s) Oral at bedtime    MEDICATIONS  (PRN):  acetaminophen   Tablet .. 650 milliGRAM(s) Oral every 6 hours PRN Temp greater or equal to 38C (100.4F)  albuterol/ipratropium for Nebulization 3 milliLiter(s) Nebulizer every 4 hours PRN Shortness of Breath and/or Wheezing  calcium carbonate    500 mG (Tums) Chewable 2 Tablet(s) Chew every 6 hours PRN Indigestion         Vitals log        ICU Vital Signs Last 24 Hrs  T(C): 36.7 (21 Jan 2020 04:15), Max: 36.7 (20 Jan 2020 19:15)  T(F): 98 (21 Jan 2020 04:15), Max: 98 (20 Jan 2020 19:15)  HR: 91 (21 Jan 2020 04:15) (77 - 91)  BP: 176/88 (21 Jan 2020 04:15) (149/96 - 176/88)  BP(mean): --  ABP: --  ABP(mean): --  RR: 18 (21 Jan 2020 04:15) (17 - 23)  SpO2: 95% (21 Jan 2020 04:15) (91% - 96%)           Input and Output:  I&O's Detail    20 Jan 2020 07:01  -  21 Jan 2020 07:00  --------------------------------------------------------  IN:    Oral Fluid: 760 mL    Solution: 50 mL  Total IN: 810 mL    OUT:    Estimated Blood Loss: 1 mL    Voided: 150 mL  Total OUT: 151 mL    Total NET: 659 mL          Lab Data                        13.1   8.56  )-----------( 276      ( 21 Jan 2020 06:31 )             40.8     01-21    146<H>  |  112<H>  |  44<H>  ----------------------------<  121<H>  4.4   |  26  |  1.80<H>    Ca    9.1      21 Jan 2020 06:31    TPro  5.6<L>  /  Alb  2.0<L>  /  TBili  0.2  /  DBili  x   /  AST  22  /  ALT  24  /  AlkPhos  78  01-20      CARDIAC MARKERS ( 20 Jan 2020 13:00 )  .105 ng/mL / x     / x     / x     / x      CARDIAC MARKERS ( 20 Jan 2020 06:14 )  .164 ng/mL / x     / 18 U/L / x     / <1.0 ng/mL  CARDIAC MARKERS ( 19 Jan 2020 23:34 )  .162 ng/mL / x     / 22 U/L / x     / 1.4 ng/mL        Review of Systems	      Objective     Physical Examination    heart s1s2  lung dec BS  abd soft  head nc      Pertinent Lab findings & Imaging      Ava:  NO   Adequate UO     I&O's Detail    20 Jan 2020 07:01  -  21 Jan 2020 07:00  --------------------------------------------------------  IN:    Oral Fluid: 760 mL    Solution: 50 mL  Total IN: 810 mL    OUT:    Estimated Blood Loss: 1 mL    Voided: 150 mL  Total OUT: 151 mL    Total NET: 659 mL               Discussed with:     Cultures:	        Radiology

## 2020-01-21 NOTE — PROGRESS NOTE ADULT - PROBLEM SELECTOR PLAN 2
EKG with out any new changes. No chest pain now. Likely atypical pleuritic chest pain   + mild Trop noted.  Cardio Consult appreciated   baby aspirin, statin

## 2020-01-21 NOTE — PROGRESS NOTE ADULT - PROBLEM SELECTOR PLAN 3
will follow  Thank you for consulting us and involving us in the management of this most interesting and challenging case.     Please Call with any further questions

## 2020-01-21 NOTE — PROGRESS NOTE ADULT - PROBLEM SELECTOR PLAN 4
Suspect metabolic  encephalopathy due to pneumonia.  Wife reports h/o dementia, not on meds needs refill prescription from neuro. On Aricept now.   CT head negative for acute findings  No focal motor/ sensory deficits   Neuro consult appreciated

## 2020-01-21 NOTE — PROGRESS NOTE ADULT - SUBJECTIVE AND OBJECTIVE BOX
[facility] -- Serina Poole, Rich Molina, Arturo Dick Savella, [place]  Office # [phone]    Follow Up:  Bio AVR, AMS, and Troponin leaks with CP    Subjective/Observations: Awake and alert, now oriented.  Comfortable on RA.  Denies any chest discomfort.  Noted to have dry cough    REVIEW OF SYSTEMS: All other review of systems is negative unless indicated above  PAST MEDICAL & SURGICAL HISTORY:  Myocardial infarction  COPD (chronic obstructive pulmonary disease)  Seasonal allergies  GERD (gastroesophageal reflux disease)  Benign prostatic hypertrophy  Hyperlipidemia  Hypertension  Depression  Peripheral Neuropathy  Osteoporosis  CAD (Coronary Artery Disease): MI, s/p CABG with 2 cardiac stents  S/P inguinal hernia repair: On right side  S/P appendectomy  S/P CABG (coronary artery bypass graft): Done in 1996; 2 cardiac stents in 1998  S/P AVR (Aortic Valve Replacement): Pig valve (bioprosthetic); done 2010 at [place]    MEDICATIONS  (STANDING):  amLODIPine   Tablet 10 milliGRAM(s) Oral daily  amLODIPine   Tablet 5 milliGRAM(s) Oral once  aspirin  chewable 81 milliGRAM(s) Oral daily  atorvastatin 80 milliGRAM(s) Oral at bedtime  cefTRIAXone   IVPB 1000 milliGRAM(s) IV Intermittent every 24 hours  donepezil 10 milliGRAM(s) Oral at bedtime  finasteride 5 milliGRAM(s) Oral daily  heparin  Injectable 5000 Unit(s) SubCutaneous every 12 hours  pantoprazole    Tablet 40 milliGRAM(s) Oral before breakfast  tamsulosin 0.4 milliGRAM(s) Oral at bedtime    MEDICATIONS  (PRN):  acetaminophen   Tablet .. 650 milliGRAM(s) Oral every 6 hours PRN Temp greater or equal to 38C (100.4F)  albuterol/ipratropium for Nebulization 3 milliLiter(s) Nebulizer every 4 hours PRN Shortness of Breath and/or Wheezing  calcium carbonate    500 mG (Tums) Chewable 2 Tablet(s) Chew every 6 hours PRN Indigestion    Allergies    amoxicillin (Unknown)  Levaquin (Unknown)  penicillin (Unknown)    Intolerances      Vital Signs Last 24 Hrs  T(C): 36.4 (21 Jan 2020 07:44), Max: 36.7 (20 Jan 2020 19:15)  T(F): 97.6 (21 Jan 2020 07:44), Max: 98 (20 Jan 2020 19:15)  HR: 86 (21 Jan 2020 07:44) (78 - 91)  BP: 170/95 (21 Jan 2020 07:44) (149/96 - 176/88)  BP(mean): --  RR: 19 (21 Jan 2020 07:44) (18 - 23)  SpO2: 94% (21 Jan 2020 07:44) (91% - 96%)  I&O's Summary    20 Jan 2020 07:01  -  21 Jan 2020 07:00  --------------------------------------------------------  IN: 810 mL / OUT: 151 mL / NET: 659 mL    PHYSICAL EXAM:  TELE: NSR  Constitutional: NAD, awake and alert, well-developed  HEENT: Moist Mucous Membranes, Anicteric  Pulmonary: Non-labored, breath sounds are equal bilaterally, No wheezing, rales.  right sided fine rhonchi  Cardiovascular: Regular, S1 and S2, + murmurs.  No rubs, gallops or clicks  Gastrointestinal: Bowel Sounds present, soft, nontender.   Lymph: No peripheral edema. No lymphadenopathy.  Skin: No visible rashes or ulcers.  Psych:  Mood & affect appropriate  LABS: All Labs Reviewed:                        13.1   8.56  )-----------( 276      ( 21 Jan 2020 06:31 )             40.8                         11.8   6.90  )-----------( 225      ( 20 Jan 2020 06:04 )             37.0                         11.5   15.67 )-----------( 222      ( 19 Jan 2020 06:26 )             36.2     21 Jan 2020 06:31    146    |  112    |  44     ----------------------------<  121    4.4     |  26     |  1.80   20 Jan 2020 06:14    147    |  115    |  56     ----------------------------<  83     4.0     |  23     |  1.90   20 Jan 2020 06:04    147    |  116    |  56     ----------------------------<  85     4.1     |  23     |  1.90     Ca    9.1        21 Jan 2020 06:31  Ca    8.3        20 Jan 2020 06:14  Ca    8.4        20 Jan 2020 06:04    TPro  5.6    /  Alb  2.0    /  TBili  0.2    /  DBili  x      /  AST  22     /  ALT  24     /  AlkPhos  78     20 Jan 2020 06:14    CARDIAC MARKERS ( 20 Jan 2020 13:00 )  .105 ng/mL / x     / x     / x     / x      CARDIAC MARKERS ( 20 Jan 2020 06:14 )  .164 ng/mL / x     / 18 U/L / x     / <1.0 ng/mL  CARDIAC MARKERS ( 19 Jan 2020 23:34 )  .162 ng/mL / x     / 22 U/L / x     / 1.4 ng/mL    < from: TTE Echo Doppler w/o Cont (06.18.14 @ 07:53) >     EXAM:  ECHO TTE W/O CON COMP W/DOPPLR           PROCEDURE DATE:  06/18/2014      INTERPRETATION:  INDICATION: aortic valve disease    Blood Pressure 136/84    Height 177     Weight 78       BSA 1.9    Dimensions:    LA           Normal Values: 2.0- 4.0 cm    Ao            Normal Values: 2.0 - 3.8 cm  SEPTUM           Normal Values: 0.6 - 1.2 cm  PWT           Normal Values: 0.6 - 1.1 cm  LVIDd             Normal Values: 3.0 - 5.6 cm  LVIDs             Normal Values: 1.8 - 4.0 cm    Derived Variables:  LVMI     g/m2  RWT      Fractional Short      Ejection Fraction        Doppler Peak v. AoV=   (m/sec)    OBSERVATIONS:    Mitral Valve: Not well visualized.  Mitral annular calcification is   present.  Aortic Valve/Aorta: Not well visualized  Tricuspid Valve: Not well visualized  Pulmonic Valve: Not well visualized  Left Atrium: Not well visualized  Right Atrium: Not well visualized  Left Ventricle: Not well visualized. Probably normal based on very   limited views.  Significant wall motion abnormalities cannot be excluded.  Right Ventricle: Not well visualized  Pericardium/Pleura: Not well visualized  Pulmonary/RV Pressure: Not well visualized. Unable to estimate PA   systolic pressure.  LV Diastolic Function: Mitral inflow pattern is consistent with diastolic   impairment.    This is a technically very limited study, with suboptimal endocardial   definition in all views. Within these very significant limitations, left   ventricle is probably normal in size with normal systolic function. A   bioprosthetic aortic valve is known to be present. Gradients across the   valve are not obtained.  Mitral annular calcification is present. Mitral   inflow pattern is consistent with diastolic impairment. Pulmonary artery   systolic pressure cannot be estimated.    ZENON REHMAN M.D., ATTENDING CARDIOLOGIST  This examination was interpreted on: Jun 18 2014  6:28P.  This document   has been electronically signed. Jun 18 2014  6:32P.       < end of copied text >    < from: CT Chest No Cont (01.17.20 @ 18:13) >    EXAM:  CT CHEST                            PROCEDURE DATE:  01/17/2020          INTERPRETATION:  .    CLINICAL INFORMATION: Cough. Fever. Evaluate for pneumonia.    TECHNIQUE: Helical axial images of the chest were obtained from the thoracic inlet to the adrenal glands without the administration of intravenous contrast. Sagittal and coronal reformations were obtained from the source data.     COMPARISON: Prior chest CT examination from 12/17/2015. Prior chest x-ray examination from earlier today.    FINDINGS: There is no axillary, mediastinal, or hilar lymphadenopathy.    The heart size is at the upper limits of normal. The pericardium appears unremarkable. The patient is status post coronary artery bypass graft. The patient is status post median sternotomy. There are atherosclerotic calcifications of the imaged coronary arteries, aorta, and branch vessels. The imaged portions of the aorta are normal in caliber. Aortic valve replacement is noted.    Small amount of layering secretions are noted within the trachea. There is patchy consolidation within the right lower lobe. Scattered areas of linear atelectasis versus scarring are notable throughout both lungs, most pronounced within the right upper lobe. Emphysema is noted. An 8 mm left lower lobe pulmonary nodule appears unchanged (series 2, image 87). A 2 mm left lower lobe pulmonary nodule also appears unchanged (series 2, image 61). There is a 5 mm triangular shaped nodule within the right fissure (series 2, image 61). This may represent intrapulmonary lymph node and appears unchanged when compared to the prior study.    Cholelithiasis is notable. A left-sided renal cyst is noted. There is a subcentimeter rounded hypodense focus within the right kidney which is too small to characterize.    The stomach is underdistended, limiting evaluation.    There is generalized osteopenia. Multilevel degenerative changes are noted within the imaged potions of the spine. An old T10 vertebral body compression deformity appears unchanged.    IMPRESSION: Right lower lobe pneumonia. Recommend imaging follow-up to resolution.    Similar-appearing 8 mm dominant left lower lobe pulmonary nodule compared with 12/17/2015.    ASTRID RICKS M.D., ATTENDING RADIOLOGIST  This document has been electronically signed. Jan 17 2020  6:21PM      < end of copied text >    < from: 12 Lead ECG (01.20.20 @ 08:59) >    Ventricular Rate 92 BPM    Atrial Rate 92 BPM    P-R Interval 202 ms    QRS Duration 124 ms    Q-T Interval 390 ms    QTC Calculation(Bezet) 482 ms    P Axis 34 degrees    R Axis -17 degrees    T Axis 49 degrees    Diagnosis Line Sinus rhythm with occasional premature ventricular complexes and premature atrial complexes  Right bundle branch block  Nonspecific ST abnormality  Abnormal ECG  Confirmed by elle Hardy ([id number]) on 1/20/2020 3:22:37 PM    < end of copied text >

## 2020-01-21 NOTE — PROGRESS NOTE ADULT - ATTENDING COMMENTS
DC planning to [facility]  D/w wife Larisa, agreed for [facility] discharge. D/w LIOR Werner will talk to wife ti get choices

## 2020-01-22 ENCOUNTER — TRANSCRIPTION ENCOUNTER (OUTPATIENT)
Age: 85
End: 2020-01-22

## 2020-01-22 VITALS
OXYGEN SATURATION: 93 % | TEMPERATURE: 97 F | RESPIRATION RATE: 18 BRPM | HEART RATE: 62 BPM | SYSTOLIC BLOOD PRESSURE: 136 MMHG | DIASTOLIC BLOOD PRESSURE: 84 MMHG

## 2020-01-22 LAB
ANION GAP SERPL CALC-SCNC: 7 MMOL/L — SIGNIFICANT CHANGE UP (ref 5–17)
BUN SERPL-MCNC: 37 MG/DL — HIGH (ref 7–23)
CALCIUM SERPL-MCNC: 9.3 MG/DL — SIGNIFICANT CHANGE UP (ref 8.5–10.1)
CHLORIDE SERPL-SCNC: 111 MMOL/L — HIGH (ref 96–108)
CO2 SERPL-SCNC: 26 MMOL/L — SIGNIFICANT CHANGE UP (ref 22–31)
CREAT SERPL-MCNC: 1.8 MG/DL — HIGH (ref 0.5–1.3)
CULTURE RESULTS: SIGNIFICANT CHANGE UP
CULTURE RESULTS: SIGNIFICANT CHANGE UP
GLUCOSE SERPL-MCNC: 140 MG/DL — HIGH (ref 70–99)
POTASSIUM SERPL-MCNC: 4 MMOL/L — SIGNIFICANT CHANGE UP (ref 3.5–5.3)
POTASSIUM SERPL-SCNC: 4 MMOL/L — SIGNIFICANT CHANGE UP (ref 3.5–5.3)
SODIUM SERPL-SCNC: 144 MMOL/L — SIGNIFICANT CHANGE UP (ref 135–145)
SPECIMEN SOURCE: SIGNIFICANT CHANGE UP
SPECIMEN SOURCE: SIGNIFICANT CHANGE UP

## 2020-01-22 PROCEDURE — 99232 SBSQ HOSP IP/OBS MODERATE 35: CPT

## 2020-01-22 PROCEDURE — 94640 AIRWAY INHALATION TREATMENT: CPT

## 2020-01-22 PROCEDURE — 82803 BLOOD GASES ANY COMBINATION: CPT

## 2020-01-22 PROCEDURE — 92610 EVALUATE SWALLOWING FUNCTION: CPT

## 2020-01-22 PROCEDURE — 84145 PROCALCITONIN (PCT): CPT

## 2020-01-22 PROCEDURE — 87086 URINE CULTURE/COLONY COUNT: CPT

## 2020-01-22 PROCEDURE — 81001 URINALYSIS AUTO W/SCOPE: CPT

## 2020-01-22 PROCEDURE — 87899 AGENT NOS ASSAY W/OPTIC: CPT

## 2020-01-22 PROCEDURE — 83036 HEMOGLOBIN GLYCOSYLATED A1C: CPT

## 2020-01-22 PROCEDURE — 96374 THER/PROPH/DIAG INJ IV PUSH: CPT

## 2020-01-22 PROCEDURE — 70450 CT HEAD/BRAIN W/O DYE: CPT

## 2020-01-22 PROCEDURE — 82550 ASSAY OF CK (CPK): CPT

## 2020-01-22 PROCEDURE — 71045 X-RAY EXAM CHEST 1 VIEW: CPT

## 2020-01-22 PROCEDURE — 97530 THERAPEUTIC ACTIVITIES: CPT

## 2020-01-22 PROCEDURE — 80048 BASIC METABOLIC PNL TOTAL CA: CPT

## 2020-01-22 PROCEDURE — 97116 GAIT TRAINING THERAPY: CPT

## 2020-01-22 PROCEDURE — 87070 CULTURE OTHR SPECIMN AEROBIC: CPT

## 2020-01-22 PROCEDURE — 85027 COMPLETE CBC AUTOMATED: CPT

## 2020-01-22 PROCEDURE — 82553 CREATINE MB FRACTION: CPT

## 2020-01-22 PROCEDURE — 99285 EMERGENCY DEPT VISIT HI MDM: CPT | Mod: 25

## 2020-01-22 PROCEDURE — 87641 MR-STAPH DNA AMP PROBE: CPT

## 2020-01-22 PROCEDURE — 99239 HOSP IP/OBS DSCHRG MGMT >30: CPT

## 2020-01-22 PROCEDURE — 87449 NOS EACH ORGANISM AG IA: CPT

## 2020-01-22 PROCEDURE — 93306 TTE W/DOPPLER COMPLETE: CPT

## 2020-01-22 PROCEDURE — 84484 ASSAY OF TROPONIN QUANT: CPT

## 2020-01-22 PROCEDURE — 80053 COMPREHEN METABOLIC PANEL: CPT

## 2020-01-22 PROCEDURE — 87040 BLOOD CULTURE FOR BACTERIA: CPT

## 2020-01-22 PROCEDURE — 93005 ELECTROCARDIOGRAM TRACING: CPT

## 2020-01-22 PROCEDURE — 36415 COLL VENOUS BLD VENIPUNCTURE: CPT

## 2020-01-22 PROCEDURE — 83690 ASSAY OF LIPASE: CPT

## 2020-01-22 PROCEDURE — 96375 TX/PRO/DX INJ NEW DRUG ADDON: CPT

## 2020-01-22 PROCEDURE — 87640 STAPH A DNA AMP PROBE: CPT

## 2020-01-22 PROCEDURE — 87631 RESP VIRUS 3-5 TARGETS: CPT

## 2020-01-22 PROCEDURE — 71250 CT THORAX DX C-: CPT

## 2020-01-22 PROCEDURE — 86480 TB TEST CELL IMMUN MEASURE: CPT

## 2020-01-22 PROCEDURE — 83605 ASSAY OF LACTIC ACID: CPT

## 2020-01-22 PROCEDURE — 97161 PT EVAL LOW COMPLEX 20 MIN: CPT

## 2020-01-22 RX ORDER — ACETAMINOPHEN 500 MG
650 TABLET ORAL EVERY 6 HOURS
Refills: 0 | Status: DISCONTINUED | OUTPATIENT
Start: 2020-01-22 | End: 2020-01-22

## 2020-01-22 RX ORDER — ACETAMINOPHEN 500 MG
650 TABLET ORAL ONCE
Refills: 0 | Status: COMPLETED | OUTPATIENT
Start: 2020-01-22 | End: 2020-01-22

## 2020-01-22 RX ADMIN — Medication 650 MILLIGRAM(S): at 08:51

## 2020-01-22 RX ADMIN — PANTOPRAZOLE SODIUM 40 MILLIGRAM(S): 20 TABLET, DELAYED RELEASE ORAL at 05:02

## 2020-01-22 RX ADMIN — Medication 650 MILLIGRAM(S): at 08:10

## 2020-01-22 RX ADMIN — Medication 650 MILLIGRAM(S): at 05:03

## 2020-01-22 RX ADMIN — Medication 81 MILLIGRAM(S): at 11:09

## 2020-01-22 RX ADMIN — AMLODIPINE BESYLATE 10 MILLIGRAM(S): 2.5 TABLET ORAL at 05:02

## 2020-01-22 RX ADMIN — Medication 25 MILLIGRAM(S): at 08:04

## 2020-01-22 RX ADMIN — Medication 500 MILLIGRAM(S): at 05:02

## 2020-01-22 RX ADMIN — HEPARIN SODIUM 5000 UNIT(S): 5000 INJECTION INTRAVENOUS; SUBCUTANEOUS at 05:02

## 2020-01-22 RX ADMIN — FINASTERIDE 5 MILLIGRAM(S): 5 TABLET, FILM COATED ORAL at 11:09

## 2020-01-22 RX ADMIN — Medication 650 MILLIGRAM(S): at 08:21

## 2020-01-22 NOTE — PROGRESS NOTE ADULT - SUBJECTIVE AND OBJECTIVE BOX
Neurology Follow up note    ARABELLA ESPARZA85yMale    HPI:  86 yo male with PMHx of Benign prostatic hypertrophy, CAD, MI, s/p CABG with 2 cardiac stents, COPD, Depression, GERD, Hyperlipidemia  Hypertension, Osteoporosis, Peripheral Neuropathy, Dementia who presented to the ED s/p fall and increased confusion x1 day. Patient is accompanied by wife and health aide. Patient is a limited historian. Per aide, patient has a history of frequent falls, most recent fall was yesterday. Aide reports chronic cough for the past 3 months. Aide and wife noticed increasing confusion. Wife states "he could not remember how to make his way to the bedroom." Patient was brought to the ED for further evaluation. Per wife and aide, patient's mental status has returned to baseline. Patient admits to chills. Admits to cough x3 months, for which he takes cough syrup. Aide endorses frequent episodes of stool and urine incontinence. Patient lives with wife and 24 hour aid. He ambulates with walker and cane. Denies fever, chest pain, shortness of breath, abdominal pain, nausea or vomiting.     ED vitals: T: 98.1, HR: 98, BP: 157/70 RR: 17 O2: 93 on RA   Labs significant for: WBC: 12.46 with left shift, Chloride: 109, BUN: 41, Cr: 2.1, EGFR: 28   X-ray:  The cardiomediastinal silhouette is similar to the previous exam with dilated tortuous thoracic aorta. There is decreased right lung volume with shift of the mediastinum to the right side. There is right perihilar scarring again noted. No pleural effusion  CT Chest:  Right lower lobe pneumonia  CT Head: No acute intracranial findings  In the ED patient was given: Azactam and Vancomycin X1, tylenol, IVF bolus X1, albuterol/ipratropium X1, Solumederol 125 X1 (17 Jan 2020 19:47)      Interval History - I am feeling tired.    Patient is seen, chart was reviewed and case was discussed with the treatment team.  Pt is not in any distress.   Lying on bed comfortably.       Vital Signs Last 24 Hrs  T(C): 36.6 (22 Jan 2020 08:06), Max: 36.6 (22 Jan 2020 04:20)  T(F): 97.9 (22 Jan 2020 08:06), Max: 97.9 (22 Jan 2020 08:06)  HR: 65 (22 Jan 2020 08:06) (54 - 91)  BP: 162/96 (22 Jan 2020 08:06) (132/83 - 175/89)  BP(mean): --  RR: 18 (22 Jan 2020 08:06) (18 - 19)  SpO2: 94% (22 Jan 2020 08:06) (94% - 96%)        REVIEW OF SYSTEMS:    Constitutional: No fever,   Eyes: No eye pain, visual  ENT:  No difficulty hearing,  Neck: No pain or stiffness  Cardiovascular: No chest pain, palpitations,   Gastrointestinal: No abdominal or epigastric pain. No nausea, vomiting or hematemesis;  Genitourinary: No dysuria, frequency, hematuria or incontinence  Neurological: No headaches,   Psychiatric: No  mood swings or difficulty sleeping  Musculoskeletal: No joint pain   Skin: No itching, burning, rashes or lesions   Lymph Nodes: No enlarged glands  Endocrine: No heat or cold intolerance;   Allergy and Immunologic: No hives or eczema    On Neurological Examination:    Mental Status - Pt is alert, awake, oriented X2   Follows commands well and able to answer questions appropriately.    Speech -  Normal.     Cranial Nerves - Pupils 3 mm equal and reactive to light, extraocular eye movements intact. Pt has no visual field deficit.  Pt has no facial asymmetry. Facial sensation is intact.  Tongue - is in midline.    Muscle tone - is normal all over.     Motor Exam - 5/5 of UE  LE 4/5    Sensory Exam -  Pt withdraws all extremities equally on stimulation. No asymmetry seen.       coordination:    Finger to nose: normal    Deep tendon Reflexes - 2 plus all over.       Neck Supple -  Yes.     MEDICATIONS    acetaminophen   Tablet .. 650 milliGRAM(s) Oral every 6 hours PRN  albuterol/ipratropium for Nebulization 3 milliLiter(s) Nebulizer every 4 hours PRN  amLODIPine   Tablet 10 milliGRAM(s) Oral daily  aspirin  chewable 81 milliGRAM(s) Oral daily  atorvastatin 80 milliGRAM(s) Oral at bedtime  calcium carbonate    500 mG (Tums) Chewable 2 Tablet(s) Chew every 6 hours PRN  cefuroxime   Tablet 500 milliGRAM(s) Oral every 12 hours  donepezil 10 milliGRAM(s) Oral at bedtime  finasteride 5 milliGRAM(s) Oral daily  heparin  Injectable 5000 Unit(s) SubCutaneous every 12 hours  metoprolol succinate ER 25 milliGRAM(s) Oral daily  pantoprazole    Tablet 40 milliGRAM(s) Oral before breakfast  tamsulosin 0.4 milliGRAM(s) Oral at bedtime      Allergies    amoxicillin (Unknown)  Levaquin (Unknown)  penicillin (Unknown)    Intolerances        LABS:  CBC Full  -  ( 21 Jan 2020 06:31 )  WBC Count : 8.56 K/uL  RBC Count : 4.83 M/uL  Hemoglobin : 13.1 g/dL  Hematocrit : 40.8 %  Platelet Count - Automated : 276 K/uL  Mean Cell Volume : 84.5 fl  Mean Cell Hemoglobin : 27.1 pg        01-22    144  |  111<H>  |  37<H>  ----------------------------<  140<H>  4.0   |  26  |  1.80<H>    Ca    9.3      22 Jan 2020 06:56      Hemoglobin A1C:     Vitamin B12     RADIOLOGY    ASSESSMENT AND PLAN:      SEEN FOR AMS; IMPROVED, LIKELY METABOLIC ENCEPHALOPATHY  DEMENTIA.  PNEUMONIA.    CONTINUE ARICEPT FOR DEMENTIA  Physical therapy evaluation.  OOB to chair/ambulation with assistance only.  Pain is accessed and addressed.  Would continue to follow.

## 2020-01-22 NOTE — PROGRESS NOTE ADULT - SUBJECTIVE AND OBJECTIVE BOX
Date/Time Patient Seen:  		  Referring MD:   Data Reviewed	       Patient is a 85y old  Male who presents with a chief complaint of Pneumonia (22 Jan 2020 08:08)      Subjective/HPI     PAST MEDICAL & SURGICAL HISTORY:  Myocardial infarction  COPD (chronic obstructive pulmonary disease)  Seasonal allergies  GERD (gastroesophageal reflux disease)  Benign prostatic hypertrophy  Hyperlipidemia  Hypertension  Asthma: Also COPD component  Depression  Peripheral Neuropathy  Asthma  S/P AVR (Aortic Valve Replacement)  S/P CABG (Coronary Artery Bypass Graft)  Osteoporosis  CAD (Coronary Artery Disease): MI, s/p CABG with 2 cardiac stents  HTN (Hypertension)  Dyslipidemia  S/P inguinal hernia repair: On right side  S/P appendectomy  S/P CABG (coronary artery bypass graft): Done in 1996; 2 cardiac stents in 1998  S/P CABG (Coronary Artery Bypass Graft)  S/P AVR (Aortic Valve Replacement): Pig valve (bioprosthetic); done 2010 at [place]        Medication list         MEDICATIONS  (STANDING):  amLODIPine   Tablet 10 milliGRAM(s) Oral daily  aspirin  chewable 81 milliGRAM(s) Oral daily  atorvastatin 80 milliGRAM(s) Oral at bedtime  cefuroxime   Tablet 500 milliGRAM(s) Oral every 12 hours  donepezil 10 milliGRAM(s) Oral at bedtime  finasteride 5 milliGRAM(s) Oral daily  heparin  Injectable 5000 Unit(s) SubCutaneous every 12 hours  metoprolol succinate ER 25 milliGRAM(s) Oral daily  pantoprazole    Tablet 40 milliGRAM(s) Oral before breakfast  tamsulosin 0.4 milliGRAM(s) Oral at bedtime    MEDICATIONS  (PRN):  acetaminophen   Tablet .. 650 milliGRAM(s) Oral every 6 hours PRN Temp greater or equal to 38C (100.4F), Mild Pain (1 - 3)  albuterol/ipratropium for Nebulization 3 milliLiter(s) Nebulizer every 4 hours PRN Shortness of Breath and/or Wheezing  calcium carbonate    500 mG (Tums) Chewable 2 Tablet(s) Chew every 6 hours PRN Indigestion         Vitals log        ICU Vital Signs Last 24 Hrs  T(C): 36.6 (22 Jan 2020 08:06), Max: 36.6 (22 Jan 2020 04:20)  T(F): 97.9 (22 Jan 2020 08:06), Max: 97.9 (22 Jan 2020 08:06)  HR: 65 (22 Jan 2020 08:06) (54 - 91)  BP: 162/96 (22 Jan 2020 08:06) (132/83 - 175/89)  BP(mean): --  ABP: --  ABP(mean): --  RR: 18 (22 Jan 2020 08:06) (18 - 19)  SpO2: 94% (22 Jan 2020 08:06) (94% - 96%)           Input and Output:  I&O's Detail      Lab Data                        13.1   8.56  )-----------( 276      ( 21 Jan 2020 06:31 )             40.8     01-22    144  |  111<H>  |  37<H>  ----------------------------<  140<H>  4.0   |  26  |  1.80<H>    Ca    9.3      22 Jan 2020 06:56        CARDIAC MARKERS ( 20 Jan 2020 13:00 )  .105 ng/mL / x     / x     / x     / x            Review of Systems	      Objective     Physical Examination    heart s1s2  lung dec BS  abd soft  head nc      Pertinent Lab findings & Imaging      Ava:  NO   Adequate UO     I&O's Detail           Discussed with:     Cultures:	        Radiology

## 2020-01-22 NOTE — PROGRESS NOTE ADULT - REASON FOR ADMISSION
Pneumonia

## 2020-01-22 NOTE — PROGRESS NOTE ADULT - PROBLEM SELECTOR PROBLEM 1
Lung nodule seen on imaging study
Pneumonia of right lower lobe due to infectious organism

## 2020-01-22 NOTE — PROGRESS NOTE ADULT - ASSESSMENT
85 year old male with CAD with stents, and CABG in 1996, bio avr in 2010 with predominantly small vessel disease at this time, HTN and COPD, who presents with fall and confusion.  CT with rll pneumonia, and he has been treated with antibiotics.    Atypical CP Hx CAD s/p CABG  - His CP chest pain was atypical in nature, more pleuritic, though, he does have known incompletely revascularized CAD.  His CT chest showed RLL Pna  - He has troponin leaks with a normal CK, suggestive against ACS.  - EKG showed with rbbb, unchanged from 2015.  He does have apcs and vpcs.  Tele showed NSR  - Start Toprol XL 25 mg daily.  - Continue ASA and statin for cad  - No need for Cilostazol  - Continue to watch on telemetry.  If no arrhythmias in 24 hrs, can discosntinue  - Follow up repeat echocardiogram. His last echo in 2015 was uninterpretable.  - No clinical evidence of volume overload  - Monitor creatinine and electrolytes. keep K>4, Mg>2  Bio AVR  - Follow up TTE.  No significant murmur noted that can indicate an elevated gradient across the valve    HTN  - BP is suboptimal at systolic 130-170.  Continue Norvasc to 10 mg daily.  - Start Toprol XL 25 mg.  Was held initially this am as HR was 54 while asleep.  Will adjust parameters  - Continue to monitor routine hemodynamics    DVT ppx  - Per Primary  - Ambulate as tolerated    Will follow with you.    Arlin Patterson DNP, NP-C  Cardiology   Spectra #[phone]

## 2020-01-22 NOTE — DISCHARGE NOTE NURSING/CASE MANAGEMENT/SOCIAL WORK - PATIENT PORTAL LINK FT
You can access the FollowMyHealth Patient Portal offered by [facility] by registering at the following website: [email or web address]. By joining [facility]’s FollowMyHealth portal, you will also be able to view your health information using other applications (apps) compatible with our system.

## 2020-01-22 NOTE — PROGRESS NOTE ADULT - SUBJECTIVE AND OBJECTIVE BOX
[facility] -- Serina Poole, Rich Molina, Arturo Dick Savella, Goodger  Office # [phone]    Follow Up:  AMS, CP w/ Troponin leak    Subjective/Observations: Awake and alert, comfortable on RA, laying supine.  Denies any respiratory or cardiac discomfort.  However, c/o HA and foot pain.  No tele events    REVIEW OF SYSTEMS: All other review of systems is negative unless indicated above  PAST MEDICAL & SURGICAL HISTORY:  Myocardial infarction  COPD (chronic obstructive pulmonary disease)  Seasonal allergies  GERD (gastroesophageal reflux disease)  Benign prostatic hypertrophy  Hyperlipidemia  Hypertension  Depression  Peripheral Neuropathy  Osteoporosis  CAD (Coronary Artery Disease): MI, s/p CABG with 2 cardiac stents  S/P inguinal hernia repair: On right side  S/P appendectomy  S/P CABG (coronary artery bypass graft): Done in 1996; 2 cardiac stents in 1998  S/P AVR (Aortic Valve Replacement): Pig valve (bioprosthetic); done 2010 at [place]    MEDICATIONS  (STANDING):  amLODIPine   Tablet 10 milliGRAM(s) Oral daily  aspirin  chewable 81 milliGRAM(s) Oral daily  atorvastatin 80 milliGRAM(s) Oral at bedtime  cefuroxime   Tablet 500 milliGRAM(s) Oral every 12 hours  donepezil 10 milliGRAM(s) Oral at bedtime  finasteride 5 milliGRAM(s) Oral daily  heparin  Injectable 5000 Unit(s) SubCutaneous every 12 hours  metoprolol succinate ER 25 milliGRAM(s) Oral daily  pantoprazole    Tablet 40 milliGRAM(s) Oral before breakfast  tamsulosin 0.4 milliGRAM(s) Oral at bedtime    MEDICATIONS  (PRN):  acetaminophen   Tablet .. 650 milliGRAM(s) Oral every 6 hours PRN Temp greater or equal to 38C (100.4F), Mild Pain (1 - 3)  albuterol/ipratropium for Nebulization 3 milliLiter(s) Nebulizer every 4 hours PRN Shortness of Breath and/or Wheezing  calcium carbonate    500 mG (Tums) Chewable 2 Tablet(s) Chew every 6 hours PRN Indigestion    Allergies    amoxicillin (Unknown)  Levaquin (Unknown)  penicillin (Unknown)    Intolerances    Vital Signs Last 24 Hrs  T(C): 36.6 (22 Jan 2020 04:20), Max: 36.6 (22 Jan 2020 04:20)  T(F): 97.8 (22 Jan 2020 04:20), Max: 97.8 (22 Jan 2020 04:20)  HR: 71 (22 Jan 2020 04:20) (64 - 91)  BP: 175/89 (22 Jan 2020 04:20) (132/83 - 175/89)  BP(mean): --  RR: 18 (22 Jan 2020 04:20) (18 - 19)  SpO2: 96% (22 Jan 2020 04:20) (95% - 96%)  I&O's Summary      PHYSICAL EXAM:  TELE:   Constitutional: NAD, awake and alert, well-developed  HEENT: Moist Mucous Membranes, Anicteric  Pulmonary: Non-labored, breath sounds are clear bilaterally, No wheezing, rales or rhonchi  Cardiovascular: Regular, S1 and S2, No murmurs, rubs, gallops or clicks  Gastrointestinal: Bowel Sounds present, soft, nontender.   Lymph: No peripheral edema. No lymphadenopathy.  Skin: No visible rashes or ulcers.  Psych:  Mood & affect appropriate  LABS: All Labs Reviewed:                        13.1   8.56  )-----------( 276      ( 21 Jan 2020 06:31 )             40.8                         11.8   6.90  )-----------( 225      ( 20 Jan 2020 06:04 )             37.0     22 Jan 2020 06:56    144    |  111    |  37     ----------------------------<  140    4.0     |  26     |  1.80   21 Jan 2020 06:31    146    |  112    |  44     ----------------------------<  121    4.4     |  26     |  1.80   20 Jan 2020 06:14    147    |  115    |  56     ----------------------------<  83     4.0     |  23     |  1.90     Ca    9.3        22 Jan 2020 06:56  Ca    9.1        21 Jan 2020 06:31  Ca    8.3        20 Jan 2020 06:14    TPro  5.6    /  Alb  2.0    /  TBili  0.2    /  DBili  x      /  AST  22     /  ALT  24     /  AlkPhos  78     20 Jan 2020 06:14  CARDIAC MARKERS ( 20 Jan 2020 13:00 )  .105 ng/mL / x     / x     / x     / x [facility] -- Serina Poole Grossman, [place], Arturo Dick Savella, Goodger  Office # [phone]    Follow Up:  AMS, CP w/ Troponin leak, BioAVR    Subjective/Observations: Awake and alert, comfortable on RA, laying supine.  Denies any respiratory or cardiac discomfort.  However, c/o HA and foot pain.  No tele events    REVIEW OF SYSTEMS: All other review of systems is negative unless indicated above  PAST MEDICAL & SURGICAL HISTORY:  Myocardial infarction  COPD (chronic obstructive pulmonary disease)  Seasonal allergies  GERD (gastroesophageal reflux disease)  Benign prostatic hypertrophy  Hyperlipidemia  Hypertension  Depression  Peripheral Neuropathy  Osteoporosis  CAD (Coronary Artery Disease): MI, s/p CABG with 2 cardiac stents  S/P inguinal hernia repair: On right side  S/P appendectomy  S/P CABG (coronary artery bypass graft): Done in 1996; 2 cardiac stents in 1998  S/P AVR (Aortic Valve Replacement): Pig valve (bioprosthetic); done 2010 at [place]    MEDICATIONS  (STANDING):  amLODIPine   Tablet 10 milliGRAM(s) Oral daily  aspirin  chewable 81 milliGRAM(s) Oral daily  atorvastatin 80 milliGRAM(s) Oral at bedtime  cefuroxime   Tablet 500 milliGRAM(s) Oral every 12 hours  donepezil 10 milliGRAM(s) Oral at bedtime  finasteride 5 milliGRAM(s) Oral daily  heparin  Injectable 5000 Unit(s) SubCutaneous every 12 hours  metoprolol succinate ER 25 milliGRAM(s) Oral daily  pantoprazole    Tablet 40 milliGRAM(s) Oral before breakfast  tamsulosin 0.4 milliGRAM(s) Oral at bedtime    MEDICATIONS  (PRN):  acetaminophen   Tablet .. 650 milliGRAM(s) Oral every 6 hours PRN Temp greater or equal to 38C (100.4F), Mild Pain (1 - 3)  albuterol/ipratropium for Nebulization 3 milliLiter(s) Nebulizer every 4 hours PRN Shortness of Breath and/or Wheezing  calcium carbonate    500 mG (Tums) Chewable 2 Tablet(s) Chew every 6 hours PRN Indigestion    Allergies    amoxicillin (Unknown)  Levaquin (Unknown)  penicillin (Unknown)    Intolerances    Vital Signs Last 24 Hrs  T(C): 36.6 (22 Jan 2020 04:20), Max: 36.6 (22 Jan 2020 04:20)  T(F): 97.8 (22 Jan 2020 04:20), Max: 97.8 (22 Jan 2020 04:20)  HR: 71 (22 Jan 2020 04:20) (64 - 91)  BP: 175/89 (22 Jan 2020 04:20) (132/83 - 175/89)  BP(mean): --  RR: 18 (22 Jan 2020 04:20) (18 - 19)  SpO2: 96% (22 Jan 2020 04:20) (95% - 96%)  I&O's Summary      PHYSICAL EXAM:  TELE: SB at high 50's  Constitutional: NAD, awake and alert, well-developed  HEENT: Moist Mucous Membranes, Anicteric  Pulmonary: Non-labored, breath sounds are diminished at bases bilaterally, No wheezing, rales or rhonchi  Cardiovascular: Regular, S1 and S2, No murmurs, rubs, gallops or clicks  Gastrointestinal: Bowel Sounds present, soft, nontender.   Lymph: No peripheral edema. No lymphadenopathy.  Skin: No visible rashes or ulcers.  Psych:  Mood & affect appropriate  LABS: All Labs Reviewed:                        13.1   8.56  )-----------( 276      ( 21 Jan 2020 06:31 )             40.8                         11.8   6.90  )-----------( 225      ( 20 Jan 2020 06:04 )             37.0     22 Jan 2020 06:56    144    |  111    |  37     ----------------------------<  140    4.0     |  26     |  1.80   21 Jan 2020 06:31    146    |  112    |  44     ----------------------------<  121    4.4     |  26     |  1.80   20 Jan 2020 06:14    147    |  115    |  56     ----------------------------<  83     4.0     |  23     |  1.90     Ca    9.3        22 Jan 2020 06:56  Ca    9.1        21 Jan 2020 06:31  Ca    8.3        20 Jan 2020 06:14    TPro  5.6    /  Alb  2.0    /  TBili  0.2    /  DBili  x      /  AST  22     /  ALT  24     /  AlkPhos  78     20 Jan 2020 06:14  CARDIAC MARKERS ( 20 Jan 2020 13:00 )  .105 ng/mL / x     / x     / x     / x        < from: TTE Echo Doppler w/o Cont (06.18.14 @ 07:53) >     EXAM:  ECHO TTE W/O CON COMP W/DOPPLR           PROCEDURE DATE:  06/18/2014      INTERPRETATION:  INDICATION: aortic valve disease    Blood Pressure 136/84    Height 177     Weight 78       BSA 1.9    Dimensions:    LA           Normal Values: 2.0- 4.0 cm    Ao            Normal Values: 2.0 - 3.8 cm  SEPTUM           Normal Values: 0.6 - 1.2 cm  PWT           Normal Values: 0.6 - 1.1 cm  LVIDd             Normal Values: 3.0 - 5.6 cm  LVIDs             Normal Values: 1.8 - 4.0 cm    Derived Variables:  LVMI     g/m2  RWT      Fractional Short      Ejection Fraction        Doppler Peak v. AoV=   (m/sec)    OBSERVATIONS:    Mitral Valve: Not well visualized.  Mitral annular calcification is   present.  Aortic Valve/Aorta: Not well visualized  Tricuspid Valve: Not well visualized  Pulmonic Valve: Not well visualized  Left Atrium: Not well visualized  Right Atrium: Not well visualized  Left Ventricle: Not well visualized. Probably normal based on very   limited views.  Significant wall motion abnormalities cannot be excluded.  Right Ventricle: Not well visualized  Pericardium/Pleura: Not well visualized  Pulmonary/RV Pressure: Not well visualized. Unable to estimate PA   systolic pressure.  LV Diastolic Function: Mitral inflow pattern is consistent with diastolic   impairment.    This is a technically very limited study, with suboptimal endocardial   definition in all views. Within these very significant limitations, left   ventricle is probably normal in size with normal systolic function. A   bioprosthetic aortic valve is known to be present. Gradients across the   valve are not obtained.  Mitral annular calcification is present. Mitral   inflow pattern is consistent with diastolic impairment. Pulmonary artery   systolic pressure cannot be estimated.    ZENON REHMAN M.D., ATTENDING CARDIOLOGIST  This examination was interpreted on: Jun 18 2014  6:28P.  This document   has been electronically signed. Jun 18 2014  6:32P.       < end of copied text >    < from: CT Chest No Cont (01.17.20 @ 18:13) >    EXAM:  CT CHEST                          PROCEDURE DATE:  01/17/2020      INTERPRETATION:  .    CLINICAL INFORMATION: Cough. Fever. Evaluate for pneumonia.    TECHNIQUE: Helical axial images of the chest were obtained from the thoracic inlet to the adrenal glands without the administration of intravenous contrast. Sagittal and coronal reformations were obtained from the source data.     COMPARISON: Prior chest CT examination from 12/17/2015. Prior chest x-ray examination from earlier today.    FINDINGS: There is no axillary, mediastinal, or hilar lymphadenopathy.    The heart size is at the upper limits of normal. The pericardium appears unremarkable. The patient is status post coronary artery bypass graft. The patient is status post median sternotomy. There are atherosclerotic calcifications of the imaged coronary arteries, aorta, and branch vessels. The imaged portions of the aorta are normal in caliber. Aortic valve replacement is noted.    Small amount of layering secretions are noted within the trachea. There is patchy consolidation within the right lower lobe. Scattered areas of linear atelectasis versus scarring are notable throughout both lungs, most pronounced within the right upper lobe. Emphysema is noted. An 8 mm left lower lobe pulmonary nodule appears unchanged (series 2, image 87). A 2 mm left lower lobe pulmonary nodule also appears unchanged (series 2, image 61). There is a 5 mm triangular shaped nodule within the right fissure (series 2, image 61). This may represent intrapulmonary lymph node and appears unchanged when compared to the prior study.    Cholelithiasis is notable. A left-sided renal cyst is noted. There is a subcentimeter rounded hypodense focus within the right kidney which is too small to characterize.    The stomach is underdistended, limiting evaluation.    There is generalized osteopenia. Multilevel degenerative changes are noted within the imaged potions of the spine. An old T10 vertebral body compression deformity appears unchanged.    IMPRESSION: Right lower lobe pneumonia. Recommend imaging follow-up to resolution.    Similar-appearing 8 mm dominant left lower lobe pulmonary nodule compared with 12/17/2015.    ASTRID RICKS M.D., ATTENDING RADIOLOGIST  This document has been electronically signed. Jan 17 2020  6:21PM      < end of copied text >    < from: 12 Lead ECG (01.20.20 @ 08:59) >    Ventricular Rate 92 BPM    Atrial Rate 92 BPM    P-R Interval 202 ms    QRS Duration 124 ms    Q-T Interval 390 ms    QTC Calculation(Bezet) 482 ms    P Axis 34 degrees    R Axis -17 degrees    T Axis 49 degrees    Diagnosis Line Sinus rhythm with occasional premature ventricular complexes and premature atrial complexes  Right bundle branch block  Nonspecific ST abnormality  Abnormal ECG  Confirmed by elle Hardy ([id number]) on 1/20/2020 3:22:37 PM    < end of copied text >

## 2020-01-22 NOTE — PROGRESS NOTE ADULT - PROBLEM SELECTOR PLAN 1
on PO Ceftin now for PNA tx - ABX regimen -   cardio follow up reviewed this am -   Benign prostatic hypertrophy, CAD, MI, s/p CABG with 2 cardiac stents, COPD, Depression, GERD, Hyperlipidemia  Hypertension, Osteoporosis, Peripheral Neuropathy, Dementia   nebs prn for COPD  pulm nodule 5 mm - 6 - 12 months rpt CT chest   weight loss - reported - work up in progress - age appropriate cancer screening  HOB elev  asp prec  swallow eval noted
poss DC plan for BCC - MICHELLE - CM notes reviewed -   pulm nodule - PNA - COPD - CKD -   CP assessment - cardio eval - CE serially noted - on tele monitor  on Rocephin IV ABX for PNA - ID follow up reviewed -   nebs prn for COPD  pulm nodule 5 mm - 6 - 12 months rpt CT chest   weight loss - reported - work up in progress - age appropriate cancer screening  HOB elev  asp prec  swallow eval noted  supportive medical regimen in effect.
pulm nodule - PNA - COPD - CKD -   now with CP assessment - cardio eval - CE serially noted - on tele monitor  cont Rocephin IV ABX for PNA  nebs prn for COPD  pulm nodule 5 mm - 6 - 12 months rpt CT chest   HOB elev  asp prec  swallow eval noted  supportive medical regimen in effect
seen and examined - vs and HD and Sat reviewed - on room air -   VBG noted -   on Rocephin ABX for poss PNA - ID eval noted -   Copd and ex smoker and Pulm Nodule - 8mm   CT chest reviewed  cad - hx -   duoneb PRN  emp ABX -   likely asp pna - swallow eval noted -   supportive medical regimen and [facility] rx regimen  will need outpatient follow up with his Pulm - for Nodule monitoring and CT chest repeat in 6 - 12 months  age appropriate cancer screening - monitor weight - and nutritional intake assessment
- Improved   - CT Chest: Right lower lobe pneumonia  - On ceftriaxone now, no allergic reaction noted. Plan to switch to Ceftin an am X 3 days  - Blood cultures are negative so far   - Tylenol for fever  - F/u AM labs  -D/w ID, will check Quantiferon Gold test, due to history and  risk factors for T.B
- Noted worsening leukocytosis, likely secondary to steroid given in ER  - CT Chest: Right lower lobe pneumonia  - On ceftriaxone now, no allergic reaction noted   - Blood cultures are negative so far   - Tylenol for fever  - F/u AM labs
- Noted worsening leukocytosis, likely secondary to steroid given in ER  - CT Chest: Right lower lobe pneumonia  - On ceftriaxone now, no allergic reaction noted   - Blood cultures are negative so far   - Tylenol for fever  - F/u AM labs  -D/w ID, will check Quantiferon Gold test, due to history and  risk factors for T.B
Patient presents s/p fall and confusion x1 day  - WBC: 12.46 with left shift  - CT Chest: Right lower lobe pneumonia  - Start Azactam and Zithro, given allergies  - F/u BCx, UCx, sputum cx, strep pneumo, legionella, MRSA  - Tylenol for fever  - F/u AM labs
as discussed with Dr Gunn certainly concerning with pt being born in [place] in the 1930s and having 4 mos of cough with 50lb weight loss, noted pulm nodule for more chronic issues such as malignancy but localization and acute worsening most suggestive of bacterial PNA as acute issue. Not a compelling PCN allergy so will continue ceftriaxone with pt tolerating this with recs to follow regarding ultimate duration.
pt improving so recommend continue ceftriaxone with potential to de-escalate to cefuroxime 500mg PO BID with last day 1/24 am as pt improves
pt improving so today last day of ceftriaxone then can finish course with cefuroxime 500mg PO BID with last day 1/24 am as pt improves

## 2020-01-22 NOTE — DISCHARGE NOTE NURSING/CASE MANAGEMENT/SOCIAL WORK - NSDCFUADDAPPT_GEN_ALL_CORE_FT
F/u with ID Dr. Dixon ID to f/u results of QuantiFeron Gold test ( TB test )   F/u with PCP with in 3 to 5 days  F/u with all your doctors

## 2020-01-24 LAB
GAMMA INTERFERON BACKGROUND BLD IA-ACNC: 0.02 IU/ML — SIGNIFICANT CHANGE UP
M TB IFN-G BLD-IMP: NEGATIVE — SIGNIFICANT CHANGE UP
M TB IFN-G CD4+ BCKGRND COR BLD-ACNC: 0 IU/ML — SIGNIFICANT CHANGE UP
M TB IFN-G CD4+CD8+ BCKGRND COR BLD-ACNC: 0.01 IU/ML — SIGNIFICANT CHANGE UP
QUANT TB PLUS MITOGEN MINUS NIL: 1.45 IU/ML — SIGNIFICANT CHANGE UP

## 2020-02-14 ENCOUNTER — INPATIENT (INPATIENT)
Facility: HOSPITAL | Age: 85
LOS: 4 days | Discharge: ANOTHER HOSPITAL | DRG: 378 | End: 2020-02-19
Attending: STUDENT IN AN ORGANIZED HEALTH CARE EDUCATION/TRAINING PROGRAM | Admitting: STUDENT IN AN ORGANIZED HEALTH CARE EDUCATION/TRAINING PROGRAM
Payer: MEDICARE

## 2020-02-14 VITALS
HEART RATE: 95 BPM | OXYGEN SATURATION: 94 % | TEMPERATURE: 98 F | RESPIRATION RATE: 16 BRPM | WEIGHT: 154.98 LBS | HEIGHT: 71 IN | DIASTOLIC BLOOD PRESSURE: 79 MMHG | SYSTOLIC BLOOD PRESSURE: 112 MMHG

## 2020-02-14 DIAGNOSIS — Z95.1 PRESENCE OF AORTOCORONARY BYPASS GRAFT: Chronic | ICD-10-CM

## 2020-02-14 DIAGNOSIS — Z98.89 OTHER SPECIFIED POSTPROCEDURAL STATES: Chronic | ICD-10-CM

## 2020-02-14 LAB
ALBUMIN SERPL ELPH-MCNC: 2.1 G/DL — LOW (ref 3.3–5)
ALP SERPL-CCNC: 81 U/L — SIGNIFICANT CHANGE UP (ref 40–120)
ALT FLD-CCNC: 18 U/L — SIGNIFICANT CHANGE UP (ref 12–78)
ANION GAP SERPL CALC-SCNC: 12 MMOL/L — SIGNIFICANT CHANGE UP (ref 5–17)
APTT BLD: 25.9 SEC — LOW (ref 28.5–37)
AST SERPL-CCNC: 15 U/L — SIGNIFICANT CHANGE UP (ref 15–37)
BASOPHILS # BLD AUTO: 0.07 K/UL — SIGNIFICANT CHANGE UP (ref 0–0.2)
BASOPHILS NFR BLD AUTO: 0.5 % — SIGNIFICANT CHANGE UP (ref 0–2)
BILIRUB SERPL-MCNC: 0.3 MG/DL — SIGNIFICANT CHANGE UP (ref 0.2–1.2)
BUN SERPL-MCNC: 77 MG/DL — HIGH (ref 7–23)
CALCIUM SERPL-MCNC: 8.4 MG/DL — LOW (ref 8.5–10.1)
CHLORIDE SERPL-SCNC: 115 MMOL/L — HIGH (ref 96–108)
CO2 SERPL-SCNC: 17 MMOL/L — LOW (ref 22–31)
CREAT SERPL-MCNC: 2.1 MG/DL — HIGH (ref 0.5–1.3)
EOSINOPHIL # BLD AUTO: 0.19 K/UL — SIGNIFICANT CHANGE UP (ref 0–0.5)
EOSINOPHIL NFR BLD AUTO: 1.3 % — SIGNIFICANT CHANGE UP (ref 0–6)
FLU A RESULT: SIGNIFICANT CHANGE UP
FLU A RESULT: SIGNIFICANT CHANGE UP
FLUAV AG NPH QL: SIGNIFICANT CHANGE UP
FLUBV AG NPH QL: SIGNIFICANT CHANGE UP
GLUCOSE SERPL-MCNC: 165 MG/DL — HIGH (ref 70–99)
HCT VFR BLD CALC: 35.6 % — LOW (ref 39–50)
HGB BLD-MCNC: 11.3 G/DL — LOW (ref 13–17)
IMM GRANULOCYTES NFR BLD AUTO: 0.6 % — SIGNIFICANT CHANGE UP (ref 0–1.5)
INR BLD: 1.09 RATIO — SIGNIFICANT CHANGE UP (ref 0.88–1.16)
LACTATE SERPL-SCNC: 2.2 MMOL/L — HIGH (ref 0.7–2)
LIDOCAIN IGE QN: 351 U/L — SIGNIFICANT CHANGE UP (ref 73–393)
LYMPHOCYTES # BLD AUTO: 1.91 K/UL — SIGNIFICANT CHANGE UP (ref 1–3.3)
LYMPHOCYTES # BLD AUTO: 13.1 % — SIGNIFICANT CHANGE UP (ref 13–44)
MCHC RBC-ENTMCNC: 27.2 PG — SIGNIFICANT CHANGE UP (ref 27–34)
MCHC RBC-ENTMCNC: 31.7 GM/DL — LOW (ref 32–36)
MCV RBC AUTO: 85.6 FL — SIGNIFICANT CHANGE UP (ref 80–100)
MONOCYTES # BLD AUTO: 0.9 K/UL — SIGNIFICANT CHANGE UP (ref 0–0.9)
MONOCYTES NFR BLD AUTO: 6.2 % — SIGNIFICANT CHANGE UP (ref 2–14)
NEUTROPHILS # BLD AUTO: 11.38 K/UL — HIGH (ref 1.8–7.4)
NEUTROPHILS NFR BLD AUTO: 78.3 % — HIGH (ref 43–77)
NRBC # BLD: 0 /100 WBCS — SIGNIFICANT CHANGE UP (ref 0–0)
NT-PROBNP SERPL-SCNC: 1177 PG/ML — HIGH (ref 0–450)
OB PNL STL: POSITIVE
PLATELET # BLD AUTO: 221 K/UL — SIGNIFICANT CHANGE UP (ref 150–400)
POTASSIUM SERPL-MCNC: 4.4 MMOL/L — SIGNIFICANT CHANGE UP (ref 3.5–5.3)
POTASSIUM SERPL-SCNC: 4.4 MMOL/L — SIGNIFICANT CHANGE UP (ref 3.5–5.3)
PROCALCITONIN SERPL-MCNC: 0.15 NG/ML — HIGH (ref 0–0.04)
PROT SERPL-MCNC: 6.1 G/DL — SIGNIFICANT CHANGE UP (ref 6–8.3)
PROTHROM AB SERPL-ACNC: 12.2 SEC — SIGNIFICANT CHANGE UP (ref 10–12.9)
RBC # BLD: 4.16 M/UL — LOW (ref 4.2–5.8)
RBC # FLD: 14.6 % — HIGH (ref 10.3–14.5)
RSV RESULT: SIGNIFICANT CHANGE UP
RSV RNA RESP QL NAA+PROBE: SIGNIFICANT CHANGE UP
SODIUM SERPL-SCNC: 144 MMOL/L — SIGNIFICANT CHANGE UP (ref 135–145)
WBC # BLD: 14.53 K/UL — HIGH (ref 3.8–10.5)
WBC # FLD AUTO: 14.53 K/UL — HIGH (ref 3.8–10.5)

## 2020-02-14 PROCEDURE — 70450 CT HEAD/BRAIN W/O DYE: CPT | Mod: 26

## 2020-02-14 PROCEDURE — 74176 CT ABD & PELVIS W/O CONTRAST: CPT | Mod: 26

## 2020-02-14 PROCEDURE — 71250 CT THORAX DX C-: CPT | Mod: 26

## 2020-02-14 RX ORDER — SODIUM CHLORIDE 9 MG/ML
1000 INJECTION INTRAMUSCULAR; INTRAVENOUS; SUBCUTANEOUS ONCE
Refills: 0 | Status: COMPLETED | OUTPATIENT
Start: 2020-02-14 | End: 2020-02-14

## 2020-02-14 RX ORDER — PANTOPRAZOLE SODIUM 20 MG/1
40 TABLET, DELAYED RELEASE ORAL ONCE
Refills: 0 | Status: COMPLETED | OUTPATIENT
Start: 2020-02-14 | End: 2020-02-14

## 2020-02-14 RX ORDER — IPRATROPIUM/ALBUTEROL SULFATE 18-103MCG
3 AEROSOL WITH ADAPTER (GRAM) INHALATION ONCE
Refills: 0 | Status: COMPLETED | OUTPATIENT
Start: 2020-02-14 | End: 2020-02-14

## 2020-02-14 RX ORDER — SODIUM CHLORIDE 9 MG/ML
3 INJECTION INTRAMUSCULAR; INTRAVENOUS; SUBCUTANEOUS ONCE
Refills: 0 | Status: COMPLETED | OUTPATIENT
Start: 2020-02-14 | End: 2020-02-14

## 2020-02-14 RX ADMIN — Medication 3 MILLILITER(S): at 22:40

## 2020-02-14 RX ADMIN — PANTOPRAZOLE SODIUM 40 MILLIGRAM(S): 20 TABLET, DELAYED RELEASE ORAL at 22:40

## 2020-02-14 RX ADMIN — SODIUM CHLORIDE 1000 MILLILITER(S): 9 INJECTION INTRAMUSCULAR; INTRAVENOUS; SUBCUTANEOUS at 23:53

## 2020-02-14 RX ADMIN — SODIUM CHLORIDE 1000 MILLILITER(S): 9 INJECTION INTRAMUSCULAR; INTRAVENOUS; SUBCUTANEOUS at 22:40

## 2020-02-14 RX ADMIN — SODIUM CHLORIDE 3 MILLILITER(S): 9 INJECTION INTRAMUSCULAR; INTRAVENOUS; SUBCUTANEOUS at 22:40

## 2020-02-14 NOTE — ED ADULT NURSE NOTE - NSIMPLEMENTINTERV_GEN_ALL_ED
Implemented All Fall with Harm Risk Interventions:  [place] to call system. Call bell, personal items and telephone within reach. Instruct patient to call for assistance. Room bathroom lighting operational. Non-slip footwear when patient is off stretcher. Physically safe environment: no spills, clutter or unnecessary equipment. Stretcher in lowest position, wheels locked, appropriate side rails in place. Provide visual cue, wrist band, yellow gown, etc. Monitor gait and stability. Monitor for mental status changes and reorient to person, place, and time. Review medications for side effects contributing to fall risk. Reinforce activity limits and safety measures with patient and family. Provide visual clues: red socks.

## 2020-02-14 NOTE — ED PROVIDER NOTE - PSH
Subjective





- Date & Time of Evaluation


Date of Evaluation: 10/21/18


Time of Evaluation: 11:15





- Subjective


Subjective: 





pt resting comfortably


currently on precedex, hd stable


monitor closely DTs








Objective





- Vital Signs/Intake and Output


Vital Signs (last 24 hours): 


                                        











Temp Pulse Resp BP Pulse Ox


 


 98.7 F   72   18   122/74   100 


 


 10/21/18 04:00  10/21/18 07:00  10/21/18 07:00  10/21/18 09:37  10/21/18 07:00








Intake and Output: 


                                        











 10/21/18 10/21/18





 06:59 18:59


 


Intake Total 1250 


 


Output Total 350 


 


Balance 900 














- Medications


Medications: 


                               Current Medications





Acetaminophen (Tylenol 650 Mg Supp)  650 mg OH Q4 PRN


   PRN Reason: Fever >100.4 F


   Last Admin: 10/20/18 04:35 Dose:  650 mg


Chlordiazepoxide (Librium)  50 mg PO Q6 [facility]


   Last Admin: 10/21/18 09:35 Dose:  Not Given


Enoxaparin Sodium (Lovenox)  40 mg SC DAILY [facility]; Protocol


   Last Admin: 10/21/18 09:35 Dose:  40 mg


Famotidine (Pepcid)  20 mg IVP DAILY [facility]


   Last Admin: 10/21/18 09:45 Dose:  20 mg


Hydralazine HCl (Apresoline)  10 mg IV Q6 [facility]


   Last Admin: 10/21/18 09:37 Dose:  Not Given


Levetiracetam 500 mg/ Sodium (Chloride)  105 mls @ 210 mls/hr IVPB Q12 [facility]


   Last Admin: 10/21/18 09:34 Dose:  210 mls/hr


Lactated Ringer's (Lactated Ringer's)  1,000 mls @ 125 mls/hr IV .Q8H [facility]


   Last Admin: 10/21/18 09:46 Dose:  125 mls/hr


Lorazepam (Ativan)  1 mg IVP Q3H PRN


   PRN Reason: Symptoms of alcohol withdrawl


   Last Admin: 10/21/18 07:53 Dose:  1 mg


Lorazepam (Ativan)  2 mg IVP Q3H PRN


   PRN Reason: Seizure, severe agitation


Thiamine HCl (Vitamin B1 Inj)  100 mg IM DAILY [facility]


   Stop: 10/23/18 09:01


   Last Admin: 10/21/18 09:38 Dose:  100 mg











- Labs


Labs: 


                                        





                                 10/21/18 05:30 





                                 10/21/18 05:30 





                                        











PT  12.6 Seconds (9.8-13.1)   10/19/18  23:05    


 


INR  1.1   10/19/18  23:05    


 


APTT  36.2 Seconds (25.6-37.1)   10/19/18  23:05    














- Constitutional


Appears: Non-toxic, No Acute Distress





- Head Exam


Head Exam: ATRAUMATIC, NORMOCEPHALIC





- Eye Exam


Eye Exam: EOMI, Normal appearance, PERRL





- ENT Exam


ENT Exam: Mucous Membranes Moist, Normal Oropharynx





- Respiratory Exam


Respiratory Exam: Clear to Ausculation Bilateral, NORMAL BREATHING PATTERN





- Cardiovascular Exam


Cardiovascular Exam: RRR, +S1, +S2





- GI/Abdominal Exam


GI & Abdominal Exam: Soft, Normal Bowel Sounds





- Extremities Exam


Extremities Exam: Normal Capillary Refill.  absent: Pedal Edema





- Back Exam


Back Exam: absent: CVA tenderness (L), CVA tenderness (R)





- Neurological Exam


Neurological Exam: Alert, Awake





- Psychiatric Exam


Psychiatric exam: Agitated, Normal Affect, Normal Mood





- Skin


Skin Exam: Dry, Warm





Assessment and Plan





- Assessment and Plan (Free Text)


Plan: 





60 years old male with hx of non compliance with medication, Alcohol abuse and 

seizure, brought to the ED with  recurrent witnessed seizures,The last in the ED

, where the Bp was 183/111mmHG with a temp of 103F rectal. He received a total 

of 12mg of Ativan for the Seizures. 





Pt with agitation, was placed on Precedex overnight. D/c today, resume librium. 

Downgrade to tele if pt no longer requires precedex, no status epilepticus.








#. Status Epilepticus due to non compliance of medication and most probably to 

Alcohol withdrawal. Keppra 750mg given in ED


- Consult Neurology Dr Mishra


-  Keppra 500mg Q12H


- Ativan for Seizure


- Ativan for Agitation


- Seizure precaution with bed rails up and padded 





#. Alcohol Withdrawal


- [facility] protocol


- Ativan for agitation and Tremors


- Start Librium when patient beginx to eat


- Banana bag which includes Thiamine, Folic Acid acid and Multivitamin daily 

while patient is NPO


- Change to Oral Folic Acid and Thiamine when patient start to eat





#. Elevated blood pressures secondary to Alcohol withdrawal


- Hydralazine IV for SBP>150mmHG


- Start Clonidine when patient begin to use the oral route


- Follow blood Pressures





#. Lactic Acidosis from the seizure


- IV fluids


-Follow VBG with lactate





#. Elevated blood glucose


- HbA1c





#. Elevated Temperature probably due to Alcohol withdrawal.


- Blood and Urine Cultures


- Tylenol for Fever





#. Stress ulcer prophylaxis with Pepcid





#. DVT prophylaxis with SCD


- Start Lovenox if CT head is negative for bleed.





Code Status: Full S/P appendectomy    S/P AVR (Aortic Valve Replacement)  Pig valve (bioprosthetic); done 2010 at [place]  S/P CABG (coronary artery bypass graft)  Done in 1996; 2 cardiac stents in 1998  S/P inguinal hernia repair  On right side

## 2020-02-14 NOTE — ED PROVIDER NOTE - NEUROLOGICAL, MLM
Alert and oriented to person and year mild confusion to place and recent events , no focal deficits, no motor or sensory deficits.

## 2020-02-14 NOTE — ED CLERICAL - NS ED CLERK NOTE PRE-ARRIVAL INFORMATION; ADDITIONAL PRE-ARRIVAL INFORMATION
This patient is enrolled in a readmission reduction initiative and has active care navigation. This patient can be followed up by the care navigation team within 24 hours.  To arrange close follow-up or to obtain additional clinical information, please call the care navigation contact number above.      For patients previously on the Hospitalist Service please call the hospitalist at [phone] for input and/or consultation PRIOR to admission. If the patient was on a Voluntary Physician’s service please contact that physician for input and/or consultation PRIOR to admission. Pneumonia

## 2020-02-14 NOTE — ED PROVIDER NOTE - OBJECTIVE STATEMENT
Pt is a 86 yo male who presents to the ED with a cc of weakness.  PMHx of Benign prostatic hypertrophy, CAD, MI, s/p CABG with 2 cardiac stents, COPD, Depression, GERD, Hyperlipidemia  Hypertension, Osteoporosis, Peripheral Neuropathy, Dementia Pt is a 84 yo male who presents to the ED with a cc of weakness.  PMHx of Benign prostatic hypertrophy, CAD, MI, s/p CABG with 2 cardiac stents, COPD, Depression, GERD, Hyperlipidemia  Hypertension, Osteoporosis, Peripheral Neuropathy, Dementia.  Pt is a poor historian and so history provided is limited.  Was recently admitted to the hospital from 1/17-1/22 with increasing confusion and was noted to have a RLL.  He was treated with a course of abx and the confusion was related to metabolic encephalopathy.  Pt was discharged to rehab and came home this past Tuesday.  Per reports he has had decreased po intake over the last several days to weeks.  He reports that today he developed a HA, became weak and sat himself on the ground.  He was unable to stand and EMS was called.  Upon EMS arrival pt was noted to be hypotensive with SBP in the 80s.  BP improved with IVF.  Wife reports that pt has been suffering from diarrhea from the last 2 days and pt reports that he noted a spot of blood in the stool.  Denies fever, chills, vomiting, CP, SOB, abd pain.  Pt does report continued cough and nausea.

## 2020-02-14 NOTE — ED ADULT NURSE NOTE - PSH
S/P appendectomy    S/P AVR (Aortic Valve Replacement)  Pig valve (bioprosthetic); done 2010 at [place]  S/P CABG (coronary artery bypass graft)  Done in 1996; 2 cardiac stents in 1998  S/P inguinal hernia repair  On right side

## 2020-02-14 NOTE — ED PROVIDER NOTE - CLINICAL SUMMARY MEDICAL DECISION MAKING FREE TEXT BOX
Pt is a 84 yo male who presents to the ED with a cc of weakness.  PMHx of Benign prostatic hypertrophy, CAD, MI, s/p CABG with 2 cardiac stents, COPD, Depression, GERD, Hyperlipidemia  Hypertension, Osteoporosis, Peripheral Neuropathy, Dementia.  Pt is a poor historian and so history provided is limited.  Was recently admitted to the hospital from 1/17-1/22 with increasing confusion and was noted to have a RLL.  He was treated with a course of abx and the confusion was related to metabolic encephalopathy.  Pt was discharged to rehab and came home this past Tuesday.  Per reports he has had decreased po intake over the last several days to weeks.  He reports that today he developed a HA, became weak and sat himself on the ground.  He was unable to stand and EMS was called.  Upon EMS arrival pt was noted to be hypotensive with SBP in the 80s.  BP improved with IVF.  Wife reports that pt has been suffering from diarrhea from the last 2 days and pt reports that he noted a spot of blood in the stool.  Denies fever, chills, vomiting, CP, SOB, abd pain.  Pt does report continued cough and nausea.

## 2020-02-14 NOTE — ED ADULT TRIAGE NOTE - CHIEF COMPLAINT QUOTE
Pt. brought to ED via EMS from home for weakness, lightheadedness and blood tinged stool. Denied chest pain or shortness of breath. Reported recent hospitalization for PNA. Per EMS pt. hypotensive upon arrival to pts. home systolic 89.

## 2020-02-14 NOTE — ED ADULT NURSE NOTE - PMH
Benign prostatic hypertrophy    CAD (Coronary Artery Disease)  MI, s/p CABG with 2 cardiac stents  COPD (chronic obstructive pulmonary disease)    Depression    GERD (gastroesophageal reflux disease)    Hyperlipidemia    Hypertension    Myocardial infarction    Osteoporosis    Peripheral Neuropathy    Seasonal allergies

## 2020-02-15 DIAGNOSIS — N28.9 DISORDER OF KIDNEY AND URETER, UNSPECIFIED: ICD-10-CM

## 2020-02-15 DIAGNOSIS — F32.9 MAJOR DEPRESSIVE DISORDER, SINGLE EPISODE, UNSPECIFIED: ICD-10-CM

## 2020-02-15 DIAGNOSIS — K62.5 HEMORRHAGE OF ANUS AND RECTUM: ICD-10-CM

## 2020-02-15 DIAGNOSIS — K92.2 GASTROINTESTINAL HEMORRHAGE, UNSPECIFIED: ICD-10-CM

## 2020-02-15 DIAGNOSIS — F03.90 UNSPECIFIED DEMENTIA WITHOUT BEHAVIORAL DISTURBANCE: ICD-10-CM

## 2020-02-15 DIAGNOSIS — K21.9 GASTRO-ESOPHAGEAL REFLUX DISEASE WITHOUT ESOPHAGITIS: ICD-10-CM

## 2020-02-15 DIAGNOSIS — I10 ESSENTIAL (PRIMARY) HYPERTENSION: ICD-10-CM

## 2020-02-15 DIAGNOSIS — I25.10 ATHEROSCLEROTIC HEART DISEASE OF NATIVE CORONARY ARTERY WITHOUT ANGINA PECTORIS: ICD-10-CM

## 2020-02-15 DIAGNOSIS — Z29.9 ENCOUNTER FOR PROPHYLACTIC MEASURES, UNSPECIFIED: ICD-10-CM

## 2020-02-15 DIAGNOSIS — N40.0 BENIGN PROSTATIC HYPERPLASIA WITHOUT LOWER URINARY TRACT SYMPTOMS: ICD-10-CM

## 2020-02-15 DIAGNOSIS — J44.9 CHRONIC OBSTRUCTIVE PULMONARY DISEASE, UNSPECIFIED: ICD-10-CM

## 2020-02-15 LAB
ANION GAP SERPL CALC-SCNC: 10 MMOL/L — SIGNIFICANT CHANGE UP (ref 5–17)
APPEARANCE UR: CLEAR — SIGNIFICANT CHANGE UP
BASOPHILS # BLD AUTO: 0.04 K/UL — SIGNIFICANT CHANGE UP (ref 0–0.2)
BASOPHILS NFR BLD AUTO: 0.4 % — SIGNIFICANT CHANGE UP (ref 0–2)
BILIRUB UR-MCNC: NEGATIVE — SIGNIFICANT CHANGE UP
BUN SERPL-MCNC: 77 MG/DL — HIGH (ref 7–23)
CALCIUM SERPL-MCNC: 8.6 MG/DL — SIGNIFICANT CHANGE UP (ref 8.5–10.1)
CHLORIDE SERPL-SCNC: 120 MMOL/L — HIGH (ref 96–108)
CO2 SERPL-SCNC: 19 MMOL/L — LOW (ref 22–31)
COLOR SPEC: YELLOW — SIGNIFICANT CHANGE UP
CREAT SERPL-MCNC: 2 MG/DL — HIGH (ref 0.5–1.3)
DIFF PNL FLD: NEGATIVE — SIGNIFICANT CHANGE UP
EOSINOPHIL # BLD AUTO: 0.13 K/UL — SIGNIFICANT CHANGE UP (ref 0–0.5)
EOSINOPHIL NFR BLD AUTO: 1.2 % — SIGNIFICANT CHANGE UP (ref 0–6)
GLUCOSE SERPL-MCNC: 102 MG/DL — HIGH (ref 70–99)
GLUCOSE UR QL: NEGATIVE — SIGNIFICANT CHANGE UP
HCT VFR BLD CALC: 26.3 % — LOW (ref 39–50)
HCT VFR BLD CALC: 28.4 % — LOW (ref 39–50)
HGB BLD-MCNC: 8.4 G/DL — LOW (ref 13–17)
HGB BLD-MCNC: 9 G/DL — LOW (ref 13–17)
IMM GRANULOCYTES NFR BLD AUTO: 0.4 % — SIGNIFICANT CHANGE UP (ref 0–1.5)
KETONES UR-MCNC: NEGATIVE — SIGNIFICANT CHANGE UP
LACTATE SERPL-SCNC: 1.6 MMOL/L — SIGNIFICANT CHANGE UP (ref 0.7–2)
LEUKOCYTE ESTERASE UR-ACNC: NEGATIVE — SIGNIFICANT CHANGE UP
LYMPHOCYTES # BLD AUTO: 2.6 K/UL — SIGNIFICANT CHANGE UP (ref 1–3.3)
LYMPHOCYTES # BLD AUTO: 24.6 % — SIGNIFICANT CHANGE UP (ref 13–44)
MCHC RBC-ENTMCNC: 26.6 PG — LOW (ref 27–34)
MCHC RBC-ENTMCNC: 31.7 GM/DL — LOW (ref 32–36)
MCV RBC AUTO: 84 FL — SIGNIFICANT CHANGE UP (ref 80–100)
MONOCYTES # BLD AUTO: 0.76 K/UL — SIGNIFICANT CHANGE UP (ref 0–0.9)
MONOCYTES NFR BLD AUTO: 7.2 % — SIGNIFICANT CHANGE UP (ref 2–14)
NEUTROPHILS # BLD AUTO: 7.01 K/UL — SIGNIFICANT CHANGE UP (ref 1.8–7.4)
NEUTROPHILS NFR BLD AUTO: 66.2 % — SIGNIFICANT CHANGE UP (ref 43–77)
NITRITE UR-MCNC: NEGATIVE — SIGNIFICANT CHANGE UP
NRBC # BLD: 0 /100 WBCS — SIGNIFICANT CHANGE UP (ref 0–0)
PH UR: 5 — SIGNIFICANT CHANGE UP (ref 5–8)
PLATELET # BLD AUTO: 166 K/UL — SIGNIFICANT CHANGE UP (ref 150–400)
POTASSIUM SERPL-MCNC: 4.5 MMOL/L — SIGNIFICANT CHANGE UP (ref 3.5–5.3)
POTASSIUM SERPL-SCNC: 4.5 MMOL/L — SIGNIFICANT CHANGE UP (ref 3.5–5.3)
PROT UR-MCNC: NEGATIVE — SIGNIFICANT CHANGE UP
RBC # BLD: 3.38 M/UL — LOW (ref 4.2–5.8)
RBC # FLD: 14.4 % — SIGNIFICANT CHANGE UP (ref 10.3–14.5)
SODIUM SERPL-SCNC: 149 MMOL/L — HIGH (ref 135–145)
SP GR SPEC: 1.01 — SIGNIFICANT CHANGE UP (ref 1.01–1.02)
UROBILINOGEN FLD QL: NEGATIVE — SIGNIFICANT CHANGE UP
WBC # BLD: 10.58 K/UL — HIGH (ref 3.8–10.5)
WBC # FLD AUTO: 10.58 K/UL — HIGH (ref 3.8–10.5)

## 2020-02-15 PROCEDURE — 99223 1ST HOSP IP/OBS HIGH 75: CPT | Mod: GC,AI

## 2020-02-15 PROCEDURE — 99285 EMERGENCY DEPT VISIT HI MDM: CPT

## 2020-02-15 PROCEDURE — 12345: CPT | Mod: NC

## 2020-02-15 RX ORDER — PANTOPRAZOLE SODIUM 20 MG/1
40 TABLET, DELAYED RELEASE ORAL
Refills: 0 | Status: DISCONTINUED | OUTPATIENT
Start: 2020-02-15 | End: 2020-02-15

## 2020-02-15 RX ORDER — SUCRALFATE 1 G
1 TABLET ORAL EVERY 6 HOURS
Refills: 0 | Status: DISCONTINUED | OUTPATIENT
Start: 2020-02-15 | End: 2020-02-19

## 2020-02-15 RX ORDER — FINASTERIDE 5 MG/1
5 TABLET, FILM COATED ORAL DAILY
Refills: 0 | Status: DISCONTINUED | OUTPATIENT
Start: 2020-02-15 | End: 2020-02-19

## 2020-02-15 RX ORDER — NYSTATIN CREAM 100000 [USP'U]/G
1 CREAM TOPICAL
Refills: 0 | Status: DISCONTINUED | OUTPATIENT
Start: 2020-02-15 | End: 2020-02-19

## 2020-02-15 RX ORDER — DONEPEZIL HYDROCHLORIDE 10 MG/1
10 TABLET, FILM COATED ORAL AT BEDTIME
Refills: 0 | Status: DISCONTINUED | OUTPATIENT
Start: 2020-02-15 | End: 2020-02-19

## 2020-02-15 RX ORDER — METOPROLOL TARTRATE 50 MG
25 TABLET ORAL DAILY
Refills: 0 | Status: DISCONTINUED | OUTPATIENT
Start: 2020-02-15 | End: 2020-02-19

## 2020-02-15 RX ORDER — IPRATROPIUM/ALBUTEROL SULFATE 18-103MCG
3 AEROSOL WITH ADAPTER (GRAM) INHALATION EVERY 4 HOURS
Refills: 0 | Status: DISCONTINUED | OUTPATIENT
Start: 2020-02-15 | End: 2020-02-19

## 2020-02-15 RX ORDER — SODIUM CHLORIDE 9 MG/ML
1000 INJECTION INTRAMUSCULAR; INTRAVENOUS; SUBCUTANEOUS
Refills: 0 | Status: DISCONTINUED | OUTPATIENT
Start: 2020-02-15 | End: 2020-02-15

## 2020-02-15 RX ORDER — ATORVASTATIN CALCIUM 80 MG/1
80 TABLET, FILM COATED ORAL AT BEDTIME
Refills: 0 | Status: DISCONTINUED | OUTPATIENT
Start: 2020-02-15 | End: 2020-02-19

## 2020-02-15 RX ORDER — SODIUM CHLORIDE 9 MG/ML
1000 INJECTION, SOLUTION INTRAVENOUS
Refills: 0 | Status: DISCONTINUED | OUTPATIENT
Start: 2020-02-15 | End: 2020-02-17

## 2020-02-15 RX ORDER — AMLODIPINE BESYLATE 2.5 MG/1
5 TABLET ORAL DAILY
Refills: 0 | Status: DISCONTINUED | OUTPATIENT
Start: 2020-02-15 | End: 2020-02-18

## 2020-02-15 RX ORDER — TAMSULOSIN HYDROCHLORIDE 0.4 MG/1
0.4 CAPSULE ORAL AT BEDTIME
Refills: 0 | Status: DISCONTINUED | OUTPATIENT
Start: 2020-02-15 | End: 2020-02-19

## 2020-02-15 RX ADMIN — SODIUM CHLORIDE 75 MILLILITER(S): 9 INJECTION, SOLUTION INTRAVENOUS at 11:33

## 2020-02-15 RX ADMIN — ATORVASTATIN CALCIUM 80 MILLIGRAM(S): 80 TABLET, FILM COATED ORAL at 22:05

## 2020-02-15 RX ADMIN — TAMSULOSIN HYDROCHLORIDE 0.4 MILLIGRAM(S): 0.4 CAPSULE ORAL at 22:05

## 2020-02-15 RX ADMIN — SODIUM CHLORIDE 75 MILLILITER(S): 9 INJECTION INTRAMUSCULAR; INTRAVENOUS; SUBCUTANEOUS at 04:25

## 2020-02-15 RX ADMIN — Medication 25 MILLIGRAM(S): at 06:28

## 2020-02-15 RX ADMIN — NYSTATIN CREAM 1 APPLICATION(S): 100000 CREAM TOPICAL at 18:54

## 2020-02-15 RX ADMIN — PANTOPRAZOLE SODIUM 40 MILLIGRAM(S): 20 TABLET, DELAYED RELEASE ORAL at 06:29

## 2020-02-15 RX ADMIN — DONEPEZIL HYDROCHLORIDE 10 MILLIGRAM(S): 10 TABLET, FILM COATED ORAL at 22:05

## 2020-02-15 RX ADMIN — Medication 1 GRAM(S): at 23:41

## 2020-02-15 RX ADMIN — FINASTERIDE 5 MILLIGRAM(S): 5 TABLET, FILM COATED ORAL at 11:34

## 2020-02-15 RX ADMIN — AMLODIPINE BESYLATE 5 MILLIGRAM(S): 2.5 TABLET ORAL at 06:28

## 2020-02-15 NOTE — H&P ADULT - PROBLEM SELECTOR PLAN 2
- YOU on CKD, baseline Cr ~1.8, on admission Cr 2.2  - likely prerenal azotemia from acute blood loss causing hypoperfusion  - s/p 2L NS bolus in the ED  - will continue with IVF 75cc/hr  - avoid nephrotoxic agents where feasible YOU on CKD Stage 3, baseline Cr ~1.8, on admission Cr 2.2  - Likely prerenal azotemia from acute blood loss causing hypoperfusion  - S/p 2L NS bolus in the ED  - Will continue with IVF 75cc/hr  - F/u CMP and trend Cr  - Avoid nephrotoxic agents where feasible

## 2020-02-15 NOTE — CHART NOTE - NSCHARTNOTEFT_GEN_A_CORE
I saw and examined the patient, still has some blood in his stool. He is hemodynamically stable, Hb trending down to 9, repeat Hb @ 1 pm, ordered type and screen and signed blood transfusion consent for possible transfusion if Hb < 8 or symptomatic. f/u GI Dr Ervin. Continue PPI.

## 2020-02-15 NOTE — CONSULT NOTE ADULT - SUBJECTIVE AND OBJECTIVE BOX
Chief Complaint:  Patient is a 85y old  Male who presents with a chief complaint of GI bleed, YOU Patient is a 86 y/o M with PMHx of BPH, MI and CAD s/p CABG with 2 cardiac stents, COPD, Depression, GERD, Hyperlipidemia, Hypertension, Osteoporosis, Peripheral Neuropathy, and Dementia who presents to the ED c/o weakness. Patient was recently admitted to [facility] from - after fall found to have RLL pneumonia treated with IV ceftriaxone, confusion suspected secondary to metabolic encephalopathy. Patient was discharged to rehab and returned home this past Tuesday. Patient is a poor historian secondary to dementia, history was gathered from wife and aide at bedside in addition to patient. Patient states that he was sitting in his lounge chair today, stood up and then became very weak, lowered himself on the ground. Denies fall, LOC, and trauma. Wife states that they found him on the ground and was unable to stand him up so they called EMS. Upon EMS arrival, per ED note, patient was noted to be hypotensive with SBP in the 80s, which improved with IVF. Wife states patient has been very weak every since returning from rehab, patient adds he feels dizzy and lightheaded as well. Aide/wife reports that patient has been suffering from diarrhea for the last two to three days and EMT noted blood in his stool today when they arrived.  In the ED, patient was noted to have bowel incontinence with bright bloody stool. Patient had endoscopy in the past 5-10 years, states it was normal. Never had endoscopy that he can remember. Denies any family history of cancer, has a 5 pack year smoking history, quit in . Denies n/v, chest pain, SOB, abdominal pain, urinary symptoms. Admits to intermittent chronic cough since pna, productive of clear sputum.   Of note, wife is unsure of any medication changes since rehab. States that she hasn't picked up any prescriptions and  hasn't been taking any medications since discharge from rehab as they were going to go see their PCP this coming week and she didn't know if they wanted to make changes in medication. Per review of last admission H&P, unable to verify medications at that time as well with aide or wife. Will place patient back on medications that he was discharged from [facility] on during last admission.  In ED:  VS afebrile, HR 95 -> 87, /79, RR 16-18, SpO2 94-96% RA  CBC significant for WBC 14.53, Hb 11.3   CMP significant for BUN/Cr 77/2.10 (baseline Cr ~1.8)  Procal 0.15, Lactate 2.2, FOBT+, Pro-BNP 1177  Flu/RSV negative, UA negative  Chest CT: Residual right lower lobe airspace consolidation is concerning for malignancy. Histopathological confirmation with percutaneous biopsy is recommended. Resolved adjacent airspace opacities likely due to treated pneumonia. Stable additional nodules.    CT abdomen/pelvis: Very mild acute uncomplicated sigmoid diverticulosis suggested. Follow-up colonoscopy if the appropriate clinical treatment is recommended.  CT Head negative for acute pathology. Stable mild chronic microvascular changes and tiny chronic lacunar infarct in the left cerebellum.   EKG: Sinus rhythm at 91bpm, with occasional PVCs, RBBB with no significant changes noted from previous admission EKG    S/p 2L NS bolus, duonebs x1, protonix 40mg IVP x1     Review of Systems:  Review of Systems: CONSTITUTIONAL: admits to weakness; denies fever, chills, fatigue  HEENT: denies blurred vision, sore throat  SKIN: denies new lesions, rash  CARDIOVASCULAR: denies chest pain, chest pressure, palpitations  RESPIRATORY: admits to cough productive of clear sputum; denies shortness of breath  GASTROINTESTINAL: admits to bloody diarrhea; denies nausea, vomiting, abdominal pain  GENITOURINARY: denies dysuria, discharge  NEUROLOGICAL: denies numbness, headache, focal weakness  MUSCULOSKELETAL: denies new joint pain, muscle aches  HEMATOLOGIC: denies bruising  LYMPHATICS: denies enlarged lymph nodes, extremity swelling  PSYCHIATRIC: denies recent changes in anxiety, depression ENDOCRINOLOGIC: denies sweating, cold or heat intolerance	  now with dark stools ? ugib vs lgib    Allergies:  amoxicillin (Unknown)  Levaquin (Unknown)  penicillin (Unknown)      Medications:  albuterol/ipratropium for Nebulization 3 milliLiter(s) Nebulizer every 4 hours PRN  amLODIPine   Tablet 5 milliGRAM(s) Oral daily  atorvastatin 80 milliGRAM(s) Oral at bedtime  donepezil 10 milliGRAM(s) Oral at bedtime  finasteride 5 milliGRAM(s) Oral daily  metoprolol succinate ER 25 milliGRAM(s) Oral daily  nystatin Powder 1 Application(s) Topical two times a day  sodium chloride 0.45%. 1000 milliLiter(s) IV Continuous <Continuous>  sucralfate suspension 1 Gram(s) Oral every 6 hours  tamsulosin 0.4 milliGRAM(s) Oral at bedtime      PMHX/PSHX:  Myocardial infarction  COPD (chronic obstructive pulmonary disease)  Seasonal allergies  GERD (gastroesophageal reflux disease)  Benign prostatic hypertrophy  Hyperlipidemia  Hypertension  Asthma  Depression  Peripheral Neuropathy  Asthma  S/P AVR (Aortic Valve Replacement)  S/P CABG (Coronary Artery Bypass Graft)  Osteoporosis  CAD (Coronary Artery Disease)  HTN (Hypertension)  Dyslipidemia  S/P inguinal hernia repair  S/P appendectomy  S/P CABG (coronary artery bypass graft)  S/P CABG (Coronary Artery Bypass Graft)  S/P AVR (Aortic Valve Replacement)      Family history:  Family history of renal failure (Sibling)  Family history of MI (myocardial infarction)  Family history of stroke  Family history of amyotrophic lateral sclerosis  Family history of stroke      Social History: no etoh no cigs no ivda lives at home    ROS:     General:  No wt loss, fevers, chills, night sweats, fatigue,   Eyes:  Good vision, no reported pain  ENT:  No sore throat, pain, runny nose, dysphagia  CV:  No pain, palpitations, hypo/hypertension  Resp:  No dyspnea, cough, tachypnea, wheezing  GI:  No pain, No nausea, No vomiting, No diarrhea, No constipation, No weight loss, No fever, No pruritis, No rectal bleeding, No tarry stools, No dysphagia,  :  No pain, bleeding, incontinence, nocturia  Muscle:  No pain, weakness  Neuro:  No weakness, tingling, memory problems  Psych:  No fatigue, insomnia, mood problems, depression  Endocrine:  No polyuria, polydipsia, cold/heat intolerance  Heme:  No petechiae, ecchymosis, easy bruisability  Skin:  No rash, tattoos, scars, edema      PHYSICAL EXAM:   Vital Signs:  Vital Signs Last 24 Hrs  T(C): 36.6 (15 Feb 2020 15:39), Max: 36.9 (2020 22:57)  T(F): 97.9 (15 Feb 2020 15:39), Max: 98.5 (2020 22:57)  HR: 73 (15 Feb 2020 15:39) (73 - 95)  BP: 105/71 (15 Feb 2020 15:39) (105/71 - 138/87)  BP(mean): --  RR: 16 (15 Feb 2020 15:39) (16 - 18)  SpO2: 97% (15 Feb 2020 15:39) (94% - 99%)  Daily Height in cm: 180.34 (2020 20:57)    Daily     GENERAL:  Appears stated age, well-groomed, well-nourished, no distress  HEENT:  NC/AT,  conjunctivae clear and pink, no thyromegaly, nodules, adenopathy, no JVD, sclera -anicteric  CHEST:  Full & symmetric excursion, no increased effort, breath sounds clear  HEART:  Regular rhythm, S1, S2, no murmur/rub/S3/S4, no abdominal bruit, no edema  ABDOMEN:  Soft, non-tender, non-distended, normoactive bowel sounds,  no masses ,no hepato-splenomegaly, no signs of chronic liver disease  EXTEREMITIES:  no cyanosis,clubbing or edema  SKIN:  No rash/erythema/ecchymoses/petechiae/wounds/abscess/warm/dry  NEURO:  Alert, oriented, no asterixis, no tremor, no encephalopathy    LABS:                        8.4    x     )-----------( x        ( 15 Feb 2020 13:29 )             26.3     02-15    149<H>  |  120<H>  |  77<H>  ----------------------------<  102<H>  4.5   |  19<L>  |  2.00<H>    Ca    8.6      15 Feb 2020 05:33    TPro  6.1  /  Alb  2.1<L>  /  TBili  0.3  /  DBili  x   /  AST  15  /  ALT  18  /  AlkPhos  81  02-14    LIVER FUNCTIONS - ( 2020 21:54 )  Alb: 2.1 g/dL / Pro: 6.1 g/dL / ALK PHOS: 81 U/L / ALT: 18 U/L / AST: 15 U/L / GGT: x           PT/INR - ( 2020 21:54 )   PT: 12.2 sec;   INR: 1.09 ratio         PTT - ( 2020 21:54 )  PTT:25.9 sec  Urinalysis Basic - ( 15 Feb 2020 01:34 )    Color: Yellow / Appearance: Clear / S.010 / pH: x  Gluc: x / Ketone: Negative  / Bili: Negative / Urobili: Negative   Blood: x / Protein: Negative / Nitrite: Negative   Leuk Esterase: Negative / RBC: x / WBC x   Sq Epi: x / Non Sq Epi: x / Bacteria: x      Amylase Serum--      Lipase [id number]       Ammonia--      Imaging:

## 2020-02-15 NOTE — H&P ADULT - NSICDXPASTSURGICALHX_GEN_ALL_CORE_FT
PAST SURGICAL HISTORY:  S/P appendectomy     S/P AVR (Aortic Valve Replacement) Pig valve (bioprosthetic); done 2010 at [place]    S/P CABG (coronary artery bypass graft) Done in 1996; 2 cardiac stents in 1998    S/P inguinal hernia repair On right side

## 2020-02-15 NOTE — H&P ADULT - NSHPPHYSICALEXAM_GEN_ALL_CORE
T(C): 36.9 (02-14-20 @ 22:57), Max: 36.9 (02-14-20 @ 22:57)  HR: 87 (02-14-20 @ 22:57) (87 - 95)  BP: 105/77 (02-14-20 @ 22:57) (105/77 - 112/79)  RR: 18 (02-14-20 @ 22:57) (16 - 18)  SpO2: 96% (02-14-20 @ 22:57) (94% - 96%)    GENERAL: patient appears well, no acute distress, appropriate, pleasant  EYES: sclera clear, no exudates  ENMT: oropharynx clear without erythema, no exudates, moist mucous membranes  NECK: supple, soft, no thyromegaly noted  LUNGS: good air entry bilaterally, clear to auscultation, symmetric breath sounds, no wheezing or rhonchi appreciated  HEART: soft S1/S2, regular rate and rhythm, no murmurs noted, no lower extremity edema  GASTROINTESTINAL: abdomen is soft, nontender, nondistended, normoactive bowel sounds, no palpable masses  INTEGUMENT: good skin turgor, no lesions noted  MUSCULOSKELETAL: no clubbing or cyanosis, no obvious deformity  NEUROLOGIC: awake, alert, oriented x3, good muscle tone in 4 extremities, no obvious sensory deficits  PSYCHIATRIC: mood is good, affect is congruent, linear and logical thought process  HEME/LYMPH: no palpable supraclavicular nodules, no obvious ecchymosis or petechiae T(C): 36.9 (02-14-20 @ 22:57), Max: 36.9 (02-14-20 @ 22:57)  HR: 87 (02-14-20 @ 22:57) (87 - 95)  BP: 105/77 (02-14-20 @ 22:57) (105/77 - 112/79)  RR: 18 (02-14-20 @ 22:57) (16 - 18)  SpO2: 96% (02-14-20 @ 22:57) (94% - 96%)    GENERAL: elderly male, no acute distress, appropriate, pleasant  EYES: sclera clear, no exudates  ENMT: oropharynx clear without erythema, no exudates, moist mucous membranes  NECK: supple, soft  LUNGS: clear to auscultation, decreased breath sounds b/l bases L>R, no wheezing or rhonchi appreciated  HEART: soft S1/S2, regular rate and rhythm, no murmurs noted, no lower extremity edema  GASTROINTESTINAL: abdomen is soft, nontender, nondistended, normoactive bowel sounds, no palpable masses  INTEGUMENT: no lesions noted  MUSCULOSKELETAL: no obvious deformity  NEUROLOGIC: awake, alert, oriented x3, good muscle tone in 4 extremities, no obvious sensory deficits  HEME/LYMPH: no obvious ecchymosis or petechiae

## 2020-02-15 NOTE — PATIENT PROFILE ADULT - VISION (WITH CORRECTIVE LENSES IF THE PATIENT USUALLY WEARS THEM):
Partially impaired: cannot see medication labels or newsprint, but can see obstacles in path, and the surrounding layout; can count fingers at arm's length/Wears Glasses. did not bring

## 2020-02-15 NOTE — H&P ADULT - PROBLEM SELECTOR PLAN 3
- Home med Rosuvastatin 80mg QD: therapeutic interchange Atorvastatin 80mg  - Continue home med Cilostazol Chronic, hx of MI s/p CABG stents x2  - Continue therapeutic interchange atorvastatin 80mg daily for home rosuvastatin 40mg daily  - Holding home baby ASA 81mg in setting of GIB

## 2020-02-15 NOTE — H&P ADULT - PROBLEM SELECTOR PLAN 1
- admit to F  - patient with large bloody bowel movement in the ER and gross raymond blood on rectal exam  - likely lower GI bleed given bright red blood, ?hemorrhoids   - hemodynamically stable at this time, H/H stable, does not require PRBC transfusion  - type and screen completed on admission  - will continue to monitor H/H  - protonix 40mg IVP BID  - Gi, Dr. Ervin, consulted, f/u recs GI bleed, suspected to be lower GI in origin, possibly 2/2 diverticulosis vs hemorrhoids vs malignancy  - Admit to [facility]  - Patient with large bloody bowel movement in the ER and gross raymond blood on rectal exam  - Likely lower GI bleed given bright red blood, denies any hx of hemorrhoids. Last colonoscopy was >5 years ago, normal per patient  - Hemodynamically stable at this time, H/H stable, does not require PRBC transfusion  - Type and screen completed on admission  - Will continue to monitor H/H  - Protonix 40mg IVP BID  - F/u lactate, likely elevated in setting of renal insufficiency and GIB, f/u repeat lactate  - Gi, Dr. Ervin, consulted, f/u recs GI bleed, suspected to be lower GI in origin, possibly 2/2 diverticulosis vs hemorrhoids vs malignancy  - Admit to F  - Patient with large bloody bowel movement in the ER and gross raymond blood on rectal exam  - Likely lower GI bleed given bright red blood, denies any hx of hemorrhoids. Last colonoscopy was >5 years ago, normal per patient  - Hemodynamically stable at this time, H/H stable but could be lower as patient appears dry with decreased PO intake over the past few days, does not require PRBC transfusion  - Type and screen completed on admission  - Will continue to monitor H/H  - Protonix 40mg IVP BID  - F/u lactate, likely elevated in setting of renal insufficiency and GIB, f/u repeat lactate  - Gi, Dr. Ervin, consulted, f/u recs GI bleed, suspected to be lower GI in origin, possibly 2/2 diverticulosis vs hemorrhoids vs malignancy  - Admit to F  - Patient with large bloody bowel movement in the ER and gross raymond blood on rectal exam  - Likely lower GI bleed given bright red blood, denies any hx of hemorrhoids. Last colonoscopy was >5 years ago, normal per patient  - Hemodynamically stable at this time, H/H stable but could be lower as patient appears dry-?hemoconcentration- with decreased PO intake over the past few days, does not require PRBC transfusion  - Type and screen completed on admission  - Will continue to monitor H/H  - Protonix 40mg IVP BID  - F/u lactate, likely elevated in setting of renal insufficiency and GIB, f/u repeat lactate  - Gi, Dr. Ervin, consulted, f/u recs

## 2020-02-15 NOTE — H&P ADULT - NSHPSOCIALHISTORY_GEN_ALL_CORE
Marital Status:  ( x  )    (   ) Single    (   )    (  )   Lives with: (  ) alone  (  ) children   ( x ) spouse   (  ) parents  (  ) other  Recent Travel: No recent travel  Occupation: Retired  Mobility: Uses walker and cane  Substance Use (street drugs): ( x ) never used  (  ) other:  Tobacco Usage:  Quit smoking 1987, smoked ~1/2 ppd for 10 years  Alcohol Usage: Occasional Marital Status:  ( x )    (   ) Single    (   )    (  )   Lives with: (  ) alone  (  ) children   ( x ) spouse   (  ) parents  (  ) other - has 24/7 aide   Recent Travel: No recent travel  Occupation: Retired  Mobility: Uses walker and cane  Substance Use (street drugs): ( x ) never used  (  ) other:  Tobacco Usage:  Quit smoking 1987, smoked ~1/2 ppd for 10 years  Alcohol Usage: Occasional

## 2020-02-15 NOTE — ED ADULT NURSE REASSESSMENT NOTE - NS ED NURSE REASSESS COMMENT FT1
Noted 1 BM with small amt bright red raymond blood.  Noted small stage 2 to coccyx and to R buttock when cleaning pt.  Pt states these have been there from home

## 2020-02-15 NOTE — H&P ADULT - ASSESSMENT
Patient is a 84 y/o M with PMHx of BPH, MI and CAD s/p CABG with 2 cardiac stents, COPD, Depression, GERD, Hyperlipidemia, Hypertension, Osteoporosis, Peripheral Neuropathy, and Dementia who presents to the ED c/o weakness admitted for GI bleed and YOU on CKD.

## 2020-02-15 NOTE — H&P ADULT - PROBLEM SELECTOR PLAN 4
- Continue home med Amlodipine  - Monitor routine hemodynamics Chronic  - Continue home norvasc 5mg daily, with hold parameters  - Continue home Toprol XL 25mg daily, with hold parameters  - Monitor routine hemodynamics

## 2020-02-15 NOTE — H&P ADULT - PROBLEM SELECTOR PLAN 9
- Will hold chemical DVT ppx in setting of acute GI bleed  - SCDs  - PT consult    IMPROVE VTE Individual Risk Assessment          RISK                                                          Points  [  ] Previous VTE                                                3  [  ] Thrombophilia                                             2  [  ] Lower limb paralysis                                   2        (unable to hold up >15 seconds)    [  ] Current Cancer                                             2         (within 6 months)  [  ] Immobilization > 24 hrs                              1  [  ] ICU/CCU stay > 24 hours                             1  [ x ] Age > 60                                                         1  IMPROVE VTE Score:         [    1     ]

## 2020-02-15 NOTE — H&P ADULT - HISTORY OF PRESENT ILLNESS
Patient is a 86 y/o M with PMHx of BPH, MI and CAD s/p CABG with 2 cardiac stents, COPD, Depression, GERD, Hyperlipidemia, Hypertension, Osteoporosis, Peripheral Neuropathy, and Dementia who presents to the ED c/o weakness. Patient was recently admitted to [facility] from 1/17-1/22 after fall found to have RLL pneumonia treated with IV ceftriaxone, confusion suspected secondary to metabolic encephalopathy. Patient was discharged to rehab and returned home this past Tuesday. Patient is a poor historian secondary to dementia. Patient states he developed a headache today and became very weak, sat himself on the ground, was unable to stand and called EMS. Upon EMS arrival, patient was noted to be hypotensive with SBP in the 80s, which improved with IVF. Per chart review, wife reports that patient has been suffering from diarrhea for the last two days and patient reports that he noted a spot of blood in the stool.  In the ED, rectal exam revealed raymond bleeding and patient also had bowel incontinence in the ED with bright bloody stool.  In ED:  VS afebrile, HR 95 -> 87, /79, RR 16-18, SpO2 94-96% RA  CBC significant for WBC 14.53, Hb 11.3 (baseline ~13)  CMP significant for BUN/Cr 77/2.10 (baseline Cr ~1.8)  Procal 0.15, Lactate 2.2, FOBT+, Pro-BNP 1177  Flu/RSV negative  Chest CT: Residual right lower lobe airspace consolidation is concerning for malignancy. Histopathological confirmation with percutaneous biopsy is recommended. Resolved adjacent airspace opacities likely due to treated pneumonia. Stable additional nodules.    CT abdomen/pelvis: Very mild acute uncomplicated sigmoid diverticulosis suggested. Follow-up colonoscopy if the appropriate clinical treatment is recommended.  CT Head negative for acute pathology. Stable mild chronic microvascular changes and tiny chronic lacunar infarct in the left cerebellum.     S/p 2L NS bolus, duonebs x1, protonix 40mg IVP x1 Patient is a 84 y/o M with PMHx of BPH, MI and CAD s/p CABG with 2 cardiac stents, COPD, Depression, GERD, Hyperlipidemia, Hypertension, Osteoporosis, Peripheral Neuropathy, and Dementia who presents to the ED c/o weakness. Patient was recently admitted to [facility] from 1/17-1/22 after fall found to have RLL pneumonia treated with IV ceftriaxone, confusion suspected secondary to metabolic encephalopathy. Patient was discharged to rehab and returned home this past Tuesday. Patient is a poor historian secondary to dementia, history was gathered from wife and aide at bedside in addition to patient. Patient states that he was sitting in his lounge chair today, stood up and then became very weak, lowered himself on the ground. Denies fall, LOC, and trauma. Wife states that they found him on the ground and was unable to stand him up so they called EMS. Upon EMS arrival, per ED note, patient was noted to be hypotensive with SBP in the 80s, which improved with IVF. Wife states patient has been very weak every since returning from rehab, patient adds he feels dizzy and lightheaded as well. Aide/wife reports that patient has been suffering from diarrhea for the last two to three days and EMT noted a blood in his stool today when they arrived.  In the ED, patient was noted to have bowel incontinence with bright bloody stool. Patient had endoscopy in the past 5-10 years, states it was normal. Never had endoscopy that he can remember. Denies any family history of cancer, has a 5 pack year smoking history, quit in 1987. Denies n/v, chest pain, SOB, abdominal pain, urinary symptoms. Admits to intermittent chronic cough since pna, productive of clear sputum.   Of note, wife is unsure of any medication changes since rehab. States that she hasn't picked up any prescriptions and  hasn't been taking any medications since discharge from rehab as they were going to go see their PCP this coming week and she didn't know if they wanted to make changes in medication. Per review of last admission H&P, unable to verify medications at that time as well with aide or wife. Will place patient back on medications that he was discharged from [facility] on during last admission.  In ED:  VS afebrile, HR 95 -> 87, /79, RR 16-18, SpO2 94-96% RA  CBC significant for WBC 14.53, Hb 11.3 (baseline ~13)  CMP significant for BUN/Cr 77/2.10 (baseline Cr ~1.8)  Procal 0.15, Lactate 2.2, FOBT+, Pro-BNP 1177  Flu/RSV negative, UA negative  Chest CT: Residual right lower lobe airspace consolidation is concerning for malignancy. Histopathological confirmation with percutaneous biopsy is recommended. Resolved adjacent airspace opacities likely due to treated pneumonia. Stable additional nodules.    CT abdomen/pelvis: Very mild acute uncomplicated sigmoid diverticulosis suggested. Follow-up colonoscopy if the appropriate clinical treatment is recommended.  CT Head negative for acute pathology. Stable mild chronic microvascular changes and tiny chronic lacunar infarct in the left cerebellum.     S/p 2L NS bolus, duonebs x1, protonix 40mg IVP x1 Patient is a 84 y/o M with PMHx of BPH, MI and CAD s/p CABG with 2 cardiac stents, COPD, Depression, GERD, Hyperlipidemia, Hypertension, Osteoporosis, Peripheral Neuropathy, and Dementia who presents to the ED c/o weakness. Patient was recently admitted to [facility] from 1/17-1/22 after fall found to have RLL pneumonia treated with IV ceftriaxone, confusion suspected secondary to metabolic encephalopathy. Patient was discharged to rehab and returned home this past Tuesday. Patient is a poor historian secondary to dementia, history was gathered from wife and aide at bedside in addition to patient. Patient states that he was sitting in his lounge chair today, stood up and then became very weak, lowered himself on the ground. Denies fall, LOC, and trauma. Wife states that they found him on the ground and was unable to stand him up so they called EMS. Upon EMS arrival, per ED note, patient was noted to be hypotensive with SBP in the 80s, which improved with IVF. Wife states patient has been very weak every since returning from rehab, patient adds he feels dizzy and lightheaded as well. Aide/wife reports that patient has been suffering from diarrhea for the last two to three days and EMT noted a blood in his stool today when they arrived.  In the ED, patient was noted to have bowel incontinence with bright bloody stool. Patient had endoscopy in the past 5-10 years, states it was normal. Never had endoscopy that he can remember. Denies any family history of cancer, has a 5 pack year smoking history, quit in 1987. Denies n/v, chest pain, SOB, abdominal pain, urinary symptoms. Admits to intermittent chronic cough since pna, productive of clear sputum.   Of note, wife is unsure of any medication changes since rehab. States that she hasn't picked up any prescriptions and  hasn't been taking any medications since discharge from rehab as they were going to go see their PCP this coming week and she didn't know if they wanted to make changes in medication. Per review of last admission H&P, unable to verify medications at that time as well with aide or wife. Will place patient back on medications that he was discharged from \[facility]\"" on during last admission.  In ED:  VS afebrile, HR 95 -> 87, /79, RR 16-18, SpO2 94-96% RA  CBC significant for WBC 14.53, Hb 11.3 (baseline ~13)  CMP significant for BUN/Cr 77/2.10 (baseline Cr ~1.8)  Procal 0.15, Lactate 2.2, FOBT+, Pro-BNP 1177  Flu/RSV negative, UA negative  Chest CT: Residual right lower lobe airspace consolidation is concerning for malignancy. Histopathological confirmation with percutaneous biopsy is recommended. Resolved adjacent airspace opacities likely due to treated pneumonia. Stable additional nodules.    CT abdomen/pelvis: Very mild acute uncomplicated sigmoid diverticulosis suggested. Follow-up colonoscopy if the appropriate clinical treatment is recommended.  CT Head negative for acute pathology. Stable mild chronic microvascular changes and tiny chronic lacunar infarct in the left cerebellum.   EKG: Sinus rhythm at 91bpm, with occasional PVCs, RBBB with no significant changes noted from previous admission EKG    S/p 2L NS bolus, duonebs x1, protonix 40mg IVP x1 Patient is a 84 y/o M with PMHx of BPH, MI and CAD s/p CABG with 2 cardiac stents, COPD, Depression, GERD, Hyperlipidemia, Hypertension, Osteoporosis, Peripheral Neuropathy, and Dementia who presents to the ED c/o weakness. Patient was recently admitted to [facility] from 1/17-1/22 after fall found to have RLL pneumonia treated with IV ceftriaxone, confusion suspected secondary to metabolic encephalopathy. Patient was discharged to rehab and returned home this past Tuesday. Patient is a poor historian secondary to dementia, history was gathered from wife and aide at bedside in addition to patient. Patient states that he was sitting in his lounge chair today, stood up and then became very weak, lowered himself on the ground. Denies fall, LOC, and trauma. Wife states that they found him on the ground and was unable to stand him up so they called EMS. Upon EMS arrival, per ED note, patient was noted to be hypotensive with SBP in the 80s, which improved with IVF. Wife states patient has been very weak every since returning from rehab, patient adds he feels dizzy and lightheaded as well. Aide/wife reports that patient has been suffering from diarrhea for the last two to three days and EMT noted blood in his stool today when they arrived.  In the ED, patient was noted to have bowel incontinence with bright bloody stool. Patient had endoscopy in the past 5-10 years, states it was normal. Never had endoscopy that he can remember. Denies any family history of cancer, has a 5 pack year smoking history, quit in 1987. Denies n/v, chest pain, SOB, abdominal pain, urinary symptoms. Admits to intermittent chronic cough since pna, productive of clear sputum.   Of note, wife is unsure of any medication changes since rehab. States that she hasn't picked up any prescriptions and  hasn't been taking any medications since discharge from rehab as they were going to go see their PCP this coming week and she didn't know if they wanted to make changes in medication. Per review of last admission H&P, unable to verify medications at that time as well with aide or wife. Will place patient back on medications that he was discharged from [facility] on during last admission.  In ED:  VS afebrile, HR 95 -> 87, /79, RR 16-18, SpO2 94-96% RA  CBC significant for WBC 14.53, Hb 11.3   CMP significant for BUN/Cr 77/2.10 (baseline Cr ~1.8)  Procal 0.15, Lactate 2.2, FOBT+, Pro-BNP 1177  Flu/RSV negative, UA negative  Chest CT: Residual right lower lobe airspace consolidation is concerning for malignancy. Histopathological confirmation with percutaneous biopsy is recommended. Resolved adjacent airspace opacities likely due to treated pneumonia. Stable additional nodules.    CT abdomen/pelvis: Very mild acute uncomplicated sigmoid diverticulosis suggested. Follow-up colonoscopy if the appropriate clinical treatment is recommended.  CT Head negative for acute pathology. Stable mild chronic microvascular changes and tiny chronic lacunar infarct in the left cerebellum.   EKG: Sinus rhythm at 91bpm, with occasional PVCs, RBBB with no significant changes noted from previous admission EKG    S/p 2L NS bolus, duonebs x1, protonix 40mg IVP x1

## 2020-02-15 NOTE — H&P ADULT - PROBLEM SELECTOR PLAN 7
- Continue home med Finasteride and Silodosin - Continue home med finasteride 5mg daily and tamsulosin 0.4mg daily as interchange for home silodosin 8mg daily

## 2020-02-15 NOTE — H&P ADULT - PROBLEM SELECTOR PLAN 5
?History of COPD, ex-smoker 5 pack year history, quit in 1987  - Wife states patient does not have COPD or lung disease  - Continue duonebs Q4H PRN for SOB/wheezing

## 2020-02-16 LAB
ANION GAP SERPL CALC-SCNC: 6 MMOL/L — SIGNIFICANT CHANGE UP (ref 5–17)
BUN SERPL-MCNC: 55 MG/DL — HIGH (ref 7–23)
CALCIUM SERPL-MCNC: 8.5 MG/DL — SIGNIFICANT CHANGE UP (ref 8.5–10.1)
CHLORIDE SERPL-SCNC: 119 MMOL/L — HIGH (ref 96–108)
CO2 SERPL-SCNC: 22 MMOL/L — SIGNIFICANT CHANGE UP (ref 22–31)
CREAT SERPL-MCNC: 1.8 MG/DL — HIGH (ref 0.5–1.3)
CULTURE RESULTS: SIGNIFICANT CHANGE UP
GLUCOSE SERPL-MCNC: 90 MG/DL — SIGNIFICANT CHANGE UP (ref 70–99)
HCT VFR BLD CALC: 26.9 % — LOW (ref 39–50)
HGB BLD-MCNC: 8.5 G/DL — LOW (ref 13–17)
MCHC RBC-ENTMCNC: 27.5 PG — SIGNIFICANT CHANGE UP (ref 27–34)
MCHC RBC-ENTMCNC: 31.6 GM/DL — LOW (ref 32–36)
MCV RBC AUTO: 87.1 FL — SIGNIFICANT CHANGE UP (ref 80–100)
NRBC # BLD: 0 /100 WBCS — SIGNIFICANT CHANGE UP (ref 0–0)
PLATELET # BLD AUTO: 146 K/UL — LOW (ref 150–400)
POTASSIUM SERPL-MCNC: 4.2 MMOL/L — SIGNIFICANT CHANGE UP (ref 3.5–5.3)
POTASSIUM SERPL-SCNC: 4.2 MMOL/L — SIGNIFICANT CHANGE UP (ref 3.5–5.3)
RBC # BLD: 3.09 M/UL — LOW (ref 4.2–5.8)
RBC # FLD: 15.6 % — HIGH (ref 10.3–14.5)
SODIUM SERPL-SCNC: 147 MMOL/L — HIGH (ref 135–145)
SPECIMEN SOURCE: SIGNIFICANT CHANGE UP
WBC # BLD: 12.54 K/UL — HIGH (ref 3.8–10.5)
WBC # FLD AUTO: 12.54 K/UL — HIGH (ref 3.8–10.5)

## 2020-02-16 PROCEDURE — 99232 SBSQ HOSP IP/OBS MODERATE 35: CPT

## 2020-02-16 RX ORDER — PANTOPRAZOLE SODIUM 20 MG/1
40 TABLET, DELAYED RELEASE ORAL EVERY 12 HOURS
Refills: 0 | Status: DISCONTINUED | OUTPATIENT
Start: 2020-02-16 | End: 2020-02-19

## 2020-02-16 RX ADMIN — ATORVASTATIN CALCIUM 80 MILLIGRAM(S): 80 TABLET, FILM COATED ORAL at 21:13

## 2020-02-16 RX ADMIN — Medication 1 GRAM(S): at 05:16

## 2020-02-16 RX ADMIN — Medication 1 GRAM(S): at 23:29

## 2020-02-16 RX ADMIN — FINASTERIDE 5 MILLIGRAM(S): 5 TABLET, FILM COATED ORAL at 13:05

## 2020-02-16 RX ADMIN — TAMSULOSIN HYDROCHLORIDE 0.4 MILLIGRAM(S): 0.4 CAPSULE ORAL at 21:13

## 2020-02-16 RX ADMIN — Medication 1 GRAM(S): at 13:05

## 2020-02-16 RX ADMIN — NYSTATIN CREAM 1 APPLICATION(S): 100000 CREAM TOPICAL at 18:27

## 2020-02-16 RX ADMIN — PANTOPRAZOLE SODIUM 40 MILLIGRAM(S): 20 TABLET, DELAYED RELEASE ORAL at 18:27

## 2020-02-16 RX ADMIN — NYSTATIN CREAM 1 APPLICATION(S): 100000 CREAM TOPICAL at 05:16

## 2020-02-16 RX ADMIN — Medication 1 GRAM(S): at 18:28

## 2020-02-16 RX ADMIN — DONEPEZIL HYDROCHLORIDE 10 MILLIGRAM(S): 10 TABLET, FILM COATED ORAL at 21:13

## 2020-02-16 NOTE — PROGRESS NOTE ADULT - SUBJECTIVE AND OBJECTIVE BOX
Patient is a 85y old  Male who presents with a chief complaint of GI bleed, YOU (15 Feb 2020 18:37)      INTERVAL HPI/OVERNIGHT EVENTS: Patient is a 86 y/o M with PMHx of BPH, MI and CAD s/p CABG with 2 cardiac stents, COPD, Depression, GERD, Hyperlipidemia, Hypertension, Osteoporosis, Peripheral Neuropathy, and Dementia who presents to the ED c/o weakness admitted for GI bleed and YOU on CKD. pt feels better , no GI bleed today.       MEDICATIONS  (STANDING):  amLODIPine   Tablet 5 milliGRAM(s) Oral daily  atorvastatin 80 milliGRAM(s) Oral at bedtime  donepezil 10 milliGRAM(s) Oral at bedtime  finasteride 5 milliGRAM(s) Oral daily  metoprolol succinate ER 25 milliGRAM(s) Oral daily  nystatin Powder 1 Application(s) Topical two times a day  pantoprazole  Injectable 40 milliGRAM(s) IV Push every 12 hours  sodium chloride 0.45%. 1000 milliLiter(s) (75 mL/Hr) IV Continuous <Continuous>  sucralfate suspension 1 Gram(s) Oral every 6 hours  tamsulosin 0.4 milliGRAM(s) Oral at bedtime    MEDICATIONS  (PRN):  albuterol/ipratropium for Nebulization 3 milliLiter(s) Nebulizer every 4 hours PRN Shortness of Breath and/or Wheezing      Allergies    amoxicillin (Unknown)  Levaquin (Unknown)  penicillin (Unknown)    Intolerances        REVIEW OF SYSTEMS:  CONSTITUTIONAL: No fever, weight loss, or fatigue  EYES: No eye pain, visual disturbances, or discharge  ENMT:  No difficulty hearing, tinnitus, vertigo; No sinus or throat pain  NECK: No pain or stiffness  BREASTS: No pain, masses, or nipple discharge  RESPIRATORY: No cough, wheezing, chills or hemoptysis; No shortness of breath  CARDIOVASCULAR: No chest pain, palpitations, dizziness, or leg swelling  GASTROINTESTINAL: No abdominal or epigastric pain. No nausea, vomiting, or hematemesis; No diarrhea or constipation. No melena or hematochezia.  GENITOURINARY: No dysuria, frequency, hematuria, or incontinence  NEUROLOGICAL: No headaches, memory loss, loss of strength, numbness, or tremors  SKIN: No itching, burning, rashes, or lesions   LYMPH NODES: No enlarged glands  ENDOCRINE: No heat or cold intolerance; No hair loss; No polydipsia or polyuria  MUSCULOSKELETAL: No joint pain or swelling; No muscle, back, or extremity pain  PSYCHIATRIC: No depression, anxiety, mood swings, or difficulty sleeping  HEME/LYMPH: No easy bruising, or bleeding gums  ALLERGY AND IMMUNOLOGIC: No hives or eczema    Vital Signs Last 24 Hrs  T(C): 36.3 (2020 08:10), Max: 36.7 (15 Feb 2020 18:41)  T(F): 97.3 (2020 08:10), Max: 98 (15 Feb 2020 18:41)  HR: 69 (2020 08:10) (69 - 87)  BP: 114/70 (2020 08:10) (103/65 - 114/70)  BP(mean): --  RR: 16 (2020 08:10) (16 - 19)  SpO2: 98% (2020 08:10) (95% - 99%)    PHYSICAL EXAM:  GENERAL: NAD, well-groomed, well-developed  HEAD:  Atraumatic, Normocephalic  EYES: EOMI, PERRLA, conjunctiva and sclera clear  ENMT: No tonsillar erythema, exudates, or enlargement; Moist mucous membranes, Good dentition, No lesions  NECK: Supple, No JVD, Normal thyroid  NERVOUS SYSTEM:  Alert & Oriented X3, Good concentration; Motor Strength 5/5 B/L upper and lower extremities; DTRs 2+ intact and symmetric  CHEST/LUNG: Clear to auscultation bilaterally; No rales, rhonchi, wheezing, or rubs  HEART: Regular rate and rhythm; No murmurs, rubs, or gallops  ABDOMEN: Soft, Nontender, Nondistended; Bowel sounds present  EXTREMITIES:  2+ Peripheral Pulses, No clubbing, cyanosis, or edema  LYMPH: No lymphadenopathy noted  SKIN: No rashes or lesions    LABS:                        8.5    12.54 )-----------( 146      ( 2020 06:52 )             26.9     2020 06:52    147    |  119    |  55     ----------------------------<  90     4.2     |  22     |  1.80     Ca    8.5        2020 06:52      PT/INR - ( 2020 21:54 )   PT: 12.2 sec;   INR: 1.09 ratio         PTT - ( 2020 21:54 )  PTT:25.9 sec  Urinalysis Basic - ( 15 Feb 2020 01:34 )    Color: Yellow / Appearance: Clear / S.010 / pH: x  Gluc: x / Ketone: Negative  / Bili: Negative / Urobili: Negative   Blood: x / Protein: Negative / Nitrite: Negative   Leuk Esterase: Negative / RBC: x / WBC x   Sq Epi: x / Non Sq Epi: x / Bacteria: x      CAPILLARY BLOOD GLUCOSE          RADIOLOGY & ADDITIONAL TESTS:    Imaging Personally Reviewed:  [x ] YES  [ ] NO    Consultant(s) Notes Reviewed:  [x ] YES  [ ] NO    Care Discussed with Consultants/Other Providers [x ] YES  [ ] NO

## 2020-02-16 NOTE — PROGRESS NOTE ADULT - PROBLEM SELECTOR PLAN 3
Chronic, hx of MI s/p CABG stents x2  - Continue therapeutic interchange atorvastatin 80mg daily for home rosuvastatin 40mg daily  - Holding home baby ASA 81mg in setting of GIB

## 2020-02-16 NOTE — PROGRESS NOTE ADULT - ASSESSMENT
ugib vs gib    plan    check cbc  ppi bid  bleeding scan in am  may need  upper gastrointestinal endoscopy to be done  adv diet to clears

## 2020-02-16 NOTE — PROGRESS NOTE ADULT - PROBLEM SELECTOR PLAN 7
- Continue home med finasteride 5mg daily and tamsulosin 0.4mg daily as interchange for home silodosin 8mg daily

## 2020-02-16 NOTE — PROGRESS NOTE ADULT - PROBLEM SELECTOR PLAN 4
Chronic  - Continue home norvasc 5mg daily, with hold parameters  - Continue home Toprol XL 25mg daily, with hold parameters  - Monitor routine hemodynamics

## 2020-02-16 NOTE — PROGRESS NOTE ADULT - PROBLEM SELECTOR PLAN 2
YOU on CKD Stage 3, baseline Cr ~1.8, on admission Cr 2.2  - Likely prerenal azotemia from acute blood loss causing hypoperfusion  - S/p 2L NS bolus in the ED  - Will continue with IVF 75cc/hr  - F/u CMP and trend Cr  - Avoid nephrotoxic agents where feasible

## 2020-02-16 NOTE — PROGRESS NOTE ADULT - ASSESSMENT
Patient is a 86 y/o M with PMHx of BPH, MI and CAD s/p CABG with 2 cardiac stents, COPD, Depression, GERD, Hyperlipidemia, Hypertension, Osteoporosis, Peripheral Neuropathy, and Dementia who presents to the ED c/o weakness admitted for GI bleed and YOU on CKD.

## 2020-02-16 NOTE — PROGRESS NOTE ADULT - SUBJECTIVE AND OBJECTIVE BOX
INTERVAL HPI/OVERNIGHT EVENTS:  No new overnight event.  No N/V/D.  Tolerating diet.  no more bleeding     Allergies    amoxicillin (Unknown)  Levaquin (Unknown)  penicillin (Unknown)    Intolerances    General:  No wt loss, fevers, chills, night sweats, fatigue,   Eyes:  Good vision, no reported pain  ENT:  No sore throat, pain, runny nose, dysphagia  CV:  No pain, palpitations, hypo/hypertension  Resp:  No dyspnea, cough, tachypnea, wheezing  GI:  No pain, No nausea, No vomiting, No diarrhea, No constipation, No weight loss, No fever, No pruritis, No rectal bleeding, No tarry stools, No dysphagia,  :  No pain, bleeding, incontinence, nocturia  Muscle:  No pain, weakness  Neuro:  No weakness, tingling, memory problems  Psych:  No fatigue, insomnia, mood problems, depression  Endocrine:  No polyuria, polydipsia, cold/heat intolerance  Heme:  No petechiae, ecchymosis, easy bruisability  Skin:  No rash, tattoos, scars, edema      PHYSICAL EXAM:   Vital Signs:  Vital Signs Last 24 Hrs  T(C): 36.3 (2020 08:10), Max: 36.7 (15 Feb 2020 18:41)  T(F): 97.3 (2020 08:10), Max: 98 (15 Feb 2020 18:41)  HR: 69 (2020 08:10) (69 - 87)  BP: 114/70 (2020 08:10) (103/65 - 114/70)  BP(mean): --  RR: 16 (2020 08:10) (16 - 19)  SpO2: 98% (2020 08:10) (95% - 98%)  Daily     Daily Weight in k.9 (2020 04:40)I&O's Summary    15 Feb 2020 07:01  -  2020 07:00  --------------------------------------------------------  IN: 1800 mL / OUT: 150 mL / NET: 1650 mL        GENERAL:  Appears stated age, well-groomed, well-nourished, no distress  HEENT:  NC/AT,  conjunctivae clear and pink, no thyromegaly, nodules, adenopathy, no JVD, sclera -anicteric  CHEST:  Full & symmetric excursion, no increased effort, breath sounds clear  HEART:  Regular rhythm, S1, S2, no murmur/rub/S3/S4, no abdominal bruit, no edema  ABDOMEN:  Soft, non-tender, non-distended, normoactive bowel sounds,  no masses ,no hepato-splenomegaly, no signs of chronic liver disease  EXTEREMITIES:  no cyanosis,clubbing or edema  SKIN:  No rash/erythema/ecchymoses/petechiae/wounds/abscess/warm/dry  NEURO:  Alert, oriented, no asterixis, no tremor, no encephalopathy      LABS:                        8.5    12.54 )-----------( 146      ( 2020 06:52 )             26.9     02-16    147<H>  |  119<H>  |  55<H>  ----------------------------<  90  4.2   |  22  |  1.80<H>    Ca    8.5      2020 06:52    TPro  6.1  /  Alb  2.1<L>  /  TBili  0.3  /  DBili  x   /  AST  15  /  ALT  18  /  AlkPhos  81  02-14    PT/INR - ( 2020 21:54 )   PT: 12.2 sec;   INR: 1.09 ratio         PTT - ( 2020 21:54 )  PTT:25.9 sec  Urinalysis Basic - ( 15 Feb 2020 01:34 )    Color: Yellow / Appearance: Clear / S.010 / pH: x  Gluc: x / Ketone: Negative  / Bili: Negative / Urobili: Negative   Blood: x / Protein: Negative / Nitrite: Negative   Leuk Esterase: Negative / RBC: x / WBC x   Sq Epi: x / Non Sq Epi: x / Bacteria: x      amylase   lipaseLipase, Serum: 351 U/L ( @ 21:54)    RADIOLOGY & ADDITIONAL TESTS:

## 2020-02-16 NOTE — PROGRESS NOTE ADULT - PROBLEM SELECTOR PLAN 1
GI bleed, suspected to be lower GI in origin, possibly 2/2 diverticulosis vs hemorrhoids vs malignancy  - Admit to F  - Patient with large bloody bowel movement in the ER and gross raymond blood on rectal exam  - Likely lower GI bleed given bright red blood, denies any hx of hemorrhoids. Last colonoscopy was >5 years ago, normal per patient  - Hemodynamically stable at this time, H/H stable but could be lower as patient appears dry-?hemoconcentration- with decreased PO intake over the past few days, s/p PRBC transfusion  - Type and screen completed on admission  - Will continue to monitor H/H  - Protonix 40mg IVP BID  - F/u lactate, likely elevated in setting of renal insufficiency and GIB, f/u repeat lactate  - Gi, Dr. Ervin, consulted, f/u recs  GI bleed scan pending

## 2020-02-17 ENCOUNTER — TRANSCRIPTION ENCOUNTER (OUTPATIENT)
Age: 85
End: 2020-02-17

## 2020-02-17 LAB
ANION GAP SERPL CALC-SCNC: 8 MMOL/L — SIGNIFICANT CHANGE UP (ref 5–17)
BUN SERPL-MCNC: 46 MG/DL — HIGH (ref 7–23)
CALCIUM SERPL-MCNC: 8.4 MG/DL — LOW (ref 8.5–10.1)
CHLORIDE SERPL-SCNC: 116 MMOL/L — HIGH (ref 96–108)
CO2 SERPL-SCNC: 22 MMOL/L — SIGNIFICANT CHANGE UP (ref 22–31)
CREAT SERPL-MCNC: 1.6 MG/DL — HIGH (ref 0.5–1.3)
GLUCOSE SERPL-MCNC: 108 MG/DL — HIGH (ref 70–99)
HCT VFR BLD CALC: 23.3 % — LOW (ref 39–50)
HCT VFR BLD CALC: 24.1 % — LOW (ref 39–50)
HGB BLD-MCNC: 7.4 G/DL — LOW (ref 13–17)
HGB BLD-MCNC: 7.7 G/DL — LOW (ref 13–17)
MCHC RBC-ENTMCNC: 27.9 PG — SIGNIFICANT CHANGE UP (ref 27–34)
MCHC RBC-ENTMCNC: 32 GM/DL — SIGNIFICANT CHANGE UP (ref 32–36)
MCV RBC AUTO: 87.3 FL — SIGNIFICANT CHANGE UP (ref 80–100)
NRBC # BLD: 0 /100 WBCS — SIGNIFICANT CHANGE UP (ref 0–0)
PLATELET # BLD AUTO: 139 K/UL — LOW (ref 150–400)
POTASSIUM SERPL-MCNC: 3.9 MMOL/L — SIGNIFICANT CHANGE UP (ref 3.5–5.3)
POTASSIUM SERPL-SCNC: 3.9 MMOL/L — SIGNIFICANT CHANGE UP (ref 3.5–5.3)
RBC # BLD: 2.76 M/UL — LOW (ref 4.2–5.8)
RBC # FLD: 15.8 % — HIGH (ref 10.3–14.5)
SODIUM SERPL-SCNC: 146 MMOL/L — HIGH (ref 135–145)
WBC # BLD: 13.43 K/UL — HIGH (ref 3.8–10.5)
WBC # FLD AUTO: 13.43 K/UL — HIGH (ref 3.8–10.5)

## 2020-02-17 PROCEDURE — 99233 SBSQ HOSP IP/OBS HIGH 50: CPT

## 2020-02-17 PROCEDURE — 78278 ACUTE GI BLOOD LOSS IMAGING: CPT | Mod: 26

## 2020-02-17 RX ORDER — LANOLIN ALCOHOL/MO/W.PET/CERES
5 CREAM (GRAM) TOPICAL AT BEDTIME
Refills: 0 | Status: DISCONTINUED | OUTPATIENT
Start: 2020-02-17 | End: 2020-02-19

## 2020-02-17 RX ADMIN — Medication 5 MILLIGRAM(S): at 23:31

## 2020-02-17 RX ADMIN — FINASTERIDE 5 MILLIGRAM(S): 5 TABLET, FILM COATED ORAL at 13:53

## 2020-02-17 RX ADMIN — PANTOPRAZOLE SODIUM 40 MILLIGRAM(S): 20 TABLET, DELAYED RELEASE ORAL at 18:33

## 2020-02-17 RX ADMIN — NYSTATIN CREAM 1 APPLICATION(S): 100000 CREAM TOPICAL at 05:41

## 2020-02-17 RX ADMIN — Medication 1 GRAM(S): at 18:33

## 2020-02-17 RX ADMIN — Medication 25 MILLIGRAM(S): at 05:40

## 2020-02-17 RX ADMIN — Medication 1 GRAM(S): at 13:53

## 2020-02-17 RX ADMIN — TAMSULOSIN HYDROCHLORIDE 0.4 MILLIGRAM(S): 0.4 CAPSULE ORAL at 23:30

## 2020-02-17 RX ADMIN — PANTOPRAZOLE SODIUM 40 MILLIGRAM(S): 20 TABLET, DELAYED RELEASE ORAL at 05:41

## 2020-02-17 RX ADMIN — ATORVASTATIN CALCIUM 80 MILLIGRAM(S): 80 TABLET, FILM COATED ORAL at 23:30

## 2020-02-17 RX ADMIN — NYSTATIN CREAM 1 APPLICATION(S): 100000 CREAM TOPICAL at 18:33

## 2020-02-17 RX ADMIN — Medication 1 GRAM(S): at 05:41

## 2020-02-17 RX ADMIN — AMLODIPINE BESYLATE 5 MILLIGRAM(S): 2.5 TABLET ORAL at 05:40

## 2020-02-17 RX ADMIN — Medication 5 MILLIGRAM(S): at 00:25

## 2020-02-17 RX ADMIN — SODIUM CHLORIDE 75 MILLILITER(S): 9 INJECTION, SOLUTION INTRAVENOUS at 06:18

## 2020-02-17 RX ADMIN — DONEPEZIL HYDROCHLORIDE 10 MILLIGRAM(S): 10 TABLET, FILM COATED ORAL at 23:30

## 2020-02-17 RX ADMIN — Medication 1 GRAM(S): at 23:31

## 2020-02-17 NOTE — PROGRESS NOTE ADULT - SUBJECTIVE AND OBJECTIVE BOX
Patient is a 85y old  Male who presents with a chief complaint of GI bleed, YOU (17 Feb 2020 08:29)      INTERVAL HPI/OVERNIGHT EVENTS: Patient is a 86 y/o M with PMHx of BPH, MI and CAD s/p CABG with 2 cardiac stents, COPD, Depression, GERD, Hyperlipidemia, Hypertension, Osteoporosis, Peripheral Neuropathy, and Dementia who presents to the ED c/o weakness admitted for GI bleed and YOU on CKD.    MEDICATIONS  (STANDING):  amLODIPine   Tablet 5 milliGRAM(s) Oral daily  atorvastatin 80 milliGRAM(s) Oral at bedtime  donepezil 10 milliGRAM(s) Oral at bedtime  finasteride 5 milliGRAM(s) Oral daily  metoprolol succinate ER 25 milliGRAM(s) Oral daily  nystatin Powder 1 Application(s) Topical two times a day  pantoprazole  Injectable 40 milliGRAM(s) IV Push every 12 hours  sodium chloride 0.45%. 1000 milliLiter(s) (75 mL/Hr) IV Continuous <Continuous>  sucralfate suspension 1 Gram(s) Oral every 6 hours  tamsulosin 0.4 milliGRAM(s) Oral at bedtime    MEDICATIONS  (PRN):  albuterol/ipratropium for Nebulization 3 milliLiter(s) Nebulizer every 4 hours PRN Shortness of Breath and/or Wheezing  melatonin 5 milliGRAM(s) Oral at bedtime PRN Insomnia      Allergies    amoxicillin (Unknown)  Levaquin (Unknown)  penicillin (Unknown)    Intolerances        REVIEW OF SYSTEMS:  CONSTITUTIONAL: No fever, weight loss, or fatigue  EYES: No eye pain, visual disturbances, or discharge  ENMT:  No difficulty hearing, tinnitus, vertigo; No sinus or throat pain  NECK: No pain or stiffness  BREASTS: No pain, masses, or nipple discharge  RESPIRATORY: No cough, wheezing, chills or hemoptysis; No shortness of breath  CARDIOVASCULAR: No chest pain, palpitations, dizziness, or leg swelling  GASTROINTESTINAL: No abdominal or epigastric pain. No nausea, vomiting, or hematemesis; No diarrhea or constipation. No melena or hematochezia.  GENITOURINARY: No dysuria, frequency, hematuria, or incontinence  NEUROLOGICAL: No headaches, memory loss, loss of strength, numbness, or tremors  SKIN: No itching, burning, rashes, or lesions   LYMPH NODES: No enlarged glands  ENDOCRINE: No heat or cold intolerance; No hair loss; No polydipsia or polyuria  MUSCULOSKELETAL: No joint pain or swelling; No muscle, back, or extremity pain  PSYCHIATRIC: No depression, anxiety, mood swings, or difficulty sleeping  HEME/LYMPH: No easy bruising, or bleeding gums  ALLERGY AND IMMUNOLOGIC: No hives or eczema    Vital Signs Last 24 Hrs  T(C): 36.4 (17 Feb 2020 07:36), Max: 36.9 (16 Feb 2020 16:42)  T(F): 97.6 (17 Feb 2020 07:36), Max: 98.4 (16 Feb 2020 16:42)  HR: 88 (17 Feb 2020 07:36) (73 - 88)  BP: 108/62 (17 Feb 2020 07:36) (108/62 - 125/78)  BP(mean): --  RR: 16 (17 Feb 2020 07:36) (16 - 17)  SpO2: 93% (17 Feb 2020 07:36) (93% - 100%)    PHYSICAL EXAM:  GENERAL: NAD, well-groomed, well-developed  HEAD:  Atraumatic, Normocephalic  EYES: EOMI, PERRLA, conjunctiva and sclera clear  ENMT: No tonsillar erythema, exudates, or enlargement; Moist mucous membranes, Good dentition, No lesions  NECK: Supple, No JVD, Normal thyroid  NERVOUS SYSTEM:  Alert & Oriented X3, Good concentration; Motor Strength 5/5 B/L upper and lower extremities; DTRs 2+ intact and symmetric  CHEST/LUNG: Clear to auscultation bilaterally; No rales, rhonchi, wheezing, or rubs  HEART: Regular rate and rhythm; No murmurs, rubs, or gallops  ABDOMEN: Soft, Nontender, Nondistended; Bowel sounds present  EXTREMITIES:  2+ Peripheral Pulses, No clubbing, cyanosis, or edema  LYMPH: No lymphadenopathy noted  SKIN: No rashes or lesions    LABS:                        7.7    13.43 )-----------( 139      ( 17 Feb 2020 07:09 )             24.1     17 Feb 2020 07:09    146    |  116    |  46     ----------------------------<  108    3.9     |  22     |  1.60     Ca    8.4        17 Feb 2020 07:09          CAPILLARY BLOOD GLUCOSE          RADIOLOGY & ADDITIONAL TESTS:    Imaging Personally Reviewed:  [ ] YES  [ ] NO    Consultant(s) Notes Reviewed:  [ ] YES  [ ] NO    Care Discussed with Consultants/Other Providers [ ] YES  [ ] NO

## 2020-02-17 NOTE — DISCHARGE NOTE PROVIDER - HOSPITAL COURSE
Patient is a 86 y/o M with PMHx of BPH, MI and CAD s/p CABG with 2 cardiac stents, COPD, Depression, GERD, Hyperlipidemia, Hypertension, Osteoporosis, Peripheral Neuropathy, and Dementia who presents to the ED c/o weakness. Patient was recently admitted to [facility] from 1/17-1/22 after fall found to have RLL pneumonia treated with IV ceftriaxone, confusion suspected secondary to metabolic encephalopathy. Patient was discharged to rehab and returned home this past Tuesday. Patient is a poor historian secondary to dementia, history was gathered from wife and aide at bedside in addition to patient. Patient states that he was sitting in his lounge chair today, stood up and then became very weak, lowered himself on the ground. Denies fall, LOC, and trauma. Wife states that they found him on the ground and was unable to stand him up so they called EMS. Upon EMS arrival, per ED note, patient was noted to be hypotensive with SBP in the 80s, which improved with IVF. Wife states patient has been very weak every since returning from rehab, patient adds he feels dizzy and lightheaded as well. Aide/wife reports that patient has been suffering from diarrhea for the last two to three days and EMT noted blood in his stool today when they arrived.  In the ED, patient was noted to have bowel incontinence with bright bloody stool. Patient had endoscopy in the past 5-10 years, states it was normal. Never had endoscopy that he can remember. Denies any family history of cancer, has a 5 pack year smoking history, quit in 1987. Denies n/v, chest pain, SOB, abdominal pain, urinary symptoms. Admits to intermittent chronic cough since pna, productive of clear sputum.     Of note, wife is unsure of any medication changes since rehab. States that she hasn't picked up any prescriptions and  hasn't been taking any medications since discharge from rehab as they were going to go see their PCP this coming week and she didn't know if they wanted to make changes in medication. Per review of last admission H&P, unable to verify medications at that time as well with aide or wife. Will place patient back on medications that he was discharged from [facility] on during last admission.    In ED:    VS afebrile, HR 95 -> 87, /79, RR 16-18, SpO2 94-96% RA    CBC significant for WBC 14.53, Hb 11.3     CMP significant for BUN/Cr 77/2.10 (baseline Cr ~1.8)    Procal 0.15, Lactate 2.2, FOBT+, Pro-BNP 1177    Flu/RSV negative, UA negative    Chest CT: Residual right lower lobe airspace consolidation is concerning for malignancy. Histopathological confirmation with percutaneous biopsy is recommended. Resolved adjacent airspace opacities likely due to treated pneumonia. Stable additional nodules.        CT abdomen/pelvis: Very mild acute uncomplicated sigmoid diverticulosis suggested. Follow-up colonoscopy if the appropriate clinical treatment is recommended.    CT Head negative for acute pathology. Stable mild chronic microvascular changes and tiny chronic lacunar infarct in the left cerebellum.     EKG: Sinus rhythm at 91bpm, with occasional PVCs, RBBB with no significant changes noted from previous admission EKG        GI bleed 2/2 upper small bowel, seen as active bleeding in bleeding scan, transfused 2 units PRBC, hemodynamically stable , need to be transferred to [facility] for procedure by GI team. Patient is a 86 y/o M with PMHx of BPH, MI and CAD s/p CABG with 2 cardiac stents, COPD, Depression, GERD, Hyperlipidemia, Hypertension, Osteoporosis, Peripheral Neuropathy, and Dementia who presents to the ED c/o weakness. Patient was recently admitted to [facility] from 1/17-1/22 after fall found to have RLL pneumonia treated with IV ceftriaxone, confusion suspected secondary to metabolic encephalopathy. Patient was discharged to rehab and returned home this past Tuesday. Patient is a poor historian secondary to dementia, history was gathered from wife and aide at bedside in addition to patient. Patient states that he was sitting in his lounge chair today, stood up and then became very weak, lowered himself on the ground. Denies fall, LOC, and trauma. Wife states that they found him on the ground and was unable to stand him up so they called EMS. Upon EMS arrival, per ED note, patient was noted to be hypotensive with SBP in the 80s, which improved with IVF. Wife states patient has been very weak every since returning from rehab, patient adds he feels dizzy and lightheaded as well. Aide/wife reports that patient has been suffering from diarrhea for the last two to three days and EMT noted blood in his stool today when they arrived.  In the ED, patient was noted to have bowel incontinence with bright bloody stool. Patient had endoscopy in the past 5-10 years, states it was normal. Never had endoscopy that he can remember. Denies any family history of cancer, has a 5 pack year smoking history, quit in 1987. Denies n/v, chest pain, SOB, abdominal pain, urinary symptoms. Admits to intermittent chronic cough since pna, productive of clear sputum.     Of note, wife is unsure of any medication changes since rehab. States that she hasn't picked up any prescriptions and  hasn't been taking any medications since discharge from rehab as they were going to go see their PCP this coming week and she didn't know if they wanted to make changes in medication. Per review of last admission H&P, unable to verify medications at that time as well with aide or wife. Will place patient back on medications that he was discharged from [facility] on during last admission.    In ED:    VS afebrile, HR 95 -> 87, /79, RR 16-18, SpO2 94-96% RA    CBC significant for WBC 14.53, Hb 11.3     CMP significant for BUN/Cr 77/2.10 (baseline Cr ~1.8)    Procal 0.15, Lactate 2.2, FOBT+, Pro-BNP 1177    Flu/RSV negative, UA negative    Chest CT: Residual right lower lobe airspace consolidation is concerning for malignancy. Histopathological confirmation with percutaneous biopsy is recommended. Resolved adjacent airspace opacities likely due to treated pneumonia. Stable additional nodules.        CT abdomen/pelvis: Very mild acute uncomplicated sigmoid diverticulosis suggested. Follow-up colonoscopy if the appropriate clinical treatment is recommended.    CT Head negative for acute pathology. Stable mild chronic microvascular changes and tiny chronic lacunar infarct in the left cerebellum.     EKG: Sinus rhythm at 91bpm, with occasional PVCs, RBBB with no significant changes noted from previous admission EKG        GI bleed 2/2 upper small bowel, seen as active bleeding in bleeding scan, transfused 2 units PRBC, hemodynamically stable , need to be transferred to [facility] for procedure by GI team, small bowel enteroscopy for further evaluation of bleed.  Patient to be transferred to [facility].  Patient medically optimized to be transferred to accepting facility. Patient is a 84 y/o M with PMHx of BPH, MI and CAD s/p CABG with 2 cardiac stents, COPD, Depression, GERD, Hyperlipidemia, Hypertension, Osteoporosis, Peripheral Neuropathy, and Dementia who presents to the ED c/o weakness. Patient was recently admitted to [facility] from 1/17-1/22 after fall found to have RLL pneumonia treated with IV ceftriaxone, confusion suspected secondary to metabolic encephalopathy. Patient was discharged to rehab and returned home this past Tuesday. Patient is a poor historian secondary to dementia, history was gathered from wife and aide at bedside in addition to patient. Patient states that he was sitting in his lounge chair today, stood up and then became very weak, lowered himself on the ground. Denies fall, LOC, and trauma. Wife states that they found him on the ground and was unable to stand him up so they called EMS. Upon EMS arrival, per ED note, patient was noted to be hypotensive with SBP in the 80s, which improved with IVF. Wife states patient has been very weak every since returning from rehab, patient adds he feels dizzy and lightheaded as well. Aide/wife reports that patient has been suffering from diarrhea for the last two to three days and EMT noted blood in his stool today when they arrived.  In the ED, patient was noted to have bowel incontinence with bright bloody stool. Patient had endoscopy in the past 5-10 years, states it was normal. Never had endoscopy that he can remember. Denies any family history of cancer, has a 5 pack year smoking history, quit in 1987. Denies n/v, chest pain, SOB, abdominal pain, urinary symptoms. Admits to intermittent chronic cough since pna, productive of clear sputum.     Of note, wife is unsure of any medication changes since rehab. States that she hasn't picked up any prescriptions and  hasn't been taking any medications since discharge from rehab as they were going to go see their PCP this coming week and she didn't know if they wanted to make changes in medication. Per review of last admission H&P, unable to verify medications at that time as well with aide or wife. Will place patient back on medications that he was discharged from [facility] on during last admission.    In ED:    VS afebrile, HR 95 -> 87, /79, RR 16-18, SpO2 94-96% RA    CBC significant for WBC 14.53, Hb 11.3     CMP significant for BUN/Cr 77/2.10 (baseline Cr ~1.8)    Procal 0.15, Lactate 2.2, FOBT+, Pro-BNP 1177    Flu/RSV negative, UA negative    Chest CT: Residual right lower lobe airspace consolidation is concerning for malignancy. Histopathological confirmation with percutaneous biopsy is recommended. Resolved adjacent airspace opacities likely due to treated pneumonia. Stable additional nodules.        CT abdomen/pelvis: Very mild acute uncomplicated sigmoid diverticulosis suggested. Follow-up colonoscopy if the appropriate clinical treatment is recommended.    CT Head negative for acute pathology. Stable mild chronic microvascular changes and tiny chronic lacunar infarct in the left cerebellum.     EKG: Sinus rhythm at 91bpm, with occasional PVCs, RBBB with no significant changes noted from previous admission EKG        GI bleed 2/2 upper small bowel, seen as active bleeding in bleeding scan, transfused 2 units PRBC, hemodynamically stable , need to be transferred to [facility] for procedure by GI team, small bowel enteroscopy for further evaluation of bleed.  Patient to be transferred to [facility].  Patient medically optimized to be transferred to accepting facility.        Vital Signs Last 24 Hrs    T(C): 36.2 (19 Feb 2020 15:38), Max: 36.7 (19 Feb 2020 07:25)    T(F): 97.2 (19 Feb 2020 15:38), Max: 98 (19 Feb 2020 07:25)    HR: 64 (19 Feb 2020 15:38) (64 - 66)    BP: 104/66 (19 Feb 2020 15:38) (103/66 - 123/70)    BP(mean): --    RR: 18 (19 Feb 2020 15:38) (18 - 18)    SpO2: 96% (19 Feb 2020 15:38) (96% - 98%)

## 2020-02-17 NOTE — PROGRESS NOTE ADULT - PROBLEM SELECTOR PLAN 1
GI bleed, suspected to be lower GI in origin, possibly 2/2 diverticulosis vs hemorrhoids vs malignancy  - Admit to [facility]  - Patient with large bloody bowel movement in the ER and gross raymond blood on rectal exam  - Likely lower GI bleed given bright red blood, denies any hx of hemorrhoids. Last colonoscopy was >5 years ago, normal per patient  - Hemodynamically stable at this time, H/H stable but could be lower as patient appears dry-?hemoconcentration- with decreased PO intake over the past few days, s/p PRBC transfusion  - Type and screen completed on admission  - Will continue to monitor H/H  - Protonix 40mg IVP BID  - F/u lactate, likely elevated in setting of renal insufficiency and GIB, f/u repeat lactate  - Gi, Dr. Ervin, consulted, f/u recs  GI bleeding scan shows active bleeding from upper small bowel.   he needs to be transfer to [place]. to be admitted under Dr Isrrael Howell.

## 2020-02-17 NOTE — DISCHARGE NOTE PROVIDER - NSDCCPCAREPLAN_GEN_ALL_CORE_FT
PRINCIPAL DISCHARGE DIAGNOSIS  Diagnosis: Rectal bleeding  Assessment and Plan of Treatment: 2/2 small bowel bleeding , transfer to [facility]      SECONDARY DISCHARGE DIAGNOSES  Diagnosis: Acute renal insufficiency  Assessment and Plan of Treatment: PRINCIPAL DISCHARGE DIAGNOSIS  Diagnosis: Rectal bleeding  Assessment and Plan of Treatment: 2/2 small bowel bleeding , transfer to [facility] for small bowel enteroscopy  -continue with protonix 40mg IV BID  -hold aspirin indefinitely  -transfuse for H <7.5      SECONDARY DISCHARGE DIAGNOSES  Diagnosis: Acute renal insufficiency  Assessment and Plan of Treatment: -YOU on CKD stage 3  -baseline Cr around 1.8  -likely prerenal zotemia from acute blood loss anemia    Diagnosis: HTN (hypertension)  Assessment and Plan of Treatment: -hold norvasc for now given GIB  -continue home toprol XL 25mg daily    Diagnosis: CAD (Coronary Artery Disease)  Assessment and Plan of Treatment: -continue atorvastatin 80mg QD (home rosuvastatin 40mg QD)  -hold home asa 81mg in setting of GIB

## 2020-02-17 NOTE — PROGRESS NOTE ADULT - SUBJECTIVE AND OBJECTIVE BOX
INTERVAL HPI/OVERNIGHT EVENTS:  pt seen and examined  denies n/v/abd pain  per rn no s/s active gib  labs noted    MEDICATIONS  (STANDING):  amLODIPine   Tablet 5 milliGRAM(s) Oral daily  atorvastatin 80 milliGRAM(s) Oral at bedtime  donepezil 10 milliGRAM(s) Oral at bedtime  finasteride 5 milliGRAM(s) Oral daily  metoprolol succinate ER 25 milliGRAM(s) Oral daily  nystatin Powder 1 Application(s) Topical two times a day  pantoprazole  Injectable 40 milliGRAM(s) IV Push every 12 hours  sodium chloride 0.45%. 1000 milliLiter(s) (75 mL/Hr) IV Continuous <Continuous>  sucralfate suspension 1 Gram(s) Oral every 6 hours  tamsulosin 0.4 milliGRAM(s) Oral at bedtime    MEDICATIONS  (PRN):  albuterol/ipratropium for Nebulization 3 milliLiter(s) Nebulizer every 4 hours PRN Shortness of Breath and/or Wheezing  melatonin 5 milliGRAM(s) Oral at bedtime PRN Insomnia      Allergies    amoxicillin (Unknown)  Levaquin (Unknown)  penicillin (Unknown)    Intolerances        Review of Systems:    General:  No wt loss, fevers, chills, night sweats, fatigue   Eyes:  Good vision, no reported pain  ENT:  No sore throat, pain, runny nose, dysphagia  CV:  No pain, palpitations, hypo/hypertension  Resp:  No dyspnea, cough, tachypnea, wheezing  GI:  No pain, No nausea, No vomiting, No diarrhea, No constipation, No weight loss, No fever, No pruritis, No rectal bleeding, No melena, No dysphagia  :  No pain, bleeding, incontinence, nocturia  Muscle:  No pain, weakness  Neuro:  No weakness, tingling, memory problems  Psych:  No fatigue, insomnia, mood problems, depression  Endocrine:  No polyuria, polydypsia, cold/heat intolerance  Heme:  No petechiae, ecchymosis, easy bruisability  Skin:  No rash, tattoos, scars, edema      Vital Signs Last 24 Hrs  T(C): 36.4 (17 Feb 2020 07:36), Max: 36.9 (16 Feb 2020 16:42)  T(F): 97.6 (17 Feb 2020 07:36), Max: 98.4 (16 Feb 2020 16:42)  HR: 88 (17 Feb 2020 07:36) (73 - 88)  BP: 108/62 (17 Feb 2020 07:36) (108/62 - 125/78)  BP(mean): --  RR: 16 (17 Feb 2020 07:36) (16 - 17)  SpO2: 93% (17 Feb 2020 07:36) (93% - 100%)    PHYSICAL EXAM:    Constitutional: lying in bed  HEENT: ncat  Neck: No LAD  Gastrointestinal: soft nt nd  Extremities: No peripheral edema  Vascular: + peripheral pulses  Neurological: Awake alert responds appropriately       LABS:                        7.7    13.43 )-----------( 139      ( 17 Feb 2020 07:09 )             24.1     02-17    146<H>  |  116<H>  |  46<H>  ----------------------------<  108<H>  3.9   |  22  |  1.60<H>    Ca    8.4<L>      17 Feb 2020 07:09            RADIOLOGY & ADDITIONAL TESTS:

## 2020-02-17 NOTE — PROGRESS NOTE ADULT - ASSESSMENT
gib  anemia    downtrending hgb wo s/s active gib; HD stable  serial cbc, active t&s, transfuse prn  cont ppi iv bid, carafate qid  cont clears  avoid ac anti plts nsaids as able pending gi w/u  if w s/s active gib check bleeding scan  icu eval if decompensates  will plan for egd tomorrow, npo p mn, will need medical and cardiac clearance        Advanced care planning was discussed with patient and family.  Advanced care planning forms were reviewed and discussed.  Risks, benefits and alternatives of gastroenterologic procedures were discussed in detail and all questions were answered.    30 minutes spent. gib  anemia    downtrending hgb wo s/s active gib; HD stable  serial cbc, active t&s, transfuse prn  cont ppi iv bid, carafate qid  cont clears  avoid ac anti plts nsaids as able pending gi w/u  if w s/s active gib check bleeding scan  icu eval if decompensates  rec transfuse 1u prbc prior to procedure  will plan for egd tomorrow, npo p mn, will need medical and cardiac clearance      Advanced care planning was discussed with patient and family.  Advanced care planning forms were reviewed and discussed.  Risks, benefits and alternatives of gastroenterologic procedures were discussed in detail and all questions were answered.    30 minutes spent.

## 2020-02-17 NOTE — DISCHARGE NOTE PROVIDER - CARE PROVIDER_API CALL
Julito Llamas (DO)  Gastroenterology; Internal Medicine  [address] [place] [id number]  Phone: [phone]  Fax: [phone]  Follow Up Time:

## 2020-02-17 NOTE — DISCHARGE NOTE PROVIDER - NSDCMRMEDTOKEN_GEN_ALL_CORE_FT
amLODIPine 5 mg oral tablet: 1 tab(s) orally once a day  aspirin 81 mg oral tablet, chewable: 1 tab(s) orally once a day  donepezil 10 mg oral tablet: 1 tab(s) orally once a day (at bedtime)  finasteride 5 mg oral tablet: 1 tab(s) orally once a day  ipratropium-albuterol 0.5 mg-2.5 mg/3 mLinhalation solution: 3 milliliter(s) inhaled every 4 hours, As needed, Shortness of Breath and/or Wheezing  metoprolol succinate 25 mg oral tablet, extended release: 1 tab(s) orally once a day  rosuvastatin 40 mg oral tablet: 1 tab(s) orally once a day  silodosin 8 mg oral capsule: 1 cap(s) orally once a day atorvastatin 80 mg oral tablet: 1 tab(s) orally once a day (at bedtime)  Dextrose 5% in Water with KCl 20 mEq/L intravenous solution: 1000 milliliter(s) intravenous   donepezil 10 mg oral tablet: 1 tab(s) orally once a day (at bedtime)  finasteride 5 mg oral tablet: 1 tab(s) orally once a day  ipratropium-albuterol 0.5 mg-2.5 mg/3 mLinhalation solution: 3 milliliter(s) inhaled every 4 hours, As needed, Shortness of Breath and/or Wheezing  melatonin 5 mg oral tablet: 1 tab(s) orally once a day (at bedtime), As needed, Insomnia  metoprolol succinate 25 mg oral tablet, extended release: 1 tab(s) orally once a day  nystatin 100,000 units/g topical powder: 1 application topically 2 times a day  pantoprazole 40 mg intravenous injection: 40 milligram(s) intravenous every 12 hours  sucralfate 1 g/10 mL oral suspension: 10 milliliter(s) orally every 6 hours  tamsulosin 0.4 mg oral capsule: 1 cap(s) orally once a day (at bedtime)

## 2020-02-18 DIAGNOSIS — D62 ACUTE POSTHEMORRHAGIC ANEMIA: ICD-10-CM

## 2020-02-18 LAB
ANION GAP SERPL CALC-SCNC: 8 MMOL/L — SIGNIFICANT CHANGE UP (ref 5–17)
BUN SERPL-MCNC: 36 MG/DL — HIGH (ref 7–23)
CALCIUM SERPL-MCNC: 8.4 MG/DL — LOW (ref 8.5–10.1)
CHLORIDE SERPL-SCNC: 116 MMOL/L — HIGH (ref 96–108)
CO2 SERPL-SCNC: 23 MMOL/L — SIGNIFICANT CHANGE UP (ref 22–31)
CREAT SERPL-MCNC: 1.6 MG/DL — HIGH (ref 0.5–1.3)
GLUCOSE SERPL-MCNC: 102 MG/DL — HIGH (ref 70–99)
HCT VFR BLD CALC: 25.3 % — LOW (ref 39–50)
HCT VFR BLD CALC: 25.8 % — LOW (ref 39–50)
HCT VFR BLD CALC: 26.5 % — LOW (ref 39–50)
HGB BLD-MCNC: 8.3 G/DL — LOW (ref 13–17)
HGB BLD-MCNC: 8.3 G/DL — LOW (ref 13–17)
HGB BLD-MCNC: 8.5 G/DL — LOW (ref 13–17)
MCHC RBC-ENTMCNC: 28.1 PG — SIGNIFICANT CHANGE UP (ref 27–34)
MCHC RBC-ENTMCNC: 32.2 GM/DL — SIGNIFICANT CHANGE UP (ref 32–36)
MCV RBC AUTO: 87.5 FL — SIGNIFICANT CHANGE UP (ref 80–100)
NRBC # BLD: 0 /100 WBCS — SIGNIFICANT CHANGE UP (ref 0–0)
PLATELET # BLD AUTO: 177 K/UL — SIGNIFICANT CHANGE UP (ref 150–400)
POTASSIUM SERPL-MCNC: 4.1 MMOL/L — SIGNIFICANT CHANGE UP (ref 3.5–5.3)
POTASSIUM SERPL-SCNC: 4.1 MMOL/L — SIGNIFICANT CHANGE UP (ref 3.5–5.3)
RBC # BLD: 2.95 M/UL — LOW (ref 4.2–5.8)
RBC # FLD: 15.9 % — HIGH (ref 10.3–14.5)
SODIUM SERPL-SCNC: 147 MMOL/L — HIGH (ref 135–145)
WBC # BLD: 13.85 K/UL — HIGH (ref 3.8–10.5)
WBC # FLD AUTO: 13.85 K/UL — HIGH (ref 3.8–10.5)

## 2020-02-18 PROCEDURE — 99233 SBSQ HOSP IP/OBS HIGH 50: CPT

## 2020-02-18 RX ADMIN — AMLODIPINE BESYLATE 5 MILLIGRAM(S): 2.5 TABLET ORAL at 06:18

## 2020-02-18 RX ADMIN — PANTOPRAZOLE SODIUM 40 MILLIGRAM(S): 20 TABLET, DELAYED RELEASE ORAL at 17:30

## 2020-02-18 RX ADMIN — NYSTATIN CREAM 1 APPLICATION(S): 100000 CREAM TOPICAL at 06:17

## 2020-02-18 RX ADMIN — TAMSULOSIN HYDROCHLORIDE 0.4 MILLIGRAM(S): 0.4 CAPSULE ORAL at 21:45

## 2020-02-18 RX ADMIN — PANTOPRAZOLE SODIUM 40 MILLIGRAM(S): 20 TABLET, DELAYED RELEASE ORAL at 06:18

## 2020-02-18 RX ADMIN — Medication 1 GRAM(S): at 23:24

## 2020-02-18 RX ADMIN — ATORVASTATIN CALCIUM 80 MILLIGRAM(S): 80 TABLET, FILM COATED ORAL at 21:45

## 2020-02-18 RX ADMIN — Medication 1 GRAM(S): at 06:18

## 2020-02-18 RX ADMIN — Medication 1 GRAM(S): at 17:31

## 2020-02-18 RX ADMIN — FINASTERIDE 5 MILLIGRAM(S): 5 TABLET, FILM COATED ORAL at 12:00

## 2020-02-18 RX ADMIN — NYSTATIN CREAM 1 APPLICATION(S): 100000 CREAM TOPICAL at 19:31

## 2020-02-18 RX ADMIN — Medication 25 MILLIGRAM(S): at 06:17

## 2020-02-18 RX ADMIN — Medication 1 GRAM(S): at 12:00

## 2020-02-18 RX ADMIN — Medication 5 MILLIGRAM(S): at 21:47

## 2020-02-18 RX ADMIN — DONEPEZIL HYDROCHLORIDE 10 MILLIGRAM(S): 10 TABLET, FILM COATED ORAL at 21:45

## 2020-02-18 NOTE — PROGRESS NOTE ADULT - ASSESSMENT
sb bleed  abla    bleeding scan + for active gib in likely proximal jejunum  hemodynamically stable at present  serial cbc, active t&s, transfuse prn  npo/ivf  cont ppi iv bid, carafate qid  avoid ac anti plts nsaids   awaiting transfer to [facility] for further care  icu eval if pt decompensates  plan dw attg, agreeable, will follow      Advanced care planning was discussed with patient and family.  Advanced care planning forms were reviewed and discussed.  Risks, benefits and alternatives of gastroenterologic procedures were discussed in detail and all questions were answered.    30 minutes spent.

## 2020-02-18 NOTE — PROGRESS NOTE ADULT - SUBJECTIVE AND OBJECTIVE BOX
INTERVAL HPI/OVERNIGHT EVENTS:  pt seen and examined  +melena  denies n/v/abd pain  sp 1u prbc yesterday    MEDICATIONS  (STANDING):  amLODIPine   Tablet 5 milliGRAM(s) Oral daily  atorvastatin 80 milliGRAM(s) Oral at bedtime  donepezil 10 milliGRAM(s) Oral at bedtime  finasteride 5 milliGRAM(s) Oral daily  metoprolol succinate ER 25 milliGRAM(s) Oral daily  nystatin Powder 1 Application(s) Topical two times a day  pantoprazole  Injectable 40 milliGRAM(s) IV Push every 12 hours  sucralfate suspension 1 Gram(s) Oral every 6 hours  tamsulosin 0.4 milliGRAM(s) Oral at bedtime    MEDICATIONS  (PRN):  albuterol/ipratropium for Nebulization 3 milliLiter(s) Nebulizer every 4 hours PRN Shortness of Breath and/or Wheezing  melatonin 5 milliGRAM(s) Oral at bedtime PRN Insomnia      Allergies    amoxicillin (Unknown)  Levaquin (Unknown)  penicillin (Unknown)    Intolerances        Review of Systems:    General:  No wt loss, fevers, chills, night sweats, fatigue   Eyes:  Good vision, no reported pain  ENT:  No sore throat, pain, runny nose, dysphagia  CV:  No pain, palpitations, hypo/hypertension  Resp:  No dyspnea, cough, tachypnea, wheezing  GI:  No pain, No nausea, No vomiting, No diarrhea, No constipation, No weight loss, No fever, No pruritis, No rectal bleeding, No melena, No dysphagia  :  No pain, bleeding, incontinence, nocturia  Muscle:  No pain, weakness  Neuro:  No weakness, tingling, memory problems  Psych:  No fatigue, insomnia, mood problems, depression  Endocrine:  No polyuria, polydypsia, cold/heat intolerance  Heme:  No petechiae, ecchymosis, easy bruisability  Skin:  No rash, tattoos, scars, edema      Vital Signs Last 24 Hrs  T(C): 36.9 (18 Feb 2020 07:32), Max: 36.9 (18 Feb 2020 00:00)  T(F): 98.5 (18 Feb 2020 07:32), Max: 98.5 (18 Feb 2020 00:00)  HR: 70 (18 Feb 2020 07:32) (67 - 83)  BP: 122/78 (18 Feb 2020 07:32) (105/70 - 125/79)  BP(mean): --  RR: 20 (18 Feb 2020 07:32) (16 - 20)  SpO2: 95% (18 Feb 2020 07:32) (92% - 99%)    PHYSICAL EXAM:    Constitutional: lying in bed  HEENT: ncat  Neck: No LAD  Gastrointestinal: soft nt nd +melena on alecia  Extremities: No peripheral edema  Vascular: + peripheral pulses  Neurological: Awake alert responds appropriately     LABS:                        8.3    13.85 )-----------( 177      ( 18 Feb 2020 07:00 )             25.8     02-18    147<H>  |  116<H>  |  36<H>  ----------------------------<  102<H>  4.1   |  23  |  1.60<H>    Ca    8.4<L>      18 Feb 2020 07:00            RADIOLOGY & ADDITIONAL TESTS:  < from: NM GI Bleed Localization (02.17.20 @ 12:31) >    EXAM:  NM GI BLEEDING IMG                            PROCEDURE DATE:  02/17/2020          INTERPRETATION:  RADIOPHARMACEUTICAL: 20 mCi Tc-99m labeled autologous red blood cells, I.V.    CLINICAL STATEMENT: 85-year-old male with GI bleeding for 2 weeks, referred for localization of bleeding site.    TECHNIQUE:  Dynamic images of the anterior abdomen/pelvis were obtained for 2 hours following administration of radiopharmaceutical. Static images of the abdomen/pelvis in the caudal and right lateral positions were obtained immediately thereafter.    COMPARISON: No prior bleeding scan. CT is dated 2/14/2018.    FINDINGS: Beginning at approximately 50 minutes post injection, a focus of increased radiotracer activity is seen in the left upper quadrant of the abdomen. This focus increases in intensity over time and was rapidly with the left hemiabdomen. Findings are compatible with an active bleeding site within small bowel the left upper abdomen.     IMPRESSION: Abnormal GI bleeding scan.    Active bleeding site in small bowel in left upper, likely proximal to mid jejunum.    Dr. Llamas was notified of these results at 12:40 PM on 2/17/2020, by telephone with read back.                DEVENDRA ELIZABETH M.D., NUCLEAR MEDICINE ATTENDING  This document has been electronically signed. Feb 17 2020 12:43PM                < end of copied text >

## 2020-02-18 NOTE — PROGRESS NOTE ADULT - PROBLEM SELECTOR PLAN 10
- Will hold chemical DVT ppx in setting of acute GI bleed  - SCDs  Dispo: accepted for transfer to [facility] under [employer], awaiting bed

## 2020-02-18 NOTE — PROGRESS NOTE ADULT - PROBLEM SELECTOR PLAN 2
due to GIB as above  -HD stable  -Hgb 8.3 today from 7.4, s/p 2 units prbcs since admission, admitted with hgb11 (baseline 11-13)  -monitor cbc q8-q12 hours, more frequent if frequent melena or change  -transfuse for hgb <8  -monitor vitals  -MICU consult if any decompensation

## 2020-02-18 NOTE — PROGRESS NOTE ADULT - ASSESSMENT
Patient is a 84 y/o M with PMHx of BPH, MI and CAD s/p CABG with 2 cardiac stents, COPD, Depression, GERD, Hyperlipidemia, Hypertension, Osteoporosis, Peripheral Neuropathy, and Dementia who presents to the ED c/o weakness admitted for acute blood loss anemia due o GI bleed and YOU on CKD 3

## 2020-02-18 NOTE — DIETITIAN INITIAL EVALUATION ADULT. - ADD RECOMMEND
1) If pt remains on clear liquid diet recommend Ensure clear, TID, 2) When medically feasible recommend to advanced diet to DASH/TLC with Ensure Enlive BID, 3) Encourage adequate PO intake, 4) Monitor pt's PO intake, weight, skin, edema, GI distress

## 2020-02-18 NOTE — PROGRESS NOTE ADULT - SUBJECTIVE AND OBJECTIVE BOX
Patient is a 85y old  Male who presents with a chief complaint of GI bleed, YOU (18 Feb 2020 08:46)      SUBJECTIVE / OVERNIGHT EVENTS: No overnight events. Reports having 1 large dark BM this AM, none overnight. Denies abd pain, n/v, cp, sob, dizziness, palpitations.        ADDITIONAL REVIEW OF SYSTEMS: Negative except for above    MEDICATIONS  (STANDING):  amLODIPine   Tablet 5 milliGRAM(s) Oral daily  atorvastatin 80 milliGRAM(s) Oral at bedtime  donepezil 10 milliGRAM(s) Oral at bedtime  finasteride 5 milliGRAM(s) Oral daily  metoprolol succinate ER 25 milliGRAM(s) Oral daily  nystatin Powder 1 Application(s) Topical two times a day  pantoprazole  Injectable 40 milliGRAM(s) IV Push every 12 hours  sucralfate suspension 1 Gram(s) Oral every 6 hours  tamsulosin 0.4 milliGRAM(s) Oral at bedtime    MEDICATIONS  (PRN):  albuterol/ipratropium for Nebulization 3 milliLiter(s) Nebulizer every 4 hours PRN Shortness of Breath and/or Wheezing  melatonin 5 milliGRAM(s) Oral at bedtime PRN Insomnia      CAPILLARY BLOOD GLUCOSE        I&O's Summary      PHYSICAL EXAM:  Vital Signs Last 24 Hrs  T(C): 36.9 (18 Feb 2020 07:32), Max: 36.9 (18 Feb 2020 00:00)  T(F): 98.5 (18 Feb 2020 07:32), Max: 98.5 (18 Feb 2020 00:00)  HR: 70 (18 Feb 2020 07:32) (67 - 83)  BP: 122/78 (18 Feb 2020 07:32) (105/70 - 125/79)  BP(mean): --  RR: 20 (18 Feb 2020 07:32) (16 - 20)  SpO2: 95% (18 Feb 2020 07:32) (92% - 99%)    PHYSICAL EXAM:  GENERAL: NAD, well-developed  HEAD:  Atraumatic, Normocephalic  NECK: Supple, No JVD  CHEST/LUNG: Clear to auscultation bilaterally; No wheeze  HEART: Regular rate and rhythm; No murmurs, rubs, or gallops  ABDOMEN: Soft, Nontender, Nondistended; Bowel sounds present  EXTREMITIES:  2+ Peripheral Pulses, No clubbing, cyanosis, or edema  PSYCH: AAOx3        LABS:                        8.3    13.85 )-----------( 177      ( 18 Feb 2020 07:00 )             25.8     02-18    147<H>  |  116<H>  |  36<H>  ----------------------------<  102<H>  4.1   |  23  |  1.60<H>    Ca    8.4<L>      18 Feb 2020 07:00                  RADIOLOGY & ADDITIONAL TESTS:    Imaging Personally Reviewed: Bleeding scan: IMPRESSION: Abnormal GI bleeding scan.    Active bleeding site in small bowel in left upper, likely proximal to mid jejunum.    Electrocardiogram Personally Reviewed:    COORDINATION OF CARE:  Care Discussed with Consultants/Other Providers [Y/N]:  Prior or Outpatient Records Reviewed [Y/N]:

## 2020-02-18 NOTE — DIETITIAN INITIAL EVALUATION ADULT. - PERTINENT LABORATORY DATA
2/18-Hgtb 8.3, Hct 25.8, Sodium 147, Chloride 116, BUN 36, Creatinine 1.60,, Glucose 102, Calcium 8.4

## 2020-02-18 NOTE — PROGRESS NOTE ADULT - PROBLEM SELECTOR PLAN 3
YOU on CKD Stage 3, baseline Cr ~1.8, on admission Cr 2.2  - Likely prerenal azotemia from acute blood loss anemia,   - cr 1.6 back to baseline, monitor   - IVF as needed  - Avoid nephrotoxic agents where feasible

## 2020-02-18 NOTE — DIETITIAN INITIAL EVALUATION ADULT. - PROBLEM SELECTOR PLAN 1
GI bleed, suspected to be lower GI in origin, possibly 2/2 diverticulosis vs hemorrhoids vs malignancy  - Admit to F  - Patient with large bloody bowel movement in the ER and gross raymond blood on rectal exam  - Likely lower GI bleed given bright red blood, denies any hx of hemorrhoids. Last colonoscopy was >5 years ago, normal per patient  - Hemodynamically stable at this time, H/H stable but could be lower as patient appears dry-?hemoconcentration- with decreased PO intake over the past few days, does not require PRBC transfusion  - Type and screen completed on admission  - Will continue to monitor H/H  - Protonix 40mg IVP BID  - F/u lactate, likely elevated in setting of renal insufficiency and GIB, f/u repeat lactate  - Gi, Dr. Ervin, consulted, f/u recs

## 2020-02-18 NOTE — PROGRESS NOTE ADULT - PROBLEM SELECTOR PLAN 5
Chronic  bp stable  -hold  norvasc 5mg daily with given GIB  - Continue home Toprol XL 25mg daily, with hold parameters  - Monitor routine hemodynamics

## 2020-02-18 NOTE — DIETITIAN INITIAL EVALUATION ADULT. - OTHER INFO
As per chart pt is a 85 year old male with a PMH of BPH, MI and CAD s/p CABG with 2 cardiac stents, COPD, Depression, GERD, Hyperlipidemia, Hypertension, Osteoporosis, Peripheral Neuropathy, and Dementia who presents to the ED c/o weakness admitted for GI bleed and YOU on CKD. Per chart pt is awaiting transfer to [facility] for further care.    Pt reports good appetite and PO intake PTA. Denies following specific dietary restrictions PTA, however reports he tried to watch what he eats. Reports taking a vitamin PTA (unable to recall type), pt also reports consuming a protein shake x 1 day PTA (thinks it was Boost). NKFA.     Pt is current NPO for possible egd today. Pt was previously on clear liquids, reports that he was tolerating them well, per chart was consuming about % of tray in house, pt reports that he is tired of the liquid and would like regular food. Pt's admission weight 155lbs, current weight per chart (2/16) 138.6lbs. Pt unsure of current weight, states that he weighed about 150lbs about 2 weeks ago, reports that 150lbs is his UBW, stable, denies any feelings of weight changes. Per previous recent admission pt's weight was (1/17/20) 149lbs, indicates that if pt's stated/ admission weight is true, pt's weight has remained fairly stable. Denies any chewing/ swallowing difficulties, denies any nausea but reports that his "stomach does not feel right", last BM 2/16.     Stage 1 sacrum pressure injury  No edema noted at this time

## 2020-02-18 NOTE — PROGRESS NOTE ADULT - PROBLEM SELECTOR PLAN 1
Bleed scan revealed Active bleeding site in small bowel in left upper, likely proximal to mid jejunum.  -had 1 episode of melena this AM  -discussed with GI Dr Llamas needs small bowel enteroscopy at [facility]  -c/w Protonix 40mg IVP BID, sucralfate 1gram q6  -hold ASA  -discussed with transfer center no bed yet at [facility], patient accepted by medicine Dr Isrrael negron

## 2020-02-19 VITALS
RESPIRATION RATE: 18 BRPM | SYSTOLIC BLOOD PRESSURE: 114 MMHG | OXYGEN SATURATION: 95 % | DIASTOLIC BLOOD PRESSURE: 76 MMHG | HEART RATE: 67 BPM | TEMPERATURE: 97 F

## 2020-02-19 LAB
ANION GAP SERPL CALC-SCNC: 9 MMOL/L — SIGNIFICANT CHANGE UP (ref 5–17)
BUN SERPL-MCNC: 27 MG/DL — HIGH (ref 7–23)
CALCIUM SERPL-MCNC: 8.5 MG/DL — SIGNIFICANT CHANGE UP (ref 8.5–10.1)
CHLORIDE SERPL-SCNC: 116 MMOL/L — HIGH (ref 96–108)
CO2 SERPL-SCNC: 23 MMOL/L — SIGNIFICANT CHANGE UP (ref 22–31)
CREAT SERPL-MCNC: 1.7 MG/DL — HIGH (ref 0.5–1.3)
GLUCOSE SERPL-MCNC: 97 MG/DL — SIGNIFICANT CHANGE UP (ref 70–99)
HCT VFR BLD CALC: 24.4 % — LOW (ref 39–50)
HCT VFR BLD CALC: 24.4 % — LOW (ref 39–50)
HCT VFR BLD CALC: 25.3 % — LOW (ref 39–50)
HGB BLD-MCNC: 7.8 G/DL — LOW (ref 13–17)
HGB BLD-MCNC: 7.8 G/DL — LOW (ref 13–17)
HGB BLD-MCNC: 8.2 G/DL — LOW (ref 13–17)
MCHC RBC-ENTMCNC: 28.7 PG — SIGNIFICANT CHANGE UP (ref 27–34)
MCHC RBC-ENTMCNC: 32.4 GM/DL — SIGNIFICANT CHANGE UP (ref 32–36)
MCV RBC AUTO: 88.5 FL — SIGNIFICANT CHANGE UP (ref 80–100)
NRBC # BLD: 0 /100 WBCS — SIGNIFICANT CHANGE UP (ref 0–0)
PLATELET # BLD AUTO: 193 K/UL — SIGNIFICANT CHANGE UP (ref 150–400)
POTASSIUM SERPL-MCNC: 3.3 MMOL/L — LOW (ref 3.5–5.3)
POTASSIUM SERPL-SCNC: 3.3 MMOL/L — LOW (ref 3.5–5.3)
RBC # BLD: 2.86 M/UL — LOW (ref 4.2–5.8)
RBC # FLD: 16.4 % — HIGH (ref 10.3–14.5)
SODIUM SERPL-SCNC: 148 MMOL/L — HIGH (ref 135–145)
WBC # BLD: 12.42 K/UL — HIGH (ref 3.8–10.5)
WBC # FLD AUTO: 12.42 K/UL — HIGH (ref 3.8–10.5)

## 2020-02-19 PROCEDURE — 99239 HOSP IP/OBS DSCHRG MGMT >30: CPT

## 2020-02-19 PROCEDURE — 87040 BLOOD CULTURE FOR BACTERIA: CPT

## 2020-02-19 PROCEDURE — P9016: CPT

## 2020-02-19 PROCEDURE — 85610 PROTHROMBIN TIME: CPT

## 2020-02-19 PROCEDURE — 84145 PROCALCITONIN (PCT): CPT

## 2020-02-19 PROCEDURE — 81003 URINALYSIS AUTO W/O SCOPE: CPT

## 2020-02-19 PROCEDURE — 80048 BASIC METABOLIC PNL TOTAL CA: CPT

## 2020-02-19 PROCEDURE — 86901 BLOOD TYPING SEROLOGIC RH(D): CPT

## 2020-02-19 PROCEDURE — 86900 BLOOD TYPING SEROLOGIC ABO: CPT

## 2020-02-19 PROCEDURE — 78278 ACUTE GI BLOOD LOSS IMAGING: CPT

## 2020-02-19 PROCEDURE — 70450 CT HEAD/BRAIN W/O DYE: CPT

## 2020-02-19 PROCEDURE — 80053 COMPREHEN METABOLIC PANEL: CPT

## 2020-02-19 PROCEDURE — 94640 AIRWAY INHALATION TREATMENT: CPT

## 2020-02-19 PROCEDURE — 83605 ASSAY OF LACTIC ACID: CPT

## 2020-02-19 PROCEDURE — 85027 COMPLETE CBC AUTOMATED: CPT

## 2020-02-19 PROCEDURE — 85730 THROMBOPLASTIN TIME PARTIAL: CPT

## 2020-02-19 PROCEDURE — A9560: CPT

## 2020-02-19 PROCEDURE — 83880 ASSAY OF NATRIURETIC PEPTIDE: CPT

## 2020-02-19 PROCEDURE — 99232 SBSQ HOSP IP/OBS MODERATE 35: CPT

## 2020-02-19 PROCEDURE — 87631 RESP VIRUS 3-5 TARGETS: CPT

## 2020-02-19 PROCEDURE — 99285 EMERGENCY DEPT VISIT HI MDM: CPT | Mod: 25

## 2020-02-19 PROCEDURE — 36430 TRANSFUSION BLD/BLD COMPNT: CPT

## 2020-02-19 PROCEDURE — 83690 ASSAY OF LIPASE: CPT

## 2020-02-19 PROCEDURE — 71250 CT THORAX DX C-: CPT

## 2020-02-19 PROCEDURE — 82272 OCCULT BLD FECES 1-3 TESTS: CPT

## 2020-02-19 PROCEDURE — 85018 HEMOGLOBIN: CPT

## 2020-02-19 PROCEDURE — 85014 HEMATOCRIT: CPT

## 2020-02-19 PROCEDURE — 74176 CT ABD & PELVIS W/O CONTRAST: CPT

## 2020-02-19 PROCEDURE — 86850 RBC ANTIBODY SCREEN: CPT

## 2020-02-19 PROCEDURE — 87086 URINE CULTURE/COLONY COUNT: CPT

## 2020-02-19 PROCEDURE — 36415 COLL VENOUS BLD VENIPUNCTURE: CPT

## 2020-02-19 PROCEDURE — 86923 COMPATIBILITY TEST ELECTRIC: CPT

## 2020-02-19 RX ORDER — SILODOSIN 4 MG/1
1 CAPSULE ORAL
Qty: 0 | Refills: 0 | DISCHARGE

## 2020-02-19 RX ORDER — SUCRALFATE 1 G
10 TABLET ORAL
Qty: 0 | Refills: 0 | DISCHARGE
Start: 2020-02-19

## 2020-02-19 RX ORDER — AMLODIPINE BESYLATE 2.5 MG/1
1 TABLET ORAL
Qty: 0 | Refills: 0 | DISCHARGE

## 2020-02-19 RX ORDER — LANOLIN ALCOHOL/MO/W.PET/CERES
1 CREAM (GRAM) TOPICAL
Qty: 0 | Refills: 0 | DISCHARGE
Start: 2020-02-19

## 2020-02-19 RX ORDER — DONEPEZIL HYDROCHLORIDE 10 MG/1
1 TABLET, FILM COATED ORAL
Qty: 0 | Refills: 0 | DISCHARGE
Start: 2020-02-19

## 2020-02-19 RX ORDER — DEXTROSE MONOHYDRATE, SODIUM CHLORIDE, AND POTASSIUM CHLORIDE 50; .745; 4.5 G/1000ML; G/1000ML; G/1000ML
1000 INJECTION, SOLUTION INTRAVENOUS
Qty: 0 | Refills: 0 | DISCHARGE
Start: 2020-02-19

## 2020-02-19 RX ORDER — PANTOPRAZOLE SODIUM 20 MG/1
40 TABLET, DELAYED RELEASE ORAL
Qty: 0 | Refills: 0 | DISCHARGE
Start: 2020-02-19

## 2020-02-19 RX ORDER — DEXTROSE MONOHYDRATE, SODIUM CHLORIDE, AND POTASSIUM CHLORIDE 50; .745; 4.5 G/1000ML; G/1000ML; G/1000ML
1000 INJECTION, SOLUTION INTRAVENOUS
Refills: 0 | Status: DISCONTINUED | OUTPATIENT
Start: 2020-02-19 | End: 2020-02-19

## 2020-02-19 RX ORDER — METOPROLOL TARTRATE 50 MG
1 TABLET ORAL
Qty: 0 | Refills: 0 | DISCHARGE
Start: 2020-02-19

## 2020-02-19 RX ORDER — POTASSIUM CHLORIDE 20 MEQ
40 PACKET (EA) ORAL EVERY 4 HOURS
Refills: 0 | Status: COMPLETED | OUTPATIENT
Start: 2020-02-19 | End: 2020-02-19

## 2020-02-19 RX ORDER — IPRATROPIUM/ALBUTEROL SULFATE 18-103MCG
3 AEROSOL WITH ADAPTER (GRAM) INHALATION
Qty: 0 | Refills: 0 | DISCHARGE
Start: 2020-02-19

## 2020-02-19 RX ORDER — ATORVASTATIN CALCIUM 80 MG/1
1 TABLET, FILM COATED ORAL
Qty: 0 | Refills: 0 | DISCHARGE
Start: 2020-02-19

## 2020-02-19 RX ORDER — NYSTATIN CREAM 100000 [USP'U]/G
1 CREAM TOPICAL
Qty: 0 | Refills: 0 | DISCHARGE
Start: 2020-02-19

## 2020-02-19 RX ORDER — TAMSULOSIN HYDROCHLORIDE 0.4 MG/1
1 CAPSULE ORAL
Qty: 0 | Refills: 0 | DISCHARGE
Start: 2020-02-19

## 2020-02-19 RX ORDER — FINASTERIDE 5 MG/1
1 TABLET, FILM COATED ORAL
Qty: 0 | Refills: 0 | DISCHARGE
Start: 2020-02-19

## 2020-02-19 RX ORDER — FINASTERIDE 5 MG/1
1 TABLET, FILM COATED ORAL
Qty: 0 | Refills: 0 | DISCHARGE

## 2020-02-19 RX ORDER — ROSUVASTATIN CALCIUM 5 MG/1
1 TABLET ORAL
Qty: 0 | Refills: 0 | DISCHARGE

## 2020-02-19 RX ADMIN — PANTOPRAZOLE SODIUM 40 MILLIGRAM(S): 20 TABLET, DELAYED RELEASE ORAL at 05:37

## 2020-02-19 RX ADMIN — NYSTATIN CREAM 1 APPLICATION(S): 100000 CREAM TOPICAL at 05:36

## 2020-02-19 RX ADMIN — Medication 1 GRAM(S): at 05:36

## 2020-02-19 RX ADMIN — NYSTATIN CREAM 1 APPLICATION(S): 100000 CREAM TOPICAL at 17:41

## 2020-02-19 RX ADMIN — Medication 1 GRAM(S): at 17:40

## 2020-02-19 RX ADMIN — DONEPEZIL HYDROCHLORIDE 10 MILLIGRAM(S): 10 TABLET, FILM COATED ORAL at 21:45

## 2020-02-19 RX ADMIN — TAMSULOSIN HYDROCHLORIDE 0.4 MILLIGRAM(S): 0.4 CAPSULE ORAL at 21:45

## 2020-02-19 RX ADMIN — Medication 1 GRAM(S): at 11:55

## 2020-02-19 RX ADMIN — Medication 40 MILLIEQUIVALENT(S): at 17:41

## 2020-02-19 RX ADMIN — Medication 5 MILLIGRAM(S): at 21:45

## 2020-02-19 RX ADMIN — PANTOPRAZOLE SODIUM 40 MILLIGRAM(S): 20 TABLET, DELAYED RELEASE ORAL at 17:40

## 2020-02-19 RX ADMIN — ATORVASTATIN CALCIUM 80 MILLIGRAM(S): 80 TABLET, FILM COATED ORAL at 21:45

## 2020-02-19 RX ADMIN — Medication 40 MILLIEQUIVALENT(S): at 11:55

## 2020-02-19 RX ADMIN — FINASTERIDE 5 MILLIGRAM(S): 5 TABLET, FILM COATED ORAL at 11:56

## 2020-02-19 RX ADMIN — Medication 25 MILLIGRAM(S): at 05:37

## 2020-02-19 NOTE — PROGRESS NOTE ADULT - ASSESSMENT
sb bleed  abla    bleeding scan + for active gib in likely proximal jejunum  hemodynamically stable at present  serial cbc, active t&s, transfuse prn  clear liquid diet  cont ppi iv bid, carafate qid  avoid ac anti plts nsaids   awaiting transfer to [facility] for further care; can also consider [facility] given bed situation  icu eval if pt decompensates        Advanced care planning was discussed with patient and family.  Advanced care planning forms were reviewed and discussed.  Risks, benefits and alternatives of gastroenterologic procedures were discussed in detail and all questions were answered.    30 minutes spent.

## 2020-02-19 NOTE — PROGRESS NOTE ADULT - SUBJECTIVE AND OBJECTIVE BOX
Patient is a 85y old  Male who presents with a chief complaint of GI bleed, YOU (2020 13:05)      FROM ADMISSION H+P:   HPI:  Patient is a 84 y/o M with PMHx of BPH, MI and CAD s/p CABG with 2 cardiac stents, COPD, Depression, GERD, Hyperlipidemia, Hypertension, Osteoporosis, Peripheral Neuropathy, and Dementia who presents to the ED c/o weakness. Patient was recently admitted to [facility] from - after fall found to have RLL pneumonia treated with IV ceftriaxone, confusion suspected secondary to metabolic encephalopathy. Patient was discharged to rehab and returned home this past Tuesday. Patient is a poor historian secondary to dementia, history was gathered from wife and aide at bedside in addition to patient. Patient states that he was sitting in his lounge chair today, stood up and then became very weak, lowered himself on the ground. Denies fall, LOC, and trauma. Wife states that they found him on the ground and was unable to stand him up so they called EMS. Upon EMS arrival, per ED note, patient was noted to be hypotensive with SBP in the 80s, which improved with IVF. Wife states patient has been very weak every since returning from rehab, patient adds he feels dizzy and lightheaded as well. Aide/wife reports that patient has been suffering from diarrhea for the last two to three days and EMT noted blood in his stool today when they arrived.  In the ED, patient was noted to have bowel incontinence with bright bloody stool. Patient had endoscopy in the past 5-10 years, states it was normal. Never had endoscopy that he can remember. Denies any family history of cancer, has a 5 pack year smoking history, quit in . Denies n/v, chest pain, SOB, abdominal pain, urinary symptoms. Admits to intermittent chronic cough since pna, productive of clear sputum.   Of note, wife is unsure of any medication changes since rehab. States that she hasn't picked up any prescriptions and  hasn't been taking any medications since discharge from rehab as they were going to go see their PCP this coming week and she didn't know if they wanted to make changes in medication. Per review of last admission H&P, unable to verify medications at that time as well with aide or wife. Will place patient back on medications that he was discharged from [facility] on during last admission.    ----  INTERVAL HPI/OVERNIGHT EVENTS: Pt seen and evaluated at the bedside. No acute overnight events occurred. Pt reports persistent loose black stools. Denies epigastric pain. Pt's spouse @ bedside verbalizes concern about "nothing being done" we discussed tx plan at length.     ----  PAST MEDICAL & SURGICAL HISTORY:  Myocardial infarction  COPD (chronic obstructive pulmonary disease)  Seasonal allergies  GERD (gastroesophageal reflux disease)  Benign prostatic hypertrophy  Hyperlipidemia  Hypertension  Depression  Peripheral Neuropathy  Osteoporosis  CAD (Coronary Artery Disease): MI, s/p CABG with 2 cardiac stents  S/P inguinal hernia repair: On right side  S/P appendectomy  S/P CABG (coronary artery bypass graft): Done in ; 2 cardiac stents in   S/P AVR (Aortic Valve Replacement): Pig valve (bioprosthetic); done  at [place]      FAMILY HISTORY:  Family history of renal failure (Sibling)  Family history of MI (myocardial infarction):  at 65  Family history of stroke:  at 65  Family history of amyotrophic lateral sclerosis:  at 67  Family history of stroke:  at 67      Allergies    amoxicillin (Unknown)  Levaquin (Unknown)  penicillin (Unknown)    Intolerances        ----  REVIEW OF SYSTEMS:  CONSTITUTIONAL: denies fever, chills, lightheadedness, fatigue  HEENT: denies blurred vision, sore throat  CARDIOVASCULAR: denies chest pain, chest pressure, palpitations  RESPIRATORY: denies shortness of breath, sputum production  GASTROINTESTINAL: feels hungry, tolerating clears  GENITOURINARY: denies dysuria, discharge  NEUROLOGICAL: denies numbness, headache, focal weakness  MUSCULOSKELETAL: denies new joint pain, muscle aches  HEMATOLOGIC: admits dark colored stool as per HPI  LYMPHATICS: denies enlarged lymph nodes, extremity swelling   ENDOCRINOLOGIC: denies sweating, cold or heat intolerance    ----  PHYSICAL EXAM:  GENERAL: patient appears comfortable, good historian, no acute distress  ENMT: oropharynx clear without erythema, moist mucous membranes, pale skin of face  LUNGS: reduced air entry bilaterally, clear to auscultation, no wheezing noted  HEART: soft S1/S2, regular rate and rhythm   GASTROINTESTINAL: abdomen is soft, nontender, nondistended, normoactive bowel sounds, no palpable masses  INTEGUMENT: pale skin, no jaundice noted  MUSCULOSKELETAL: no clubbing or cyanosis, no obvious deformity  NEUROLOGIC: awake, alert, oriented x3, good muscle tone in 4 extremities, no obvious sensory deficits    T(C): 36.2 (20 @ 15:38), Max: 37 (20 @ 23:30)  HR: 64 (20 @ 15:38) (64 - 69)  BP: 104/66 (20 @ 15:38) (99/64 - 123/70)  RR: 18 (20 @ 15:38) (18 - 18)  SpO2: 96% (20 @ 15:38) (96% - 98%)  Wt(kg): --    ----  I&O's Summary      LABS:                        7.8    x     )-----------( x        ( 2020 12:38 )             24.4     -    148<H>  |  116<H>  |  27<H>  ----------------------------<  97  3.3<L>   |  23  |  1.70<H>    Ca    8.5      2020 06:50          CAPILLARY BLOOD GLUCOSE                    ----  Personally reviewed:  Vital sign trends: [ x ] yes    [  ] no     [  ] n/a  Laboratory results: [ x ] yes    [  ] no     [  ] n/a  Radiology results: [  ] yes    [  ] no     [ x ] n/a  Culture results: [  ] yes    [  ] no     [ x ] n/a  Consultant recommendations: [ x ] yes    [  ] no     [  ] n/a

## 2020-02-19 NOTE — PROGRESS NOTE ADULT - SUBJECTIVE AND OBJECTIVE BOX
INTERVAL HPI/OVERNIGHT EVENTS:  pt seen and examined  still with dark stool  last prbc was 2/17  no beds at [facility]    MEDICATIONS  (STANDING):  amLODIPine   Tablet 5 milliGRAM(s) Oral daily  atorvastatin 80 milliGRAM(s) Oral at bedtime  donepezil 10 milliGRAM(s) Oral at bedtime  finasteride 5 milliGRAM(s) Oral daily  metoprolol succinate ER 25 milliGRAM(s) Oral daily  nystatin Powder 1 Application(s) Topical two times a day  pantoprazole  Injectable 40 milliGRAM(s) IV Push every 12 hours  sucralfate suspension 1 Gram(s) Oral every 6 hours  tamsulosin 0.4 milliGRAM(s) Oral at bedtime    MEDICATIONS  (PRN):  albuterol/ipratropium for Nebulization 3 milliLiter(s) Nebulizer every 4 hours PRN Shortness of Breath and/or Wheezing  melatonin 5 milliGRAM(s) Oral at bedtime PRN Insomnia      Allergies    amoxicillin (Unknown)  Levaquin (Unknown)  penicillin (Unknown)    Intolerances        Review of Systems:    General:  No wt loss, fevers, chills, night sweats, fatigue   Eyes:  Good vision, no reported pain  ENT:  No sore throat, pain, runny nose, dysphagia  CV:  No pain, palpitations, hypo/hypertension  Resp:  No dyspnea, cough, tachypnea, wheezing  GI:  No pain, No nausea, No vomiting, No diarrhea, No constipation, No weight loss, No fever, No pruritis, No rectal bleeding, No melena, No dysphagia  :  No pain, bleeding, incontinence, nocturia  Muscle:  No pain, weakness  Neuro:  No weakness, tingling, memory problems  Psych:  No fatigue, insomnia, mood problems, depression  Endocrine:  No polyuria, polydypsia, cold/heat intolerance  Heme:  No petechiae, ecchymosis, easy bruisability  Skin:  No rash, tattoos, scars, edema      Vital Signs Last 24 Hrs  T(C): 36.9 (18 Feb 2020 07:32), Max: 36.9 (18 Feb 2020 00:00)  T(F): 98.5 (18 Feb 2020 07:32), Max: 98.5 (18 Feb 2020 00:00)  HR: 70 (18 Feb 2020 07:32) (67 - 83)  BP: 122/78 (18 Feb 2020 07:32) (105/70 - 125/79)  BP(mean): --  RR: 20 (18 Feb 2020 07:32) (16 - 20)  SpO2: 95% (18 Feb 2020 07:32) (92% - 99%)    PHYSICAL EXAM:    Constitutional: lying in bed  HEENT: ncat  Neck: No LAD  Gastrointestinal: soft nt nd +melena on alecia  Extremities: No peripheral edema  Vascular: + peripheral pulses  Neurological: Awake alert responds appropriately     LABS:                        8.3    13.85 )-----------( 177      ( 18 Feb 2020 07:00 )             25.8     02-18    147<H>  |  116<H>  |  36<H>  ----------------------------<  102<H>  4.1   |  23  |  1.60<H>    Ca    8.4<L>      18 Feb 2020 07:00            RADIOLOGY & ADDITIONAL TESTS:  < from: NM GI Bleed Localization (02.17.20 @ 12:31) >    EXAM:  NM GI BLEEDING IMG                            PROCEDURE DATE:  02/17/2020          INTERPRETATION:  RADIOPHARMACEUTICAL: 20 mCi Tc-99m labeled autologous red blood cells, I.V.    CLINICAL STATEMENT: 85-year-old male with GI bleeding for 2 weeks, referred for localization of bleeding site.    TECHNIQUE:  Dynamic images of the anterior abdomen/pelvis were obtained for 2 hours following administration of radiopharmaceutical. Static images of the abdomen/pelvis in the caudal and right lateral positions were obtained immediately thereafter.    COMPARISON: No prior bleeding scan. CT is dated 2/14/2018.    FINDINGS: Beginning at approximately 50 minutes post injection, a focus of increased radiotracer activity is seen in the left upper quadrant of the abdomen. This focus increases in intensity over time and was rapidly with the left hemiabdomen. Findings are compatible with an active bleeding site within small bowel the left upper abdomen.     IMPRESSION: Abnormal GI bleeding scan.    Active bleeding site in small bowel in left upper, likely proximal to mid jejunum.    Dr. Llamas was notified of these results at 12:40 PM on 2/17/2020, by telephone with read back.                DEVENDRA ELIZABETH M.D., NUCLEAR MEDICINE ATTENDING  This document has been electronically signed. Feb 17 2020 12:43PM                < end of copied text >

## 2020-02-19 NOTE — PROGRESS NOTE ADULT - PROBLEM SELECTOR PLAN 1
Bleed scan revealed Active bleeding site in small bowel in left upper, likely proximal to mid jejunum.  - still experiencing loose dark stool  - needs small bowel enteroscopy. await bed at [facility] or [facility]  - pt and family are aware  - c/w Protonix 40mg IVP BID, sucralfate 1gram q6  - holding ASA indefinitely  - discussed with transfer center no bed yet at [facility], patient accepted by medicine Dr Isrrael negron  - h/h q8h for today, will check type/screen, if hgb<7.5, will offer another 1 unit pRBC's tonight. h/h is slowly downtrending

## 2020-02-19 NOTE — PROGRESS NOTE ADULT - ATTENDING COMMENTS
Advanced care planning was discussed with patient and family.  Advanced care planning forms were reviewed and discussed.  Risks, benefits and alternatives of gastroenterologic procedures were discussed in detail and all questions were answered.    30 minutes spent.
The tx plan was discussed in detail with the patient and family members at the bedside. Their questions and concerns were addressed to the best of my ability. They are in agreement with the plan as detailed above. They demonstrated adequate understanding of the counseling which I have provided.      Spouse @ bedside. Was counseled. Discussed plan at length. Prefers [facility] to [facility] but I explained bed availability concerns and they are agreeable w/ tx plan. Also informed of potential need for fruther transfusion tonight and they are agreeable prn.

## 2020-02-19 NOTE — PROGRESS NOTE ADULT - PROBLEM SELECTOR PLAN 2
due to GIB as above  -s/p 2 units prbcs since admission, admitted with hgb11 (baseline 11-13)  -monitor cbc q8-q12 hours, more frequent if frequent melena or change  -transfuse for hgb <8  -monitor vitals  -may need MICU eval if pt decompensates but no intervention available to use at [facility] currently

## 2020-02-19 NOTE — PROGRESS NOTE ADULT - PROBLEM SELECTOR PLAN 5
Chronic  bp stable  - agree w/ hold norvasc 5mg daily with given GIB for now  - Continue home Toprol XL 25mg daily, with hold parameters  - Monitor routine hemodynamics

## 2020-02-19 NOTE — PROGRESS NOTE ADULT - REASON FOR ADMISSION
GI bleed, YOU

## 2020-02-19 NOTE — PROGRESS NOTE ADULT - PROBLEM SELECTOR PLAN 3
YOU on CKD Stage 3, baseline Cr ~1.8, on admission Cr 2.2  - Likely prerenal azotemia from acute blood loss anemia,   - cr 1.6 back near baseline, monitor as indicated  - IVF as needed  - Avoid nephrotoxic agents where feasible

## 2020-02-19 NOTE — PROGRESS NOTE ADULT - NSHPATTENDINGPLANDISCUSS_GEN_ALL_CORE
pt and Dr Llamas for his GI bleed.
pt and nurses about his bleeding
GI and transfer center
pt, SW, CM, RN,GI, family @ bedside - re: tx plan, disposition planning, above detailed plan

## 2020-02-20 ENCOUNTER — INPATIENT (INPATIENT)
Facility: HOSPITAL | Age: 85
LOS: 1 days | Discharge: HOME HEALTH | End: 2020-02-22
Attending: INTERNAL MEDICINE | Admitting: INTERNAL MEDICINE
Payer: MEDICARE

## 2020-02-20 VITALS
WEIGHT: 148.15 LBS | RESPIRATION RATE: 16 BRPM | OXYGEN SATURATION: 100 % | DIASTOLIC BLOOD PRESSURE: 62 MMHG | SYSTOLIC BLOOD PRESSURE: 95 MMHG | HEART RATE: 64 BPM | HEIGHT: 70 IN | TEMPERATURE: 98 F

## 2020-02-20 DIAGNOSIS — Z29.9 ENCOUNTER FOR PROPHYLACTIC MEASURES, UNSPECIFIED: ICD-10-CM

## 2020-02-20 DIAGNOSIS — K92.2 GASTROINTESTINAL HEMORRHAGE, UNSPECIFIED: ICD-10-CM

## 2020-02-20 DIAGNOSIS — I10 ESSENTIAL (PRIMARY) HYPERTENSION: ICD-10-CM

## 2020-02-20 DIAGNOSIS — N40.0 BENIGN PROSTATIC HYPERPLASIA WITHOUT LOWER URINARY TRACT SYMPTOMS: ICD-10-CM

## 2020-02-20 DIAGNOSIS — F03.90 UNSPECIFIED DEMENTIA WITHOUT BEHAVIORAL DISTURBANCE: ICD-10-CM

## 2020-02-20 DIAGNOSIS — Z98.89 OTHER SPECIFIED POSTPROCEDURAL STATES: Chronic | ICD-10-CM

## 2020-02-20 DIAGNOSIS — I25.10 ATHEROSCLEROTIC HEART DISEASE OF NATIVE CORONARY ARTERY WITHOUT ANGINA PECTORIS: ICD-10-CM

## 2020-02-20 DIAGNOSIS — N18.3 CHRONIC KIDNEY DISEASE, STAGE 3 (MODERATE): ICD-10-CM

## 2020-02-20 DIAGNOSIS — Z02.9 ENCOUNTER FOR ADMINISTRATIVE EXAMINATIONS, UNSPECIFIED: ICD-10-CM

## 2020-02-20 DIAGNOSIS — J44.9 CHRONIC OBSTRUCTIVE PULMONARY DISEASE, UNSPECIFIED: ICD-10-CM

## 2020-02-20 DIAGNOSIS — Z95.1 PRESENCE OF AORTOCORONARY BYPASS GRAFT: Chronic | ICD-10-CM

## 2020-02-20 DIAGNOSIS — K21.9 GASTRO-ESOPHAGEAL REFLUX DISEASE WITHOUT ESOPHAGITIS: ICD-10-CM

## 2020-02-20 LAB
ANION GAP SERPL CALC-SCNC: 9 MMO/L — SIGNIFICANT CHANGE UP (ref 7–14)
APTT BLD: 32 SEC — SIGNIFICANT CHANGE UP (ref 27.5–36.3)
BLD GP AB SCN SERPL QL: NEGATIVE — SIGNIFICANT CHANGE UP
BUN SERPL-MCNC: 21 MG/DL — SIGNIFICANT CHANGE UP (ref 7–23)
CALCIUM SERPL-MCNC: 8.8 MG/DL — SIGNIFICANT CHANGE UP (ref 8.4–10.5)
CHLORIDE SERPL-SCNC: 110 MMOL/L — HIGH (ref 98–107)
CO2 SERPL-SCNC: 21 MMOL/L — LOW (ref 22–31)
CREAT SERPL-MCNC: 1.76 MG/DL — HIGH (ref 0.5–1.3)
CULTURE RESULTS: SIGNIFICANT CHANGE UP
CULTURE RESULTS: SIGNIFICANT CHANGE UP
GLUCOSE SERPL-MCNC: 108 MG/DL — HIGH (ref 70–99)
HCT VFR BLD CALC: 25.5 % — LOW (ref 39–50)
HGB BLD-MCNC: 7.8 G/DL — LOW (ref 13–17)
INR BLD: 1.05 — SIGNIFICANT CHANGE UP (ref 0.88–1.17)
MAGNESIUM SERPL-MCNC: 1.6 MG/DL — SIGNIFICANT CHANGE UP (ref 1.6–2.6)
MCHC RBC-ENTMCNC: 27.6 PG — SIGNIFICANT CHANGE UP (ref 27–34)
MCHC RBC-ENTMCNC: 30.6 % — LOW (ref 32–36)
MCV RBC AUTO: 90.1 FL — SIGNIFICANT CHANGE UP (ref 80–100)
NRBC # FLD: 0 K/UL — SIGNIFICANT CHANGE UP (ref 0–0)
PHOSPHATE SERPL-MCNC: 2.5 MG/DL — SIGNIFICANT CHANGE UP (ref 2.5–4.5)
PLATELET # BLD AUTO: 234 K/UL — SIGNIFICANT CHANGE UP (ref 150–400)
PMV BLD: 10.6 FL — SIGNIFICANT CHANGE UP (ref 7–13)
POTASSIUM SERPL-MCNC: 4.2 MMOL/L — SIGNIFICANT CHANGE UP (ref 3.5–5.3)
POTASSIUM SERPL-SCNC: 4.2 MMOL/L — SIGNIFICANT CHANGE UP (ref 3.5–5.3)
PROTHROM AB SERPL-ACNC: 12 SEC — SIGNIFICANT CHANGE UP (ref 9.8–13.1)
RBC # BLD: 2.83 M/UL — LOW (ref 4.2–5.8)
RBC # FLD: 16.2 % — HIGH (ref 10.3–14.5)
RH IG SCN BLD-IMP: POSITIVE — SIGNIFICANT CHANGE UP
SODIUM SERPL-SCNC: 140 MMOL/L — SIGNIFICANT CHANGE UP (ref 135–145)
SPECIMEN SOURCE: SIGNIFICANT CHANGE UP
SPECIMEN SOURCE: SIGNIFICANT CHANGE UP
WBC # BLD: 10.13 K/UL — SIGNIFICANT CHANGE UP (ref 3.8–10.5)
WBC # FLD AUTO: 10.13 K/UL — SIGNIFICANT CHANGE UP (ref 3.8–10.5)

## 2020-02-20 PROCEDURE — 99223 1ST HOSP IP/OBS HIGH 75: CPT

## 2020-02-20 RX ORDER — ATORVASTATIN CALCIUM 80 MG/1
80 TABLET, FILM COATED ORAL AT BEDTIME
Refills: 0 | Status: DISCONTINUED | OUTPATIENT
Start: 2020-02-20 | End: 2020-02-22

## 2020-02-20 RX ORDER — SUCRALFATE 1 G
1 TABLET ORAL EVERY 6 HOURS
Refills: 0 | Status: DISCONTINUED | OUTPATIENT
Start: 2020-02-20 | End: 2020-02-22

## 2020-02-20 RX ORDER — ALBUTEROL 90 UG/1
2 AEROSOL, METERED ORAL EVERY 6 HOURS
Refills: 0 | Status: DISCONTINUED | OUTPATIENT
Start: 2020-02-20 | End: 2020-02-22

## 2020-02-20 RX ORDER — DONEPEZIL HYDROCHLORIDE 10 MG/1
10 TABLET, FILM COATED ORAL AT BEDTIME
Refills: 0 | Status: DISCONTINUED | OUTPATIENT
Start: 2020-02-20 | End: 2020-02-22

## 2020-02-20 RX ORDER — FINASTERIDE 5 MG/1
5 TABLET, FILM COATED ORAL DAILY
Refills: 0 | Status: DISCONTINUED | OUTPATIENT
Start: 2020-02-20 | End: 2020-02-22

## 2020-02-20 RX ORDER — PANTOPRAZOLE SODIUM 20 MG/1
40 TABLET, DELAYED RELEASE ORAL EVERY 12 HOURS
Refills: 0 | Status: DISCONTINUED | OUTPATIENT
Start: 2020-02-20 | End: 2020-02-21

## 2020-02-20 RX ORDER — DEXTROSE MONOHYDRATE, SODIUM CHLORIDE, AND POTASSIUM CHLORIDE 50; .745; 4.5 G/1000ML; G/1000ML; G/1000ML
1000 INJECTION, SOLUTION INTRAVENOUS
Refills: 0 | Status: DISCONTINUED | OUTPATIENT
Start: 2020-02-20 | End: 2020-02-22

## 2020-02-20 RX ORDER — TAMSULOSIN HYDROCHLORIDE 0.4 MG/1
0.4 CAPSULE ORAL AT BEDTIME
Refills: 0 | Status: DISCONTINUED | OUTPATIENT
Start: 2020-02-20 | End: 2020-02-22

## 2020-02-20 RX ORDER — LANOLIN ALCOHOL/MO/W.PET/CERES
3 CREAM (GRAM) TOPICAL AT BEDTIME
Refills: 0 | Status: DISCONTINUED | OUTPATIENT
Start: 2020-02-20 | End: 2020-02-22

## 2020-02-20 RX ADMIN — Medication 1 GRAM(S): at 11:53

## 2020-02-20 RX ADMIN — PANTOPRAZOLE SODIUM 40 MILLIGRAM(S): 20 TABLET, DELAYED RELEASE ORAL at 05:07

## 2020-02-20 RX ADMIN — Medication 1 GRAM(S): at 17:24

## 2020-02-20 RX ADMIN — Medication 1 GRAM(S): at 05:07

## 2020-02-20 RX ADMIN — TAMSULOSIN HYDROCHLORIDE 0.4 MILLIGRAM(S): 0.4 CAPSULE ORAL at 22:22

## 2020-02-20 RX ADMIN — FINASTERIDE 5 MILLIGRAM(S): 5 TABLET, FILM COATED ORAL at 11:53

## 2020-02-20 RX ADMIN — ATORVASTATIN CALCIUM 80 MILLIGRAM(S): 80 TABLET, FILM COATED ORAL at 22:22

## 2020-02-20 RX ADMIN — DONEPEZIL HYDROCHLORIDE 10 MILLIGRAM(S): 10 TABLET, FILM COATED ORAL at 22:22

## 2020-02-20 RX ADMIN — Medication 1 GRAM(S): at 23:39

## 2020-02-20 RX ADMIN — PANTOPRAZOLE SODIUM 40 MILLIGRAM(S): 20 TABLET, DELAYED RELEASE ORAL at 17:24

## 2020-02-20 RX ADMIN — DEXTROSE MONOHYDRATE, SODIUM CHLORIDE, AND POTASSIUM CHLORIDE 50 MILLILITER(S): 50; .745; 4.5 INJECTION, SOLUTION INTRAVENOUS at 03:49

## 2020-02-20 NOTE — H&P ADULT - NSICDXPASTSURGICALHX_GEN_ALL_CORE_FT
PAST SURGICAL HISTORY:  S/P appendectomy     S/P AVR (Aortic Valve Replacement) Pig valve (bioprosthetic); done 2010 at [place]    S/P CABG (coronary artery bypass graft) Done in 1996; 2 cardiac stents in 1998    S/P inguinal hernia repair On right side

## 2020-02-20 NOTE — ADVANCED PRACTICE NURSE CONSULT - RECOMMEDATIONS
Topical Recommendations:  Sween 24 to bilateral upper and lower extremities and feet daily.    Right dorsal first and third toe liquid barrier film daily.    Continue low air loss bed therapy, continue heel elevation with Z-flex fluidized positioning boots, continue to turn & reposition q2h with Z-niharika fluidized positioning device, soft pillow between bony prominences, continue moisture and incontinence management with Minor moisture barrier cream & single breathable pad, continue measures to decrease friction/shear/pressure.     Continue with nutritional support as per dietary/orders.    Findings and plan discussed with patient and primary team. All questions and concerns addressed.    Please contact Wound Care Service Line if we can be of further assistance (ext [phone]).

## 2020-02-20 NOTE — H&P ADULT - NSHPREVIEWOFSYSTEMS_GEN_ALL_CORE
ROS:  Constitutional:  (+) chills, (-) fevers, sweats, unintentional weight loss, fatigue, sick contacts, recent travel, trauma  Ears/Nose/Mouth/Throat: (+) none; (-) throat pain, rhinorrhea, dysphagia/odynophagia  CV: (+) none; (-) chest pain, palpitations, lower extremity edema, orthopnea, PND  Resp: (+) none; (-) shortness of breath, cough  GI: (+) bleeding per rectum (-) abdominal pain, nausea, vomitting, diarrhea, constipation, hematochezia  : (+) none; (-) dysuria, hematuria  MSK: (+) none; (-) joint pain, joint swelling  Skin: (+) none; (-) rash, new yellowing/darkening of skin  Neuro: (+) none; (-) HA, vision changes, weakness, confusion, lightheadedness/dizziness  Endo: (+) none; (-) heat/cold intolerance, recent skin/hair/nail changes  Heme/Lymph: (+) none; (-) swollen lymph nodes, night sweats

## 2020-02-20 NOTE — H&P ADULT - NSHPLABSRESULTS_GEN_ALL_CORE
Prior diagnostics reviewed and remarkable for:  CBC w/ recent hgb around 7-8 and WBC around 12-13  BMP w/ K 3.3, Na 148, Cr 1.7 improved from initial 2.0  TTE at prior visit with poor visualization but grossly normal LV function  Tagged red scan localizing bleed to likely prox jejunum    Repeat CBC, BMP coags pending

## 2020-02-20 NOTE — H&P ADULT - PROBLEM SELECTOR PROBLEM 7
Need for prophylactic measure Essential hypertension Benign prostatic hyperplasia without lower urinary tract symptoms

## 2020-02-20 NOTE — ADVANCED PRACTICE NURSE CONSULT - REASON FOR CONSULT
Patient seen on skin care rounds after wound care referral received for assessment of skin impairment and recommendations of topical management. Chart reviewed: BMI 21.3kg/m2, Gerardo 16. Patient H/O BPH, CAD s/p CABG and stents in the past, COPD, Depression, GERD, HTN, Dementia who presents as a transfer from [facility] for GI bleed.

## 2020-02-20 NOTE — H&P ADULT - PROBLEM SELECTOR PLAN 10
1.  Name of PCP:   2.  PCP Contacted on Admission: [ ] Y    [x] N    3.  PCP contacted at Discharge: [ ] Y    [ ] N    [ ] N/A  4.  Post-Discharge Appointment Date and Location:  5.  Summary of Handoff given to PCP:    Patient assigned to my care for initial evaluation and management.  Dr. Howell to assume care in the morning.

## 2020-02-20 NOTE — PROGRESS NOTE ADULT - SUBJECTIVE AND OBJECTIVE BOX
INTERVAL HPI/OVERNIGHT EVENTS:    now with brown stool   denying abdominal pain    no n/v  NPO for Capsule later this afternoon     MEDICATIONS  (STANDING):  atorvastatin 80 milliGRAM(s) Oral at bedtime  dextrose 5% with potassium chloride 20 mEq/L 1000 milliLiter(s) (50 mL/Hr) IV Continuous <Continuous>  donepezil 10 milliGRAM(s) Oral at bedtime  finasteride 5 milliGRAM(s) Oral daily  pantoprazole  Injectable 40 milliGRAM(s) IV Push every 12 hours  sucralfate suspension 1 Gram(s) Oral every 6 hours  tamsulosin 0.4 milliGRAM(s) Oral at bedtime    MEDICATIONS  (PRN):  ALBUTerol    90 MICROgram(s) HFA Inhaler 2 Puff(s) Inhalation every 6 hours PRN Shortness of Breath and/or Wheezing  melatonin 3 milliGRAM(s) Oral at bedtime PRN Insomnia      Allergies    amoxicillin (Unknown)  Levaquin (Unknown)  penicillin (Unknown)    Intolerances        Review of Systems:    General:  No wt loss, fevers, chills, night sweats, fatigue   Eyes:  Good vision, no reported pain  ENT:  No sore throat, pain, runny nose, dysphagia  CV:  No pain, palpitations, hypo/hypertension  Resp:  No dyspnea, cough, tachypnea, wheezing  GI:  No pain, No nausea, No vomiting, No diarrhea, No constipation, No weight loss, No fever, No pruritis, No rectal bleeding, No melena, No dysphagia  :  No pain, bleeding, incontinence, nocturia  Muscle:  No pain, weakness  Neuro:  No weakness, tingling, memory problems  Psych:  No fatigue, insomnia, mood problems, depression  Endocrine:  No polyuria, polydypsia, cold/heat intolerance  Heme:  No petechiae, ecchymosis, easy bruisability  Skin:  No rash, tattoos, scars, edema      Vital Signs Last 24 Hrs  T(C): 36.9 (20 Feb 2020 09:50), Max: 36.9 (20 Feb 2020 09:50)  T(F): 98.5 (20 Feb 2020 09:50), Max: 98.5 (20 Feb 2020 09:50)  HR: 60 (20 Feb 2020 09:50) (60 - 67)  BP: 129/84 (20 Feb 2020 09:50) (95/62 - 129/84)  BP(mean): --  RR: 16 (20 Feb 2020 09:50) (16 - 18)  SpO2: 100% (20 Feb 2020 09:50) (95% - 100%)    PHYSICAL EXAM:    Constitutional: NAD  HEENT: EOMI, throat clear  Neck: No LAD, supple  Respiratory: CTA and P  Cardiovascular: S1 and S2, RRR, no M  Gastrointestinal: BS+, soft, NT/ND, neg HSM,  Extremities: No peripheral edema, neg clubbing, cyanosis  Vascular: 2+ peripheral pulses  Neurological: A/O x 3, no focal deficits  Psychiatric: Normal mood, normal affect  Skin: No rashes      LABS:                        7.8    10.13 )-----------( 234      ( 20 Feb 2020 03:30 )             25.5     02-20    140  |  110<H>  |  21  ----------------------------<  108<H>  4.2   |  21<L>  |  1.76<H>    Ca    8.8      20 Feb 2020 03:30  Phos  2.5     02-20  Mg     1.6     02-20      PT/INR - ( 20 Feb 2020 03:30 )   PT: 12.0 SEC;   INR: 1.05          PTT - ( 20 Feb 2020 03:30 )  PTT:32.0 SEC      RADIOLOGY & ADDITIONAL TESTS:      < from: NM GI Bleed Localization (02.17.20 @ 12:31) >    EXAM:  NM GI BLEEDING IMG                            PROCEDURE DATE:  02/17/2020          INTERPRETATION:  RADIOPHARMACEUTICAL: 20 mCi Tc-99m labeled autologous red blood cells, I.V.    CLINICAL STATEMENT: 85-year-old male with GI bleeding for 2 weeks, referred for localization of bleeding site.    TECHNIQUE:  Dynamic images of the anterior abdomen/pelvis were obtained for 2 hours following administration of radiopharmaceutical. Static images of the abdomen/pelvis in the caudal and right lateral positions were obtained immediately thereafter.    COMPARISON: No prior bleeding scan. CT is dated 2/14/2018.    FINDINGS: Beginning at approximately 50 minutes post injection, a focus of increased radiotracer activity is seen in the left upper quadrant of the abdomen. This focus increases in intensity over time and was rapidly with the left hemiabdomen. Findings are compatible with an active bleeding site within small bowel the left upper abdomen.     IMPRESSION: Abnormal GI bleeding scan.    Active bleeding site in small bowel in left upper, likely proximal to mid jejunum.    Dr. Llamas was notified of these results at 12:40 PM on 2/17/2020, by telephone with read back.                DEVENDRA ELIZABETH M.D., NUCLEAR MEDICINE ATTENDING  This document has been electronically signed. Feb 17 2020 12:43PM                < end of copied text >

## 2020-02-20 NOTE — H&P ADULT - HISTORY OF PRESENT ILLNESS
Shayne Shelley is an 85 year old man with a history of BPH, CAD s/p CABG and stents in the past, COPD, Depression, GERD, HTN, Dementia who presents as a transfer from [facility] for GI bleed.    History obtained from chart review as patient poor historian at baseline and at time of interview he was unsure about most of his recent history.    He was initially admitted to [facility] on 2/14 for weakness and hypotension and GI bleed.  He underwent a tagged red blood cell scan which localized bleeding to likely the proximal jejunum.  GI evaluated and he was planned for transfer for purposes of a push enteroscopy to attempt to intervene on the bleeding.  He required 2 units of pRBCs at [place] and was also treated with PPI IV and sucralfate.  He was transferred to [facility] on 2/19.    On evaluation, he reported feeling cold but denied any pain or other symptoms.  He otherwise felt well and was unsure about his recent admission.

## 2020-02-20 NOTE — H&P ADULT - PROBLEM SELECTOR PLAN 4
-PPI as above -Creatinine appears stabilized around 1.6-1.7  -Recent YOU possibly from prerenal etiology  -Continue to monitor for need for transfusion or additional fluids

## 2020-02-20 NOTE — H&P ADULT - ASSESSMENT
85 year old man with a history of BPH, CAD s/p CABG and stents in the past, COPD, Depression, GERD, HTN, Dementia who presents as a transfer from [facility] for GI bleed.

## 2020-02-20 NOTE — H&P ADULT - PROBLEM SELECTOR PROBLEM 6
Essential hypertension Benign prostatic hyperplasia without lower urinary tract symptoms Gastroesophageal reflux disease, esophagitis presence not specified

## 2020-02-20 NOTE — PROGRESS NOTE ADULT - ASSESSMENT
Rectal Bleeding  bleeding scan + for active gib in likely proximal jejunum  hemodynamically stable at present  serial cbc, active t&s, transfuse prn  cont ppi iv bid, carafate qid  avoid a/c antiplatelets and nsaids   NPO for Video Capsule Endoscopy given now with brown stool   further recommendations pending Capsule read tomorrow afternoon     HTN  cont to trend bp and titrate medications accordingly per primary team     Advanced care planning was discussed with patient and family.  Advanced care planning forms were reviewed and discussed.  Risks, benefits and alternatives of gastroenterologic procedures were discussed in detail and all questions were answered. 30 minutes spent.

## 2020-02-20 NOTE — PROGRESS NOTE ADULT - SUBJECTIVE AND OBJECTIVE BOX
CHIEF COMPLAINT:Patient is a 85y old  Male who presents with a chief complaint of CC: GI bleed (20 Feb 2020 03:23)    	        PAST MEDICAL & SURGICAL HISTORY:  Myocardial infarction  COPD (chronic obstructive pulmonary disease)  Seasonal allergies  GERD (gastroesophageal reflux disease)  Benign prostatic hypertrophy  Hyperlipidemia  Hypertension  Depression  Peripheral Neuropathy  Osteoporosis  CAD (Coronary Artery Disease): MI, s/p CABG with 2 cardiac stents  S/P inguinal hernia repair: On right side  S/P appendectomy  S/P CABG (coronary artery bypass graft): Done in 1996; 2 cardiac stents in 1998  S/P AVR (Aortic Valve Replacement): Pig valve (bioprosthetic); done 2010 at [place]          REVIEW OF SYSTEMS:  CONSTITUTIONAL: No fever, weight loss, or fatigue  EYES: No eye pain, visual disturbances, or discharge  NECK: No pain or stiffness  RESPIRATORY: No cough, wheezing, chills or hemoptysis; No Shortness of Breath  CARDIOVASCULAR: No chest pain, palpitations, passing out, dizziness, or leg swelling  GASTROINTESTINAL: No abdominal or epigastric pain. No nausea, vomiting, or hematemesis  GENITOURINARY: No dysuria, frequency, hematuria, or incontinence  NEUROLOGICAL: No headaches,    Medications:  MEDICATIONS  (STANDING):  atorvastatin 80 milliGRAM(s) Oral at bedtime  dextrose 5% with potassium chloride 20 mEq/L 1000 milliLiter(s) (50 mL/Hr) IV Continuous <Continuous>  donepezil 10 milliGRAM(s) Oral at bedtime  finasteride 5 milliGRAM(s) Oral daily  pantoprazole  Injectable 40 milliGRAM(s) IV Push every 12 hours  sucralfate suspension 1 Gram(s) Oral every 6 hours  tamsulosin 0.4 milliGRAM(s) Oral at bedtime    MEDICATIONS  (PRN):  ALBUTerol    90 MICROgram(s) HFA Inhaler 2 Puff(s) Inhalation every 6 hours PRN Shortness of Breath and/or Wheezing  melatonin 3 milliGRAM(s) Oral at bedtime PRN Insomnia    	    PHYSICAL EXAM:  T(C): 36.9 (02-20-20 @ 09:50), Max: 36.9 (02-20-20 @ 09:50)  HR: 60 (02-20-20 @ 09:50) (60 - 67)  BP: 129/84 (02-20-20 @ 09:50) (95/62 - 129/84)  RR: 16 (02-20-20 @ 09:50) (16 - 18)  SpO2: 100% (02-20-20 @ 09:50) (95% - 100%)  Wt(kg): --  I&O's Summary      Appearance: Normal	  HEENT:   Normal oral mucosa, PERRL, EOMI	  Lymphatic: No lymphadenopathy  Cardiovascular: Normal S1 S2, No JVD, No murmurs, No edema  Respiratory: Lungs clear to auscultation	  Psychiatry: A & O   Gastrointestinal:  Soft, Non-tender, + BS	  Skin: No rashes, No ecchymoses, No cyanosis	  Neurologic: Non-focal  Extremities: Normal range of motion, No clubbing, cyanosis or edema  Vascular: Peripheral pulses palpable 2+ bilaterally    TELEMETRY: 	    ECG:  	  RADIOLOGY:  OTHER: 	  	  LABS:	 	    CARDIAC MARKERS:                                7.8    10.13 )-----------( 234      ( 20 Feb 2020 03:30 )             25.5     02-20    140  |  110<H>  |  21  ----------------------------<  108<H>  4.2   |  21<L>  |  1.76<H>    Ca    8.8      20 Feb 2020 03:30  Phos  2.5     02-20  Mg     1.6     02-20      proBNP:   Lipid Profile:   HgA1c:   TSH:

## 2020-02-20 NOTE — H&P ADULT - PROBLEM SELECTOR PLAN 8
1.  Name of PCP:   2.  PCP Contacted on Admission: [ ] Y    [x] N    3.  PCP contacted at Discharge: [ ] Y    [ ] N    [ ] N/A  4.  Post-Discharge Appointment Date and Location:  5.  Summary of Handoff given to PCP:    Patient assigned to my care for initial evaluation and management.  Dr. Howell to assume care in the morning. -No pharmacologic anticoagulation given active bleeding.  SCD for ppx. -BP soft.  Hold home amlodipine, metoprolol given soft BP

## 2020-02-20 NOTE — H&P ADULT - PROBLEM SELECTOR PLAN 1
-Source possibly localizing to proximal jejunum.  Possible sources of bleeding include AVM, ulcer, malignancy etc.  -Will continue PPI IV bid, sucralfate as previously given  -Monitor CBC and transfuse for <7 (consent in the chart)  -Aspirin held at [place] - will plan to resume aspirin after intervention  -NPO for possible push enteroscopy - GI emailed overnight

## 2020-02-20 NOTE — H&P ADULT - PROBLEM SELECTOR PLAN 7
-No pharmacologic anticoagulation given active bleeding.  SCD for ppx. -BP soft.  Hold home amlodipine, metoprolol given soft BP -Continue tamsulosin and finasteride

## 2020-02-20 NOTE — H&P ADULT - PROBLEM SELECTOR PLAN 9
1.  Name of PCP:   2.  PCP Contacted on Admission: [ ] Y    [x] N    3.  PCP contacted at Discharge: [ ] Y    [ ] N    [ ] N/A  4.  Post-Discharge Appointment Date and Location:  5.  Summary of Handoff given to PCP:    Patient assigned to my care for initial evaluation and management.  Dr. Howell to assume care in the morning. -No pharmacologic anticoagulation given active bleeding.  SCD for ppx.

## 2020-02-20 NOTE — H&P ADULT - PROBLEM SELECTOR PLAN 2
-No current chest pain  -Remote history of CABG and stents in 1990s  -Will plan to resume aspirin after likely intervention today

## 2020-02-20 NOTE — CHART NOTE - NSCHARTNOTEFT_GEN_A_CORE
Gastroenterology Brief Note  Risks of video capsule endoscopy explained, including risk of retained capsule, potentially requiring surgery for removal.  Patient understands and agrees to risks.      Capsule swallowed at: 4:00 pm    NPO now.   Resume Clear liquid diet at:  6:00 pm   Resume regular consistency diet at: 8:00 pm    Equipment can be removed at: 4:00 am    GI fellow will retrieve equipment in the morning.    NO MRI UNTIL CAPSULE CLEARANCE CONFIRMED. CAPSULE IS NOT MRI COMPATIBLE.

## 2020-02-20 NOTE — H&P ADULT - NSHPPHYSICALEXAM_GEN_ALL_CORE
Physical exam:  Constitutional-Vitals signs reviewed and afebrile, hr 64, bp 95/62, rr 16, satting 100% on RA, awake, alert, not in acute distress  Neuro-awake, alert, oriented to person, year, month, and situation but not location, moving all extremities, face symmetric  Eyes-pupils equally round and reactive to light, non icteric, no discharge noted  Ears, nose, mouth, throat-no abnormal discharge, moist mucous membranes, oropharynx clear without noted exudate  CV-regular rate and rhythm, no rubs, murmurs, gallops appreciated, no lower extremity edema, palpable distal pulses (radial, dorsalis pedis, posterior tibial)  Resp-clear to auscultation bilaterally, normal respiratory effort,   GI-abdomen soft and nontender, no rebound or guarding present  -not examined  MSK-normal range of motion of bilateral elbows and knees, no obvious deformities   Skin-warm, dry, no rash appreciated  Psych-calm, cooperative, normal affect, not attending to internal stimuli

## 2020-02-20 NOTE — H&P ADULT - PROBLEM SELECTOR PLAN 6
-BP soft.  Hold home amlodipine, metoprolol given soft BP -Continue tamsulosin and finasteride -PPI as above

## 2020-02-20 NOTE — PROGRESS NOTE ADULT - ASSESSMENT
85 year old male  with  history of BPH, CAD s/p CABG and stents in the past, COPD, Depression, GERD, HTN, Dementia who presents as a transfer from [facility] for GI bleed.      ·  Problem: Gastrointestinal hemorrhage, unspecified gastrointestinal hemorrhage type.  jejunal?   continue PPI IV bid, sucralfate   -Monitor CBC and transfuse for <7  -Aspirin held at [place] -   -NPO for possible push enteroscopy -or capsule       ·  Problem: Coronary artery disease involving native coronary artery of native heart without angina pectoris.   -Remote history of CABG and stents in 1990s        ·  Problem: Chronic obstructive pulmonary disease, unspecified COPD type.    Plan: -Continue albuterol prn      ·  Problem: Stage 3 chronic kidney disease.   -Creatinine stabilized around 1.6-1.7  -Recent YOU possibly from prerenal etiology        ·  Problem: Dementia without behavioral disturbance, unspecified dementia type.   Plan: -Continue donepezil.      Problem: Gastroesophageal reflux disease, esophagitis presence not specified  .Plan: -PPI       ·  Problem: Benign prostatic hyperplasia without lower urinary tract symptoms.   Plan: -Continue tamsulosin and finasteride.      ·  Problem: Essential hypertension.   c/w meds / adjust prn         Problem/Plan - 10:

## 2020-02-20 NOTE — H&P ADULT - PROBLEM SELECTOR PROBLEM 5
Benign prostatic hyperplasia without lower urinary tract symptoms Gastroesophageal reflux disease, esophagitis presence not specified Dementia without behavioral disturbance, unspecified dementia type

## 2020-02-20 NOTE — ADVANCED PRACTICE NURSE CONSULT - ASSESSMENT
General: A&Ox 2-3, independently mobile in bed, incontinent of urine and stool. Skin warm, dry with increased moisture in intertriginous folds, adequate skin turgor, scattered areas ecchymosis on bilateral upper extremities.  Patient at risk for incontinence associated dermatitis, skin currently intact with blanchable erythema to bilateral upper inner buttock, no pressure injury noted to sacrum at time of assessment. Blanchable erythema noted to bilateral heels, no pressure injuries at time of assessment. Right medial heel with stable callus. Right dorsal first and third toe with stable intact scabs measuring 0.2cmx0.2cmx0.

## 2020-02-20 NOTE — PATIENT PROFILE ADULT - NSPROPTRIGHTBILLOFRIGHTS_GEN_A_NUR
Spine appears normal, range of motion is not limited, no muscle or joint tenderness patient representative

## 2020-02-21 ENCOUNTER — TRANSCRIPTION ENCOUNTER (OUTPATIENT)
Age: 85
End: 2020-02-21

## 2020-02-21 LAB
ANION GAP SERPL CALC-SCNC: 11 MMO/L — SIGNIFICANT CHANGE UP (ref 7–14)
ANION GAP SERPL CALC-SCNC: 11 MMO/L — SIGNIFICANT CHANGE UP (ref 7–14)
APPEARANCE UR: CLEAR — SIGNIFICANT CHANGE UP
BILIRUB UR-MCNC: NEGATIVE — SIGNIFICANT CHANGE UP
BLOOD UR QL VISUAL: NEGATIVE — SIGNIFICANT CHANGE UP
BUN SERPL-MCNC: 19 MG/DL — SIGNIFICANT CHANGE UP (ref 7–23)
BUN SERPL-MCNC: 19 MG/DL — SIGNIFICANT CHANGE UP (ref 7–23)
CALCIUM SERPL-MCNC: 8.9 MG/DL — SIGNIFICANT CHANGE UP (ref 8.4–10.5)
CALCIUM SERPL-MCNC: 8.9 MG/DL — SIGNIFICANT CHANGE UP (ref 8.4–10.5)
CHLORIDE SERPL-SCNC: 111 MMOL/L — HIGH (ref 98–107)
CHLORIDE SERPL-SCNC: 111 MMOL/L — HIGH (ref 98–107)
CO2 SERPL-SCNC: 19 MMOL/L — LOW (ref 22–31)
CO2 SERPL-SCNC: 19 MMOL/L — LOW (ref 22–31)
COLOR SPEC: SIGNIFICANT CHANGE UP
CREAT SERPL-MCNC: 1.77 MG/DL — HIGH (ref 0.5–1.3)
CREAT SERPL-MCNC: 1.77 MG/DL — HIGH (ref 0.5–1.3)
GLUCOSE SERPL-MCNC: 92 MG/DL — SIGNIFICANT CHANGE UP (ref 70–99)
GLUCOSE SERPL-MCNC: 92 MG/DL — SIGNIFICANT CHANGE UP (ref 70–99)
GLUCOSE UR-MCNC: NEGATIVE — SIGNIFICANT CHANGE UP
HCT VFR BLD CALC: 26.5 % — LOW (ref 39–50)
HGB BLD-MCNC: 8.3 G/DL — LOW (ref 13–17)
KETONES UR-MCNC: NEGATIVE — SIGNIFICANT CHANGE UP
LEUKOCYTE ESTERASE UR-ACNC: NEGATIVE — SIGNIFICANT CHANGE UP
MAGNESIUM SERPL-MCNC: 1.4 MG/DL — LOW (ref 1.6–2.6)
MCHC RBC-ENTMCNC: 28 PG — SIGNIFICANT CHANGE UP (ref 27–34)
MCHC RBC-ENTMCNC: 31.3 % — LOW (ref 32–36)
MCV RBC AUTO: 89.5 FL — SIGNIFICANT CHANGE UP (ref 80–100)
NITRITE UR-MCNC: NEGATIVE — SIGNIFICANT CHANGE UP
NRBC # FLD: 0 K/UL — SIGNIFICANT CHANGE UP (ref 0–0)
PH UR: 5.5 — SIGNIFICANT CHANGE UP (ref 5–8)
PHOSPHATE SERPL-MCNC: 3.2 MG/DL — SIGNIFICANT CHANGE UP (ref 2.5–4.5)
PLATELET # BLD AUTO: 227 K/UL — SIGNIFICANT CHANGE UP (ref 150–400)
PMV BLD: 10.7 FL — SIGNIFICANT CHANGE UP (ref 7–13)
POTASSIUM SERPL-MCNC: 4.2 MMOL/L — SIGNIFICANT CHANGE UP (ref 3.5–5.3)
POTASSIUM SERPL-MCNC: 4.2 MMOL/L — SIGNIFICANT CHANGE UP (ref 3.5–5.3)
POTASSIUM SERPL-SCNC: 4.2 MMOL/L — SIGNIFICANT CHANGE UP (ref 3.5–5.3)
POTASSIUM SERPL-SCNC: 4.2 MMOL/L — SIGNIFICANT CHANGE UP (ref 3.5–5.3)
PROT UR-MCNC: 10 — SIGNIFICANT CHANGE UP
PROT UR-MCNC: 17.9 MG/DL — SIGNIFICANT CHANGE UP
RBC # BLD: 2.96 M/UL — LOW (ref 4.2–5.8)
RBC # FLD: 16 % — HIGH (ref 10.3–14.5)
RBC CASTS # UR COMP ASSIST: SIGNIFICANT CHANGE UP (ref 0–?)
SODIUM SERPL-SCNC: 141 MMOL/L — SIGNIFICANT CHANGE UP (ref 135–145)
SODIUM SERPL-SCNC: 141 MMOL/L — SIGNIFICANT CHANGE UP (ref 135–145)
SP GR SPEC: 1.01 — SIGNIFICANT CHANGE UP (ref 1–1.04)
UROBILINOGEN FLD QL: NORMAL — SIGNIFICANT CHANGE UP
WBC # BLD: 7.37 K/UL — SIGNIFICANT CHANGE UP (ref 3.8–10.5)
WBC # FLD AUTO: 7.37 K/UL — SIGNIFICANT CHANGE UP (ref 3.8–10.5)
WBC UR QL: SIGNIFICANT CHANGE UP (ref 0–?)

## 2020-02-21 PROCEDURE — 76770 US EXAM ABDO BACK WALL COMP: CPT | Mod: 26

## 2020-02-21 RX ORDER — PANTOPRAZOLE SODIUM 20 MG/1
40 TABLET, DELAYED RELEASE ORAL
Refills: 0 | Status: DISCONTINUED | OUTPATIENT
Start: 2020-02-22 | End: 2020-02-22

## 2020-02-21 RX ADMIN — PANTOPRAZOLE SODIUM 40 MILLIGRAM(S): 20 TABLET, DELAYED RELEASE ORAL at 05:30

## 2020-02-21 RX ADMIN — TAMSULOSIN HYDROCHLORIDE 0.4 MILLIGRAM(S): 0.4 CAPSULE ORAL at 21:11

## 2020-02-21 RX ADMIN — DONEPEZIL HYDROCHLORIDE 10 MILLIGRAM(S): 10 TABLET, FILM COATED ORAL at 21:11

## 2020-02-21 RX ADMIN — ATORVASTATIN CALCIUM 80 MILLIGRAM(S): 80 TABLET, FILM COATED ORAL at 21:11

## 2020-02-21 RX ADMIN — Medication 3 MILLIGRAM(S): at 21:10

## 2020-02-21 RX ADMIN — Medication 1 GRAM(S): at 17:39

## 2020-02-21 RX ADMIN — FINASTERIDE 5 MILLIGRAM(S): 5 TABLET, FILM COATED ORAL at 13:09

## 2020-02-21 RX ADMIN — Medication 1 GRAM(S): at 13:09

## 2020-02-21 RX ADMIN — Medication 1 GRAM(S): at 05:30

## 2020-02-21 NOTE — DISCHARGE NOTE PROVIDER - PROVIDER TOKENS
PROVIDER:[TOKEN:[[id number]]] PROVIDER:[TOKEN:[[id number]]],PROVIDER:[TOKEN:[[id number]]]

## 2020-02-21 NOTE — PROGRESS NOTE ADULT - ASSESSMENT
85 year old male  with  history of BPH, CAD s/p CABG and stents in the past, COPD, Depression, GERD, HTN, Dementia who presents as a transfer from [facility] for GI bleed.      ·  Problem: Gastrointestinal hemorrhage, unspecified gastrointestinal hemorrhage type.  jejunal bleed + on scan   continue PPI IV bid, sucralfate   -Monitor CBC and transfuse for <7  -Aspirin held   diet as per gi       ·  Problem: Coronary artery disease involving native coronary artery of native heart without angina pectoris.   -Remote history of CABG and stents in 1990s        ·  Problem: Chronic obstructive pulmonary disease, unspecified COPD type.    Plan: -Continue albuterol prn      ·  Problem: Stage 3 chronic kidney disease.   -Creatinine stabilized around 1.6-1.7  -Recent YOU possibly from prerenal etiology  renal f/u        ·  Problem: Dementia without behavioral disturbance, unspecified dementia type.   Plan: -Continue donepezil.      Problem: Gastroesophageal reflux disease, esophagitis presence not specified  .Plan: -PPI       ·  Problem: Benign prostatic hyperplasia without lower urinary tract symptoms.   Plan: -Continue tamsulosin and finasteride.      ·  Problem: Essential hypertension.   off bp meds as bp stable         Problem/Plan - 10:

## 2020-02-21 NOTE — CONSULT NOTE ADULT - ASSESSMENT
ASSESSMENT:  Mr. Shelley is an 85 year old gentleman  with  history of BPH, CAD s/p CABG and stents in the past, COPD, Depression, GERD, HTN, Dementia who presents as a transfer from [facility] for GI bleed.    1. YOU ON CKD Stage 3 secondary to prerenal azotemia from hypotension.. Renal US was negative.   2. High anion gap metabolic acidosis.   3. Electrolytes are acceptable.   4. Hypomagnesemia.   5. Euvolemic and at goal.   6. Acute blood loss anemia and anemia of chronic disease. Due to GI bleed.   7. Coronary artery disease involving native coronary artery of native heart without angina pectoris.   -Remote history of CABG and stents in 1990s      RECOMMEND:  -Defer to GI for  PPI IV bid, sucralfate. If not discontinue as can worsen YOU.    -Monitor CBC and transfuse for <7  -Aspirin hold   -IVF of 50 cc/hr for 20 hours. Up to 1 liter  - BMP, CBC, Magnesium and phosphorus.   - Magnesium of 2 grams IV x one dose.       Thank you for involving [employer] in this patient's care.    With warm regards,    Sue Rothman, DO  [employer]  [phone]

## 2020-02-21 NOTE — PROGRESS NOTE ADULT - ASSESSMENT
Rectal Bleeding  bleeding scan + for active gib in likely proximal jejunum  hemodynamically stable  serial cbc, active t&s, transfuse prn  cont ppi iv bid, carafate qid  avoid a/c antiplatelets and nsaids   Capsule Endoscopy uploading; further recs this afternoon after read       HTN  cont to trend bp and titrate medications accordingly per primary team     Advanced care planning was discussed with patient and family.  Advanced care planning forms were reviewed and discussed.  Risks, benefits and alternatives of gastroenterologic procedures were discussed in detail and all questions were answered. 30 minutes spent. Rectal Bleeding  bleeding scan + for active gib in likely proximal jejunum  hemodynamically stable  serial cbc, active t&s, transfuse prn  cont ppi iv bid, carafate qid  avoid a/c antiplatelets and nsaids   hpylori stool ag with next bm  Capsule Endoscopy with no active bleeding; healing ulcer at duodenal bulb   no gi objection to dc planning with outpatient follow up in our office in 2 weeks       HTN  cont to trend bp and titrate medications accordingly per primary team     Advanced care planning was discussed with patient and family.  Advanced care planning forms were reviewed and discussed.  Risks, benefits and alternatives of gastroenterologic procedures were discussed in detail and all questions were answered. 30 minutes spent. Rectal Bleeding  bleeding scan + for active gib in likely proximal jejunum  hemodynamically stable  serial cbc, active t&s, transfuse prn  cont ppi iv bid, carafate qid  avoid a/c antiplatelets and nsaids   hpylori stool ag with next bm  Capsule Endoscopy with no active bleeding; healing ulcer at duodenal bulb   outpatient fu in our office March 2nd @11am with Dr. Perdomo       HTN  cont to trend bp and titrate medications accordingly per primary team     Advanced care planning was discussed with patient and family.  Advanced care planning forms were reviewed and discussed.  Risks, benefits and alternatives of gastroenterologic procedures were discussed in detail and all questions were answered. 30 minutes spent.

## 2020-02-21 NOTE — DISCHARGE NOTE PROVIDER - NSDCCAREPROVSEEN_GEN_ALL_CORE_FT
[facility] Gastroenterology, Team  Base, Isrrael NAIDU  [facility] Medicine ACP, Team

## 2020-02-21 NOTE — CONSULT NOTE ADULT - SUBJECTIVE AND OBJECTIVE BOX
Patient is a 85y old  Male who presents with a chief complaint of GI bleed (2020 15:22)      HPI:  Shayne Shelley is an 85 year old man with a history of BPH, CAD s/p CABG and stents in the past, COPD, Depression, GERD, HTN, Dementia who presents as a transfer from [facility] for GI bleed.    History obtained from chart review as patient poor historian at baseline and at time of interview he was unsure about most of his recent history.    He was initially admitted to [facility] on  for weakness and hypotension and GI bleed.  He underwent a tagged red blood cell scan which localized bleeding to likely the proximal jejunum.  GI evaluated and he was planned for transfer for purposes of a push enteroscopy to attempt to intervene on the bleeding.  He required 2 units of pRBCs at [place] and was also treated with PPI IV and sucralfate.  He was transferred to [facility] on 2020. On evaluation, he reported feeling cold but denied any pain or other symptoms.  He otherwise felt well and was unsure about his recent admission. (2020 03:23)Today he felt better and was alert but was aware he could not go home and wanted rehab as he was weak. He denied any dysuria, hematuria, stones or infection. Nephrology consulted for YOU on CKD stage 3.       PAST MEDICAL & SURGICAL HISTORY:  Myocardial infarction  COPD (chronic obstructive pulmonary disease)  Seasonal allergies  GERD (gastroesophageal reflux disease)  Benign prostatic hypertrophy  Hyperlipidemia  Hypertension  Depression  Peripheral Neuropathy  Osteoporosis  CAD (Coronary Artery Disease): MI, s/p CABG with 2 cardiac stents  S/P inguinal hernia repair: On right side  S/P appendectomy  S/P CABG (coronary artery bypass graft): Done in ; 2 cardiac stents in   S/P AVR (Aortic Valve Replacement): Pig valve (bioprosthetic); done  at [place]      MEDICATIONS  (STANDING):  atorvastatin 80 milliGRAM(s) Oral at bedtime  dextrose 5% with potassium chloride 20 mEq/L 1000 milliLiter(s) (50 mL/Hr) IV Continuous <Continuous>  donepezil 10 milliGRAM(s) Oral at bedtime  finasteride 5 milliGRAM(s) Oral daily  pantoprazole    Tablet 40 milliGRAM(s) Oral before breakfast  sucralfate suspension 1 Gram(s) Oral every 6 hours  tamsulosin 0.4 milliGRAM(s) Oral at bedtime      Allergies  amoxicillin (Unknown)  Levaquin (Unknown)  penicillin (Unknown)    SOCIAL HISTORY:  Denies alcohol or tobacco abuse.    FAMILY HISTORY:  Family history of renal failure (Sibling)  Family history of MI (myocardial infarction):  at 65  Family history of stroke:  at 65  Family history of amyotrophic lateral sclerosis:  at 67  Family history of stroke:  at 67  No kidney disease in family.    REVIEW OF SYSTEMS:  CONSTITUTIONAL: Has weakness, no fevers or chills  EYES/ENT: No visual changes  NECK: No pain or stiffness  RESPIRATORY: No cough, wheezing, hemoptysis. No shortness of breath  CARDIOVASCULAR: No chest pain or palpitations  GASTROINTESTINAL: No abdominal or epigastric pain. No nausea, vomiting, or hematemesis. No diarrhea or constipation. Has GI bleed.   GENITOURINARY: No dysuria, frequency or hematuria. No stones or infection  NEUROLOGICAL: No numbness or weakness  SKIN: No itching, burning, rashes, or lesions   All other review of systems is negative unless indicated above    VITAL:  T(C): , Max: 36.6 (20 @ 21:09)  T(F): , Max: 97.9 (20 @ 21:09)  HR: 83 (20 @ 21:09)  BP: 124/67 (20 @ 21:09)  RR: 17 (20 @ 21:09)  SpO2: 100% (20 @ 21:09)      PHYSICAL EXAM:  General: NAD, Alert.  HEENT: NCAT, PERRLA  Neck: Supple, No JVD  Respiratory: CTA-b/l  Cardiovascular: RRR s1s2, no m/r/g  Gastrointestinal: +BS, soft, NT/ND  Extremities: No peripheral edema b/l  Neurological: no focal deficits; strength grossly intact  Psychiatric: Normal mood, normal affect  Back: no CVAT b/l  Skin: No rashes, no nevi      LABS:                        8.3    7.37  )-----------( 227      ( 2020 07:00 )             26.5     Na(141)/K(4.2)/Cl(111)/HCO3(19)/BUN(19)/Cr(1.77)Glu(92)/Ca(8.9)/Mg(1.4)/PO4(3.2)     @ 07:00  Na(140)/K(4.2)/Cl(110)/HCO3(21)/BUN(21)/Cr(1.76)Glu(108)/Ca(8.8)/Mg(1.6)/PO4(2.5)     @ 03:30  Na(148)/K(3.3)/Cl(116)/HCO3(23)/BUN(27)/Cr(1.70)Glu(97)/Ca(8.5)/Mg(--)/PO4(--)     @ 06:50        141  |  111<H>  |  19  ----------------------------<  92  4.2   |  19<L>  |  1.77<H>    Ca    8.9      2020 07:00  Phos  3.2       Mg     1.4           Urinalysis Basic - ( 2020 03:30 )    Color: LIGHT YELLOW / Appearance: CLEAR / S.014 / pH: 5.5  Gluc: NEGATIVE / Ketone: NEGATIVE  / Bili: NEGATIVE / Urobili: NORMAL   Blood: NEGATIVE / Protein: 10 / Nitrite: NEGATIVE   Leuk Esterase: NEGATIVE / RBC: 0-2 / WBC 0-2   Sq Epi: x / Non Sq Epi: x / Bacteria: x      Protein, Random Urine: 17.9 mg/dL ( @ 03:30)      IMAGING:  EXAM:  US KIDNEYS AND BLADDER        PROCEDURE DATE:  2020         INTERPRETATION:  CLINICAL INFORMATION: Acute kidney injury.    COMPARISON: 2014.    TECHNIQUE: Sonography of the kidneys and bladder.     FINDINGS:    Right kidney:  6.5 x 3.7 x 3.9 cm. No hydronephrosis.    Left kidney:  8.5 x 5.1 x 3.4 cm. No hydronephrosis. Upper pole cyst with septation, measuring 5.2 x 5.2 x 4.8 cm.    Urinary bladder: Bladder wall thickening with trabeculation, possibly due to chronic bladder outlet obstruction.    IMPRESSION:     No hydronephrosis of either kidney.

## 2020-02-21 NOTE — DISCHARGE NOTE PROVIDER - NSDCCPCAREPLAN_GEN_ALL_CORE_FT
PRINCIPAL DISCHARGE DIAGNOSIS  Diagnosis: GIB (gastrointestinal bleeding)  Assessment and Plan of Treatment: continue Protonix 2 x day and  Carafate 4 x day  would avoid Motrin/Advil/Aleve  Outpatient fu in our office March 2nd @11am with Dr. Perdomo      SECONDARY DISCHARGE DIAGNOSES  Diagnosis: Essential hypertension  Assessment and Plan of Treatment: - HOLD amlodipine  - metoprolol given soft BP      Diagnosis: Stage 3 chronic kidney disease  Assessment and Plan of Treatment: US kidney/bladder is negative for Hydronephrosis       Diagnosis: Dementia without behavioral disturbance, unspecified dementia type  Assessment and Plan of Treatment: - c/w donepezil  Fall precautions      Diagnosis: Gastroesophageal reflux disease, esophagitis presence not specified  Assessment and Plan of Treatment: continue Protonix 2 x day and  Carafate 4 x day PRINCIPAL DISCHARGE DIAGNOSIS  Diagnosis: GIB (gastrointestinal bleeding)  Assessment and Plan of Treatment: continue Protonix 2 x day and  Carafate 4 x day  avoid Motrin/Advil/Aleve  Outpatient fu in our office March 2nd @11am with Dr. Perdomo  CABG and stents in 1990s  - HOLD Aspirin for now in setting of GI bleeding and discuss w/ Dr Perdomo when to resume Aspirin**********,  Hx CABG and stents in 1990s      SECONDARY DISCHARGE DIAGNOSES  Diagnosis: Essential hypertension  Assessment and Plan of Treatment: - HOLD amlodipine  - metoprolol given soft BP      Diagnosis: Stage 3 chronic kidney disease  Assessment and Plan of Treatment:  kidney/bladder is negative for Hydronephrosis       Diagnosis: Coronary artery disease involving native coronary artery of native heart without angina pectoris  Assessment and Plan of Treatment: - HOLD Aspirin for now in setting of GI bleeding and discuss w/ Dr Perdomo when to resume Aspirin**********,  Hx CABG and stents in 1990s  Continue Statin. Hold Metoprolol since blood pressure during the hospital stay was low normal. Follow up w/ your PCP/cardiologist Dr Weems in 7-10 days and discuss when to resume Metoprolol **********  *****Mike Car) Phone: [phone]  [address] [place] [address], [facility] [address], [place] [id number]  Phone: [phone]    Diagnosis: Dementia without behavioral disturbance, unspecified dementia type  Assessment and Plan of Treatment: - c/w donepezil  Fall precautions      Diagnosis: Gastroesophageal reflux disease, esophagitis presence not specified  Assessment and Plan of Treatment: continue Protonix 2 x day and  Carafate 4 x day

## 2020-02-21 NOTE — DISCHARGE NOTE PROVIDER - NSDCMRMEDTOKEN_GEN_ALL_CORE_FT
atorvastatin 80 mg oral tablet: 1 tab(s) orally once a day (at bedtime)  Dextrose 5% in Water with KCl 20 mEq/L intravenous solution: 1000 milliliter(s) intravenous   donepezil 10 mg oral tablet: 1 tab(s) orally once a day (at bedtime)  finasteride 5 mg oral tablet: 1 tab(s) orally once a day  ipratropium-albuterol 0.5 mg-2.5 mg/3 mLinhalation solution: 3 milliliter(s) inhaled every 4 hours, As needed, Shortness of Breath and/or Wheezing  melatonin 5 mg oral tablet: 1 tab(s) orally once a day (at bedtime), As needed, Insomnia  metoprolol succinate 25 mg oral tablet, extended release: 1 tab(s) orally once a day  nystatin 100,000 units/g topical powder: 1 application topically 2 times a day  pantoprazole 40 mg intravenous injection: 40 milligram(s) intravenous every 12 hours  sucralfate 1 g/10 mL oral suspension: 10 milliliter(s) orally every 6 hours  tamsulosin 0.4 mg oral capsule: 1 cap(s) orally once a day (at bedtime) albuterol 90 mcg/inh inhalation aerosol: 2 puff(s) inhaled every 6 hours, As needed, Shortness of Breath and/or Wheezing  atorvastatin 80 mg oral tablet: 1 tab(s) orally once a day (at bedtime)  donepezil 10 mg oral tablet: 1 tab(s) orally once a day (at bedtime)  finasteride 5 mg oral tablet: 1 tab(s) orally once a day  melatonin 5 mg oral tablet: 1 tab(s) orally once a day (at bedtime), As needed, Insomnia  pantoprazole 40 mg oral delayed release tablet: 1 tab(s) orally once a day (before a meal)  sucralfate 1 g/10 mL oral suspension: 10 milliliter(s) orally 4 times a day (before meals and at bedtime)  tamsulosin 0.4 mg oral capsule: 1 cap(s) orally once a day (at bedtime)

## 2020-02-21 NOTE — PROGRESS NOTE ADULT - SUBJECTIVE AND OBJECTIVE BOX
CHIEF COMPLAINT:Patient is a 85y old  Male who presents with a chief complaint of CC: GI bleed (21 Feb 2020 12:11)    	        PAST MEDICAL & SURGICAL HISTORY:  Myocardial infarction  COPD (chronic obstructive pulmonary disease)  Seasonal allergies  GERD (gastroesophageal reflux disease)  Benign prostatic hypertrophy  Hyperlipidemia  Hypertension  Depression  Peripheral Neuropathy  Osteoporosis  CAD (Coronary Artery Disease): MI, s/p CABG with 2 cardiac stents  S/P inguinal hernia repair: On right side  S/P appendectomy  S/P CABG (coronary artery bypass graft): Done in 1996; 2 cardiac stents in 1998  S/P AVR (Aortic Valve Replacement): Pig valve (bioprosthetic); done 2010 at [place]          REVIEW OF SYSTEMS:  wants to eat   RESPIRATORY: No cough, wheezing, chills or hemoptysis; No Shortness of Breath  CARDIOVASCULAR: No chest pain, palpitations, passing out, dizziness, or leg swelling  GASTROINTESTINAL: No abdominal or epigastric pain. No nausea, vomiting, or hematemesis;   GENITOURINARY: No dysuria, frequency, hematuria, or incontinence  NEUROLOGICAL: No headaches,     Medications:  MEDICATIONS  (STANDING):  atorvastatin 80 milliGRAM(s) Oral at bedtime  dextrose 5% with potassium chloride 20 mEq/L 1000 milliLiter(s) (50 mL/Hr) IV Continuous <Continuous>  donepezil 10 milliGRAM(s) Oral at bedtime  finasteride 5 milliGRAM(s) Oral daily  pantoprazole  Injectable 40 milliGRAM(s) IV Push every 12 hours  sucralfate suspension 1 Gram(s) Oral every 6 hours  tamsulosin 0.4 milliGRAM(s) Oral at bedtime    MEDICATIONS  (PRN):  ALBUTerol    90 MICROgram(s) HFA Inhaler 2 Puff(s) Inhalation every 6 hours PRN Shortness of Breath and/or Wheezing  melatonin 3 milliGRAM(s) Oral at bedtime PRN Insomnia    	    PHYSICAL EXAM:  T(C): 36.4 (02-21-20 @ 05:24), Max: 37 (02-20-20 @ 13:50)  HR: 55 (02-21-20 @ 05:24) (55 - 71)  BP: 102/58 (02-21-20 @ 05:24) (102/57 - 127/59)  RR: 16 (02-21-20 @ 05:24) (16 - 17)  SpO2: 100% (02-21-20 @ 05:24) (100% - 100%)  Wt(kg): --  I&O's Summary      Appearance: Normal	  HEENT:   Normal oral mucosa, PERRL, EOMI	  Lymphatic: No lymphadenopathy  Cardiovascular: Normal S1 S2, No JVD, No murmurs, No edema  Respiratory: Lungs clear to auscultation	  Psychiatry: A & O   Gastrointestinal:  Soft, Non-tender, + BS	  Skin: No rashes, No ecchymoses, No cyanosis	  Neurologic: Non-focal  Extremities: Normal range of motion, No clubbing, cyanosis or edema  Vascular: Peripheral pulses palpable 2+ bilaterally    TELEMETRY: 	    ECG:  	  RADIOLOGY:  OTHER: 	  	  LABS:	 	    CARDIAC MARKERS:                                8.3    7.37  )-----------( 227      ( 21 Feb 2020 07:00 )             26.5     02-21    141  |  111<H>  |  19  ----------------------------<  92  4.2   |  19<L>  |  1.77<H>    Ca    8.9      21 Feb 2020 07:00  Phos  3.2     02-21  Mg     1.4     02-21      proBNP:   Lipid Profile:   HgA1c:   TSH:

## 2020-02-21 NOTE — DISCHARGE NOTE PROVIDER - CARE PROVIDERS DIRECT ADDRESSES
[email or web address]

## 2020-02-21 NOTE — DISCHARGE NOTE PROVIDER - HOSPITAL COURSE
86 yo M hx of BPH, CAD, COPD, Depression, GERD, Dementia, HTN, HLD p/w GI bleed from [place].     NM scan located in proximal-mid jejunum, Video Capsule Endoscopy on 2/20--No active bleeding. GI consulted, pt o c/w  PPI IV BID, Sucralfate QID.    - HOLD all antiplatelets, AVOID NSAIDs    Pt w/ Remote history of CABG and stents in 1990s, ASA on hold in setting of GIB, restart as clinically indicated        COPD - c/w Albuterol PRN    - Stable no signs of acute exacerbation        CKD, stg 3,  Baseline appears to ~1.6-1.7    -US kidney/bladder is negative for Hydro (2/21)        Dementia- c/w donepezil        BPH- c/w tamsulosin and finasteride         Essential hypertension.-  amlodipine and metoprolol held given soft BP. Pt will f/u as outpt w/ Dr Weems for further instructions         Pt rec rehab, spouse and pt decided to take the pt home w/ private hire 24 hrs. D/w  GI and Dr Howell, both in agreement for pt to be dc w/ outpt scheduled follow up GI  and     PCP . Pt is optimized for dc

## 2020-02-21 NOTE — PROGRESS NOTE ADULT - SUBJECTIVE AND OBJECTIVE BOX
INTERVAL HPI/OVERNIGHT EVENTS:    brown bm last night   pt without bm this morning and denying abd pain       MEDICATIONS  (STANDING):  atorvastatin 80 milliGRAM(s) Oral at bedtime  dextrose 5% with potassium chloride 20 mEq/L 1000 milliLiter(s) (50 mL/Hr) IV Continuous <Continuous>  donepezil 10 milliGRAM(s) Oral at bedtime  finasteride 5 milliGRAM(s) Oral daily  pantoprazole  Injectable 40 milliGRAM(s) IV Push every 12 hours  sucralfate suspension 1 Gram(s) Oral every 6 hours  tamsulosin 0.4 milliGRAM(s) Oral at bedtime    MEDICATIONS  (PRN):  ALBUTerol    90 MICROgram(s) HFA Inhaler 2 Puff(s) Inhalation every 6 hours PRN Shortness of Breath and/or Wheezing  melatonin 3 milliGRAM(s) Oral at bedtime PRN Insomnia      Allergies    amoxicillin (Unknown)  Levaquin (Unknown)  penicillin (Unknown)    Intolerances        Review of Systems:    General:  No wt loss, fevers, chills, night sweats, fatigue   Eyes:  Good vision, no reported pain  ENT:  No sore throat, pain, runny nose, dysphagia  CV:  No pain, palpitations, hypo/hypertension  Resp:  No dyspnea, cough, tachypnea, wheezing  GI:  No pain, No nausea, No vomiting, No diarrhea, No constipation, No weight loss, No fever, No pruritis, No rectal bleeding, No melena, No dysphagia  :  No pain, bleeding, incontinence, nocturia  Muscle:  No pain, weakness  Neuro:  No weakness, tingling, memory problems  Psych:  No fatigue, insomnia, mood problems, depression  Endocrine:  No polyuria, polydypsia, cold/heat intolerance  Heme:  No petechiae, ecchymosis, easy bruisability  Skin:  No rash, tattoos, scars, edema      Vital Signs Last 24 Hrs  T(C): 36.4 (2020 05:24), Max: 37 (2020 13:50)  T(F): 97.6 (2020 05:24), Max: 98.6 (2020 13:50)  HR: 55 (2020 05:24) (55 - 71)  BP: 102/58 (2020 05:24) (102/57 - 127/59)  BP(mean): --  RR: 16 (2020 05:24) (16 - 17)  SpO2: 100% (2020 05:24) (100% - 100%)    PHYSICAL EXAM:    Constitutional: NAD  HEENT: EOMI, throat clear  Neck: No LAD, supple  Respiratory: CTA and P  Cardiovascular: S1 and S2, RRR, no M  Gastrointestinal: BS+, soft, NT/ND, neg HSM,  Extremities: No peripheral edema, neg clubbing, cyanosis  Vascular: 2+ peripheral pulses  Neurological: A/O x 3, no focal deficits  Psychiatric: Normal mood, normal affect  Skin: No rashes      LABS:                        8.3    7.37  )-----------( 227      ( 2020 07:00 )             26.5     02-    141  |  111<H>  |  19  ----------------------------<  92  4.2   |  19<L>  |  1.77<H>    Ca    8.9      2020 07:00  Phos  3.2     -  Mg     1.4     02-21      PT/INR - ( 2020 03:30 )   PT: 12.0 SEC;   INR: 1.05          PTT - ( 2020 03:30 )  PTT:32.0 SEC  Urinalysis Basic - ( 2020 03:30 )    Color: LIGHT YELLOW / Appearance: CLEAR / S.014 / pH: 5.5  Gluc: NEGATIVE / Ketone: NEGATIVE  / Bili: NEGATIVE / Urobili: NORMAL   Blood: NEGATIVE / Protein: 10 / Nitrite: NEGATIVE   Leuk Esterase: NEGATIVE / RBC: 0-2 / WBC 0-2   Sq Epi: x / Non Sq Epi: x / Bacteria: x        RADIOLOGY & ADDITIONAL TESTS:

## 2020-02-21 NOTE — DISCHARGE NOTE PROVIDER - CARE PROVIDER_API CALL
Wilver Perdomo)  Gastroenterology  [address] [place] [address]  Phone: [phone]  Fax: [phone]  Follow Up Time: Wilver Perdomo)  Gastroenterology  [address] [place] [address]  Phone: [phone]  Fax: [phone]  Follow Up Time:     Mike Weems)  Cardiovascular Disease; Internal Medicine; Nuclear Cardiology  [address], [address]  [place], [place] [address]  Phone: [phone]  Fax: [phone]  Follow Up Time:

## 2020-02-22 ENCOUNTER — TRANSCRIPTION ENCOUNTER (OUTPATIENT)
Age: 85
End: 2020-02-22

## 2020-02-22 VITALS
TEMPERATURE: 97 F | HEART RATE: 72 BPM | RESPIRATION RATE: 18 BRPM | SYSTOLIC BLOOD PRESSURE: 116 MMHG | OXYGEN SATURATION: 100 % | DIASTOLIC BLOOD PRESSURE: 75 MMHG

## 2020-02-22 LAB
ANION GAP SERPL CALC-SCNC: 10 MMO/L — SIGNIFICANT CHANGE UP (ref 7–14)
BUN SERPL-MCNC: 15 MG/DL — SIGNIFICANT CHANGE UP (ref 7–23)
CALCIUM SERPL-MCNC: 8.5 MG/DL — SIGNIFICANT CHANGE UP (ref 8.4–10.5)
CHLORIDE SERPL-SCNC: 104 MMOL/L — SIGNIFICANT CHANGE UP (ref 98–107)
CO2 SERPL-SCNC: 21 MMOL/L — LOW (ref 22–31)
CREAT SERPL-MCNC: 1.72 MG/DL — HIGH (ref 0.5–1.3)
GLUCOSE SERPL-MCNC: 95 MG/DL — SIGNIFICANT CHANGE UP (ref 70–99)
HCT VFR BLD CALC: 25.2 % — LOW (ref 39–50)
HGB BLD-MCNC: 8 G/DL — LOW (ref 13–17)
MAGNESIUM SERPL-MCNC: 2.3 MG/DL — SIGNIFICANT CHANGE UP (ref 1.6–2.6)
MCHC RBC-ENTMCNC: 28.3 PG — SIGNIFICANT CHANGE UP (ref 27–34)
MCHC RBC-ENTMCNC: 31.7 % — LOW (ref 32–36)
MCV RBC AUTO: 89 FL — SIGNIFICANT CHANGE UP (ref 80–100)
NRBC # FLD: 0 K/UL — SIGNIFICANT CHANGE UP (ref 0–0)
PHOSPHATE SERPL-MCNC: 2.8 MG/DL — SIGNIFICANT CHANGE UP (ref 2.5–4.5)
PLATELET # BLD AUTO: 230 K/UL — SIGNIFICANT CHANGE UP (ref 150–400)
PMV BLD: 10.7 FL — SIGNIFICANT CHANGE UP (ref 7–13)
POTASSIUM SERPL-MCNC: 3.2 MMOL/L — LOW (ref 3.5–5.3)
POTASSIUM SERPL-SCNC: 3.2 MMOL/L — LOW (ref 3.5–5.3)
RBC # BLD: 2.83 M/UL — LOW (ref 4.2–5.8)
RBC # FLD: 15.8 % — HIGH (ref 10.3–14.5)
SODIUM SERPL-SCNC: 135 MMOL/L — SIGNIFICANT CHANGE UP (ref 135–145)
WBC # BLD: 6.92 K/UL — SIGNIFICANT CHANGE UP (ref 3.8–10.5)
WBC # FLD AUTO: 6.92 K/UL — SIGNIFICANT CHANGE UP (ref 3.8–10.5)

## 2020-02-22 PROCEDURE — 99238 HOSP IP/OBS DSCHRG MGMT 30/<: CPT

## 2020-02-22 RX ORDER — DONEPEZIL HYDROCHLORIDE 10 MG/1
1 TABLET, FILM COATED ORAL
Qty: 30 | Refills: 0
Start: 2020-02-22 | End: 2020-03-22

## 2020-02-22 RX ORDER — FINASTERIDE 5 MG/1
1 TABLET, FILM COATED ORAL
Qty: 30 | Refills: 0
Start: 2020-02-22 | End: 2020-03-22

## 2020-02-22 RX ORDER — LANOLIN ALCOHOL/MO/W.PET/CERES
1 CREAM (GRAM) TOPICAL
Qty: 30 | Refills: 0
Start: 2020-02-22 | End: 2020-03-22

## 2020-02-22 RX ORDER — TAMSULOSIN HYDROCHLORIDE 0.4 MG/1
1 CAPSULE ORAL
Qty: 30 | Refills: 0
Start: 2020-02-22 | End: 2020-03-22

## 2020-02-22 RX ORDER — ALBUTEROL 90 UG/1
2 AEROSOL, METERED ORAL
Qty: 0 | Refills: 0 | DISCHARGE
Start: 2020-02-22

## 2020-02-22 RX ORDER — SUCRALFATE 1 G
10 TABLET ORAL
Qty: 1200 | Refills: 0
Start: 2020-02-22 | End: 2020-03-22

## 2020-02-22 RX ORDER — ATORVASTATIN CALCIUM 80 MG/1
1 TABLET, FILM COATED ORAL
Qty: 30 | Refills: 0
Start: 2020-02-22 | End: 2020-03-22

## 2020-02-22 RX ORDER — PANTOPRAZOLE SODIUM 20 MG/1
1 TABLET, DELAYED RELEASE ORAL
Qty: 30 | Refills: 0
Start: 2020-02-22 | End: 2020-03-22

## 2020-02-22 RX ORDER — SODIUM BICARBONATE 1 MEQ/ML
0.06 SYRINGE (ML) INTRAVENOUS
Qty: 75 | Refills: 0 | Status: DISCONTINUED | OUTPATIENT
Start: 2020-02-22 | End: 2020-02-22

## 2020-02-22 RX ORDER — POTASSIUM CHLORIDE 20 MEQ
20 PACKET (EA) ORAL ONCE
Refills: 0 | Status: COMPLETED | OUTPATIENT
Start: 2020-02-22 | End: 2020-02-22

## 2020-02-22 RX ORDER — MAGNESIUM SULFATE 500 MG/ML
2 VIAL (ML) INJECTION ONCE
Refills: 0 | Status: COMPLETED | OUTPATIENT
Start: 2020-02-22 | End: 2020-02-22

## 2020-02-22 RX ADMIN — Medication 1 GRAM(S): at 17:35

## 2020-02-22 RX ADMIN — Medication 50 MEQ/KG/HR: at 04:21

## 2020-02-22 RX ADMIN — PANTOPRAZOLE SODIUM 40 MILLIGRAM(S): 20 TABLET, DELAYED RELEASE ORAL at 05:16

## 2020-02-22 RX ADMIN — Medication 1 GRAM(S): at 05:16

## 2020-02-22 RX ADMIN — Medication 1 GRAM(S): at 01:14

## 2020-02-22 RX ADMIN — FINASTERIDE 5 MILLIGRAM(S): 5 TABLET, FILM COATED ORAL at 12:02

## 2020-02-22 RX ADMIN — Medication 1 GRAM(S): at 12:02

## 2020-02-22 RX ADMIN — Medication 20 MILLIEQUIVALENT(S): at 12:02

## 2020-02-22 RX ADMIN — Medication 50 GRAM(S): at 03:27

## 2020-02-22 NOTE — PROGRESS NOTE ADULT - SUBJECTIVE AND OBJECTIVE BOX
INTERVAL HPI/OVERNIGHT EVENTS:    doing well   denying rectal bleeding or melena     MEDICATIONS  (STANDING):  atorvastatin 80 milliGRAM(s) Oral at bedtime  dextrose 5% with potassium chloride 20 mEq/L 1000 milliLiter(s) (50 mL/Hr) IV Continuous <Continuous>  donepezil 10 milliGRAM(s) Oral at bedtime  finasteride 5 milliGRAM(s) Oral daily  pantoprazole    Tablet 40 milliGRAM(s) Oral before breakfast  sodium bicarbonate  Infusion 0.056 mEq/kG/Hr (50 mL/Hr) IV Continuous <Continuous>  sucralfate suspension 1 Gram(s) Oral every 6 hours  tamsulosin 0.4 milliGRAM(s) Oral at bedtime    MEDICATIONS  (PRN):  ALBUTerol    90 MICROgram(s) HFA Inhaler 2 Puff(s) Inhalation every 6 hours PRN Shortness of Breath and/or Wheezing  melatonin 3 milliGRAM(s) Oral at bedtime PRN Insomnia      Allergies    amoxicillin (Unknown)  Levaquin (Unknown)  penicillin (Unknown)    Intolerances        Review of Systems:    General:  No wt loss, fevers, chills, night sweats, fatigue   Eyes:  Good vision, no reported pain  ENT:  No sore throat, pain, runny nose, dysphagia  CV:  No pain, palpitations, hypo/hypertension  Resp:  No dyspnea, cough, tachypnea, wheezing  GI:  No pain, No nausea, No vomiting, No diarrhea, No constipation, No weight loss, No fever, No pruritis, No rectal bleeding, No melena, No dysphagia  :  No pain, bleeding, incontinence, nocturia  Muscle:  No pain, weakness  Neuro:  No weakness, tingling, memory problems  Psych:  No fatigue, insomnia, mood problems, depression  Endocrine:  No polyuria, polydypsia, cold/heat intolerance  Heme:  No petechiae, ecchymosis, easy bruisability  Skin:  No rash, tattoos, scars, edema      Vital Signs Last 24 Hrs  T(C): 36.7 (2020 05:14), Max: 36.7 (2020 05:14)  T(F): 98.1 (2020 05:14), Max: 98.1 (2020 05:14)  HR: 79 (2020 05:14) (79 - 83)  BP: 122/66 (2020 05:14) (122/66 - 127/69)  BP(mean): --  RR: 18 (2020 05:14) (17 - 24)  SpO2: 98% (2020 05:14) (98% - 100%)    PHYSICAL EXAM:    Constitutional: NAD  HEENT: EOMI, throat clear  Neck: No LAD, supple  Respiratory: CTA and P  Cardiovascular: S1 and S2, RRR, no M  Gastrointestinal: BS+, soft, NT/ND, neg HSM,  Extremities: No peripheral edema, neg clubbing, cyanosis  Vascular: 2+ peripheral pulses  Neurological: A/O x 3, no focal deficits  Psychiatric: Normal mood, normal affect  Skin: No rashes      LABS:                        8.0    6.92  )-----------( 230      ( 2020 06:05 )             25.2     02-22    135  |  104  |  15  ----------------------------<  95  3.2<L>   |  21<L>  |  1.72<H>    Ca    8.5      2020 06:05  Phos  2.8     02-  Mg     2.3     -        Urinalysis Basic - ( 2020 03:30 )    Color: LIGHT YELLOW / Appearance: CLEAR / S.014 / pH: 5.5  Gluc: NEGATIVE / Ketone: NEGATIVE  / Bili: NEGATIVE / Urobili: NORMAL   Blood: NEGATIVE / Protein: 10 / Nitrite: NEGATIVE   Leuk Esterase: NEGATIVE / RBC: 0-2 / WBC 0-2   Sq Epi: x / Non Sq Epi: x / Bacteria: x        RADIOLOGY & ADDITIONAL TESTS:

## 2020-02-22 NOTE — PROGRESS NOTE ADULT - ASSESSMENT
85 year old male  with  history of BPH, CAD s/p CABG and stents in the past, COPD, Depression, GERD, HTN, Dementia who presents as a transfer from [facility] for GI bleed.      ·  Problem: Gastrointestinal hemorrhage, unspecified gastrointestinal hemorrhage type.  jejunal bleed + on scan   capsule noted    continue PPI IV bid, sucralfate   -Monitor CBC and transfuse for <7  -Aspirin held   diet as per gi       ·  Problem: Coronary artery disease involving native coronary artery of native heart without angina pectoris.   -Remote history of CABG and stents in 1990s        ·  Problem: Chronic obstructive pulmonary disease, unspecified COPD type.    Plan: -Continue albuterol prn      ·  Problem: Stage 3 chronic kidney disease.   -Creatinine stabilized around 1.6-1.7  -Recent YOU possibly from prerenal etiology  renal f/u        ·  Problem: Dementia without behavioral disturbance, unspecified dementia type.   Plan: -Continue donepezil.      Problem: Gastroesophageal reflux disease, esophagitis presence not specified  .Plan: -PPI       ·  Problem: Benign prostatic hyperplasia without lower urinary tract symptoms.   Plan: -Continue tamsulosin and finasteride.      ·  Problem: Essential hypertension.   off bp meds as bp stable    pt

## 2020-02-22 NOTE — DISCHARGE NOTE NURSING/CASE MANAGEMENT/SOCIAL WORK - PATIENT PORTAL LINK FT
You can access the FollowMyHealth Patient Portal offered by [facility] by registering at the following website: [email or web address]. By joining [email or web address]’s FollowMyHealth portal, you will also be able to view your health information using other applications (apps) compatible with our system.

## 2020-02-22 NOTE — PROGRESS NOTE ADULT - SUBJECTIVE AND OBJECTIVE BOX
CHIEF COMPLAINT:Patient is a 85y old  Male who presents with a chief complaint of CC: GI bleed (22 Feb 2020 08:45)    	        PAST MEDICAL & SURGICAL HISTORY:  Myocardial infarction  COPD (chronic obstructive pulmonary disease)  Seasonal allergies  GERD (gastroesophageal reflux disease)  Benign prostatic hypertrophy  Hyperlipidemia  Hypertension  Depression  Peripheral Neuropathy  Osteoporosis  CAD (Coronary Artery Disease): MI, s/p CABG with 2 cardiac stents  S/P inguinal hernia repair: On right side  S/P appendectomy  S/P CABG (coronary artery bypass graft): Done in 1996; 2 cardiac stents in 1998  S/P AVR (Aortic Valve Replacement): Pig valve (bioprosthetic); done 2010 at [place]          REVIEW OF SYSTEMS:  feels better  RESPIRATORY: No cough, wheezing, chills or hemoptysis; No Shortness of Breath  CARDIOVASCULAR: No chest pain, palpitations, passing out, dizziness, or leg swelling  GASTROINTESTINAL: No abdominal or epigastric pain. No nausea, vomiting, or hematemesis; No diarrhea or constipation. No melena or hematochezia.  GENITOURINARY: No dysuria, frequency, hematuria, or incontinence  NEUROLOGICAL: No headaches,  Medications:  MEDICATIONS  (STANDING):  atorvastatin 80 milliGRAM(s) Oral at bedtime  dextrose 5% with potassium chloride 20 mEq/L 1000 milliLiter(s) (50 mL/Hr) IV Continuous <Continuous>  donepezil 10 milliGRAM(s) Oral at bedtime  finasteride 5 milliGRAM(s) Oral daily  pantoprazole    Tablet 40 milliGRAM(s) Oral before breakfast  sodium bicarbonate  Infusion 0.056 mEq/kG/Hr (50 mL/Hr) IV Continuous <Continuous>  sucralfate suspension 1 Gram(s) Oral every 6 hours  tamsulosin 0.4 milliGRAM(s) Oral at bedtime    MEDICATIONS  (PRN):  ALBUTerol    90 MICROgram(s) HFA Inhaler 2 Puff(s) Inhalation every 6 hours PRN Shortness of Breath and/or Wheezing  melatonin 3 milliGRAM(s) Oral at bedtime PRN Insomnia    	    PHYSICAL EXAM:  T(C): 36.7 (02-22-20 @ 05:14), Max: 36.7 (02-22-20 @ 05:14)  HR: 79 (02-22-20 @ 05:14) (79 - 83)  BP: 122/66 (02-22-20 @ 05:14) (122/66 - 127/69)  RR: 18 (02-22-20 @ 05:14) (17 - 24)  SpO2: 98% (02-22-20 @ 05:14) (98% - 100%)  Wt(kg): --  I&O's Summary      Appearance: Normal	  HEENT:   Normal oral mucosa, PERRL, EOMI	  Lymphatic: No lymphadenopathy  Cardiovascular: Normal S1 S2, No JVD, No murmurs, No edema  Respiratory: Lungs clear to auscultation	    Gastrointestinal:  Soft, Non-tender, + BS	  Skin: No rashes, No ecchymoses, No cyanosis	  Neurologic: Non-focal  Extremities: Normal range of motion, No clubbing, cyanosis or edema  Vascular: Peripheral pulses palpable 2+ bilaterally    TELEMETRY: 	    ECG:  	  RADIOLOGY:  OTHER: 	  	  LABS:	 	    CARDIAC MARKERS:                                8.0    6.92  )-----------( 230      ( 22 Feb 2020 06:05 )             25.2     02-22    135  |  104  |  15  ----------------------------<  95  3.2<L>   |  21<L>  |  1.72<H>    Ca    8.5      22 Feb 2020 06:05  Phos  2.8     02-22  Mg     2.3     02-22      proBNP:   Lipid Profile:   HgA1c:   TSH:

## 2020-02-22 NOTE — PROGRESS NOTE ADULT - ASSESSMENT
Rectal Bleeding  bleeding scan + for active gib in likely proximal jejunum  s/p Capsule Endoscopy with no active bleeding; healing ulcer at duodenal bulb  h/h stable  transfuse prn   cont ppi PO bid and carafate qid  would avoid a/c antiplatelets and nsaids   hpylori stool ag pending   outpatient fu in our office March 2nd @11am with Dr. Perdomo   diet as tolerated     HTN  cont to trend bp and titrate medications accordingly per primary team     Advanced care planning was discussed with patient and family.  Advanced care planning forms were reviewed and discussed.  Risks, benefits and alternatives of gastroenterologic procedures were discussed in detail and all questions were answered. 30 minutes spent.

## 2020-06-02 ENCOUNTER — INPATIENT (INPATIENT)
Facility: HOSPITAL | Age: 85
LOS: 6 days | Discharge: NURSING, REHAB OR LONG-TERM CARE | DRG: 871 | End: 2020-06-09
Attending: INTERNAL MEDICINE | Admitting: INTERNAL MEDICINE
Payer: MEDICARE

## 2020-06-02 VITALS
HEART RATE: 90 BPM | SYSTOLIC BLOOD PRESSURE: 161 MMHG | TEMPERATURE: 98 F | RESPIRATION RATE: 18 BRPM | DIASTOLIC BLOOD PRESSURE: 106 MMHG | OXYGEN SATURATION: 99 %

## 2020-06-02 DIAGNOSIS — Z98.89 OTHER SPECIFIED POSTPROCEDURAL STATES: Chronic | ICD-10-CM

## 2020-06-02 DIAGNOSIS — Z95.1 PRESENCE OF AORTOCORONARY BYPASS GRAFT: Chronic | ICD-10-CM

## 2020-06-02 LAB
ALBUMIN SERPL ELPH-MCNC: 3.4 G/DL — SIGNIFICANT CHANGE UP (ref 3.3–5)
ALP SERPL-CCNC: 130 U/L — HIGH (ref 40–120)
ALT FLD-CCNC: 15 U/L — SIGNIFICANT CHANGE UP (ref 12–78)
ANION GAP SERPL CALC-SCNC: 10 MMOL/L — SIGNIFICANT CHANGE UP (ref 5–17)
AST SERPL-CCNC: 26 U/L — SIGNIFICANT CHANGE UP (ref 15–37)
BASOPHILS # BLD AUTO: 0.05 K/UL — SIGNIFICANT CHANGE UP (ref 0–0.2)
BASOPHILS NFR BLD AUTO: 0.3 % — SIGNIFICANT CHANGE UP (ref 0–2)
BILIRUB SERPL-MCNC: 0.5 MG/DL — SIGNIFICANT CHANGE UP (ref 0.2–1.2)
BUN SERPL-MCNC: 26 MG/DL — HIGH (ref 7–23)
CALCIUM SERPL-MCNC: 9.2 MG/DL — SIGNIFICANT CHANGE UP (ref 8.5–10.1)
CHLORIDE SERPL-SCNC: 112 MMOL/L — HIGH (ref 96–108)
CO2 SERPL-SCNC: 24 MMOL/L — SIGNIFICANT CHANGE UP (ref 22–31)
CREAT SERPL-MCNC: 1.9 MG/DL — HIGH (ref 0.5–1.3)
EOSINOPHIL # BLD AUTO: 0.1 K/UL — SIGNIFICANT CHANGE UP (ref 0–0.5)
EOSINOPHIL NFR BLD AUTO: 0.6 % — SIGNIFICANT CHANGE UP (ref 0–6)
GLUCOSE SERPL-MCNC: 141 MG/DL — HIGH (ref 70–99)
HCT VFR BLD CALC: 42.6 % — SIGNIFICANT CHANGE UP (ref 39–50)
HGB BLD-MCNC: 13.3 G/DL — SIGNIFICANT CHANGE UP (ref 13–17)
IMM GRANULOCYTES NFR BLD AUTO: 0.3 % — SIGNIFICANT CHANGE UP (ref 0–1.5)
LACTATE SERPL-SCNC: 1.3 MMOL/L — SIGNIFICANT CHANGE UP (ref 0.7–2)
LIDOCAIN IGE QN: 76 U/L — SIGNIFICANT CHANGE UP (ref 73–393)
LYMPHOCYTES # BLD AUTO: 0.56 K/UL — LOW (ref 1–3.3)
LYMPHOCYTES # BLD AUTO: 3.5 % — LOW (ref 13–44)
MCHC RBC-ENTMCNC: 23.5 PG — LOW (ref 27–34)
MCHC RBC-ENTMCNC: 31.2 GM/DL — LOW (ref 32–36)
MCV RBC AUTO: 75.3 FL — LOW (ref 80–100)
MONOCYTES # BLD AUTO: 1.28 K/UL — HIGH (ref 0–0.9)
MONOCYTES NFR BLD AUTO: 8 % — SIGNIFICANT CHANGE UP (ref 2–14)
NEUTROPHILS # BLD AUTO: 14 K/UL — HIGH (ref 1.8–7.4)
NEUTROPHILS NFR BLD AUTO: 87.3 % — HIGH (ref 43–77)
NRBC # BLD: 0 /100 WBCS — SIGNIFICANT CHANGE UP (ref 0–0)
NT-PROBNP SERPL-SCNC: 6361 PG/ML — HIGH (ref 0–450)
PLATELET # BLD AUTO: 157 K/UL — SIGNIFICANT CHANGE UP (ref 150–400)
POTASSIUM SERPL-MCNC: 3.9 MMOL/L — SIGNIFICANT CHANGE UP (ref 3.5–5.3)
POTASSIUM SERPL-SCNC: 3.9 MMOL/L — SIGNIFICANT CHANGE UP (ref 3.5–5.3)
PROCALCITONIN SERPL-MCNC: 0.24 NG/ML — HIGH (ref 0–0.04)
PROT SERPL-MCNC: 7.6 G/DL — SIGNIFICANT CHANGE UP (ref 6–8.3)
RBC # BLD: 5.66 M/UL — SIGNIFICANT CHANGE UP (ref 4.2–5.8)
RBC # FLD: 15.2 % — HIGH (ref 10.3–14.5)
SODIUM SERPL-SCNC: 146 MMOL/L — HIGH (ref 135–145)
WBC # BLD: 16.04 K/UL — HIGH (ref 3.8–10.5)
WBC # FLD AUTO: 16.04 K/UL — HIGH (ref 3.8–10.5)

## 2020-06-02 PROCEDURE — 93010 ELECTROCARDIOGRAM REPORT: CPT

## 2020-06-02 PROCEDURE — 71045 X-RAY EXAM CHEST 1 VIEW: CPT | Mod: 26

## 2020-06-02 PROCEDURE — 74176 CT ABD & PELVIS W/O CONTRAST: CPT | Mod: 26

## 2020-06-02 PROCEDURE — 71250 CT THORAX DX C-: CPT | Mod: 26

## 2020-06-02 PROCEDURE — 99285 EMERGENCY DEPT VISIT HI MDM: CPT

## 2020-06-02 PROCEDURE — 70450 CT HEAD/BRAIN W/O DYE: CPT | Mod: 26

## 2020-06-02 RX ORDER — SODIUM CHLORIDE 9 MG/ML
3 INJECTION INTRAMUSCULAR; INTRAVENOUS; SUBCUTANEOUS ONCE
Refills: 0 | Status: COMPLETED | OUTPATIENT
Start: 2020-06-02 | End: 2020-06-02

## 2020-06-02 RX ORDER — AZITHROMYCIN 500 MG/1
500 TABLET, FILM COATED ORAL ONCE
Refills: 0 | Status: COMPLETED | OUTPATIENT
Start: 2020-06-02 | End: 2020-06-02

## 2020-06-02 RX ORDER — ONDANSETRON 8 MG/1
4 TABLET, FILM COATED ORAL ONCE
Refills: 0 | Status: COMPLETED | OUTPATIENT
Start: 2020-06-02 | End: 2020-06-02

## 2020-06-02 RX ORDER — CEFTRIAXONE 500 MG/1
1000 INJECTION, POWDER, FOR SOLUTION INTRAMUSCULAR; INTRAVENOUS ONCE
Refills: 0 | Status: COMPLETED | OUTPATIENT
Start: 2020-06-02 | End: 2020-06-02

## 2020-06-02 RX ORDER — ACETAMINOPHEN 500 MG
650 TABLET ORAL ONCE
Refills: 0 | Status: COMPLETED | OUTPATIENT
Start: 2020-06-02 | End: 2020-06-02

## 2020-06-02 RX ORDER — SODIUM CHLORIDE 9 MG/ML
1000 INJECTION INTRAMUSCULAR; INTRAVENOUS; SUBCUTANEOUS ONCE
Refills: 0 | Status: COMPLETED | OUTPATIENT
Start: 2020-06-02 | End: 2020-06-02

## 2020-06-02 RX ADMIN — CEFTRIAXONE 1000 MILLIGRAM(S): 500 INJECTION, POWDER, FOR SOLUTION INTRAMUSCULAR; INTRAVENOUS at 23:10

## 2020-06-02 RX ADMIN — SODIUM CHLORIDE 1000 MILLILITER(S): 9 INJECTION INTRAMUSCULAR; INTRAVENOUS; SUBCUTANEOUS at 22:19

## 2020-06-02 RX ADMIN — SODIUM CHLORIDE 3 MILLILITER(S): 9 INJECTION INTRAMUSCULAR; INTRAVENOUS; SUBCUTANEOUS at 22:19

## 2020-06-02 RX ADMIN — ONDANSETRON 4 MILLIGRAM(S): 8 TABLET, FILM COATED ORAL at 22:18

## 2020-06-02 RX ADMIN — CEFTRIAXONE 100 MILLIGRAM(S): 500 INJECTION, POWDER, FOR SOLUTION INTRAMUSCULAR; INTRAVENOUS at 22:39

## 2020-06-02 RX ADMIN — Medication 650 MILLIGRAM(S): at 22:11

## 2020-06-02 RX ADMIN — SODIUM CHLORIDE 1000 MILLILITER(S): 9 INJECTION INTRAMUSCULAR; INTRAVENOUS; SUBCUTANEOUS at 23:10

## 2020-06-02 RX ADMIN — AZITHROMYCIN 255 MILLIGRAM(S): 500 TABLET, FILM COATED ORAL at 23:16

## 2020-06-02 NOTE — ED PROVIDER NOTE - OBJECTIVE STATEMENT
Pt is a 86 yo male who presents to the ED with a cc of difficulty breathing. PMHx of Benign prostatic hypertrophy, CAD, MI, s/p CABG with 2 cardiac stents, COPD, Depression, GERD, Hyperlipidemia  Hypertension, Osteoporosis, Peripheral Neuropathy, Dementia.  Pt is a poor historian and so history provided is limited.  Was recently admitted to the hospital from 1/17-1/22 with increasing confusion and was noted to have a RLL.  He was treated with a course of abx and the confusion was thought to be related to metabolic encephalopathy.  He was then again admitted to the hospital from 2/14-2/22.  He presented this time with weakness and hypotension and was found to have a GIB.  Pt was initially treated at [facility] but transferred to [facility].  He required transfusion stabilized and was discharged home. Currently pt is AAOx3 but is unaware of why he was taken to the hospital.  EMS reports that they received a call for SOB, cough, and one episode of vomiting.  Pt was seated at the table during this episode.  Upon their arrival pt was found to have rales with hypoxia SPO2 in the 80s.  Oxygen was placed and pt was taken to the ED.  Wife reports that pt was more confused then usual today which usually indicates an infection.  She also notes that over the last several weeks he has been wetting the bee which is not usual for him and today he was noted to have increased nasal congestion.  History is also limited from wife.

## 2020-06-02 NOTE — ED PROVIDER NOTE - PSH
S/P appendectomy    S/P AVR (Aortic Valve Replacement)  Pig valve (bioprosthetic); done 2010 at [place]  S/P CABG (coronary artery bypass graft)  Done in 1996; 2 cardiac stents in 1998  S/P inguinal hernia repair  On right side

## 2020-06-02 NOTE — ED ADULT NURSE NOTE - CHIEF COMPLAINT QUOTE
sob, coughing, episode of vomiting. per ems rales on auscultation, RA sat 90%, arrives on NRB. hx of pneumonia in february per EMS

## 2020-06-02 NOTE — ED ADULT TRIAGE NOTE - CHIEF COMPLAINT QUOTE
sob, coughing, episode of vomiting. per ems rales on auscultation, RA sat 90%, arrives on NRB. hx of pneumonia in february per EMS DISPLAY PLAN FREE TEXT

## 2020-06-02 NOTE — ED PROVIDER NOTE - NEUROLOGICAL, MLM
Awake alert to person, place and year but has confusion concerning the events surrounding him being taken to the hospital.  Pt moving all ext

## 2020-06-02 NOTE — ED ADULT NURSE NOTE - PSH
S/P appendectomy    S/P AVR (Aortic Valve Replacement)  Pig valve (bioprosthetic); done 2010 at [place]  S/P CABG (coronary artery bypass graft)  Done in 1996; 2 cardiac stents in 1998  S/P inguinal hernia repair  On right side

## 2020-06-02 NOTE — ED PROVIDER NOTE - CARE PLAN
Principal Discharge DX:	Sepsis  Secondary Diagnosis:	Pneumonia  Secondary Diagnosis:	Renal insufficiency  Secondary Diagnosis:	Altered mental status  Secondary Diagnosis:	Leukocytosis

## 2020-06-02 NOTE — ED ADULT NURSE NOTE - OBJECTIVE STATEMENT
Patient alert and oriented X 2. History of dementia from home. Brought in by EMS for vomiting, difficulty breathing. + vomiting upon arrival. Patient febrile.   Denies chest pain, shortness of breath, , headache, dizziness.  Respirations even and not labored. Abdomen soft non tender. + non productive cough

## 2020-06-02 NOTE — ED PROVIDER NOTE - CLINICAL SUMMARY MEDICAL DECISION MAKING FREE TEXT BOX
Pt is a 86 yo male who presents to the ED with a cc of difficulty breathing. PMHx of Benign prostatic hypertrophy, CAD, MI, s/p CABG with 2 cardiac stents, COPD, Depression, GERD, Hyperlipidemia  Hypertension, Osteoporosis, Peripheral Neuropathy, Dementia.  Pt is a poor historian and so history provided is limited.  Was recently admitted to the hospital from 1/17-1/22 with increasing confusion and was noted to have a RLL.  He was treated with a course of abx and the confusion was thought to be related to metabolic encephalopathy.  He was then again admitted to the hospital from 2/14-2/22.  He presented this time with weakness and hypotension and was found to have a GIB.  Pt was initially treated at [facility] but transferred to [facility].  He required transfusion stabilized and was discharged home. Currently pt is AAOx3 but is unaware of why he was taken to the hospital.  EMS reports that they received a call for SOB, cough, and one episode of vomiting.  Pt was seated at the table during this episode.  Upon their arrival pt was found to have rales with hypoxia SPO2 in the 80s.  Oxygen was placed and pt was taken to the ED.  Wife reports that pt was more confused then usual today which usually indicates an infection.  She also notes that over the last several weeks he has been wetting the bee which is not usual for him and today he was noted to have increased nasal congestion.  History is also limited from wife.  Pt noted to have fever on arrival.  Pt presenting with respiratory distress and fever high suspicion for possible pneumonia given current presentation.  Will obtain screening septic work up, EKG, chest x-ray.  Will hydrate, control fever and swab for COVID.  Will begin abx.  Pt will likely require admission

## 2020-06-03 DIAGNOSIS — N40.0 BENIGN PROSTATIC HYPERPLASIA WITHOUT LOWER URINARY TRACT SYMPTOMS: ICD-10-CM

## 2020-06-03 DIAGNOSIS — R78.81 BACTEREMIA: ICD-10-CM

## 2020-06-03 DIAGNOSIS — A41.9 SEPSIS, UNSPECIFIED ORGANISM: ICD-10-CM

## 2020-06-03 DIAGNOSIS — R41.82 ALTERED MENTAL STATUS, UNSPECIFIED: ICD-10-CM

## 2020-06-03 DIAGNOSIS — J18.9 PNEUMONIA, UNSPECIFIED ORGANISM: ICD-10-CM

## 2020-06-03 DIAGNOSIS — F03.90 UNSPECIFIED DEMENTIA WITHOUT BEHAVIORAL DISTURBANCE: ICD-10-CM

## 2020-06-03 DIAGNOSIS — D72.829 ELEVATED WHITE BLOOD CELL COUNT, UNSPECIFIED: ICD-10-CM

## 2020-06-03 LAB
ANION GAP SERPL CALC-SCNC: 7 MMOL/L — SIGNIFICANT CHANGE UP (ref 5–17)
ANISOCYTOSIS BLD QL: SLIGHT — SIGNIFICANT CHANGE UP
APPEARANCE UR: CLEAR — SIGNIFICANT CHANGE UP
BASE EXCESS BLDV CALC-SCNC: -0.6 MMOL/L — SIGNIFICANT CHANGE UP (ref -2–2)
BASOPHILS # BLD AUTO: 0.03 K/UL — SIGNIFICANT CHANGE UP (ref 0–0.2)
BASOPHILS NFR BLD AUTO: 0.2 % — SIGNIFICANT CHANGE UP (ref 0–2)
BILIRUB UR-MCNC: NEGATIVE — SIGNIFICANT CHANGE UP
BLOOD GAS COMMENTS, VENOUS: SIGNIFICANT CHANGE UP
BUN SERPL-MCNC: 27 MG/DL — HIGH (ref 7–23)
CALCIUM SERPL-MCNC: 8.6 MG/DL — SIGNIFICANT CHANGE UP (ref 8.5–10.1)
CHLORIDE SERPL-SCNC: 114 MMOL/L — HIGH (ref 96–108)
CO2 SERPL-SCNC: 25 MMOL/L — SIGNIFICANT CHANGE UP (ref 22–31)
COLOR SPEC: YELLOW — SIGNIFICANT CHANGE UP
CREAT SERPL-MCNC: 2 MG/DL — HIGH (ref 0.5–1.3)
CRP SERPL-MCNC: 4.89 MG/DL — HIGH (ref 0–0.4)
DIFF PNL FLD: ABNORMAL
E COLI DNA BLD POS QL NAA+NON-PROBE: SIGNIFICANT CHANGE UP
ELLIPTOCYTES BLD QL SMEAR: SLIGHT — SIGNIFICANT CHANGE UP
EOSINOPHIL # BLD AUTO: 0.04 K/UL — SIGNIFICANT CHANGE UP (ref 0–0.5)
EOSINOPHIL NFR BLD AUTO: 0.3 % — SIGNIFICANT CHANGE UP (ref 0–6)
GLUCOSE SERPL-MCNC: 109 MG/DL — HIGH (ref 70–99)
GLUCOSE UR QL: NEGATIVE — SIGNIFICANT CHANGE UP
GRAM STN FLD: SIGNIFICANT CHANGE UP
HCO3 BLDV-SCNC: 24 MMOL/L — SIGNIFICANT CHANGE UP (ref 21–29)
HCT VFR BLD CALC: 34.5 % — LOW (ref 39–50)
HGB BLD-MCNC: 10.5 G/DL — LOW (ref 13–17)
HOROWITZ INDEX BLDV+IHG-RTO: 21 — SIGNIFICANT CHANGE UP
HYPOCHROMIA BLD QL: SLIGHT — SIGNIFICANT CHANGE UP
IMM GRANULOCYTES NFR BLD AUTO: 0.6 % — SIGNIFICANT CHANGE UP (ref 0–1.5)
KETONES UR-MCNC: NEGATIVE — SIGNIFICANT CHANGE UP
LEUKOCYTE ESTERASE UR-ACNC: ABNORMAL
LYMPHOCYTES # BLD AUTO: 0.89 K/UL — LOW (ref 1–3.3)
LYMPHOCYTES # BLD AUTO: 7.2 % — LOW (ref 13–44)
MAGNESIUM SERPL-MCNC: 1.9 MG/DL — SIGNIFICANT CHANGE UP (ref 1.6–2.6)
MANUAL SMEAR VERIFICATION: SIGNIFICANT CHANGE UP
MCHC RBC-ENTMCNC: 23.3 PG — LOW (ref 27–34)
MCHC RBC-ENTMCNC: 30.4 GM/DL — LOW (ref 32–36)
MCV RBC AUTO: 76.5 FL — LOW (ref 80–100)
METHOD TYPE: SIGNIFICANT CHANGE UP
MICROCYTES BLD QL: SLIGHT — SIGNIFICANT CHANGE UP
MONOCYTES # BLD AUTO: 1 K/UL — HIGH (ref 0–0.9)
MONOCYTES NFR BLD AUTO: 8.1 % — SIGNIFICANT CHANGE UP (ref 2–14)
MRSA PCR RESULT.: SIGNIFICANT CHANGE UP
NEUTROPHILS # BLD AUTO: 10.39 K/UL — HIGH (ref 1.8–7.4)
NEUTROPHILS NFR BLD AUTO: 83.6 % — HIGH (ref 43–77)
NITRITE UR-MCNC: POSITIVE
NRBC # BLD: 0 /100 WBCS — SIGNIFICANT CHANGE UP (ref 0–0)
NT-PROBNP SERPL-SCNC: 9592 PG/ML — HIGH (ref 0–450)
OVALOCYTES BLD QL SMEAR: SLIGHT — SIGNIFICANT CHANGE UP
PCO2 BLDV: 39 MMHG — SIGNIFICANT CHANGE UP (ref 35–50)
PH BLDV: 7.4 — SIGNIFICANT CHANGE UP (ref 7.35–7.45)
PH UR: 5 — SIGNIFICANT CHANGE UP (ref 5–8)
PLAT MORPH BLD: NORMAL — SIGNIFICANT CHANGE UP
PLATELET # BLD AUTO: 99 K/UL — LOW (ref 150–400)
PO2 BLDV: 91 MMHG — HIGH (ref 25–45)
POIKILOCYTOSIS BLD QL AUTO: SLIGHT — SIGNIFICANT CHANGE UP
POTASSIUM SERPL-MCNC: 4.2 MMOL/L — SIGNIFICANT CHANGE UP (ref 3.5–5.3)
POTASSIUM SERPL-SCNC: 4.2 MMOL/L — SIGNIFICANT CHANGE UP (ref 3.5–5.3)
PROCALCITONIN SERPL-MCNC: 4.8 NG/ML — HIGH (ref 0–0.04)
PROT UR-MCNC: 30 MG/DL
RBC # BLD: 4.51 M/UL — SIGNIFICANT CHANGE UP (ref 4.2–5.8)
RBC # FLD: 15.1 % — HIGH (ref 10.3–14.5)
RBC BLD AUTO: ABNORMAL
S AUREUS DNA NOSE QL NAA+PROBE: SIGNIFICANT CHANGE UP
SAO2 % BLDV: 97 % — HIGH (ref 67–88)
SARS-COV-2 RNA SPEC QL NAA+PROBE: SIGNIFICANT CHANGE UP
SODIUM SERPL-SCNC: 146 MMOL/L — HIGH (ref 135–145)
SP GR SPEC: 1.02 — SIGNIFICANT CHANGE UP (ref 1.01–1.02)
SPECIMEN SOURCE: SIGNIFICANT CHANGE UP
SPECIMEN SOURCE: SIGNIFICANT CHANGE UP
UROBILINOGEN FLD QL: NEGATIVE — SIGNIFICANT CHANGE UP
WBC # BLD: 12.42 K/UL — HIGH (ref 3.8–10.5)
WBC # FLD AUTO: 12.42 K/UL — HIGH (ref 3.8–10.5)

## 2020-06-03 RX ORDER — SODIUM CHLORIDE 9 MG/ML
500 INJECTION INTRAMUSCULAR; INTRAVENOUS; SUBCUTANEOUS ONCE
Refills: 0 | Status: COMPLETED | OUTPATIENT
Start: 2020-06-03 | End: 2020-06-03

## 2020-06-03 RX ORDER — SENNA PLUS 8.6 MG/1
2 TABLET ORAL AT BEDTIME
Refills: 0 | Status: DISCONTINUED | OUTPATIENT
Start: 2020-06-03 | End: 2020-06-09

## 2020-06-03 RX ORDER — PANTOPRAZOLE SODIUM 20 MG/1
40 TABLET, DELAYED RELEASE ORAL
Refills: 0 | Status: DISCONTINUED | OUTPATIENT
Start: 2020-06-03 | End: 2020-06-09

## 2020-06-03 RX ORDER — TAMSULOSIN HYDROCHLORIDE 0.4 MG/1
0.4 CAPSULE ORAL AT BEDTIME
Refills: 0 | Status: DISCONTINUED | OUTPATIENT
Start: 2020-06-03 | End: 2020-06-09

## 2020-06-03 RX ORDER — FINASTERIDE 5 MG/1
5 TABLET, FILM COATED ORAL DAILY
Refills: 0 | Status: DISCONTINUED | OUTPATIENT
Start: 2020-06-03 | End: 2020-06-09

## 2020-06-03 RX ORDER — CEFTRIAXONE 500 MG/1
1000 INJECTION, POWDER, FOR SOLUTION INTRAMUSCULAR; INTRAVENOUS EVERY 24 HOURS
Refills: 0 | Status: COMPLETED | OUTPATIENT
Start: 2020-06-03 | End: 2020-06-07

## 2020-06-03 RX ORDER — DONEPEZIL HYDROCHLORIDE 10 MG/1
10 TABLET, FILM COATED ORAL AT BEDTIME
Refills: 0 | Status: DISCONTINUED | OUTPATIENT
Start: 2020-06-03 | End: 2020-06-09

## 2020-06-03 RX ORDER — SODIUM CHLORIDE 9 MG/ML
1000 INJECTION INTRAMUSCULAR; INTRAVENOUS; SUBCUTANEOUS
Refills: 0 | Status: DISCONTINUED | OUTPATIENT
Start: 2020-06-03 | End: 2020-06-05

## 2020-06-03 RX ORDER — ACETAMINOPHEN 500 MG
650 TABLET ORAL EVERY 6 HOURS
Refills: 0 | Status: DISCONTINUED | OUTPATIENT
Start: 2020-06-03 | End: 2020-06-09

## 2020-06-03 RX ORDER — ATORVASTATIN CALCIUM 80 MG/1
80 TABLET, FILM COATED ORAL AT BEDTIME
Refills: 0 | Status: DISCONTINUED | OUTPATIENT
Start: 2020-06-03 | End: 2020-06-09

## 2020-06-03 RX ORDER — ACETAMINOPHEN 500 MG
650 TABLET ORAL ONCE
Refills: 0 | Status: COMPLETED | OUTPATIENT
Start: 2020-06-03 | End: 2020-06-03

## 2020-06-03 RX ORDER — IPRATROPIUM/ALBUTEROL SULFATE 18-103MCG
3 AEROSOL WITH ADAPTER (GRAM) INHALATION EVERY 8 HOURS
Refills: 0 | Status: DISCONTINUED | OUTPATIENT
Start: 2020-06-03 | End: 2020-06-09

## 2020-06-03 RX ORDER — ONDANSETRON 8 MG/1
4 TABLET, FILM COATED ORAL EVERY 6 HOURS
Refills: 0 | Status: DISCONTINUED | OUTPATIENT
Start: 2020-06-03 | End: 2020-06-09

## 2020-06-03 RX ORDER — AZITHROMYCIN 500 MG/1
500 TABLET, FILM COATED ORAL EVERY 24 HOURS
Refills: 0 | Status: DISCONTINUED | OUTPATIENT
Start: 2020-06-03 | End: 2020-06-03

## 2020-06-03 RX ADMIN — SENNA PLUS 2 TABLET(S): 8.6 TABLET ORAL at 21:52

## 2020-06-03 RX ADMIN — FINASTERIDE 5 MILLIGRAM(S): 5 TABLET, FILM COATED ORAL at 10:13

## 2020-06-03 RX ADMIN — SODIUM CHLORIDE 50 MILLILITER(S): 9 INJECTION INTRAMUSCULAR; INTRAVENOUS; SUBCUTANEOUS at 21:54

## 2020-06-03 RX ADMIN — SODIUM CHLORIDE 500 MILLILITER(S): 9 INJECTION INTRAMUSCULAR; INTRAVENOUS; SUBCUTANEOUS at 01:20

## 2020-06-03 RX ADMIN — DONEPEZIL HYDROCHLORIDE 10 MILLIGRAM(S): 10 TABLET, FILM COATED ORAL at 21:53

## 2020-06-03 RX ADMIN — ATORVASTATIN CALCIUM 80 MILLIGRAM(S): 80 TABLET, FILM COATED ORAL at 21:53

## 2020-06-03 RX ADMIN — ONDANSETRON 4 MILLIGRAM(S): 8 TABLET, FILM COATED ORAL at 11:22

## 2020-06-03 RX ADMIN — SODIUM CHLORIDE 50 MILLILITER(S): 9 INJECTION INTRAMUSCULAR; INTRAVENOUS; SUBCUTANEOUS at 02:39

## 2020-06-03 RX ADMIN — Medication 3 MILLILITER(S): at 15:04

## 2020-06-03 RX ADMIN — Medication 3 MILLILITER(S): at 08:03

## 2020-06-03 RX ADMIN — PANTOPRAZOLE SODIUM 40 MILLIGRAM(S): 20 TABLET, DELAYED RELEASE ORAL at 04:18

## 2020-06-03 RX ADMIN — CEFTRIAXONE 100 MILLIGRAM(S): 500 INJECTION, POWDER, FOR SOLUTION INTRAMUSCULAR; INTRAVENOUS at 21:53

## 2020-06-03 RX ADMIN — Medication 650 MILLIGRAM(S): at 11:38

## 2020-06-03 RX ADMIN — TAMSULOSIN HYDROCHLORIDE 0.4 MILLIGRAM(S): 0.4 CAPSULE ORAL at 21:53

## 2020-06-03 RX ADMIN — Medication 3 MILLILITER(S): at 23:00

## 2020-06-03 NOTE — SWALLOW BEDSIDE ASSESSMENT ADULT - ASR SWALLOW ASPIRATION MONITOR
pneumonia/gurgly voice/change of breathing pattern/cough/oral hygiene/position upright (90Y)/upper respiratory infection/fever/throat clearing

## 2020-06-03 NOTE — GOALS OF CARE CONVERSATION - ADVANCED CARE PLANNING - CONVERSATION DETAILS
pt with confusion, spoke to wife on phone, slow, simple lay terms: pt has hcp, wife is hcp, spoke of his resuscitation wishes: agrees to dnr dni no decision regarding KELLEY, PEg, wants IV fluids & antibiotics. Dr REGGIE Perdomo aware.

## 2020-06-03 NOTE — CONSULT NOTE ADULT - SUBJECTIVE AND OBJECTIVE BOX
[facility]  Wilver Osman PA-C  [address] [place] [address]  [phone]      Chief Complaint:  Patient is a 85y old  Male who presents with a chief complaint of SOB, cough (2020 11:17)      HPI:84 yo male PMHx of Benign prostatic hypertrophy, CAD, MI, s/p CABG with 2 cardiac stents, COPD, Depression, GERD, Hyperlipidemia  Hypertension, Osteoporosis, Peripheral Neuropathy, Dementia.  Pt is a poor historian and so history provided is limited.  Was recently admitted to the hospital from - with increasing confusion and was noted to have a RLL.  He was treated with a course of abx and the confusion was thought to be related to metabolic encephalopathy.  He was then again admitted to the hospital from -.  He presented this time with weakness and hypotension and was found to have a GIB.  Pt was initially treated at [facility] but transferred to [facility].  He required transfusion stabilized and was discharged home. Currently pt is AAOx3 but is unaware of why he was taken to the hospital.  EMS reports that they received a call for SOB, cough, and one episode of vomiting.  Pt was seated at the table during this episode.  Upon their arrival pt was found to have rales with hypoxia SPO2 in the 80s.  Oxygen was placed and pt was taken to the ED.  Wife reports that pt was more confused then usual today which usually indicates an infection.  She also notes that over the last several weeks he has been wetting the bee which is not usual for him and today he was noted to have increased nasal congestion.     Allergies:  amoxicillin (Unknown)  Levaquin (Unknown)  penicillin (Unknown)      Medications:  acetaminophen   Tablet .. 650 milliGRAM(s) Oral every 6 hours PRN  albuterol/ipratropium for Nebulization 3 milliLiter(s) Nebulizer every 8 hours  atorvastatin 80 milliGRAM(s) Oral at bedtime  bisacodyl 5 milliGRAM(s) Oral every 12 hours PRN  cefTRIAXone   IVPB 1000 milliGRAM(s) IV Intermittent every 24 hours  donepezil 10 milliGRAM(s) Oral at bedtime  finasteride 5 milliGRAM(s) Oral daily  guaiFENesin   Syrup  (Sugar-Free) 200 milliGRAM(s) Oral every 6 hours PRN  ondansetron Injectable 4 milliGRAM(s) IV Push every 6 hours PRN  pantoprazole    Tablet 40 milliGRAM(s) Oral before breakfast  senna 2 Tablet(s) Oral at bedtime  sodium chloride 0.9%. 1000 milliLiter(s) IV Continuous <Continuous>  tamsulosin 0.4 milliGRAM(s) Oral at bedtime      PMHX/PSHX:  Myocardial infarction  COPD (chronic obstructive pulmonary disease)  Seasonal allergies  GERD (gastroesophageal reflux disease)  Benign prostatic hypertrophy  Hyperlipidemia  Hypertension  Asthma  Depression  Peripheral Neuropathy  Asthma  S/P AVR (Aortic Valve Replacement)  S/P CABG (Coronary Artery Bypass Graft)  Osteoporosis  CAD (Coronary Artery Disease)  HTN (Hypertension)  Dyslipidemia  S/P inguinal hernia repair  S/P appendectomy  S/P CABG (coronary artery bypass graft)  S/P CABG (Coronary Artery Bypass Graft)  S/P AVR (Aortic Valve Replacement)      Family history:  Family history of renal failure (Sibling)  Family history of MI (myocardial infarction)  Family history of stroke  Family history of amyotrophic lateral sclerosis  Family history of stroke      Social History:     ROS:     General:  No wt loss, fevers, chills, night sweats, fatigue,   Eyes:  Good vision, no reported pain  ENT:  No sore throat, pain, runny nose, dysphagia  CV:  No pain, palpitations, hypo/hypertension  Resp:  No dyspnea, cough, tachypnea, wheezing  GI:  No pain, No nausea, No vomiting, No diarrhea, No constipation, No weight loss, No fever, No pruritis, No rectal bleeding, No tarry stools, No dysphagia,  :  No pain, bleeding, incontinence, nocturia  Muscle:  No pain, weakness  Neuro:  No weakness, tingling, memory problems  Psych:  No fatigue, insomnia, mood problems, depression  Endocrine:  No polyuria, polydipsia, cold/heat intolerance  Heme:  No petechiae, ecchymosis, easy bruisability  Skin:  No rash, tattoos, scars, edema      PHYSICAL EXAM:   Vital Signs:  Vital Signs Last 24 Hrs  T(C): 36.5 (2020 12:30), Max: 38.8 (2020 22:06)  T(F): 97.7 (2020 12:30), Max: 101.8 (2020 22:06)  HR: 84 (2020 12:30) (62 - 880)  BP: 139/87 (2020 12:30) (112/67 - 161/106)  BP(mean): --  RR: 17 (2020 04:36) (16 - 18)  SpO2: 96% (2020 08:05) (94% - 100%)  Daily     Daily Weight in k.5 (2020 04:36)    GENERAL:  Appears stated age, well-groomed, well-nourished, no distress  HEENT:  NC/AT,  conjunctivae clear and pink, no thyromegaly, nodules, adenopathy, no JVD, sclera -anicteric  CHEST:  Full & symmetric excursion, no increased effort, breath sounds clear  HEART:  Regular rhythm, S1, S2, no murmur/rub/S3/S4, no abdominal bruit, no edema  ABDOMEN:  Soft, non-tender, non-distended, normoactive bowel sounds,  no masses ,no hepato-splenomegaly, no signs of chronic liver disease  EXTEREMITIES:  no cyanosis,clubbing or edema  SKIN:  No rash/erythema/ecchymoses/petechiae/wounds/abscess/warm/dry  NEURO:  Alert, oriented, no asterixis, no tremor, no encephalopathy    LABS:                        10.5   12.42 )-----------( 99       ( 2020 08:19 )             34.5     06-03    146<H>  |  114<H>  |  27<H>  ----------------------------<  109<H>  4.2   |  25  |  2.00<H>    Ca    8.6      2020 08:19  Mg     1.9     06-03    TPro  7.6  /  Alb  3.4  /  TBili  0.5  /  DBili  x   /  AST  26  /  ALT  15  /  AlkPhos  130<H>  06-02    LIVER FUNCTIONS - ( 2020 22:07 )  Alb: 3.4 g/dL / Pro: 7.6 g/dL / ALK PHOS: 130 U/L / ALT: 15 U/L / AST: 26 U/L / GGT: x             Urinalysis Basic - ( 2020 01:06 )    Color: Yellow / Appearance: Clear / S.020 / pH: x  Gluc: x / Ketone: Negative  / Bili: Negative / Urobili: Negative   Blood: x / Protein: 30 mg/dL / Nitrite: Positive   Leuk Esterase: Moderate / RBC: >50 /HPF / WBC >50   Sq Epi: x / Non Sq Epi: Occasional / Bacteria: Few      Amylase Serum--      Lipase serum76       Ammonia--      Imaging:

## 2020-06-03 NOTE — H&P ADULT - NSICDXPASTSURGICALHX_GEN_ALL_CORE_FT
PAST SURGICAL HISTORY:  S/P appendectomy     S/P AVR (Aortic Valve Replacement) Pig valve (bioprosthetic); done 2010 at [place]    S/P CABG (coronary artery bypass graft) Done in 1996; 2 cardiac stents in 1998    S/P inguinal hernia repair On right side

## 2020-06-03 NOTE — PHYSICAL THERAPY INITIAL EVALUATION ADULT - TRANSFER TRAINING, PT EVAL
Patient will transfer from all surfaces with supervision in 1 week  in order to transition between surfaces safely

## 2020-06-03 NOTE — PROGRESS NOTE ADULT - PROBLEM SELECTOR PLAN 1
picture appears more consistent with recurrent aspiration events and no clear acute airspace disease at this point.  Noted elevated procalcitonin, wbc, and fever and now with E coli bacteremia-continue ceftriaxone for now

## 2020-06-03 NOTE — DIETITIAN INITIAL EVALUATION ADULT. - OTHER INFO
85 year old male dx rule out sepsis history dementia COPD GERD HTN hyperlipidemia  this AM ate 100% breakfast, lunch vomited a bit per CNA . referred for pressure injury . cinesophagram ordered. patient states food preferences willing to try chocolate ensure. no food allergies. no change in weight noted. # per February RD note.

## 2020-06-03 NOTE — CONSULT NOTE ADULT - ASSESSMENT
84 yo male PMHx of Benign prostatic hypertrophy, CAD, MI, s/p CABG with 2 cardiac stents, COPD, Depression, GERD, Hyperlipidemia  Hypertension, Osteoporosis, Peripheral Neuropathy, Dementia. adm with SOB, cough, one episode of vomiting hypoxia SPO2 in the 80s confusion and concerns for PNA

## 2020-06-03 NOTE — PHYSICAL THERAPY INITIAL EVALUATION ADULT - GAIT TRAINING, PT EVAL
Patient will ambulate x 50 feet with RW with supervision in 1 week in order to increase mobility at home

## 2020-06-03 NOTE — CONSULT NOTE ADULT - SUBJECTIVE AND OBJECTIVE BOX
HPI:86 yo male PMHx of Benign prostatic hypertrophy, CAD, MI, s/p CABG with 2 cardiac stents, COPD, Depression, GERD, Hyperlipidemia  Hypertension, Osteoporosis, Peripheral Neuropathy, Dementia.  Pt is a poor historian and so history provided is limited.  Was recently admitted to the hospital from - with increasing confusion and was noted to have a RLL.  He was treated with a course of abx and the confusion was thought to be related to metabolic encephalopathy.  He was then again admitted to the hospital from -.  He presented this time with weakness and hypotension and was found to have a GIB.  Pt was initially treated at [facility] but transferred to [facility].  He required transfusion stabilized and was discharged home. Currently pt is AAOx3 but is unaware of why he was taken to the hospital.  EMS reports that they received a call for SOB, cough, and one episode of vomiting.  Pt was seated at the table during this episode.  Upon their arrival pt was found to have rales with hypoxia SPO2 in the 80s.  Oxygen was placed and pt was taken to the ED.  Wife reports that pt was more confused then usual today which usually indicates an infection.  She also notes that over the last several weeks he has been wetting the bee which is not usual for him and today he was noted to have increased nasal congestion.  History is also limited from wife.    at [facility] = 85 year old man with a history of BPH, CAD s/p CABG and stents in the past, COPD, Depression, GERD, HTN, Dementia who presents as a transfer from [facility] for GI bleed.      PAST MEDICAL & SURGICAL HISTORY:  Myocardial infarction  COPD (chronic obstructive pulmonary disease)  Seasonal allergies  GERD (gastroesophageal reflux disease)  Benign prostatic hypertrophy  Hyperlipidemia  Hypertension  Depression  Peripheral Neuropathy  Osteoporosis  CAD (Coronary Artery Disease): MI, s/p CABG with 2 cardiac stents  S/P inguinal hernia repair: On right side  S/P appendectomy  S/P CABG (coronary artery bypass graft): Done in ; 2 cardiac stents in   S/P AVR (Aortic Valve Replacement): Pig valve (bioprosthetic); done  at [place]      Antimicrobials  azithromycin  IVPB 500 milliGRAM(s) IV Intermittent every 24 hours  cefTRIAXone   IVPB 1000 milliGRAM(s) IV Intermittent every 24 hours      Immunological      Other  acetaminophen   Tablet .. 650 milliGRAM(s) Oral every 6 hours PRN  albuterol/ipratropium for Nebulization 3 milliLiter(s) Nebulizer every 8 hours  atorvastatin 80 milliGRAM(s) Oral at bedtime  bisacodyl 5 milliGRAM(s) Oral every 12 hours PRN  donepezil 10 milliGRAM(s) Oral at bedtime  finasteride 5 milliGRAM(s) Oral daily  guaiFENesin   Syrup  (Sugar-Free) 200 milliGRAM(s) Oral every 6 hours PRN  ondansetron Injectable 4 milliGRAM(s) IV Push every 6 hours PRN  pantoprazole    Tablet 40 milliGRAM(s) Oral before breakfast  senna 2 Tablet(s) Oral at bedtime  sodium chloride 0.9%. 1000 milliLiter(s) IV Continuous <Continuous>  tamsulosin 0.4 milliGRAM(s) Oral at bedtime      Allergies    amoxicillin (Unknown)  Levaquin (Unknown)  penicillin (Unknown)    Intolerances    SOCIAL HISTORY:  no toxic habits reported      FAMILY HISTORY:  Family history of renal failure (Sibling)  Family history of MI (myocardial infarction):  at 65  Family history of stroke:  at 65  Family history of amyotrophic lateral sclerosis:  at 67  Family history of stroke:  at 67      ROS:    limited with cognitive status    Vital Signs Last 24 Hrs  T(C): 36.7 (2020 04:36), Max: 38.8 (2020 22:06)  T(F): 98 (2020 04:36), Max: 101.8 (2020 22:06)  HR: 80 (2020 08:05) (62 - 880)  BP: 135/78 (2020 04:36) (112/67 - 161/106)  BP(mean): --  RR: 17 (2020 04:36) (16 - 18)  SpO2: 96% (2020 08:05) (94% - 100%)    PE:  WDWN in no distress  HEENT:  NC, PERRL, sclerae anicteric, conjunctivae clear, EOMI.  Sinuses nontender, no nasal exudate.  No buccal or pharyngeal lesions, erythema or exudate  Neck:  Supple, no adenopathy  Lungs:  No accessory muscle use, bilaterally with basilar crackles  Cor:  distant  Abd:  Symmetric, normoactive BS.  Soft, nontender, no masses, guarding or rebound.  Liver and spleen not enlarged  Extrem:  No cyanosis or edema  Skin:  No rashes.  Neuro: confused and a bit agitated  Musc: moving all limbs freely, no focal deficits    LABS:                        10.5   12.42 )-----------( 99       ( 2020 08:19 )             34.5       WBC Count: 12.42 K/uL (20 @ 08:19)  WBC Count: 16.04 K/uL (20 @ 22:07)          146<H>  |  114<H>  |  27<H>  ----------------------------<  109<H>  4.2   |  25  |  2.00<H>    Ca    8.6      2020 08:19  Mg     1.9         TPro  7.6  /  Alb  3.4  /  TBili  0.5  /  DBili  x   /  AST  26  /  ALT  15  /  AlkPhos  130<H>        Creatinine, Serum: 2.00 mg/dL (20 @ 08:19)  Creatinine, Serum: 1.90 mg/dL (20 @ 22:07)      Urinalysis Basic - ( 2020 01:06 )    Color: Yellow / Appearance: Clear / S.020 / pH: x  Gluc: x / Ketone: Negative  / Bili: Negative / Urobili: Negative   Blood: x / Protein: 30 mg/dL / Nitrite: Positive   Leuk Esterase: Moderate / RBC: >50 /HPF / WBC >50   Sq Epi: x / Non Sq Epi: Occasional / Bacteria: Few      MICROBIOLOGY:      RADIOLOGY & ADDITIONAL STUDIES:    --< from: CT Chest No Cont (20 @ 23:58) >    EXAM:  CT ABDOMEN AND PELVIS                          EXAM:  CT CHEST                            PROCEDURE DATE:  2020          INTERPRETATION:  CLINICAL INFORMATION: Suspected pneumonia, YOU with fever.     PROCEDURE: CT of the chest, abdomen and pelvis was performed without intravenous contrast. Coronal and sagittal reconstruction images were obtained.      COMPARISON: CT 2020.     FINDINGS:  Evaluation of the thoracic, abdominal and pelvic organs and vasculature is limited withoutintravenous contrast. Artifact from the patient's arms degrades images.    CHEST:     LUNGS AND AIRWAYS: Patent central airways. Mild diffuse peribronchial thickening. Debris/secretion in the trachea extending to the left mainstem bronchus and left lower lobe proximal bronchus. Paraseptal emphysema. Residual linear opacity in the right lower lobe, decreased in extent since prior study. Stable scattered bilateral pulmonary nodules. Stable opacities along the left major fissure and minor fissure. Subsegmental atelectasis/scarring in the right upper lobe and lung bases.  PLEURA: No pleural effusion or pneumothorax.  HEART: Normal size. No pericardial effusion. Aortic valve replacement. Mitral annular calcification.  VESSELS: Atherosclerotic change of the thoracic aorta, great vessel origin and coronary arteries. Stable ectatic ascending aorta. Left vertebral artery arising from the aortic arch.  MEDIASTINUM AND GEETA: Subcentimeter mediastinal lymph nodes without lymphadenopathy.  CHEST WALL AND LOWER NECK: Median sternotomy. Nonspecific punctate calcifications in the left thyroid gland again noted.    ABDOMEN/PELVIS:     LIVER: Unremarkable.  GALLBLADDER/BILE DUCTS: No intrahepatic or extrahepatic biliary dilatation. Cholelithiasis.  PANCREAS: Diffuse fatty atrophy.  SPLEEN: Unremarkable.    ADRENALS: Unremarkable.  KIDNEYS/URETERS: Nonspecific mild bilateral perinephric stranding without hydroureteronephrosis. No obvious obstructing radiopaque urinary tract stone. Again noted left renal cyst with calcified periphery/septation.  BLADDER: Partially distended. Bladder trabeculation and/or diverticula. Asymmetric thickening of the right > left bladder wall.  REPRODUCTIVE ORGANS: Normal size of the prostate with calcifications.    BOWEL: No bowel obstruction. Appendectomy. Nonspecific fluid-filled small bowel loops. Colon diverticulosis.  PERITONEUM: No drainable fluid collection or free air.  VESSELS: Atherosclerosis. 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  RETROPERITONEUM: No lymphadenopathy.    ABDOMINAL WALL/SOFT TISSUES: Unremarkable.  BONES: Osteopenia. Degenerative changes of the spine, bilateral glenohumeral, hip and sacroiliac joints. Stable compression fractures at T3, T4 and T10. Stable grade 1 anterolisthesis of L3 on L4. Internal fixation of the right proximal femoral deformity with incompletely visualized intramedullary pilar. Heterotopic ossification about the right femur.    IMPRESSION:    Residual linear opacity in the right lower lobe, whichhas decreased in extent since 2020. Follow-up to resolution would be helpful to exclude potential underlying neoplasm.    Debris/secretion in the trachea extending to the left-sided airways. Recommend clinical correlation to assess aspiration.    Nonspecific fluid-filled small bowel loops, which can be seen in the setting of enterocolitis. Recommend clinical correlation.    Cholelithiasis. Recommend clinical correlation to assess acute cholecystitis.    Asymmetric bladder wall thickening with evidence of bladder trabeculation/diverticula. Recommend clinical correlation to assess urinary tract infection. Follow-up will be helpful to exclude potential underlying neoplasm.    Additional findings as described.

## 2020-06-03 NOTE — SWALLOW BEDSIDE ASSESSMENT ADULT - COMMENTS
Consult received and chart reviewed. The patient was seen at bedside this AM for an initial assessment of swallow function, at which time he was alert and cooperative. Patient currently receiving supplemental O2 via NC in place. The patient denied any pain pre/post today's evaluation and also denied any swallowing difficulties at this time.    Per charting, the "Pt is a 86 yo male who presents to the ED with a cc of difficulty breathing. PMHx of Benign prostatic hypertrophy, CAD, MI, s/p CABG with 2 cardiac stents, COPD, Depression, GERD, Hyperlipidemia  Hypertension, Osteoporosis, Peripheral Neuropathy, Dementia.  Pt is a poor historian and so history provided is limited.  Was recently admitted to the hospital from 1/17-1/22 with increasing confusion and was noted to have a RLL." WBC is elevated.    Recent CT of the chest revealed, "Residual linear opacity in the right lower lobe, which has decreased in extent since 2/14/2020. Follow-up to resolution would be helpful to exclude potential underlying neoplasm. Debris/secretion in the trachea extending to the left-sided airways. Recommend clinical correlation to assess aspiration." Discussed results and recommendations from this evaluation with the patient, RN, and call out to MD.

## 2020-06-03 NOTE — CONSULT NOTE ADULT - PROBLEM SELECTOR RECOMMENDATION 4
will coordinate with medicine.    Thank you for consulting us and involving us in the management of this most interesting and challenging case.     We will follow along in the care of this patient.

## 2020-06-03 NOTE — SWALLOW BEDSIDE ASSESSMENT ADULT - SWALLOW EVAL: RECOMMENDED FEEDING/EATING TECHNIQUES
allow for swallow between intakes/maintain upright posture during/after eating for 30 mins/oral hygiene/alternate food with liquid/crush medication (when feasible)/position upright (90 degrees)/hard swallow w/ each bite or sip/no straws/small sips/bites

## 2020-06-03 NOTE — CONSULT NOTE ADULT - PROBLEM SELECTOR RECOMMENDATION 9
? urosepsis  f/u cultures, growing GNR  CT showing fluid in small bowel no obvious wall thickening or obstruction  gb with stones and sludge no obvious thickening or fluid   lfts not suggesting biliary obstruction  cont IV antibiotics  diet as tolerated  will follow
leukocytosis - eval in progress - unclear whether this Pulm process - CT chest does not support PNA - on Rocephin and Zithro - s/p recent hospital stay in [place] and [facility] - would need Broader spectrum ABX if considering PNA - may need ID eval  COPD - emphysema - mucus plugging - Atelectasis - see CT Chest - will order NEBS TID for mucociliary clearance and COPD management, will check VBG  recent GI bleed - off AC - known to [place] GI team - H and H noted - bowel rx regimen  valvular heart disease - see TTE - proBNP elev - I and O - monitor for vol overload  will order Swallow eval - hx of dementia and mucus plugging and atelectasis - concern for aspiration   ckd - old records reviewed - renal indices noted - monitor lytes  prognosis guarded  pall care eval for ALEX
picture appears more consistent with recurrent aspiration events and no clear acute airspace disease at this point.  Noted elevated procalcitonin, wbc, and fever so will start by stopping azithromycin and hope to limit ceftriaxone but reasonable to continue for now

## 2020-06-03 NOTE — H&P ADULT - HISTORY OF PRESENT ILLNESS
84 yo male PMHx of Benign prostatic hypertrophy, CAD, MI, s/p CABG with 2 cardiac stents, COPD, Depression, GERD, Hyperlipidemia  Hypertension, Osteoporosis, Peripheral Neuropathy, Dementia.  Pt is a poor historian and so history provided is limited.  Was recently admitted to the hospital from 1/17-1/22 with increasing confusion and was noted to have a RLL.  He was treated with a course of abx and the confusion was thought to be related to metabolic encephalopathy.  He was then again admitted to the hospital from 2/14-2/22.  He presented this time with weakness and hypotension and was found to have a GIB.  Pt was initially treated at [facility] but transferred to [facility].  He required transfusion stabilized and was discharged home. Currently pt is AAOx3 but is unaware of why he was taken to the hospital.  EMS reports that they received a call for SOB, cough, and one episode of vomiting.  Pt was seated at the table during this episode.  Upon their arrival pt was found to have rales with hypoxia SPO2 in the 80s.  Oxygen was placed and pt was taken to the ED.  Wife reports that pt was more confused then usual today which usually indicates an infection.  She also notes that over the last several weeks he has been wetting the bee which is not usual for him and today he was noted to have increased nasal congestion.  History is also limited from wife.

## 2020-06-03 NOTE — CONSULT NOTE ADULT - SUBJECTIVE AND OBJECTIVE BOX
Date/Time Patient Seen:  		  Referring MD:   Data Reviewed	       Patient is a 85y old  Male who presents with a chief complaint of     Subjective/HPI  in bed  seen and examined  vs and meds reviewed  labs reviewed  H and P reviewed  ER provider note reviewed    · Chief Complaint: The patient is a 85y Male complaining of see chief complaint quote.  · HPI Objective Statement: Pt is a 86 yo male who presents to the ED with a cc of difficulty breathing. PMHx of Benign prostatic hypertrophy, CAD, MI, s/p CABG with 2 cardiac stents, COPD, Depression, GERD, Hyperlipidemia  Hypertension, Osteoporosis, Peripheral Neuropathy, Dementia.  Pt is a poor historian and so history provided is limited.  Was recently admitted to the hospital from - with increasing confusion and was noted to have a RLL.  He was treated with a course of abx and the confusion was thought to be related to metabolic encephalopathy.  He was then again admitted to the hospital from -.  He presented this time with weakness and hypotension and was found to have a GIB.  Pt was initially treated at [facility] but transferred to [facility].  He required transfusion stabilized and was discharged home. Currently pt is AAOx3 but is unaware of why he was taken to the hospital.  EMS reports that they received a call for SOB, cough, and one episode of vomiting.  Pt was seated at the table during this episode.  Upon their arrival pt was found to have rales with hypoxia SPO2 in the 80s.  Oxygen was placed and pt was taken to the ED.  Wife reports that pt was more confused then usual today which usually indicates an infection.  She also notes that over the last several weeks he has been wetting the bee which is not usual for him and today he was noted to have increased nasal congestion.  History is also limited from wife.    at [facility] = 85 year old man with a history of BPH, CAD s/p CABG and stents in the past, COPD, Depression, GERD, HTN, Dementia who presents as a transfer from [facility] for GI bleed.    S/P inguinal hernia repair On right side.     FAMILY HISTORY:  Family history of amyotrophic lateral sclerosis,  at 67  Family history of MI (myocardial infarction),  at 65  Family history of stroke,  at 65  Family history of stroke,  at 67    Sibling  Still living? No  Family history of renal failure, Age at diagnosis: Age Unknown.     Social History:  Social History (marital status, living situation, occupation, tobacco use, alcohol and drug use, and sexual history): Lives at home with wife and aide.  Quit tobacco , occasional ETOH.     Tobacco Screening:  · Core Measure Site	No  · Has the patient used tobacco in the past 30 days?	No    Risk Assessment:    Present on Admission:  Deep Venous Thrombosis	no  Pulmonary Embolus	no     Heart Failure:  Does this patient have a history of or has been diagnosed with heart failure? no.  · Associated Symptoms: see above  · Significant Negative Findings: see above  · Location: see above  · Radiation: see above  · Quality: see above  · Aggravating Factors: see above  · Relieving Factors: see above     PAST MEDICAL & SURGICAL HISTORY:  Myocardial infarction  COPD (chronic obstructive pulmonary disease)  Seasonal allergies  GERD (gastroesophageal reflux disease)  Benign prostatic hypertrophy  Hyperlipidemia  Hypertension  Asthma: Also COPD component  Depression  Peripheral Neuropathy  Asthma  S/P AVR (Aortic Valve Replacement)  S/P CABG (Coronary Artery Bypass Graft)  Osteoporosis  CAD (Coronary Artery Disease): MI, s/p CABG with 2 cardiac stents  HTN (Hypertension)  Dyslipidemia  S/P inguinal hernia repair: On right side  S/P appendectomy  S/P CABG (coronary artery bypass graft): Done in ; 2 cardiac stents in   S/P CABG (Coronary Artery Bypass Graft)  S/P AVR (Aortic Valve Replacement): Pig valve (bioprosthetic); done  at [place]        Medication list         MEDICATIONS  (STANDING):  atorvastatin 80 milliGRAM(s) Oral at bedtime  azithromycin  IVPB 500 milliGRAM(s) IV Intermittent every 24 hours  cefTRIAXone   IVPB 1000 milliGRAM(s) IV Intermittent every 24 hours  donepezil 10 milliGRAM(s) Oral at bedtime  finasteride 5 milliGRAM(s) Oral daily  pantoprazole    Tablet 40 milliGRAM(s) Oral before breakfast  sodium chloride 0.9%. 1000 milliLiter(s) (50 mL/Hr) IV Continuous <Continuous>  tamsulosin 0.4 milliGRAM(s) Oral at bedtime    MEDICATIONS  (PRN):  acetaminophen   Tablet .. 650 milliGRAM(s) Oral every 6 hours PRN Temp greater or equal to 38C (100.4F), Mild Pain (1 - 3)  ondansetron Injectable 4 milliGRAM(s) IV Push every 6 hours PRN Nausea and/or Vomiting         Vitals log        ICU Vital Signs Last 24 Hrs  T(C): 36.7 (2020 04:36), Max: 38.8 (2020 22:06)  T(F): 98 (2020 04:36), Max: 101.8 (2020 22:06)  HR: 62 (2020 04:36) (62 - 90)  BP: 135/78 (2020 04:36) (112/67 - 161/106)  BP(mean): --  ABP: --  ABP(mean): --  RR: 17 (2020 04:36) (16 - 18)  SpO2: 100% (2020 04:36) (94% - 100%)           Input and Output:  I&O's Detail    2020 07:01  -  2020 07:00  --------------------------------------------------------  IN:    sodium chloride 0.9%.: 150 mL  Total IN: 150 mL    OUT:  Total OUT: 0 mL    Total NET: 150 mL          Lab Data                        13.3   16.04 )-----------( 157      ( 2020 22:07 )             42.6     06-02    146<H>  |  112<H>  |  26<H>  ----------------------------<  141<H>  3.9   |  24  |  1.90<H>    Ca    9.2      2020 22:07    TPro  7.6  /  Alb  3.4  /  TBili  0.5  /  DBili  x   /  AST  26  /  ALT  15  /  AlkPhos  130<H>  06-02            Review of Systems	    cough  poor historian    Objective     Physical Examination    heart s1s2  lung dec BS  abd soft      Pertinent Lab findings & Imaging      Ava:  NO   Adequate UO     I&O's Detail    2020 07:01  -  2020 07:00  --------------------------------------------------------  IN:    sodium chloride 0.9%.: 150 mL  Total IN: 150 mL    OUT:  Total OUT: 0 mL    Total NET: 150 mL               Discussed with:     Cultures:	        Radiology    EXAM:  CT ABDOMEN AND PELVIS                          EXAM:  CT CHEST                            PROCEDURE DATE:  2020          INTERPRETATION:  CLINICAL INFORMATION: Suspected pneumonia, YOU with fever.     PROCEDURE: CT of the chest, abdomen and pelvis was performed without intravenous contrast. Coronal and sagittal reconstruction images were obtained.      COMPARISON: CT 2020.     FINDINGS:  Evaluation of the thoracic, abdominal and pelvic organs and vasculature is limited without intravenous contrast. Artifact from the patient's arms degrades images.    CHEST:     LUNGS AND AIRWAYS: Patent central airways. Mild diffuse peribronchial thickening. Debris/secretion in the trachea extending to the left mainstem bronchus and left lower lobe proximal bronchus. Paraseptal emphysema. Residual linear opacity in the right lower lobe, decreased in extent since prior study. Stable scattered bilateral pulmonary nodules. Stable opacities along the left major fissure and minor fissure. Subsegmental atelectasis/scarring in the right upper lobe and lung bases.  PLEURA: No pleural effusion or pneumothorax.  HEART: Normal size. No pericardial effusion. Aortic valve replacement. Mitral annular calcification.  VESSELS: Atherosclerotic change of the thoracic aorta, great vessel origin and coronary arteries. Stable ectatic ascending aorta. Left vertebral artery arising from the aortic arch.  MEDIASTINUM AND GEETA: Subcentimeter mediastinal lymph nodes without lymphadenopathy.  CHEST WALL AND LOWER NECK: Median sternotomy. Nonspecific punctate calcifications in the left thyroid gland again noted.    ABDOMEN/PELVIS:     LIVER: Unremarkable.  GALLBLADDER/BILE DUCTS: No intrahepatic or extrahepatic biliary dilatation. Cholelithiasis.  PANCREAS: Diffuse fatty atrophy.  SPLEEN: Unremarkable.    ADRENALS: Unremarkable.  KIDNEYS/URETERS: Nonspecific mild bilateral perinephric stranding without hydroureteronephrosis. No obvious obstructing radiopaque urinary tract stone. Again noted left renal cyst with calcified periphery/septation.  BLADDER: Partially distended. Bladder trabeculation and/or diverticula. Asymmetric thickening of the right > left bladder wall.  REPRODUCTIVE ORGANS: Normal size of the prostate with calcifications.    BOWEL: No bowel obstruction. Appendectomy. Nonspecific fluid-filled small bowel loops. Colon diverticulosis.  PERITONEUM: No drainable fluid collection or free air.  VESSELS: Atherosclerosis. 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  RETROPERITONEUM: No lymphadenopathy.    ABDOMINAL WALL/SOFT TISSUES: Unremarkable.  BONES: Osteopenia. Degenerative changes of the spine, bilateral glenohumeral, hip and sacroiliac joints. Stable compression fractures at T3, T4 and T10. Stable grade 1 anterolisthesis of L3 on L4. Internal fixation of the right proximal femoral deformity with incompletely visualized intramedullary pilar. Heterotopic ossification about the right femur.    IMPRESSION:    Residual linear opacity in the right lower lobe, which has decreased in extent since 2020. Follow-up to resolution would be helpful to exclude potential underlying neoplasm.    Debris/secretion in the trachea extending to the left-sided airways. Recommend clinical correlation to assess aspiration.    Nonspecific fluid-filled small bowel loops, which can be seen in the setting of enterocolitis. Recommend clinical correlation.    Cholelithiasis. Recommend clinical correlation to assess acute cholecystitis.    Asymmetric bladder wall thickening with evidence of bladder trabeculation/diverticula. Recommend clinical correlation to assess urinary tract infection. Follow-up will be helpful to exclude potential underlying neoplasm.    Additional findings as described.                NIKKI RICKS M.D., ATTENDING RADIOLOGIST  This document has been electronically signed. Juan R  3 2020 12:50AM

## 2020-06-03 NOTE — PROGRESS NOTE ADULT - SUBJECTIVE AND OBJECTIVE BOX
Culture - Blood (06.03.20 @ 00:55)    Gram Stain:   Growth in aerobic bottle: Gram Negative Rods    -  Escherichia coli: Detec    Specimen Source: .Blood Blood-Venous    Organism: Blood Culture PCR    Culture Results:   Growth in aerobic bottle: Gram Negative Rods  "Due to technical problems, Proteus sp. will Not be reported as part of  the BCID panel until further notice"  ***Blood Panel PCR results on this specimen are available  approximately 3 hours after the Gram stain result.***  Gram stain, PCR, and/or culture results may not always  correspond due to difference in methodologies.  ************************************************************  This PCR assay was performed using [employer] ([employer]).  The following targets are tested for: Enterococcus,  vancomycin resistant enterococci, Listeria monocytogenes,  coagulase negative staphylococci, S. aureus,  methicillin resistant S. aureus, Streptococcus agalactiae  (Group B), S. pneumoniae, S. pyogenes (Group A),  Acinetobacter baumannii, Enterobacter cloacae, E. coli,  Klebsiella oxytoca, K. pneumoniae, Proteus sp.,  Serratia marcescens, Haemophilus influenzae,  Neisseria meningitidis, Pseudomonas aeruginosa, Candida  albicans, C. glabrata, C krusei, C parapsilosis,  C. tropicalis and the KPC resistance gene.    Organism Identification: Blood Culture PCR    Method Type: PCR

## 2020-06-03 NOTE — PHYSICAL THERAPY INITIAL EVALUATION ADULT - PERTINENT HX OF CURRENT PROBLEM, REHAB EVAL
84 yo male PMHx of Benign prostatic hypertrophy, CAD, MI, s/p CABG with 2 cardiac stents, COPD, Depression, GERD, Hyperlipidemia  Hypertension, Osteoporosis, Peripheral Neuropathy, Dementia.

## 2020-06-03 NOTE — SWALLOW BEDSIDE ASSESSMENT ADULT - SLP PRECAUTIONS/LIMITATIONS: HEARING
within functional limits
PRINCIPAL PROCEDURE  Procedure: Right total hip arthroplasty  Findings and Treatment:

## 2020-06-03 NOTE — SWALLOW BEDSIDE ASSESSMENT ADULT - SWALLOW EVAL: DIAGNOSIS
1. The patient demonstrated functional oral management of puree, nectar thick, and thin liquid textures marked by adequate bolus collection, transfer, and posterior transport. 2. The patient demonstrated a mild oral dysphagia for solids marked by delayed bolus collection, transfer, and posterior transport. Trace lingual residue noted subsequent to deglutition which cleared with a liquid wash. 3. The patient demonstrated a mild pharyngeal dysphagia for puree, solid, nectar thick, and thin liquid textures marked by a suspected delayed pharyngeal swallow trigger with reduced hyolaryngeal elevation upon digital palpation w/o evidence of airway penetration.

## 2020-06-04 DIAGNOSIS — N18.3 CHRONIC KIDNEY DISEASE, STAGE 3 (MODERATE): ICD-10-CM

## 2020-06-04 LAB
-  AMIKACIN: SIGNIFICANT CHANGE UP
-  AMPICILLIN/SULBACTAM: SIGNIFICANT CHANGE UP
-  AMPICILLIN: SIGNIFICANT CHANGE UP
-  AZTREONAM: SIGNIFICANT CHANGE UP
-  CEFAZOLIN: SIGNIFICANT CHANGE UP
-  CEFEPIME: SIGNIFICANT CHANGE UP
-  CEFOXITIN: SIGNIFICANT CHANGE UP
-  CEFTRIAXONE: SIGNIFICANT CHANGE UP
-  CIPROFLOXACIN: SIGNIFICANT CHANGE UP
-  GENTAMICIN: SIGNIFICANT CHANGE UP
-  IMIPENEM: SIGNIFICANT CHANGE UP
-  LEVOFLOXACIN: SIGNIFICANT CHANGE UP
-  MEROPENEM: SIGNIFICANT CHANGE UP
-  NITROFURANTOIN: SIGNIFICANT CHANGE UP
-  PIPERACILLIN/TAZOBACTAM: SIGNIFICANT CHANGE UP
-  TIGECYCLINE: SIGNIFICANT CHANGE UP
-  TOBRAMYCIN: SIGNIFICANT CHANGE UP
-  TRIMETHOPRIM/SULFAMETHOXAZOLE: SIGNIFICANT CHANGE UP
ANION GAP SERPL CALC-SCNC: 6 MMOL/L — SIGNIFICANT CHANGE UP (ref 5–17)
BUN SERPL-MCNC: 36 MG/DL — HIGH (ref 7–23)
CALCIUM SERPL-MCNC: 8.9 MG/DL — SIGNIFICANT CHANGE UP (ref 8.5–10.1)
CHLORIDE SERPL-SCNC: 113 MMOL/L — HIGH (ref 96–108)
CO2 SERPL-SCNC: 27 MMOL/L — SIGNIFICANT CHANGE UP (ref 22–31)
CREAT SERPL-MCNC: 2.1 MG/DL — HIGH (ref 0.5–1.3)
CULTURE RESULTS: SIGNIFICANT CHANGE UP
GLUCOSE SERPL-MCNC: 92 MG/DL — SIGNIFICANT CHANGE UP (ref 70–99)
HCT VFR BLD CALC: 33.4 % — LOW (ref 39–50)
HGB BLD-MCNC: 10 G/DL — LOW (ref 13–17)
MAGNESIUM SERPL-MCNC: 2 MG/DL — SIGNIFICANT CHANGE UP (ref 1.6–2.6)
MCHC RBC-ENTMCNC: 23.3 PG — LOW (ref 27–34)
MCHC RBC-ENTMCNC: 29.9 GM/DL — LOW (ref 32–36)
MCV RBC AUTO: 77.7 FL — LOW (ref 80–100)
METHOD TYPE: SIGNIFICANT CHANGE UP
NRBC # BLD: 0 /100 WBCS — SIGNIFICANT CHANGE UP (ref 0–0)
ORGANISM # SPEC MICROSCOPIC CNT: SIGNIFICANT CHANGE UP
ORGANISM # SPEC MICROSCOPIC CNT: SIGNIFICANT CHANGE UP
PLATELET # BLD AUTO: 77 K/UL — LOW (ref 150–400)
POTASSIUM SERPL-MCNC: 4.2 MMOL/L — SIGNIFICANT CHANGE UP (ref 3.5–5.3)
POTASSIUM SERPL-SCNC: 4.2 MMOL/L — SIGNIFICANT CHANGE UP (ref 3.5–5.3)
RBC # BLD: 4.3 M/UL — SIGNIFICANT CHANGE UP (ref 4.2–5.8)
RBC # FLD: 15.7 % — HIGH (ref 10.3–14.5)
SODIUM SERPL-SCNC: 146 MMOL/L — HIGH (ref 135–145)
SPECIMEN SOURCE: SIGNIFICANT CHANGE UP
WBC # BLD: 16.22 K/UL — HIGH (ref 3.8–10.5)
WBC # FLD AUTO: 16.22 K/UL — HIGH (ref 3.8–10.5)

## 2020-06-04 PROCEDURE — 76705 ECHO EXAM OF ABDOMEN: CPT | Mod: 26

## 2020-06-04 PROCEDURE — 74230 X-RAY XM SWLNG FUNCJ C+: CPT | Mod: 26

## 2020-06-04 PROCEDURE — 78226 HEPATOBILIARY SYSTEM IMAGING: CPT | Mod: 26

## 2020-06-04 RX ADMIN — Medication 3 MILLILITER(S): at 19:16

## 2020-06-04 RX ADMIN — Medication 3 MILLILITER(S): at 14:40

## 2020-06-04 RX ADMIN — ATORVASTATIN CALCIUM 80 MILLIGRAM(S): 80 TABLET, FILM COATED ORAL at 20:31

## 2020-06-04 RX ADMIN — CEFTRIAXONE 100 MILLIGRAM(S): 500 INJECTION, POWDER, FOR SOLUTION INTRAMUSCULAR; INTRAVENOUS at 20:31

## 2020-06-04 RX ADMIN — FINASTERIDE 5 MILLIGRAM(S): 5 TABLET, FILM COATED ORAL at 12:11

## 2020-06-04 RX ADMIN — Medication 3 MILLILITER(S): at 07:42

## 2020-06-04 RX ADMIN — PANTOPRAZOLE SODIUM 40 MILLIGRAM(S): 20 TABLET, DELAYED RELEASE ORAL at 05:14

## 2020-06-04 RX ADMIN — SENNA PLUS 2 TABLET(S): 8.6 TABLET ORAL at 20:31

## 2020-06-04 RX ADMIN — TAMSULOSIN HYDROCHLORIDE 0.4 MILLIGRAM(S): 0.4 CAPSULE ORAL at 20:31

## 2020-06-04 RX ADMIN — DONEPEZIL HYDROCHLORIDE 10 MILLIGRAM(S): 10 TABLET, FILM COATED ORAL at 20:31

## 2020-06-04 RX ADMIN — SODIUM CHLORIDE 50 MILLILITER(S): 9 INJECTION INTRAMUSCULAR; INTRAVENOUS; SUBCUTANEOUS at 20:30

## 2020-06-04 NOTE — SWALLOW VFSS/MBS ASSESSMENT ADULT - ROSENBEK'S PENETRATION ASPIRATION SCALE
(1) no aspiration, contrast does not enter airway (3) contrast remains above the vocal cords, visible residue remains (penetration)/migrating down towards the level of the VFs

## 2020-06-04 NOTE — PROGRESS NOTE ADULT - SUBJECTIVE AND OBJECTIVE BOX
Date/Time Patient Seen:  		  Referring MD:   Data Reviewed	       Patient is a 85y old  Male who presents with a chief complaint of SOB, cough (03 Jun 2020 14:49)      Subjective/HPI     PAST MEDICAL & SURGICAL HISTORY:  Myocardial infarction  COPD (chronic obstructive pulmonary disease)  Seasonal allergies  GERD (gastroesophageal reflux disease)  Benign prostatic hypertrophy  Hyperlipidemia  Hypertension  Asthma: Also COPD component  Depression  Peripheral Neuropathy  Asthma  S/P AVR (Aortic Valve Replacement)  S/P CABG (Coronary Artery Bypass Graft)  Osteoporosis  CAD (Coronary Artery Disease): MI, s/p CABG with 2 cardiac stents  HTN (Hypertension)  Dyslipidemia  S/P inguinal hernia repair: On right side  S/P appendectomy  S/P CABG (coronary artery bypass graft): Done in 1996; 2 cardiac stents in 1998  S/P CABG (Coronary Artery Bypass Graft)  S/P AVR (Aortic Valve Replacement): Pig valve (bioprosthetic); done 2010 at [place]        Medication list         MEDICATIONS  (STANDING):  albuterol/ipratropium for Nebulization 3 milliLiter(s) Nebulizer every 8 hours  atorvastatin 80 milliGRAM(s) Oral at bedtime  cefTRIAXone   IVPB 1000 milliGRAM(s) IV Intermittent every 24 hours  donepezil 10 milliGRAM(s) Oral at bedtime  finasteride 5 milliGRAM(s) Oral daily  pantoprazole    Tablet 40 milliGRAM(s) Oral before breakfast  senna 2 Tablet(s) Oral at bedtime  sodium chloride 0.9%. 1000 milliLiter(s) (50 mL/Hr) IV Continuous <Continuous>  tamsulosin 0.4 milliGRAM(s) Oral at bedtime    MEDICATIONS  (PRN):  acetaminophen   Tablet .. 650 milliGRAM(s) Oral every 6 hours PRN Temp greater or equal to 38C (100.4F), Mild Pain (1 - 3)  bisacodyl 5 milliGRAM(s) Oral every 12 hours PRN Constipation  guaiFENesin   Syrup  (Sugar-Free) 200 milliGRAM(s) Oral every 6 hours PRN Cough  ondansetron Injectable 4 milliGRAM(s) IV Push every 6 hours PRN Nausea and/or Vomiting         Vitals log        ICU Vital Signs Last 24 Hrs  T(C): 36.2 (04 Jun 2020 04:37), Max: 38 (03 Jun 2020 11:25)  T(F): 97.2 (04 Jun 2020 04:37), Max: 100.4 (03 Jun 2020 11:25)  HR: 66 (04 Jun 2020 04:37) (66 - 88)  BP: 113/67 (04 Jun 2020 04:37) (102/62 - 139/87)  BP(mean): --  ABP: --  ABP(mean): --  RR: 18 (04 Jun 2020 04:37) (17 - 18)  SpO2: 99% (04 Jun 2020 04:37) (96% - 99%)           Input and Output:  I&O's Detail    02 Jun 2020 07:01  -  03 Jun 2020 07:00  --------------------------------------------------------  IN:    sodium chloride 0.9%.: 150 mL  Total IN: 150 mL    OUT:  Total OUT: 0 mL    Total NET: 150 mL      03 Jun 2020 07:01  -  04 Jun 2020 06:51  --------------------------------------------------------  IN:  Total IN: 0 mL    OUT:    Voided: 350 mL  Total OUT: 350 mL    Total NET: -350 mL          Lab Data                        10.5   12.42 )-----------( 99       ( 03 Jun 2020 08:19 )             34.5     06-03    146<H>  |  114<H>  |  27<H>  ----------------------------<  109<H>  4.2   |  25  |  2.00<H>    Ca    8.6      03 Jun 2020 08:19  Mg     1.9     06-03    TPro  7.6  /  Alb  3.4  /  TBili  0.5  /  DBili  x   /  AST  26  /  ALT  15  /  AlkPhos  130<H>  06-02            Review of Systems	      Objective     Physical Examination    heart s1s2  lung dec BS  abd soft      Pertinent Lab findings & Imaging      Ava:  NO   Adequate UO     I&O's Detail    02 Jun 2020 07:01  -  03 Jun 2020 07:00  --------------------------------------------------------  IN:    sodium chloride 0.9%.: 150 mL  Total IN: 150 mL    OUT:  Total OUT: 0 mL    Total NET: 150 mL      03 Jun 2020 07:01  -  04 Jun 2020 06:51  --------------------------------------------------------  IN:  Total IN: 0 mL    OUT:    Voided: 350 mL  Total OUT: 350 mL    Total NET: -350 mL               Discussed with:     Cultures:	        Radiology

## 2020-06-04 NOTE — SWALLOW VFSS/MBS ASSESSMENT ADULT - SLP GENERAL OBSERVATIONS
Pt sitting upright in MBS chair, +lateral view. Pt awake and cooperative. Pt on supplemental O2 via 3L NC. Pt denied pain pre and post assessment

## 2020-06-04 NOTE — PROGRESS NOTE ADULT - PROBLEM SELECTOR PLAN 1
CT showing fluid in small bowel no obvious wall thickening or obstruction; gb with stones/sludge and non specific gb wall thickening  lfts not suggesting biliary obstruction  bld cxs growing ecoli; hida ordered to r/o acute sonya   f/u repeat bld cxs  surgery eval noted  cont abx; f/u further id recs  to follow

## 2020-06-04 NOTE — SWALLOW VFSS/MBS ASSESSMENT ADULT - SLP PERTINENT HISTORY OF CURRENT PROBLEM
Per charting, 86 yo male PMHx of Benign prostatic hypertrophy, CAD, MI, s/p CABG with 2 cardiac stents, COPD, Depression, GERD, Hyperlipidemia  Hypertension, Osteoporosis, Peripheral Neuropathy, Dementia. MS reports that they received a call for SOB, cough, and one episode of vomiting. Upon their arrival pt was found to have rales with hypoxia SPO2 in the 80s.

## 2020-06-04 NOTE — PROGRESS NOTE ADULT - SUBJECTIVE AND OBJECTIVE BOX
Patient is a 85y old  Male who presents with a chief complaint of SOB, cough (2020 11:45)      INTERVAL HPI/OVERNIGHT EVENTS: Patient seen and examined. NAD. No complaints.    Vital Signs Last 24 Hrs  T(C): 36.2 (2020 04:37), Max: 36.7 (2020 14:05)  T(F): 97.2 (2020 04:37), Max: 98 (2020 14:05)  HR: 57 (2020 07:44) (57 - 88)  BP: 113/67 (2020 04:37) (102/62 - 139/87)  BP(mean): --  RR: 18 (2020 04:37) (17 - 18)  SpO2: 97% (2020 07:44) (96% - 99%)    06-04    146<H>  |  113<H>  |  36<H>  ----------------------------<  92  4.2   |  27  |  2.10<H>    Ca    8.9      2020 08:09  Mg     2.0     06-04    TPro  7.6  /  Alb  3.4  /  TBili  0.5  /  DBili  x   /  AST  26  /  ALT  15  /  AlkPhos  130<H>  -02                          10.0   16.22 )-----------( 77       ( 2020 08:09 )             33.4       CAPILLARY BLOOD GLUCOSE        Urinalysis Basic - ( 2020 01:06 )    Color: Yellow / Appearance: Clear / S.020 / pH: x  Gluc: x / Ketone: Negative  / Bili: Negative / Urobili: Negative   Blood: x / Protein: 30 mg/dL / Nitrite: Positive   Leuk Esterase: Moderate / RBC: >50 /HPF / WBC >50   Sq Epi: x / Non Sq Epi: Occasional / Bacteria: Few    Culture - Blood (20 @ 00:55)    Gram Stain:   Growth in aerobic bottle: Gram Negative Rods  Growth in anaerobic bottle: Gram Negative Rods    Specimen Source: .Blood Blood-Venous    Culture Results:   Growth in aerobic and anaerobic bottles: Escherichia coli  See previous culture [id number]              acetaminophen   Tablet .. 650 milliGRAM(s) Oral every 6 hours PRN  albuterol/ipratropium for Nebulization 3 milliLiter(s) Nebulizer every 8 hours  atorvastatin 80 milliGRAM(s) Oral at bedtime  bisacodyl 5 milliGRAM(s) Oral every 12 hours PRN  cefTRIAXone   IVPB 1000 milliGRAM(s) IV Intermittent every 24 hours  donepezil 10 milliGRAM(s) Oral at bedtime  finasteride 5 milliGRAM(s) Oral daily  guaiFENesin   Syrup  (Sugar-Free) 200 milliGRAM(s) Oral every 6 hours PRN  ondansetron Injectable 4 milliGRAM(s) IV Push every 6 hours PRN  pantoprazole    Tablet 40 milliGRAM(s) Oral before breakfast  senna 2 Tablet(s) Oral at bedtime  sodium chloride 0.9%. 1000 milliLiter(s) IV Continuous <Continuous>  tamsulosin 0.4 milliGRAM(s) Oral at bedtime              REVIEW OF SYSTEMS:  CONSTITUTIONAL: No fever, no weight loss, or no fatigue  NECK: No pain, no stiffness  RESPIRATORY: No cough, no wheezing, no chills, no hemoptysis, No shortness of breath  CARDIOVASCULAR: No chest pain, no palpitations, no dizziness, no leg swelling  GASTROINTESTINAL: No abdominal pain. No nausea, no vomiting, no hematemesis; No diarrhea, no constipation. No melena, no hematochezia.  GENITOURINARY: No dysuria, no frequency, no hematuria, no incontinence  NEUROLOGICAL: No headaches, no loss of strength, no numbness, no tremors  SKIN: No itching, no burning  MUSCULOSKELETAL: No joint pain, no swelling; No muscle, no back, no extremity pain  PSYCHIATRIC: No depression, no mood swings,   HEME/LYMPH: No easy bruising, no bleeding gums  ALLERY AND IMMUNOLOGIC: No hives       Consultant(s) Notes Reviewed:  [X] YES  [ ] NO    PHYSICAL EXAM:  GENERAL: NAD  HEAD:  Atraumatic, Normocephalic  EYES: EOMI, PERRLA, conjunctiva and sclera clear  ENMT: No tonsillar erythema, exudates, or enlargement; Moist mucous membranes  NECK: Supple, No JVD  NERVOUS SYSTEM:  Awake & alert  CHEST/LUNG: Clear to auscultation bilaterally; No rales, rhonchi, wheezing,  HEART: Regular rate and rhythm  ABDOMEN: Soft, Nontender, Nondistended; Bowel sounds present  EXTREMITIES:  No clubbing, cyanosis, or edema  LYMPH: No lymphadenopathy noted  SKIN: No rashes      Advanced care planning discussed with patient/family [X] YES   [ ] NO    Advanced care planning discussed with patient/family. Patient's health status was discussed. All appropriate changes have been made regarding patient's end-of-life care. Advanced care planning forms reviewed/discussed/completed.  20 minutes spent.

## 2020-06-04 NOTE — SWALLOW VFSS/MBS ASSESSMENT ADULT - ORAL PHASE
Reduced anterior - posterior transport/Delayed oral transit time Delayed oral transit time/Reduced anterior - posterior transport Reduced anterior - posterior transport/Uncontrolled bolus / spillover in quita-pharynx/Incomplete tongue to palate contact Incomplete tongue to palate contact/Uncontrolled bolus / spillover in hypopharynx/Reduced anterior - posterior transport/Uncontrolled bolus / spillover in quita-pharynx

## 2020-06-04 NOTE — SWALLOW VFSS/MBS ASSESSMENT ADULT - RECOMMENDED CONSISTENCY
1. Mechanical soft solids with nectar thick liquids via single/small cup sips.  2. Strict aspiration precautions.  3. Safe swallowing guidelines/aspiration precautions: feed when fully awake/alert, sit fully upright, small bite/sip, complete full mastication of solids, alternate bite/sip to assist with pharyngeal clearance, pace meal slowly, and remain upright 45 minutes to 1 hour post meal to assist with pharyngeal clearance.  4. Continue to monitor pulmonary status closely.  5. Swallowing therapy s/p d/c to maximize overall swallow mechanism (rehab vs. outpatient vs. home care).  6. Ongoing oral care. IMPRESSIONS CONTINUED: the level of the valleculae, reduced BOT retraction, reduced hyolaryngeal elevation/excursion, and complete epiglottic retroflexion. There was no penetration and/or aspiration pre/during/post swallow. There was mild BOT, mild to moderate vallecular, and mild pyriform residue post swallow. Pt completed spontaneous additional dry swallow, which resulted in partial clearance.  6. *Given partial inconsistency in swallow function observed during study and h/o recent PNA, recommend nectar thick liquids to reduce aspiration risk. Given severity of pharyngeal residue increases as viscosity of PO increases, recommend mechanical soft solids. Strict aspiration precautions with ongoing monitoring of pulmonary status. Will continue to follow for diet tolerance.    RECOMMENDATIONS:  1. Mechanical soft solids with nectar thick liquids via single/small cup sips.  2. Strict aspiration precautions.  3. Safe swallowing guidelines/aspiration precautions: feed when fully awake/alert, sit fully upright, small bite/sip, complete full mastication of solids, alternate bite/sip to assist with pharyngeal clearance, pace meal slowly, and remain upright 45 minutes to 1 hour post meal to assist with pharyngeal clearance.  4. Continue to monitor pulmonary status closely.  5. Swallowing therapy s/p d/c to maximize overall swallow mechanism (rehab vs. outpatient vs. home care).  6. Ongoing oral care.

## 2020-06-04 NOTE — PROGRESS NOTE ADULT - PROBLEM SELECTOR PLAN 2
Likely from sepsis  Possibly worsening dementia Likely from sepsis  Possibly worsening dementia  Appears at baseline

## 2020-06-04 NOTE — CONSULT NOTE ADULT - SUBJECTIVE AND OBJECTIVE BOX
Patient is a 85y old  Male who presents with a chief complaint of SOB, cough (2020 13:24)    HPI:  86 yo male PMHx of Benign prostatic hypertrophy, CAD, MI, s/p CABG with 2 cardiac stents, COPD, Depression, GERD, Hyperlipidemia  Hypertension, Osteoporosis, Peripheral Neuropathy, Dementia.  Pt is a poor historian and so history provided is limited.  Was recently admitted to the hospital from - with increasing confusion and was noted to have a RLL.  He was treated with a course of abx and the confusion was thought to be related to metabolic encephalopathy.  He was then again admitted to the hospital from -.  He presented this time with weakness and hypotension and was found to have a GIB.  Pt was initially treated at [facility] but transferred to [facility].  He required transfusion stabilized and was discharged home. Currently pt is AAOx3 but is unaware of why he was taken to the hospital.  EMS reports that they received a call for SOB, cough, and one episode of vomiting.  Pt was seated at the table during this episode.  Upon their arrival pt was found to have rales with hypoxia SPO2 in the 80s.  Oxygen was placed and pt was taken to the ED.  Wife reports that pt was more confused then usual today which usually indicates an infection.  She also notes that over the last several weeks he has been wetting the bee which is not usual for him and today he was noted to have increased nasal congestion.  History is also limited from wife. (2020 11:17)    Renal consult called for YOU. History obtained from chart and patient. Pt denies any history of kidney disease.       PAST MEDICAL HISTORY:  Myocardial infarction  COPD (chronic obstructive pulmonary disease)  Seasonal allergies  GERD (gastroesophageal reflux disease)  Benign prostatic hypertrophy  Hyperlipidemia  Hypertension  Asthma  Depression  Peripheral Neuropathy  Asthma  S/P AVR (Aortic Valve Replacement)  S/P CABG (Coronary Artery Bypass Graft)  Osteoporosis  CAD (Coronary Artery Disease)  HTN (Hypertension)  Dyslipidemia      PAST SURGICAL HISTORY:  S/P inguinal hernia repair  S/P appendectomy  S/P CABG (coronary artery bypass graft)  S/P CABG (Coronary Artery Bypass Graft)  S/P AVR (Aortic Valve Replacement)      FAMILY HISTORY:  Family history of renal failure (Sibling)  Family history of MI (myocardial infarction):  at 65  Family history of stroke:  at 65  Family history of amyotrophic lateral sclerosis:  at 67  Family history of stroke:  at 67      SOCIAL HISTORY:    Allergies    amoxicillin (Unknown)  Levaquin (Unknown)  penicillin (Unknown)    Intolerances      Home Medications:  albuterol 90 mcg/inh inhalation aerosol: 2 puff(s) inhaled every 6 hours, As needed, Shortness of Breath and/or Wheezing (2020 16:46)    MEDICATIONS  (STANDING):  albuterol/ipratropium for Nebulization 3 milliLiter(s) Nebulizer every 8 hours  atorvastatin 80 milliGRAM(s) Oral at bedtime  cefTRIAXone   IVPB 1000 milliGRAM(s) IV Intermittent every 24 hours  donepezil 10 milliGRAM(s) Oral at bedtime  finasteride 5 milliGRAM(s) Oral daily  pantoprazole    Tablet 40 milliGRAM(s) Oral before breakfast  senna 2 Tablet(s) Oral at bedtime  sodium chloride 0.9%. 1000 milliLiter(s) (50 mL/Hr) IV Continuous <Continuous>  tamsulosin 0.4 milliGRAM(s) Oral at bedtime    MEDICATIONS  (PRN):  acetaminophen   Tablet .. 650 milliGRAM(s) Oral every 6 hours PRN Temp greater or equal to 38C (100.4F), Mild Pain (1 - 3)  bisacodyl 5 milliGRAM(s) Oral every 12 hours PRN Constipation  guaiFENesin   Syrup  (Sugar-Free) 200 milliGRAM(s) Oral every 6 hours PRN Cough  ondansetron Injectable 4 milliGRAM(s) IV Push every 6 hours PRN Nausea and/or Vomiting      REVIEW OF SYSTEMS:  General: weakness  Respiratory: No cough, SOB  Cardiovascular: No CP or Palpitations	  Gastrointestinal: No nausea, Vomiting. No diarrhea  Genitourinary: No urinary complaints	  Musculoskeletal: No new rash or lesions	  all other systems negative    T(F): 97.4 (20 @ 12:25), Max: 97.4 (20 @ 20:21)  HR: 78 (20 @ 15:12) (56 - 86)  BP: 105/65 (20 @ 12:45) (100/65 - 113/67)  RR: 18 (20 @ 12:25) (17 - 18)  SpO2: 98% (20 @ 15:12) (96% - 99%)  Wt(kg): --    PHYSICAL EXAM:  General: NAD  Respiratory: b/l air entry  Cardiovascular: S1 S2  Gastrointestinal: soft  Extremities: no edema            146<H>  |  113<H>  |  36<H>  ----------------------------<  92  4.2   |  27  |  2.10<H>    Ca    8.9      2020 08:09  Mg     2.0         TPro  7.6  /  Alb  3.4  /  TBili  0.5  /  DBili  x   /  AST  26  /  ALT  15  /  AlkPhos  130<H>                            10.0   16.22 )-----------( 77       ( 2020 08:09 )             33.4       Potassium, Serum: 4.2 mmol/L ( @ 08:09)  Blood Urea Nitrogen, Serum: 36 mg/dL ( @ 08:09)  Calcium, Total Serum: 8.9 mg/dL ( @ 08:09)  Hemoglobin: 10.0 g/dL ( @ 08:09)      Creatinine, Serum: 2.10 ( @ 08:09)  Creatinine, Serum: 2.00 ( @ 08:19)  Creatinine, Serum: 1.90 ( @ 22:07)      Urinalysis Basic - ( 2020 01:06 )    Color: Yellow / Appearance: Clear / S.020 / pH: x  Gluc: x / Ketone: Negative  / Bili: Negative / Urobili: Negative   Blood: x / Protein: 30 mg/dL / Nitrite: Positive   Leuk Esterase: Moderate / RBC: >50 /HPF / WBC >50   Sq Epi: x / Non Sq Epi: Occasional / Bacteria: Few      LIVER FUNCTIONS - ( 2020 22:07 )  Alb: 3.4 g/dL / Pro: 7.6 g/dL / ALK PHOS: 130 U/L / ALT: 15 U/L / AST: 26 U/L / GGT: x                 I&O's Detail    2020 07:01  -  2020 07:00  --------------------------------------------------------  IN:  Total IN: 0 mL    OUT:    Voided: 350 mL  Total OUT: 350 mL    Total NET: -350 mL            Culture - Urine (collected 2020 04:53)  Source: .Urine Clean Catch (Midstream)  Preliminary Report (2020 02:06):    50,000 - 99,000 CFU/mL Gram Negative Rods    Culture - Blood (collected 2020 00:55)  Source: .Blood Blood-Venous  Gram Stain (2020 14:14):    Growth in aerobic bottle: Gram Negative Rods    Growth in anaerobic bottle: Gram Negative Rods  Preliminary Report (2020 10:48):    Growth in aerobic and anaerobic bottles: Escherichia coli    See previous culture [id number]    Culture - Blood (collected 2020 00:55)  Source: .Blood Blood-Venous  Gram Stain (2020 13:16):    Growth in aerobic bottle: Gram Negative Rods  Preliminary Report (2020 10:35):    Growth in aerobic bottle: Escherichia coli Susceptibility to follow.    "Due to technical problems, Proteus sp. will Not be reported as part of    the BCID panel until further notice"    ***Blood Panel PCR results on this specimen are available    approximately 3 hours after the Gram stain result.***    Gram stain, PCR, and/or culture results may not always    correspond due to difference in methodologies.    ************************************************************    This PCR assay was performed using [employer].    The following targets are tested for: Enterococcus,    vancomycin resistant enterococci, Listeria monocytogenes,    coagulase negative staphylococci, S. aureus,    methicillin resistant S. aureus, Streptococcus agalactiae    (Group B), S. pneumoniae, S. pyogenes (Group A),    Acinetobacter baumannii, Enterobacter cloacae, E. coli,    Klebsiella oxytoca, K. pneumoniae, Proteus sp.,    Serratia marcescens, Haemophilus influenzae,    Neisseria meningitidis, Pseudomonas aeruginosa, Candida    albicans,C. glabrata, C krusei, C parapsilosis,    C. tropicalis and the KPC resistance gene.  Organism: Blood Culture PCR (2020 14:44)  Organism: Blood Culture PCR (2020 14:44)    Sensitivities:      -  Escherichia coli: Detec      Method Type: PCR        < from: US Gallbladder (20 @ 09:36) >    EXAM:  US GALLBLADDER                            PROCEDURE DATE:  2020          INTERPRETATION:  Indication: Possible cholecystitis on the recent CT abdomen.    Gallbladder ultrasound.    Gallbladder physiologically distended with mobile shadowing calculi. There is edematous thickening of the gallbladder wall. This is a nonspecific finding in and of itself. Differential diagnosis includes  adjacent liver disease, hypoalbuminemia and cholecystitis. If there is a high pretest suspicion of cholecystitis consider HIDA scan for additional evaluation. No biliary dilatation. Common duct 0.5 cm.    Impression: Cholelithiasis with edematous thickened gallbladder wall. Correlate with HIDA scan as clinically indicated      ATILIO DOUGLASS M.D., ATTENDING RADIOLOGIST  This document has been electronically signed. 2020  9:46AM              < from: CT Abdomen and Pelvis No Cont (20 @ 23:57) >    EXAM:  CT ABDOMEN AND PELVIS                          EXAM:  CT CHEST                            PROCEDURE DATE:  2020          INTERPRETATION:  CLINICAL INFORMATION: Suspected pneumonia, YOU with fever.     PROCEDURE: CT of the chest, abdomen and pelvis was performed without intravenous contrast. Coronal and sagittal reconstruction images were obtained.      COMPARISON: CT 2020.     FINDINGS:  Evaluation of the thoracic, abdominal and pelvic organs and vasculature is limited withoutintravenous contrast. Artifact from the patient's arms degrades images.    CHEST:     LUNGS AND AIRWAYS: Patent central airways. Mild diffuse peribronchial thickening. Debris/secretion in the trachea extending to the left mainstem bronchus and left lower lobe proximal bronchus. Paraseptal emphysema. Residual linear opacity in the right lower lobe, decreased in extent since prior study. Stable scattered bilateral pulmonary nodules. Stable opacities along the left major fissure and minor fissure. Subsegmental atelectasis/scarring in the right upper lobe and lung bases.  PLEURA: No pleural effusion or pneumothorax.  HEART: Normal size. No pericardial effusion. Aortic valve replacement. Mitral annular calcification.  VESSELS: Atherosclerotic change of the thoracic aorta, great vessel origin and coronary arteries. Stable ectatic ascending aorta. Left vertebral artery arising from the aortic arch.  MEDIASTINUM AND GEETA: Subcentimeter mediastinal lymph nodes without lymphadenopathy.  CHEST WALL AND LOWER NECK: Median sternotomy. Nonspecific punctate calcifications in the left thyroid gland again noted.    ABDOMEN/PELVIS:     LIVER: Unremarkable.  GALLBLADDER/BILE DUCTS: No intrahepatic or extrahepatic biliary dilatation. Cholelithiasis.  PANCREAS: Diffuse fatty atrophy.  SPLEEN: Unremarkable.    ADRENALS: Unremarkable.  KIDNEYS/URETERS: Nonspecific mild bilateral perinephric stranding without hydroureteronephrosis. No obvious obstructing radiopaque urinary tract stone. Again noted left renal cyst with calcified periphery/septation.  BLADDER: Partially distended. Bladder trabeculation and/or diverticula. Asymmetric thickening of the right > left bladder wall.  REPRODUCTIVE ORGANS: Normal size of the prostate with calcifications.    BOWEL: No bowel obstruction. Appendectomy. Nonspecific fluid-filled small bowel loops. Colon diverticulosis.  PERITONEUM: No drainable fluid collection or free air.  VESSELS: Atherosclerosis. 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  RETROPERITONEUM: No lymphadenopathy.    ABDOMINAL WALL/SOFT TISSUES: Unremarkable.  BONES: Osteopenia. Degenerative changes of the spine, bilateral glenohumeral, hip and sacroiliac joints. Stable compression fractures at T3, T4 and T10. Stable grade 1 anterolisthesis of L3 on L4. Internal fixation of the right proximal femoral deformity with incompletely visualized intramedullary pilar. Heterotopic ossification about the right femur.    IMPRESSION:    Residual linear opacity in the right lower lobe, whichhas decreased in extent since 2020. Follow-up to resolution would be helpful to exclude potential underlying neoplasm.    Debris/secretion in the trachea extending to the left-sided airways. Recommend clinical correlation to assess aspiration.    Nonspecific fluid-filled small bowel loops, which can be seen in the setting of enterocolitis. Recommend clinical correlation.    Cholelithiasis. Recommend clinical correlation to assess acute cholecystitis.    Asymmetric bladder wall thickening with evidence of bladder trabeculation/diverticula. Recommend clinical correlation to assess urinary tract infection. Follow-up will be helpful to exclude potential underlying neoplasm.    Additional findings as described.          NIKKI RICKS M.D., ATTENDING RADIOLOGIST  This document has been electronically signed. Juan R  3 2020 12:50AM                < end of copied text >

## 2020-06-04 NOTE — CONSULT NOTE ADULT - SUBJECTIVE AND OBJECTIVE BOX
hpi used due to documentation of dementia:  86 yo male PMHx of Benign prostatic hypertrophy, CAD, MI, s/p CABG with 2 cardiac stents, COPD, Depression, GERD, Hyperlipidemia  Hypertension, Osteoporosis, Peripheral Neuropathy, Dementia.  Pt is a poor historian and so history provided is limited.  Was recently admitted to the hospital from 1/17-1/22 with increasing confusion and was noted to have a RLL.  He was treated with a course of abx and the confusion was thought to be related to metabolic encephalopathy.  He was then again admitted to the hospital from 2/14-2/22.  He presented this time with weakness and hypotension and was found to have a GIB.  Pt was initially treated at [facility] but [facility].  He required transfusion stabilized and was discharged home. Currently pt is AAOx3 but is unaware of why he was taken to the hospital.  EMS reports that they received a call for SOB, cough, and one episode of vomiting.  Pt was seated at the table during this episode.  Upon their arrival pt was found to have rales with hypoxia SPO2 in the 80s.  Oxygen was placed and pt was taken to the ED.  Wife reports that pt was more confused then usual today which usually indicates an infection.  She also notes that over the last several weeks he has been wetting the bee which is not usual for him and today he was noted to have increased nasal congestion.  History is also limited from wife.  US and CT concerning for acute cholecystitis, with E.Coli Bacteremia    ROS-unable to accurately assess but denies pain    PAST MEDICAL & SURGICAL HISTORY:  Myocardial infarction  COPD (chronic obstructive pulmonary disease)  Seasonal allergies  GERD (gastroesophageal reflux disease)  Benign prostatic hypertrophy  Hyperlipidemia  Hypertension  Depression  Peripheral Neuropathy  Osteoporosis  CAD (Coronary Artery Disease): MI, s/p CABG with 2 cardiac stents  S/P inguinal hernia repair: On right side  S/P appendectomy  S/P CABG (coronary artery bypass graft): Done in 1996; 2 cardiac stents in 1998  S/P AVR (Aortic Valve Replacement): Pig valve (bioprosthetic); done 2010 at [place]    Allergies    amoxicillin (Unknown)  Levaquin (Unknown)  penicillin (Unknown)    Intolerances    MEDICATIONS  (STANDING):  albuterol/ipratropium for Nebulization 3 milliLiter(s) Nebulizer every 8 hours  atorvastatin 80 milliGRAM(s) Oral at bedtime  cefTRIAXone   IVPB 1000 milliGRAM(s) IV Intermittent every 24 hours  donepezil 10 milliGRAM(s) Oral at bedtime  finasteride 5 milliGRAM(s) Oral daily  pantoprazole    Tablet 40 milliGRAM(s) Oral before breakfast  senna 2 Tablet(s) Oral at bedtime  sodium chloride 0.9%. 1000 milliLiter(s) (50 mL/Hr) IV Continuous <Continuous>  tamsulosin 0.4 milliGRAM(s) Oral at bedtime    MEDICATIONS  (PRN):  acetaminophen   Tablet .. 650 milliGRAM(s) Oral every 6 hours PRN Temp greater or equal to 38C (100.4F), Mild Pain (1 - 3)  bisacodyl 5 milliGRAM(s) Oral every 12 hours PRN Constipation  guaiFENesin   Syrup  (Sugar-Free) 200 milliGRAM(s) Oral every 6 hours PRN Cough  ondansetron Injectable 4 milliGRAM(s) IV Push every 6 hours PRN Nausea and/or Vomiting    SH-neg x3    .  VITAL SIGNS:  T(C): 36.2 (06-04-20 @ 04:37), Max: 36.7 (06-03-20 @ 14:05)  < from: US Gallbladder (06.04.20 @ 09:36) >  Impression: Cholelithiasis with edematous thickened gallbladder wall. Correlate with HIDA scan as clinically indicated          < end of copied text >  T(F): 97.2 (06-04-20 @ 04:37), Max: 98 (06-03-20 @ 14:05)  HR: 57 (06-04-20 @ 07:44) (57 - 88)  BP: 113/67 (06-04-20 @ 04:37) (102/62 - 139/87)  BP(mean): --  RR: 18 (06-04-20 @ 04:37) (17 - 18)  SpO2: 97% (06-04-20 @ 07:44) (96% - 99%)  Wt(kg): --    PHYSICAL EXAM:    Constitutional:  NAD, resting comfortably in bed  Head: NC/AT  Eyes: PERRL b/l  ENT: MMM  Neck: supple; no JVD or thyromegaly  Respiratory: CTA B/L   Cardiac: +S1/S2; RRR   Gastrointestinal: soft, NT/ND; no rebound or guarding; + BS                          10.0   16.22 )-----------( 77       ( 04 Jun 2020 08:09 )             33.4   06-04    146<H>  |  113<H>  |  36<H>  ----------------------------<  92  4.2   |  27  |  2.10<H>    Ca    8.9      04 Jun 2020 08:09  Mg     2.0     06-04    TPro  7.6  /  Alb  3.4  /  TBili  0.5  /  DBili  x   /  AST  26  /  ALT  15  /  AlkPhos  130<H>  06-02    < from: US Gallbladder (06.04.20 @ 09:36) >  Impression: Cholelithiasis with edematous thickened gallbladder wall. Correlate with HIDA scan as clinically indicated        < end of copied text >

## 2020-06-04 NOTE — PROGRESS NOTE ADULT - SUBJECTIVE AND OBJECTIVE BOX
INTERVAL HPI/OVERNIGHT EVENTS:  pt seen and examined  denies n/v/d/abd pain  tolerating po    MEDICATIONS  (STANDING):  albuterol/ipratropium for Nebulization 3 milliLiter(s) Nebulizer every 8 hours  atorvastatin 80 milliGRAM(s) Oral at bedtime  cefTRIAXone   IVPB 1000 milliGRAM(s) IV Intermittent every 24 hours  donepezil 10 milliGRAM(s) Oral at bedtime  finasteride 5 milliGRAM(s) Oral daily  pantoprazole    Tablet 40 milliGRAM(s) Oral before breakfast  senna 2 Tablet(s) Oral at bedtime  sodium chloride 0.9%. 1000 milliLiter(s) (50 mL/Hr) IV Continuous <Continuous>  tamsulosin 0.4 milliGRAM(s) Oral at bedtime    MEDICATIONS  (PRN):  acetaminophen   Tablet .. 650 milliGRAM(s) Oral every 6 hours PRN Temp greater or equal to 38C (100.4F), Mild Pain (1 - 3)  bisacodyl 5 milliGRAM(s) Oral every 12 hours PRN Constipation  guaiFENesin   Syrup  (Sugar-Free) 200 milliGRAM(s) Oral every 6 hours PRN Cough  ondansetron Injectable 4 milliGRAM(s) IV Push every 6 hours PRN Nausea and/or Vomiting      Allergies    amoxicillin (Unknown)  Levaquin (Unknown)  penicillin (Unknown)    Intolerances        Review of Systems:    General:  No wt loss, fevers, chills, night sweats, fatigue   Eyes:  Good vision, no reported pain  ENT:  No sore throat, pain, runny nose, dysphagia  CV:  No pain, palpitations, hypo/hypertension  Resp:  No dyspnea, cough, tachypnea, wheezing  GI:  No pain, No nausea, No vomiting, No diarrhea, No constipation, No weight loss, No fever, No pruritis, No rectal bleeding, No melena, No dysphagia  :  No pain, bleeding, incontinence, nocturia  Muscle:  No pain, weakness  Neuro:  No weakness, tingling, memory problems  Psych:  No fatigue, insomnia, mood problems, depression  Endocrine:  No polyuria, polydypsia, cold/heat intolerance  Heme:  No petechiae, ecchymosis, easy bruisability  Skin:  No rash, tattoos, scars, edema      Vital Signs Last 24 Hrs  T(C): 36.2 (2020 04:37), Max: 38 (2020 11:25)  T(F): 97.2 (2020 04:37), Max: 100.4 (2020 11:25)  HR: 57 (2020 07:44) (57 - 88)  BP: 113/67 (2020 04:37) (102/62 - 139/87)  BP(mean): --  RR: 18 (2020 04:37) (17 - 18)  SpO2: 97% (2020 07:44) (96% - 99%)    PHYSICAL EXAM:    Constitutional: NAD  HEENT: ncat  Neck: No LAD  Gastrointestinal: soft nt nd  Extremities: No peripheral edema  Vascular: + peripheral pulses  Neurological: Awake alert appropriate      LABS:                        10.0   16.22 )-----------( 77       ( 2020 08:09 )             33.4     06-04    146<H>  |  113<H>  |  36<H>  ----------------------------<  92  4.2   |  27  |  2.10<H>    Ca    8.9      2020 08:09  Mg     2.0     06-04    TPro  7.6  /  Alb  3.4  /  TBili  0.5  /  DBili  x   /  AST  26  /  ALT  15  /  AlkPhos  130<H>  06-02      Urinalysis Basic - ( 2020 01:06 )    Color: Yellow / Appearance: Clear / S.020 / pH: x  Gluc: x / Ketone: Negative  / Bili: Negative / Urobili: Negative   Blood: x / Protein: 30 mg/dL / Nitrite: Positive   Leuk Esterase: Moderate / RBC: >50 /HPF / WBC >50   Sq Epi: x / Non Sq Epi: Occasional / Bacteria: Few        RADIOLOGY & ADDITIONAL TESTS: INTERVAL HPI/OVERNIGHT EVENTS:  pt seen and examined  denies n/v/d/abd pain  tolerating po  mbs noted    MEDICATIONS  (STANDING):  albuterol/ipratropium for Nebulization 3 milliLiter(s) Nebulizer every 8 hours  atorvastatin 80 milliGRAM(s) Oral at bedtime  cefTRIAXone   IVPB 1000 milliGRAM(s) IV Intermittent every 24 hours  donepezil 10 milliGRAM(s) Oral at bedtime  finasteride 5 milliGRAM(s) Oral daily  pantoprazole    Tablet 40 milliGRAM(s) Oral before breakfast  senna 2 Tablet(s) Oral at bedtime  sodium chloride 0.9%. 1000 milliLiter(s) (50 mL/Hr) IV Continuous <Continuous>  tamsulosin 0.4 milliGRAM(s) Oral at bedtime    MEDICATIONS  (PRN):  acetaminophen   Tablet .. 650 milliGRAM(s) Oral every 6 hours PRN Temp greater or equal to 38C (100.4F), Mild Pain (1 - 3)  bisacodyl 5 milliGRAM(s) Oral every 12 hours PRN Constipation  guaiFENesin   Syrup  (Sugar-Free) 200 milliGRAM(s) Oral every 6 hours PRN Cough  ondansetron Injectable 4 milliGRAM(s) IV Push every 6 hours PRN Nausea and/or Vomiting      Allergies    amoxicillin (Unknown)  Levaquin (Unknown)  penicillin (Unknown)    Intolerances        Review of Systems:    General:  No wt loss, fevers, chills, night sweats, fatigue   Eyes:  Good vision, no reported pain  ENT:  No sore throat, pain, runny nose, dysphagia  CV:  No pain, palpitations, hypo/hypertension  Resp:  No dyspnea, cough, tachypnea, wheezing  GI:  No pain, No nausea, No vomiting, No diarrhea, No constipation, No weight loss, No fever, No pruritis, No rectal bleeding, No melena, No dysphagia  :  No pain, bleeding, incontinence, nocturia  Muscle:  No pain, weakness  Neuro:  No weakness, tingling, memory problems  Psych:  No fatigue, insomnia, mood problems, depression  Endocrine:  No polyuria, polydypsia, cold/heat intolerance  Heme:  No petechiae, ecchymosis, easy bruisability  Skin:  No rash, tattoos, scars, edema      Vital Signs Last 24 Hrs  T(C): 36.2 (2020 04:37), Max: 38 (2020 11:25)  T(F): 97.2 (2020 04:37), Max: 100.4 (2020 11:25)  HR: 57 (2020 07:44) (57 - 88)  BP: 113/67 (2020 04:37) (102/62 - 139/87)  BP(mean): --  RR: 18 (2020 04:37) (17 - 18)  SpO2: 97% (2020 07:44) (96% - 99%)    PHYSICAL EXAM:    Constitutional: NAD  HEENT: ncat  Neck: No LAD  Gastrointestinal: soft nt nd  Extremities: No peripheral edema  Vascular: + peripheral pulses  Neurological: Awake alert appropriate      LABS:                        10.0   16.22 )-----------( 77       ( 2020 08:09 )             33.4     06-04    146<H>  |  113<H>  |  36<H>  ----------------------------<  92  4.2   |  27  |  2.10<H>    Ca    8.9      2020 08:09  Mg     2.0     06-04    TPro  7.6  /  Alb  3.4  /  TBili  0.5  /  DBili  x   /  AST  26  /  ALT  15  /  AlkPhos  130<H>  06-02      Urinalysis Basic - ( 2020 01:06 )    Color: Yellow / Appearance: Clear / S.020 / pH: x  Gluc: x / Ketone: Negative  / Bili: Negative / Urobili: Negative   Blood: x / Protein: 30 mg/dL / Nitrite: Positive   Leuk Esterase: Moderate / RBC: >50 /HPF / WBC >50   Sq Epi: x / Non Sq Epi: Occasional / Bacteria: Few        RADIOLOGY & ADDITIONAL TESTS:

## 2020-06-04 NOTE — PROGRESS NOTE ADULT - SUBJECTIVE AND OBJECTIVE BOX
infectious diseases progress note:    ARABELLA ESPARZA is a 85y y. o. Male patient    Patient reports: "I am not really sure I have pneumonia"    ROS:    EYES:  Negative  blurry vision or double vision  GASTROINTESTINAL:  Negative for nausea, vomiting, diarrhea  -otherwise negative except for subjective    Allergies    amoxicillin (Unknown)  Levaquin (Unknown)  penicillin (Unknown)    Intolerances        ANTIBIOTICS/RELEVANT:  antimicrobials  cefTRIAXone   IVPB 1000 milliGRAM(s) IV Intermittent every 24 hours    immunologic:    OTHER:  acetaminophen   Tablet .. 650 milliGRAM(s) Oral every 6 hours PRN  albuterol/ipratropium for Nebulization 3 milliLiter(s) Nebulizer every 8 hours  atorvastatin 80 milliGRAM(s) Oral at bedtime  bisacodyl 5 milliGRAM(s) Oral every 12 hours PRN  donepezil 10 milliGRAM(s) Oral at bedtime  finasteride 5 milliGRAM(s) Oral daily  guaiFENesin   Syrup  (Sugar-Free) 200 milliGRAM(s) Oral every 6 hours PRN  ondansetron Injectable 4 milliGRAM(s) IV Push every 6 hours PRN  pantoprazole    Tablet 40 milliGRAM(s) Oral before breakfast  senna 2 Tablet(s) Oral at bedtime  sodium chloride 0.9%. 1000 milliLiter(s) IV Continuous <Continuous>  tamsulosin 0.4 milliGRAM(s) Oral at bedtime      Objective:  Last 24-Vital Signs Last 24 Hrs  T(C): 36.3 (2020 12:25), Max: 36.7 (2020 14:05)  T(F): 97.4 (2020 12:25), Max: 98 (2020 14:05)  HR: 56 (2020 12:25) (56 - 88)  BP: 100/65 (2020 12:25) (100/65 - 139/85)  BP(mean): --  RR: 18 (2020 12:25) (17 - 18)  SpO2: 99% (2020 12:25) (96% - 99%)    T(C): 36.3 (20 @ 12:25), Max: 38.8 (20 @ 22:06)  T(F): 97.4 (20 @ 12:25), Max: 101.8 (20 @ 22:06)  T(C): 36.3 (20 @ 12:25), Max: 38.8 (20 @ 22:06)  T(F): 97.4 (20 @ 12:25), Max: 101.8 (20 @ 22:06)  T(C): 36.3 (20 @ 12:25), Max: 38.8 (20 @ 22:06)  T(F): 97.4 (20 @ 12:25), Max: 101.8 (20 @ 22:06)    PHYSICAL EXAM:  Constitutional: Well-developed, well nourished  Eyes: PERRLA, EOMI  Ear/Nose/Throat: oropharynx normal	  Neck: no JVD, no lymphadenopathy, supple  Respiratory: no accessory muscle use, lung fields with some decrease in RLL  Cardiovascular: RRR, normal S1, S2 no m/r/g  Gastrointestinal: soft, NT, no HSM, BS-normal  Extremities: no clubbing, no cyanosis, edema absent  Neuro: patient alert, oriented and appropriate  Skin: no sig lesions      LABS:                        10.0   16.22 )-----------( 77       ( 2020 08:09 )             33.4       WBC 16.22   @ 08:09  WBC 12.42   @ 08:19  WBC 16.04   @ 22:07      06-    146<H>  |  113<H>  |  36<H>  ----------------------------<  92  4.2   |  27  |  2.10<H>    Ca    8.9      2020 08:09  Mg     2.0     -    TPro  7.6  /  Alb  3.4  /  TBili  0.5  /  DBili  x   /  AST  26  /  ALT  15  /  AlkPhos  130<H>  06-      Creatinine, Serum: 2.10 mg/dL (20 @ 08:09)  Creatinine, Serum: 2.00 mg/dL (20 @ 08:19)  Creatinine, Serum: 1.90 mg/dL (20 @ 22:07)        Urinalysis Basic - ( 2020 01:06 )    Color: Yellow / Appearance: Clear / S.020 / pH: x  Gluc: x / Ketone: Negative  / Bili: Negative / Urobili: Negative   Blood: x / Protein: 30 mg/dL / Nitrite: Positive   Leuk Esterase: Moderate / RBC: >50 /HPF / WBC >50   Sq Epi: x / Non Sq Epi: Occasional / Bacteria: Few            MICROBIOLOGY:    Culture - Urine (20 @ 04:53)    Specimen Source: .Urine Clean Catch (Midstream)    Culture Results:   50,000 - 99,000 CFU/mL Gram Negative Rods    Culture - Blood (20 @ 00:55)    -  Escherichia coli: Detec    Gram Stain:   Growth in aerobic bottle: Gram Negative Rods    Specimen Source: .Blood Blood-Venous    Organism: Blood Culture PCR    Culture Results:   Growth in aerobic bottle: Escherichia coli Susceptibility to follow.        RADIOLOGY & ADDITIONAL STUDIES:

## 2020-06-04 NOTE — PROGRESS NOTE ADULT - ASSESSMENT
86 yo male PMHx of Benign prostatic hypertrophy, CAD, MI, s/p CABG with 2 cardiac stents, COPD, Depression, GERD, Hyperlipidemia  Hypertension, Osteoporosis, Peripheral Neuropathy, Dementia. adm with SOB, cough, one episode of vomiting hypoxia SPO2 in the 80s confusion and concerns for PNA but no compelling symptoms and noted E coli bacteremia

## 2020-06-04 NOTE — CONSULT NOTE ADULT - ASSESSMENT
1.	CKD 3/4, Prerenal azotemia, ? Mild ATN  2.	Gram negative bacteremia, Cholecystitis  3.	Hypertension    Continue IV hydration. Change IVF to half NS. On IV abx. Monitor BP trend. Titrate BP meds as needed. Salt restriction.   Will follow electrolytes and renal function trend. Avoid nephrotoxic meds as possible. CT results noted. No evidence of obstructive uropathy.   Further recommendations pending clinical course. Thank you for the courtesy of this referral.

## 2020-06-04 NOTE — PROGRESS NOTE ADULT - PROBLEM SELECTOR PLAN 1
picture appears more consistent with recurrent aspiration events and no clear acute airspace disease at this point.  Noted elevated procalcitonin, wbc, and fever and now with E coli bacteremia-with continue elevation of WBC and PCN issues will follow for sensitivities

## 2020-06-04 NOTE — PROGRESS NOTE ADULT - PROBLEM SELECTOR PLAN 1
Admit  Pan-culture  IV antibiotics  ID consult  Trend CBC and lactate  Check procalcitonin E. Coli Sepsis POA  Likely from urine  GB slightly distended on u/s  Will check HIDA  ID/surg f/u

## 2020-06-04 NOTE — PROGRESS NOTE ADULT - PROBLEM SELECTOR PLAN 1
CT chest does not support PNA - on Rocephin - eval for  source of infection  ID eval noted   COPD - emphysema - mucus plugging - Atelectasis - see CT Chest - NEBS TID for mucociliary clearance and COPD management, BG noted  recent GI bleed - off AC - known to [facility] team - H and H noted - bowel rx regimen  valvular heart disease - see TTE - proBNP elev - I and O - monitor for vol overload   Swallow eval - MBS pending - hx of dementia and mucus plugging and atelectasis - concern for aspiration   ckd - old records reviewed - renal indices noted - monitor lytes  prognosis guarded  pall care eval for MOLST - DNR DNI

## 2020-06-04 NOTE — SWALLOW VFSS/MBS ASSESSMENT ADULT - DIAGNOSTIC IMPRESSIONS
1. Mild to moderate oral dysphagia when given regular solids marked by adequate retrieval and containment, prolonged mastication with delayed bolus cohesion, reduced posterior transfer, and grossly adequate clearance post swallow.  2. Mild oral dysphagia when given puree, honey thick liquids, nectar thick liquids, and thin liquids marked by adequate retrieval and containment, reduced bolus cohesion resulting in premature spillage, reduced posterior transfer, and adequate clearance post swallow.  3. Mild to moderate pharyngeal dysphagia when given honey thick liquids marked by brief delay in pharyngeal swallow trigger at the level of the valleculae, reduced BOT retraction, reduced hyolaryngeal, and intermittent incomplete epiglottic retroflexion. There was x1 occurrence of deep/SILENT penetration during the swallow with honey thick liquids; cued cough response was ineffective in ejecting barium from airway, resulting in residue migrating down to the level of the VFs. This was NOT reduplicated amongst several additional trials of honey thick liquids, resulting in no penetration. There was mild to moderate BOT, moderate vallecular, and mild pyriform residue post swallow. Pt completed spontaneous additional dry swallows, which resulted in partial clearance.  4. Mild to moderate pharyngeal dysphagia when given puree and regular solids marked by brief delay in pharyngeal swallow trigger at the level of the valleculae, reduced BOT retraction, reduced hyolaryngeal elevation/excursion, and complete epiglottic retroflexion. There was no penetration and/or aspiration pre/during/post swallow. There was mild to moderate BOT, moderate vallecular, and mild pyriform residue post swallow. Pt completed spontaneous additional dry swallows, which resulted in partial clearance. Pt benefited from liquid wash to assist with pharyngeal clearance.  5. Mild to moderate pharyngeal dysphagia when given thin and nectar thick liquids marked by timely pharyngeal swallow trigger s/p spillage to

## 2020-06-04 NOTE — SWALLOW VFSS/MBS ASSESSMENT ADULT - RECOMMENDED FEEDING/EATING TECHNIQUES
check mouth frequently for oral residue/pocketing/position upright (90 degrees)/alternate food with liquid/hard swallow w/ each bite or sip/no straws/maintain upright posture during/after eating for 30 mins/oral hygiene/allow for swallow between intakes/crush medication (when feasible)/provide rest periods between swallows

## 2020-06-05 LAB
-  AMIKACIN: SIGNIFICANT CHANGE UP
-  AMPICILLIN/SULBACTAM: SIGNIFICANT CHANGE UP
-  AMPICILLIN: SIGNIFICANT CHANGE UP
-  AZTREONAM: SIGNIFICANT CHANGE UP
-  CEFAZOLIN: SIGNIFICANT CHANGE UP
-  CEFEPIME: SIGNIFICANT CHANGE UP
-  CEFOXITIN: SIGNIFICANT CHANGE UP
-  CEFTRIAXONE: SIGNIFICANT CHANGE UP
-  CIPROFLOXACIN: SIGNIFICANT CHANGE UP
-  ERTAPENEM: SIGNIFICANT CHANGE UP
-  GENTAMICIN: SIGNIFICANT CHANGE UP
-  IMIPENEM: SIGNIFICANT CHANGE UP
-  LEVOFLOXACIN: SIGNIFICANT CHANGE UP
-  MEROPENEM: SIGNIFICANT CHANGE UP
-  PIPERACILLIN/TAZOBACTAM: SIGNIFICANT CHANGE UP
-  TOBRAMYCIN: SIGNIFICANT CHANGE UP
-  TRIMETHOPRIM/SULFAMETHOXAZOLE: SIGNIFICANT CHANGE UP
ALBUMIN SERPL ELPH-MCNC: 2.3 G/DL — LOW (ref 3.3–5)
ALP SERPL-CCNC: 105 U/L — SIGNIFICANT CHANGE UP (ref 40–120)
ALT FLD-CCNC: 20 U/L — SIGNIFICANT CHANGE UP (ref 12–78)
ANION GAP SERPL CALC-SCNC: 6 MMOL/L — SIGNIFICANT CHANGE UP (ref 5–17)
AST SERPL-CCNC: 26 U/L — SIGNIFICANT CHANGE UP (ref 15–37)
BILIRUB DIRECT SERPL-MCNC: <.1 MG/DL — SIGNIFICANT CHANGE UP (ref 0.05–0.2)
BILIRUB INDIRECT FLD-MCNC: >0.1 MG/DL — LOW (ref 0.2–1)
BILIRUB SERPL-MCNC: 0.2 MG/DL — SIGNIFICANT CHANGE UP (ref 0.2–1.2)
BUN SERPL-MCNC: 41 MG/DL — HIGH (ref 7–23)
CALCIUM SERPL-MCNC: 8.6 MG/DL — SIGNIFICANT CHANGE UP (ref 8.5–10.1)
CHLORIDE SERPL-SCNC: 113 MMOL/L — HIGH (ref 96–108)
CO2 SERPL-SCNC: 27 MMOL/L — SIGNIFICANT CHANGE UP (ref 22–31)
CREAT SERPL-MCNC: 2.1 MG/DL — HIGH (ref 0.5–1.3)
CULTURE RESULTS: SIGNIFICANT CHANGE UP
CULTURE RESULTS: SIGNIFICANT CHANGE UP
GLUCOSE SERPL-MCNC: 102 MG/DL — HIGH (ref 70–99)
HCT VFR BLD CALC: 33.5 % — LOW (ref 39–50)
HGB BLD-MCNC: 10 G/DL — LOW (ref 13–17)
MAGNESIUM SERPL-MCNC: 2 MG/DL — SIGNIFICANT CHANGE UP (ref 1.6–2.6)
MCHC RBC-ENTMCNC: 23.3 PG — LOW (ref 27–34)
MCHC RBC-ENTMCNC: 29.9 GM/DL — LOW (ref 32–36)
MCV RBC AUTO: 77.9 FL — LOW (ref 80–100)
METHOD TYPE: SIGNIFICANT CHANGE UP
NRBC # BLD: 0 /100 WBCS — SIGNIFICANT CHANGE UP (ref 0–0)
ORGANISM # SPEC MICROSCOPIC CNT: SIGNIFICANT CHANGE UP
PLATELET # BLD AUTO: 82 K/UL — LOW (ref 150–400)
POTASSIUM SERPL-MCNC: 4 MMOL/L — SIGNIFICANT CHANGE UP (ref 3.5–5.3)
POTASSIUM SERPL-SCNC: 4 MMOL/L — SIGNIFICANT CHANGE UP (ref 3.5–5.3)
PROT SERPL-MCNC: 5.7 G/DL — LOW (ref 6–8.3)
RBC # BLD: 4.3 M/UL — SIGNIFICANT CHANGE UP (ref 4.2–5.8)
RBC # FLD: 15.6 % — HIGH (ref 10.3–14.5)
SODIUM SERPL-SCNC: 146 MMOL/L — HIGH (ref 135–145)
SPECIMEN SOURCE: SIGNIFICANT CHANGE UP
SPECIMEN SOURCE: SIGNIFICANT CHANGE UP
WBC # BLD: 11.72 K/UL — HIGH (ref 3.8–10.5)
WBC # FLD AUTO: 11.72 K/UL — HIGH (ref 3.8–10.5)

## 2020-06-05 RX ORDER — SODIUM CHLORIDE 9 MG/ML
1000 INJECTION, SOLUTION INTRAVENOUS
Refills: 0 | Status: DISCONTINUED | OUTPATIENT
Start: 2020-06-05 | End: 2020-06-07

## 2020-06-05 RX ADMIN — Medication 3 MILLILITER(S): at 19:40

## 2020-06-05 RX ADMIN — Medication 3 MILLILITER(S): at 15:28

## 2020-06-05 RX ADMIN — TAMSULOSIN HYDROCHLORIDE 0.4 MILLIGRAM(S): 0.4 CAPSULE ORAL at 20:42

## 2020-06-05 RX ADMIN — SODIUM CHLORIDE 60 MILLILITER(S): 9 INJECTION, SOLUTION INTRAVENOUS at 20:41

## 2020-06-05 RX ADMIN — CEFTRIAXONE 100 MILLIGRAM(S): 500 INJECTION, POWDER, FOR SOLUTION INTRAMUSCULAR; INTRAVENOUS at 20:42

## 2020-06-05 RX ADMIN — SENNA PLUS 2 TABLET(S): 8.6 TABLET ORAL at 20:42

## 2020-06-05 RX ADMIN — DONEPEZIL HYDROCHLORIDE 10 MILLIGRAM(S): 10 TABLET, FILM COATED ORAL at 20:42

## 2020-06-05 RX ADMIN — FINASTERIDE 5 MILLIGRAM(S): 5 TABLET, FILM COATED ORAL at 12:44

## 2020-06-05 RX ADMIN — PANTOPRAZOLE SODIUM 40 MILLIGRAM(S): 20 TABLET, DELAYED RELEASE ORAL at 05:40

## 2020-06-05 RX ADMIN — ATORVASTATIN CALCIUM 80 MILLIGRAM(S): 80 TABLET, FILM COATED ORAL at 20:42

## 2020-06-05 RX ADMIN — Medication 3 MILLILITER(S): at 08:04

## 2020-06-05 NOTE — PROGRESS NOTE ADULT - SUBJECTIVE AND OBJECTIVE BOX
Date/Time Patient Seen:  		  Referring MD:   Data Reviewed	       Patient is a 85y old  Male who presents with a chief complaint of SOB, cough (04 Jun 2020 17:07)      Subjective/HPI     PAST MEDICAL & SURGICAL HISTORY:  Myocardial infarction  COPD (chronic obstructive pulmonary disease)  Seasonal allergies  GERD (gastroesophageal reflux disease)  Benign prostatic hypertrophy  Hyperlipidemia  Hypertension  Asthma: Also COPD component  Depression  Peripheral Neuropathy  Asthma  S/P AVR (Aortic Valve Replacement)  S/P CABG (Coronary Artery Bypass Graft)  Osteoporosis  CAD (Coronary Artery Disease): MI, s/p CABG with 2 cardiac stents  HTN (Hypertension)  Dyslipidemia  S/P inguinal hernia repair: On right side  S/P appendectomy  S/P CABG (coronary artery bypass graft): Done in 1996; 2 cardiac stents in 1998  S/P CABG (Coronary Artery Bypass Graft)  S/P AVR (Aortic Valve Replacement): Pig valve (bioprosthetic); done 2010 at [place]        Medication list         MEDICATIONS  (STANDING):  albuterol/ipratropium for Nebulization 3 milliLiter(s) Nebulizer every 8 hours  atorvastatin 80 milliGRAM(s) Oral at bedtime  cefTRIAXone   IVPB 1000 milliGRAM(s) IV Intermittent every 24 hours  donepezil 10 milliGRAM(s) Oral at bedtime  finasteride 5 milliGRAM(s) Oral daily  pantoprazole    Tablet 40 milliGRAM(s) Oral before breakfast  senna 2 Tablet(s) Oral at bedtime  sodium chloride 0.9%. 1000 milliLiter(s) (50 mL/Hr) IV Continuous <Continuous>  tamsulosin 0.4 milliGRAM(s) Oral at bedtime    MEDICATIONS  (PRN):  acetaminophen   Tablet .. 650 milliGRAM(s) Oral every 6 hours PRN Temp greater or equal to 38C (100.4F), Mild Pain (1 - 3)  bisacodyl 5 milliGRAM(s) Oral every 12 hours PRN Constipation  guaiFENesin   Syrup  (Sugar-Free) 200 milliGRAM(s) Oral every 6 hours PRN Cough  ondansetron Injectable 4 milliGRAM(s) IV Push every 6 hours PRN Nausea and/or Vomiting         Vitals log        ICU Vital Signs Last 24 Hrs  T(C): 36.5 (05 Jun 2020 04:25), Max: 36.6 (04 Jun 2020 20:54)  T(F): 97.7 (05 Jun 2020 04:25), Max: 97.8 (04 Jun 2020 20:54)  HR: 73 (05 Jun 2020 04:25) (56 - 80)  BP: 146/- (05 Jun 2020 04:25) (100/65 - 146/-)  BP(mean): --  ABP: --  ABP(mean): --  RR: 18 (05 Jun 2020 04:25) (18 - 18)  SpO2: 93% (05 Jun 2020 04:25) (93% - 99%)           Input and Output:  I&O's Detail    03 Jun 2020 07:01  -  04 Jun 2020 07:00  --------------------------------------------------------  IN:  Total IN: 0 mL    OUT:    Voided: 350 mL  Total OUT: 350 mL    Total NET: -350 mL      04 Jun 2020 07:01  -  05 Jun 2020 06:51  --------------------------------------------------------  IN:  Total IN: 0 mL    OUT:    Voided: 300 mL  Total OUT: 300 mL    Total NET: -300 mL          Lab Data                        10.0   11.72 )-----------( 82       ( 05 Jun 2020 06:38 )             33.5     06-04    146<H>  |  113<H>  |  36<H>  ----------------------------<  92  4.2   |  27  |  2.10<H>    Ca    8.9      04 Jun 2020 08:09  Mg     2.0     06-04              Review of Systems	      Objective     Physical Examination    heart s1s2  lung dec BS  abd soft      Pertinent Lab findings & Imaging      Ava:  NO   Adequate UO     I&O's Detail    03 Jun 2020 07:01  -  04 Jun 2020 07:00  --------------------------------------------------------  IN:  Total IN: 0 mL    OUT:    Voided: 350 mL  Total OUT: 350 mL    Total NET: -350 mL      04 Jun 2020 07:01  -  05 Jun 2020 06:51  --------------------------------------------------------  IN:  Total IN: 0 mL    OUT:    Voided: 300 mL  Total OUT: 300 mL    Total NET: -300 mL               Discussed with:     Cultures:	        Radiology

## 2020-06-05 NOTE — PROGRESS NOTE ADULT - PROBLEM SELECTOR PLAN 1
CT showing fluid in small bowel no obvious wall thickening or obstruction, no abd pain/diarrhea, doubt enterocolitis  gb with stones/sludge and non specific gb wall thickening; lfts not suggesting biliary obstruction; hida neg  repeat bld cxs ngtd  cont abx as per id  diet as tolerated  to follow

## 2020-06-05 NOTE — PROGRESS NOTE ADULT - ASSESSMENT
86 yo male PMHx of Benign prostatic hypertrophy, CAD, MI, s/p CABG with 2 cardiac stents, COPD, Depression, GERD, Hyperlipidemia  Hypertension, Osteoporosis, Peripheral Neuropathy, Dementia. adm with SOB, cough, one episode of vomiting hypoxia SPO2 in the 80s confusion and concerns for PNA but no compelling symptoms, noted E coli bacteremia and E coli in urine

## 2020-06-05 NOTE — ADVANCED PRACTICE NURSE CONSULT - ASSESSMENT
Patient is an 84yo male admitted with sepsis PMHX MI, COPD, GERD, CAD, BPH, HTN, osteoporosis: Presents with a 1.5 x 1.5 deep tissue pressure injury left heel, Patient denies pain/discomfort: Right heel with callous: Patient was refusing z-float boots agreed to use them if left open.

## 2020-06-05 NOTE — PROGRESS NOTE ADULT - PROBLEM SELECTOR PLAN 1
HIDA scan neg, US GB - eval for Acute Surekha, Urine and Blood Cx noted  very unlikely to have LRTI with Gram Neg Rods on Blood Cx -   repeat Blood cx neg -   on Rocephin    ID following  renal eval noted - CKD - on IVF - serial renal indices -   COPD - emphysema - mucus plugging - Atelectasis - see CT Chest - NEBS TID for mucociliary clearance and COPD management, BG noted  recent GI bleed - off AC - known to [facility] team - H and H noted - bowel rx regimen  valvular heart disease - see TTE - proBNP elev - I and O - monitor for vol overload   Swallow eval - MBS eval noted - hx of dementia and mucus plugging and atelectasis - concern for aspiration   ckd - old records reviewed - renal indices noted - monitor lytes  prognosis guarded  pall care eval for MOLST - DNR DNI.

## 2020-06-05 NOTE — PROGRESS NOTE ADULT - ASSESSMENT
84 yo male PMHx of Benign prostatic hypertrophy, CAD, MI, s/p CABG with 2 cardiac stents, COPD, Depression, GERD, Hyperlipidemia  Hypertension, Osteoporosis, Peripheral Neuropathy, Dementia.  Now with E Coli sepsis and YOU. Will continue the IVF but change to half NS at 60 cc an hour. Avoid nephrotoxic medications and continue antibiotics as per ID. will follow.

## 2020-06-05 NOTE — PROGRESS NOTE ADULT - SUBJECTIVE AND OBJECTIVE BOX
ISAIAS ARABELLA  85y  Male    Patient is a 85y old  Male who presents with a chief complaint of SOB, cough (05 Jun 2020 12:23)    seen at bed side  denies any chest pain or shortness of breath.     PAST MEDICAL & SURGICAL HISTORY:  Myocardial infarction  COPD (chronic obstructive pulmonary disease)  Seasonal allergies  GERD (gastroesophageal reflux disease)  Benign prostatic hypertrophy  Hyperlipidemia  Hypertension  Depression  Peripheral Neuropathy  Osteoporosis  CAD (Coronary Artery Disease): MI, s/p CABG with 2 cardiac stents  S/P inguinal hernia repair: On right side  S/P appendectomy  S/P CABG (coronary artery bypass graft): Done in 1996; 2 cardiac stents in 1998  S/P AVR (Aortic Valve Replacement): Pig valve (bioprosthetic); done 2010 at [place]          PHYSICAL EXAM:    T(C): 36.3 (06-05-20 @ 12:50), Max: 36.6 (06-04-20 @ 20:54)  HR: 68 (06-05-20 @ 12:50) (68 - 79)  BP: 167/78 (06-05-20 @ 12:50) (120/72 - 167/78)  RR: 17 (06-05-20 @ 12:50) (17 - 18)  SpO2: 95% (06-05-20 @ 12:50) (93% - 98%)  Wt(kg): --    I&O's Detail    04 Jun 2020 07:01  -  05 Jun 2020 07:00  --------------------------------------------------------  IN:    sodium chloride 0.9%.: 600 mL  Total IN: 600 mL    OUT:    Voided: 300 mL  Total OUT: 300 mL    Total NET: 300 mL    Respiratory: clear anteriorly, decreased BS at bases  Cardiovascular: S1 S2  Gastrointestinal: soft NT ND +BS  Extremities: edema   Neuro: Awake and alert    MEDICATIONS  (STANDING):  albuterol/ipratropium for Nebulization 3 milliLiter(s) Nebulizer every 8 hours  atorvastatin 80 milliGRAM(s) Oral at bedtime  cefTRIAXone   IVPB 1000 milliGRAM(s) IV Intermittent every 24 hours  donepezil 10 milliGRAM(s) Oral at bedtime  finasteride 5 milliGRAM(s) Oral daily  pantoprazole    Tablet 40 milliGRAM(s) Oral before breakfast  senna 2 Tablet(s) Oral at bedtime  sodium chloride 0.9%. 1000 milliLiter(s) (50 mL/Hr) IV Continuous <Continuous>  tamsulosin 0.4 milliGRAM(s) Oral at bedtime    MEDICATIONS  (PRN):  acetaminophen   Tablet .. 650 milliGRAM(s) Oral every 6 hours PRN Temp greater or equal to 38C (100.4F), Mild Pain (1 - 3)  bisacodyl 5 milliGRAM(s) Oral every 12 hours PRN Constipation  guaiFENesin   Syrup  (Sugar-Free) 200 milliGRAM(s) Oral every 6 hours PRN Cough  ondansetron Injectable 4 milliGRAM(s) IV Push every 6 hours PRN Nausea and/or Vomiting                            10.0   11.72 )-----------( 82       ( 05 Jun 2020 06:38 )             33.5       06-05    146<H>  |  113<H>  |  41<H>  ----------------------------<  102<H>  4.0   |  27  |  2.10<H>    Ca    8.6      05 Jun 2020 06:38  Mg     2.0     06-05    TPro  5.7<L>  /  Alb  2.3<L>  /  TBili  0.2  /  DBili  <.10  /  AST  26  /  ALT  20  /  AlkPhos  105  06-05      Creatinine Trend: Creatinine Trend: 2.10<--, 2.10<--, 2.00<--, 1.90<--

## 2020-06-05 NOTE — ADVANCED PRACTICE NURSE CONSULT - RECOMMEDATIONS
1. Paint with cavilon daily   2. Off load heels with z-float boots at all times while in bed  RN educated in the care of this patient

## 2020-06-05 NOTE — PROGRESS NOTE ADULT - SUBJECTIVE AND OBJECTIVE BOX
infectious diseases progress note:    ARABELLA ESPARZA is a 85y y. o. Male patient    No concerning overnight events    Allergies    amoxicillin (Unknown)  Levaquin (Unknown)  penicillin (Unknown)    Intolerances        ANTIBIOTICS/RELEVANT:  antimicrobials  cefTRIAXone   IVPB 1000 milliGRAM(s) IV Intermittent every 24 hours    immunologic:    OTHER:  acetaminophen   Tablet .. 650 milliGRAM(s) Oral every 6 hours PRN  albuterol/ipratropium for Nebulization 3 milliLiter(s) Nebulizer every 8 hours  atorvastatin 80 milliGRAM(s) Oral at bedtime  bisacodyl 5 milliGRAM(s) Oral every 12 hours PRN  donepezil 10 milliGRAM(s) Oral at bedtime  finasteride 5 milliGRAM(s) Oral daily  guaiFENesin   Syrup  (Sugar-Free) 200 milliGRAM(s) Oral every 6 hours PRN  ondansetron Injectable 4 milliGRAM(s) IV Push every 6 hours PRN  pantoprazole    Tablet 40 milliGRAM(s) Oral before breakfast  senna 2 Tablet(s) Oral at bedtime  sodium chloride 0.9%. 1000 milliLiter(s) IV Continuous <Continuous>  tamsulosin 0.4 milliGRAM(s) Oral at bedtime      Objective:  Vital Signs Last 24 Hrs  T(C): 36.5 (05 Jun 2020 04:25), Max: 36.6 (04 Jun 2020 20:54)  T(F): 97.7 (05 Jun 2020 04:25), Max: 97.8 (04 Jun 2020 20:54)  HR: 78 (05 Jun 2020 08:04) (56 - 80)  BP: 146/- (05 Jun 2020 04:25) (100/65 - 146/-)  BP(mean): --  RR: 18 (05 Jun 2020 04:25) (18 - 18)  SpO2: 98% (05 Jun 2020 08:04) (93% - 99%)    T(C): 36.5 (06-05-20 @ 04:25), Max: 36.7 (06-03-20 @ 14:05)  T(C): 36.5 (06-05-20 @ 04:25), Max: 38.8 (06-02-20 @ 22:06)  T(C): 36.5 (06-05-20 @ 04:25), Max: 38.8 (06-02-20 @ 22:06)    PHYSICAL EXAM:  HEENT: NC atraumatic  Neck: supple  Respiratory: no accessory muscle use, breathing comfortably  Cardiovascular: distant  Gastrointestinal: normal appearing, nondistended  Extremities: no clubbing, no cyanosis,      LABS:                          10.0   11.72 )-----------( 82       ( 05 Jun 2020 06:38 )             33.5       11.72 06-05 @ 06:38  16.22 06-04 @ 08:09  12.42 06-03 @ 08:19  16.04 06-02 @ 22:07      06-05    146<H>  |  113<H>  |  41<H>  ----------------------------<  102<H>  4.0   |  27  |  2.10<H>    Ca    8.6      05 Jun 2020 06:38  Mg     2.0     06-05    TPro  5.7<L>  /  Alb  2.3<L>  /  TBili  0.2  /  DBili  <.10  /  AST  26  /  ALT  20  /  AlkPhos  105  06-05      Creatinine, Serum: 2.10 mg/dL (06-05-20 @ 06:38)  Creatinine, Serum: 2.10 mg/dL (06-04-20 @ 08:09)  Creatinine, Serum: 2.00 mg/dL (06-03-20 @ 08:19)  Creatinine, Serum: 1.90 mg/dL (06-02-20 @ 22:07)    Basic Metabolic Panel (06.05.20 @ 06:38)    eGFR if Non African American: 28 32 mL/min/1.73M2        INFLAMMATORY MARKERS  Auto Neutrophil #: 10.39 K/uL (06-03-20 @ 08:19)  Auto Lymphocyte #: 0.89 K/uL (06-03-20 @ 08:19)  Auto Lymphocyte #: 0.56 K/uL (06-02-20 @ 22:07)  Auto Neutrophil #: 14.00 K/uL (06-02-20 @ 22:07)    Lactate, Blood: 1.3 mmol/L (06-02-20 @ 22:07)    Auto Eosinophil #: 0.04 K/uL (06-03-20 @ 08:19)  Auto Eosinophil #: 0.10 K/uL (06-02-20 @ 22:07)        Procalcitonin, Serum: 4.80 ng/mL (06-03-20 @ 08:19)  Procalcitonin, Serum: 0.24 ng/mL (06-02-20 @ 22:07)      MICROBIOLOGY:    Culture - Blood (06.03.20 @ 22:06)    Specimen Source: .Blood Blood-Peripheral    Culture Results:   No growth to date.    Culture - Urine (06.03.20 @ 04:53)    -  Gentamicin: S <=4    -  Imipenem: S <=1    -  Levofloxacin: S <=2    -  Meropenem: S <=1    -  Nitrofurantoin: S <=32 Should not be used to treat pyelonephritis    -  Piperacillin/Tazobactam: S <=16    -  Tigecycline: S <=2    -  Tobramycin: S <=4    -  Trimethoprim/Sulfamethoxazole: S <=2/38    -  Amikacin: S <=16    -  Ampicillin/Sulbactam: S <=8/4 Enterobacter, Citrobacter, and Serratia may develop resistance during prolonged therapy (3-4 days)    -  Ampicillin: S <=8 These ampicillin results predict results for amoxicillin    -  Aztreonam: S <=4    -  Cefazolin: S <=8 (MIC_CL_COM_[id number]) For uncomplicated UTI with K. pneumoniae, E. coli, or P. mirablis: KATERIN <=16 is sensitive and KATERIN >=32 is resistant. This also predicts results for oral agents cefaclor, cefdinir, cefpodoxime, cefprozil, cefuroxime axetil, cephalexin and locarbef for uncomplicated UTI. Note that some isolates may be susceptible to these agents while testing resistant to cefazolin.    -  Cefepime: S <=4    -  Cefoxitin: S <=8    -  Ceftriaxone: S <=1 Enterobacter, Citrobacter, and Serratia may develop resistance during prolonged therapy    -  Ciprofloxacin: S <=1    Specimen Source: .Urine Clean Catch (Midstream)    Culture Results:   50,000 - 99,000 CFU/mL Escherichia coli    Organism Identification: Escherichia coli    Organism: Escherichia coli    Method Type: KATERIN    Culture - Blood (06.03.20 @ 00:55)    -  Escherichia coli: Detec    Gram Stain:   Growth in aerobic bottle: Gram Negative Rods    -  Amikacin: S <=16    -  Cefoxitin: S <=8    -  Ceftriaxone: S <=1 Enterobacter, Citrobacter, and Serratia may develop resistance during prolonged therapy    -  Ertapenem: S <=0.5    -  Ciprofloxacin: S <=0.25    -  Cefazolin: S <=2 Enterobacter, Citrobacter, and Serratia may develop resistance during prolonged therapy (3-4 days)    -  Cefepime: S <=2    -  Ampicillin/Sulbactam: S <=4/2 Enterobacter, Citrobacter, and Serratia may develop resistance during prolonged therapy (3-4 days)    -  Ampicillin: S <=8 These ampicillin results predict results for amoxicillin    -  Aztreonam: S <=4    -  Gentamicin: S <=2    -  Levofloxacin: S <=0.5    -  Trimethoprim/Sulfamethoxazole: S <=0.5/9.5    -  Piperacillin/Tazobactam: S <=8    -  Tobramycin: S <=2    -  Imipenem: S <=1    -  Meropenem: S <=1    Specimen Source: .Blood Blood-Venous    Organism: Blood Culture PCR    Organism: Escherichia coli    Culture Results:   Growth in aerobic bottle: Escherichia coli        RADIOLOGY & ADDITIONAL STUDIES:

## 2020-06-05 NOTE — PROGRESS NOTE ADULT - SUBJECTIVE AND OBJECTIVE BOX
pt seen  no complaints  ICU Vital Signs Last 24 Hrs  T(C): 36.5 (05 Jun 2020 04:25), Max: 36.6 (04 Jun 2020 20:54)  T(F): 97.7 (05 Jun 2020 04:25), Max: 97.8 (04 Jun 2020 20:54)  HR: 78 (05 Jun 2020 08:04) (56 - 80)  BP: 146/- (05 Jun 2020 04:25) (100/65 - 146/-)  BP(mean): --  ABP: --  ABP(mean): --  RR: 18 (05 Jun 2020 04:25) (18 - 18)  SpO2: 98% (05 Jun 2020 08:04) (93% - 99%)  gen-NAD  resp-clear  abd-soft NT/nD                        10.0   11.72 )-----------( 82       ( 05 Jun 2020 06:38 )             33.5     06-05    146<H>  |  113<H>  |  41<H>  ----------------------------<  102<H>  4.0   |  27  |  2.10<H>    Ca    8.6      05 Jun 2020 06:38  Mg     2.0     06-05    TPro  5.7<L>  /  Alb  2.3<L>  /  TBili  0.2  /  DBili  <.10  /  AST  26  /  ALT  20  /  AlkPhos  105  06-05    < from: NM Hepatobiliary Imaging (06.04.20 @ 18:18) >    INTERPRETATION:   RADIOPHARMACEUTICAL: 3.0 mCi Tc-99m-Mebrofenin, I.V.    CLINICAL STATEMENT: 85-year-old male with Escherichia coli bacteremia and abdominal pain    TECHNIQUE:   Dynamic imaging of the anterior abdomen was performed for 60 minutes following injection of radiotracer. Static images of the abdomen in the anterior, right lateral, and BURGER views were obtained immediately thereafter.    FINDINGS: There is prompt, homogeneous uptake of radiotracer by the hepatocytes. Activity is first seen in the gallbladder at 55 minutes and in the bowel at 20 minutes. There is good clearance of activity from the liver by the end of the study.    IMPRESSION: Hepatobiliary scan demonstrates:    No evidence of acute cholecystitis.    < end of copied text >

## 2020-06-05 NOTE — PROGRESS NOTE ADULT - SUBJECTIVE AND OBJECTIVE BOX
Patient is a 85y old  Male who presents with a chief complaint of SOB, cough (05 Jun 2020 10:15)      INTERVAL HPI/OVERNIGHT EVENTS: Patient seen and examined. NAD. No complaints.    Vital Signs Last 24 Hrs  T(C): 36.5 (05 Jun 2020 04:25), Max: 36.6 (04 Jun 2020 20:54)  T(F): 97.7 (05 Jun 2020 04:25), Max: 97.8 (04 Jun 2020 20:54)  HR: 78 (05 Jun 2020 08:04) (56 - 80)  BP: 146/- (05 Jun 2020 04:25) (100/65 - 146/-)  BP(mean): --  RR: 18 (05 Jun 2020 04:25) (18 - 18)  SpO2: 98% (05 Jun 2020 08:04) (93% - 99%)    06-05    146<H>  |  113<H>  |  41<H>  ----------------------------<  102<H>  4.0   |  27  |  2.10<H>    Ca    8.6      05 Jun 2020 06:38  Mg     2.0     06-05    TPro  5.7<L>  /  Alb  2.3<L>  /  TBili  0.2  /  DBili  <.10  /  AST  26  /  ALT  20  /  AlkPhos  105  06-05                          10.0   11.72 )-----------( 82       ( 05 Jun 2020 06:38 )             33.5       CAPILLARY BLOOD GLUCOSE                  acetaminophen   Tablet .. 650 milliGRAM(s) Oral every 6 hours PRN  albuterol/ipratropium for Nebulization 3 milliLiter(s) Nebulizer every 8 hours  atorvastatin 80 milliGRAM(s) Oral at bedtime  bisacodyl 5 milliGRAM(s) Oral every 12 hours PRN  cefTRIAXone   IVPB 1000 milliGRAM(s) IV Intermittent every 24 hours  donepezil 10 milliGRAM(s) Oral at bedtime  finasteride 5 milliGRAM(s) Oral daily  guaiFENesin   Syrup  (Sugar-Free) 200 milliGRAM(s) Oral every 6 hours PRN  ondansetron Injectable 4 milliGRAM(s) IV Push every 6 hours PRN  pantoprazole    Tablet 40 milliGRAM(s) Oral before breakfast  senna 2 Tablet(s) Oral at bedtime  sodium chloride 0.9%. 1000 milliLiter(s) IV Continuous <Continuous>  tamsulosin 0.4 milliGRAM(s) Oral at bedtime              REVIEW OF SYSTEMS:  CONSTITUTIONAL: No fever, no weight loss, or no fatigue  NECK: No pain, no stiffness  RESPIRATORY: No cough, no wheezing, no chills, no hemoptysis, No shortness of breath  CARDIOVASCULAR: No chest pain, no palpitations, no dizziness, no leg swelling  GASTROINTESTINAL: No abdominal pain. No nausea, no vomiting, no hematemesis; No diarrhea, no constipation. No melena, no hematochezia.  GENITOURINARY: No dysuria, no frequency, no hematuria, no incontinence  NEUROLOGICAL: No headaches, no loss of strength, no numbness, no tremors  SKIN: No itching, no burning  MUSCULOSKELETAL: No joint pain, no swelling; No muscle, no back, no extremity pain  PSYCHIATRIC: No depression, no mood swings,   HEME/LYMPH: No easy bruising, no bleeding gums  ALLERY AND IMMUNOLOGIC: No hives       Consultant(s) Notes Reviewed:  [X] YES  [ ] NO    PHYSICAL EXAM:  GENERAL: NAD  HEAD:  Atraumatic, Normocephalic  EYES: EOMI, PERRLA, conjunctiva and sclera clear  ENMT: No tonsillar erythema, exudates, or enlargement; Moist mucous membranes  NECK: Supple, No JVD  NERVOUS SYSTEM:  Awake & alert  CHEST/LUNG: Clear to auscultation bilaterally; No rales, rhonchi, wheezing,  HEART: Regular rate and rhythm  ABDOMEN: Soft, Nontender, Nondistended; Bowel sounds present  EXTREMITIES:  No clubbing, cyanosis, or edema  LYMPH: No lymphadenopathy noted  SKIN: No rashes      Advanced care planning discussed with patient/family [X] YES   [ ] NO    Advanced care planning discussed with patient/family. Patient's health status was discussed. All appropriate changes have been made regarding patient's end-of-life care. Advanced care planning forms reviewed/discussed/completed.  20 minutes spent.

## 2020-06-05 NOTE — PROGRESS NOTE ADULT - SUBJECTIVE AND OBJECTIVE BOX
INTERVAL HPI/OVERNIGHT EVENTS:  pt seen and examined  mohsen lyle  denies n/v/d/abd pain  tolerating po    MEDICATIONS  (STANDING):  albuterol/ipratropium for Nebulization 3 milliLiter(s) Nebulizer every 8 hours  atorvastatin 80 milliGRAM(s) Oral at bedtime  cefTRIAXone   IVPB 1000 milliGRAM(s) IV Intermittent every 24 hours  donepezil 10 milliGRAM(s) Oral at bedtime  finasteride 5 milliGRAM(s) Oral daily  pantoprazole    Tablet 40 milliGRAM(s) Oral before breakfast  senna 2 Tablet(s) Oral at bedtime  sodium chloride 0.9%. 1000 milliLiter(s) (50 mL/Hr) IV Continuous <Continuous>  tamsulosin 0.4 milliGRAM(s) Oral at bedtime    MEDICATIONS  (PRN):  acetaminophen   Tablet .. 650 milliGRAM(s) Oral every 6 hours PRN Temp greater or equal to 38C (100.4F), Mild Pain (1 - 3)  bisacodyl 5 milliGRAM(s) Oral every 12 hours PRN Constipation  guaiFENesin   Syrup  (Sugar-Free) 200 milliGRAM(s) Oral every 6 hours PRN Cough  ondansetron Injectable 4 milliGRAM(s) IV Push every 6 hours PRN Nausea and/or Vomiting      Allergies    amoxicillin (Unknown)  Levaquin (Unknown)  penicillin (Unknown)    Intolerances        Review of Systems:    General:  No wt loss, fevers, chills, night sweats, fatigue   Eyes:  Good vision, no reported pain  ENT:  No sore throat, pain, runny nose, dysphagia  CV:  No pain, palpitations, hypo/hypertension  Resp:  No dyspnea, cough, tachypnea, wheezing  GI:  No pain, No nausea, No vomiting, No diarrhea, No constipation, No weight loss, No fever, No pruritis, No rectal bleeding, No melena, No dysphagia  :  No pain, bleeding, incontinence, nocturia  Muscle:  No pain, weakness  Neuro:  No weakness, tingling, memory problems  Psych:  No fatigue, insomnia, mood problems, depression  Endocrine:  No polyuria, polydypsia, cold/heat intolerance  Heme:  No petechiae, ecchymosis, easy bruisability  Skin:  No rash, tattoos, scars, edema      Vital Signs Last 24 Hrs  T(C): 36.5 (05 Jun 2020 04:25), Max: 36.6 (04 Jun 2020 20:54)  T(F): 97.7 (05 Jun 2020 04:25), Max: 97.8 (04 Jun 2020 20:54)  HR: 78 (05 Jun 2020 08:04) (56 - 80)  BP: 146/- (05 Jun 2020 04:25) (100/65 - 146/-)  BP(mean): --  RR: 18 (05 Jun 2020 04:25) (18 - 18)  SpO2: 98% (05 Jun 2020 08:04) (93% - 99%)    PHYSICAL EXAM:    Constitutional: NAD  HEENT: ncat  Neck: No LAD  Gastrointestinal: soft nt nd  Extremities: No peripheral edema  Vascular: + peripheral pulses  Neurological: Awake alert appropriate        LABS:                        10.0   11.72 )-----------( 82       ( 05 Jun 2020 06:38 )             33.5     06-05    146<H>  |  113<H>  |  41<H>  ----------------------------<  102<H>  4.0   |  27  |  2.10<H>    Ca    8.6      05 Jun 2020 06:38  Mg     2.0     06-05    TPro  5.7<L>  /  Alb  2.3<L>  /  TBili  0.2  /  DBili  <.10  /  AST  26  /  ALT  20  /  AlkPhos  105  06-05          RADIOLOGY & ADDITIONAL TESTS:

## 2020-06-05 NOTE — PROGRESS NOTE ADULT - PROBLEM SELECTOR PLAN 1
picture appears more consistent with recurrent aspiration events and no clear acute airspace disease at this point.  Noted elevated procalcitonin, wbc, and fever and now with E coli bacteremia-  -continue ceftriaxone for now but potential to finish course with Keflex adjusted for renal function with last day 6/16

## 2020-06-06 LAB
ANION GAP SERPL CALC-SCNC: 5 MMOL/L — SIGNIFICANT CHANGE UP (ref 5–17)
BUN SERPL-MCNC: 37 MG/DL — HIGH (ref 7–23)
CALCIUM SERPL-MCNC: 8.9 MG/DL — SIGNIFICANT CHANGE UP (ref 8.5–10.1)
CHLORIDE SERPL-SCNC: 113 MMOL/L — HIGH (ref 96–108)
CO2 SERPL-SCNC: 29 MMOL/L — SIGNIFICANT CHANGE UP (ref 22–31)
CREAT SERPL-MCNC: 1.7 MG/DL — HIGH (ref 0.5–1.3)
GLUCOSE SERPL-MCNC: 104 MG/DL — HIGH (ref 70–99)
HCT VFR BLD CALC: 33.8 % — LOW (ref 39–50)
HGB BLD-MCNC: 10.2 G/DL — LOW (ref 13–17)
MAGNESIUM SERPL-MCNC: 1.9 MG/DL — SIGNIFICANT CHANGE UP (ref 1.6–2.6)
MCHC RBC-ENTMCNC: 23.2 PG — LOW (ref 27–34)
MCHC RBC-ENTMCNC: 30.2 GM/DL — LOW (ref 32–36)
MCV RBC AUTO: 77 FL — LOW (ref 80–100)
NRBC # BLD: 0 /100 WBCS — SIGNIFICANT CHANGE UP (ref 0–0)
PLATELET # BLD AUTO: 86 K/UL — LOW (ref 150–400)
POTASSIUM SERPL-MCNC: 4.1 MMOL/L — SIGNIFICANT CHANGE UP (ref 3.5–5.3)
POTASSIUM SERPL-SCNC: 4.1 MMOL/L — SIGNIFICANT CHANGE UP (ref 3.5–5.3)
RBC # BLD: 4.39 M/UL — SIGNIFICANT CHANGE UP (ref 4.2–5.8)
RBC # FLD: 15.5 % — HIGH (ref 10.3–14.5)
SODIUM SERPL-SCNC: 147 MMOL/L — HIGH (ref 135–145)
WBC # BLD: 8.44 K/UL — SIGNIFICANT CHANGE UP (ref 3.8–10.5)
WBC # FLD AUTO: 8.44 K/UL — SIGNIFICANT CHANGE UP (ref 3.8–10.5)

## 2020-06-06 RX ORDER — AMLODIPINE BESYLATE 2.5 MG/1
5 TABLET ORAL ONCE
Refills: 0 | Status: COMPLETED | OUTPATIENT
Start: 2020-06-06 | End: 2020-06-06

## 2020-06-06 RX ADMIN — DONEPEZIL HYDROCHLORIDE 10 MILLIGRAM(S): 10 TABLET, FILM COATED ORAL at 20:56

## 2020-06-06 RX ADMIN — FINASTERIDE 5 MILLIGRAM(S): 5 TABLET, FILM COATED ORAL at 11:21

## 2020-06-06 RX ADMIN — SENNA PLUS 2 TABLET(S): 8.6 TABLET ORAL at 20:56

## 2020-06-06 RX ADMIN — CEFTRIAXONE 100 MILLIGRAM(S): 500 INJECTION, POWDER, FOR SOLUTION INTRAMUSCULAR; INTRAVENOUS at 20:56

## 2020-06-06 RX ADMIN — AMLODIPINE BESYLATE 5 MILLIGRAM(S): 2.5 TABLET ORAL at 05:07

## 2020-06-06 RX ADMIN — Medication 3 MILLILITER(S): at 22:06

## 2020-06-06 RX ADMIN — TAMSULOSIN HYDROCHLORIDE 0.4 MILLIGRAM(S): 0.4 CAPSULE ORAL at 20:56

## 2020-06-06 RX ADMIN — PANTOPRAZOLE SODIUM 40 MILLIGRAM(S): 20 TABLET, DELAYED RELEASE ORAL at 04:23

## 2020-06-06 RX ADMIN — ATORVASTATIN CALCIUM 80 MILLIGRAM(S): 80 TABLET, FILM COATED ORAL at 20:56

## 2020-06-06 RX ADMIN — Medication 3 MILLILITER(S): at 14:43

## 2020-06-06 RX ADMIN — Medication 3 MILLILITER(S): at 07:58

## 2020-06-06 NOTE — PROGRESS NOTE ADULT - SUBJECTIVE AND OBJECTIVE BOX
Date/Time Patient Seen:  		  Referring MD:   Data Reviewed	       Patient is a 85y old  Male who presents with a chief complaint of SOB, cough (05 Jun 2020 16:26)      Subjective/HPI     PAST MEDICAL & SURGICAL HISTORY:  Myocardial infarction  COPD (chronic obstructive pulmonary disease)  Seasonal allergies  GERD (gastroesophageal reflux disease)  Benign prostatic hypertrophy  Hyperlipidemia  Hypertension  Asthma: Also COPD component  Depression  Peripheral Neuropathy  Asthma  S/P AVR (Aortic Valve Replacement)  S/P CABG (Coronary Artery Bypass Graft)  Osteoporosis  CAD (Coronary Artery Disease): MI, s/p CABG with 2 cardiac stents  HTN (Hypertension)  Dyslipidemia  S/P inguinal hernia repair: On right side  S/P appendectomy  S/P CABG (coronary artery bypass graft): Done in 1996; 2 cardiac stents in 1998  S/P CABG (Coronary Artery Bypass Graft)  S/P AVR (Aortic Valve Replacement): Pig valve (bioprosthetic); done 2010 at [facility]        Medication list         MEDICATIONS  (STANDING):  albuterol/ipratropium for Nebulization 3 milliLiter(s) Nebulizer every 8 hours  atorvastatin 80 milliGRAM(s) Oral at bedtime  cefTRIAXone   IVPB 1000 milliGRAM(s) IV Intermittent every 24 hours  donepezil 10 milliGRAM(s) Oral at bedtime  finasteride 5 milliGRAM(s) Oral daily  pantoprazole    Tablet 40 milliGRAM(s) Oral before breakfast  senna 2 Tablet(s) Oral at bedtime  sodium chloride 0.45%. 1000 milliLiter(s) (60 mL/Hr) IV Continuous <Continuous>  tamsulosin 0.4 milliGRAM(s) Oral at bedtime    MEDICATIONS  (PRN):  acetaminophen   Tablet .. 650 milliGRAM(s) Oral every 6 hours PRN Temp greater or equal to 38C (100.4F), Mild Pain (1 - 3)  bisacodyl 5 milliGRAM(s) Oral every 12 hours PRN Constipation  guaiFENesin   Syrup  (Sugar-Free) 200 milliGRAM(s) Oral every 6 hours PRN Cough  ondansetron Injectable 4 milliGRAM(s) IV Push every 6 hours PRN Nausea and/or Vomiting         Vitals log        ICU Vital Signs Last 24 Hrs  T(C): 36.6 (06 Jun 2020 04:19), Max: 36.6 (06 Jun 2020 04:19)  T(F): 97.9 (06 Jun 2020 04:19), Max: 97.9 (06 Jun 2020 04:19)  HR: 59 (06 Jun 2020 06:08) (59 - 81)  BP: 169/85 (06 Jun 2020 06:08) (152/77 - 174/91)  BP(mean): --  ABP: --  ABP(mean): --  RR: 16 (06 Jun 2020 04:19) (16 - 17)  SpO2: 99% (06 Jun 2020 04:19) (94% - 99%)           Input and Output:  I&O's Detail    04 Jun 2020 07:01  -  05 Jun 2020 07:00  --------------------------------------------------------  IN:    sodium chloride 0.9%: 600 mL  Total IN: 600 mL    OUT:    Voided: 300 mL  Total OUT: 300 mL    Total NET: 300 mL      05 Jun 2020 07:01  -  06 Jun 2020 06:47  --------------------------------------------------------  IN:    sodium chloride 0.45%.: 720 mL    Solution: 50 mL  Total IN: 770 mL    OUT:  Total OUT: 0 mL    Total NET: 770 mL          Lab Data                        10.2   8.44  )-----------( 86       ( 06 Jun 2020 05:21 )             33.8     06-06    147<H>  |  113<H>  |  37<H>  ----------------------------<  104<H>  4.1   |  29  |  1.70<H>    Ca    8.9      06 Jun 2020 05:21  Mg     1.9     06-06    TPro  5.7<L>  /  Alb  2.3<L>  /  TBili  0.2  /  DBili  <.10  /  AST  26  /  ALT  20  /  AlkPhos  105  06-05            Review of Systems	      Objective     Physical Examination    heart s1s2  lung dec BS      Pertinent Lab findings & Imaging      Ava:  NO   Adequate UO     I&O's Detail    04 Jun 2020 07:01  -  05 Jun 2020 07:00  --------------------------------------------------------  IN:    sodium chloride 0.9%: 600 mL  Total IN: 600 mL    OUT:    Voided: 300 mL  Total OUT: 300 mL    Total NET: 300 mL      05 Jun 2020 07:01  -  06 Jun 2020 06:47  --------------------------------------------------------  IN:    sodium chloride 0.45%.: 720 mL    Solution: 50 mL  Total IN: 770 mL    OUT:  Total OUT: 0 mL    Total NET: 770 mL               Discussed with:     Cultures:	        Radiology

## 2020-06-06 NOTE — CHART NOTE - NSCHARTNOTEFT_GEN_A_CORE
S: Called by RN for elevated BP, patient's BP trend reviewed. Initially hypo/normotensive on admission, now hypertensive, SBP range 150-170s. Patient was seen and evaluated at the bedside, seen resting comfortably, NAD. He denies any chest pain, vision changes, headache, nausea, vomiting, neck pain, SOB, palpitations. Patient noted to be receiving IVF and Abx. Offers no other complaints on exam. Labs and imaging reviewed.     ROS as per HPI    O:  Allergies    amoxicillin (Unknown)  Levaquin (Unknown)  penicillin (Unknown)    Intolerances        Vitals  Vital Signs Last 24 Hrs  T(C): 36.6 (06 Jun 2020 04:19), Max: 36.6 (06 Jun 2020 04:19)  T(F): 97.9 (06 Jun 2020 04:19), Max: 97.9 (06 Jun 2020 04:19)  HR: 69 (06 Jun 2020 04:19) (66 - 81)  BP: 174/91 (06 Jun 2020 04:19) (152/77 - 174/91)  BP(mean): --  RR: 16 (06 Jun 2020 04:19) (16 - 17)  SpO2: 99% (06 Jun 2020 04:19) (94% - 99%)      General: WN/WD NAD. elderly, frail  Respiratory: CTA B/L  CV: RRR, S1S2, no murmurs, rubs or gallops  Abdominal: Soft, NT, ND +BS,  Extremities: No edema, + peripheral pulses  Neurology: A&Ox2       LABS:                        10.0   11.72 )-----------( 82       ( 05 Jun 2020 06:38 )             33.5     06-05    146<H>  |  113<H>  |  41<H>  ----------------------------<  102<H>  4.0   |  27  |  2.10<H>    Ca    8.6      05 Jun 2020 06:38  Mg     2.0     06-05    TPro  5.7<L>  /  Alb  2.3<L>  /  TBili  0.2  /  DBili  <.10  /  AST  26  /  ALT  20  /  AlkPhos  105  06-05        RADIOLOGY & ADDITIONAL TESTS:  < from: NM Hepatobiliary Imaging (06.04.20 @ 18:18) >    EXAM:  NM HEPATOBILIARY IMG                            PROCEDURE DATE:  06/04/2020          INTERPRETATION:   RADIOPHARMACEUTICAL: 3.0 mCi Tc-99m-Mebrofenin, I.V.    CLINICAL STATEMENT: 85-year-old male with Escherichia coli bacteremia and abdominal pain    TECHNIQUE:   Dynamic imaging of the anterior abdomen was performed for 60 minutes following injection of radiotracer. Static images of the abdomen in the anterior, right lateral, and BURGER views were obtained immediately thereafter.    FINDINGS: There is prompt, homogeneous uptake of radiotracer by the hepatocytes. Activity is first seen in the gallbladder at 55 minutes and in the bowel at 20 minutes. There is good clearance of activity from the liver by the end of the study.    IMPRESSION: Hepatobiliary scan demonstrates:    No evidence of acute cholecystitis.          ANDRES RIVERA M.D., NUCLEAR MEDICINE ATTENDING  This document has been electronically signed. Jun 4 2020  6:23PM        < end of copied text >      < from: US Gallbladder (06.04.20 @ 09:36) >      EXAM:  US GALLBLADDER                            PROCEDURE DATE:  06/04/2020          INTERPRETATION:  Indication: Possible cholecystitis on the recent CT abdomen.    Gallbladder ultrasound.    Gallbladder physiologically distended with mobile shadowing calculi. There is edematous thickening of the gallbladder wall. This is a nonspecific finding in and of itself. Differential diagnosis includes  adjacent liver disease, hypoalbuminemia and cholecystitis. If there is a high pretest suspicion of cholecystitis consider HIDA scan for additional evaluation. No biliary dilatation. Common duct 0.5 cm.    Impression: Cholelithiasis with edematous thickened gallbladder wall. Correlate with HIDA scan as clinically indicated        ATILIO DOUGLASS M.D., ATTENDING RADIOLOGIST  This document has been electronically signed. Jun 4 2020  9:46AM    < end of copied text >      A/P: 86 yo male PMHx of Benign prostatic hypertrophy, CAD, MI, s/p CABG with 2 cardiac stents, COPD, Depression, GERD, Hyperlipidemia  Hypertension, Osteoporosis, Peripheral Neuropathy, Dementia.  Now with E Coli sepsis and YOU, treated with rocephin. Called on patient by RN for evaluation of elevated BP trend    -no signs/symptoms of hypertensive urgency - denies CP, headache, n/v, SOB, palpitations - no acute distress on exam.  -Patient's BP trend reviewed, SBP initially normotensive, admitted meeting sepsis criteria, now trending higher SBP range 150-170s. Likely multifactorial -- improved septic shock, successful IVF resuscitation  -On review of outpatient medication record, patient appears to have been on amlodipine in the past - would resume 5mg presently. Will give 1 dose now, defer to primary to continue  -SBP trend may improve once no longer requiring IVF  -Continue routine hemodynamic monitoring  -RN to update as necessary    Nilesh Joseph MD PGY-2

## 2020-06-06 NOTE — PROGRESS NOTE ADULT - PROBLEM SELECTOR PLAN 1
overnight events noted - BP management in progress -   Urine and Blood Cx noted  very unlikely to have LRTI with Gram Neg Rods on Blood Cx -   repeat Blood cx neg -   on Rocephin    ID following  renal eval noted - CKD - on IVF - serial renal indices -   COPD - emphysema - mucus plugging - Atelectasis - see CT Chest - NEBS TID for mucociliary clearance and COPD management, BG noted  recent GI bleed - off AC - known to [facility] - H and H noted - bowel rx regimen  valvular heart disease - see TTE - proBNP elev - I and O - monitor for vol overload   Swallow eval - MBS eval noted - hx of dementia and mucus plugging and atelectasis - concern for aspiration   ckd - old records reviewed - renal indices noted - monitor lytes  prognosis guarded  pall care eval for MOLST - DNR DNI.

## 2020-06-06 NOTE — PROVIDER CONTACT NOTE (OTHER) - BACKGROUND
Admitting dx: sepsis  PMH: MI, COPD, seasonal allergies, GERD, CAD, HTN, BPH, hyperlipidemia, depression, peripheral neuropathy, osteoporosis

## 2020-06-06 NOTE — PROGRESS NOTE ADULT - SUBJECTIVE AND OBJECTIVE BOX
Patient is a 85y old  Male who presents with a chief complaint of SOB, cough (06 Jun 2020 06:47)      INTERVAL HPI/OVERNIGHT EVENTS: Patient seen and examined. NAD. No complaints.    Vital Signs Last 24 Hrs  T(C): 36.6 (06 Jun 2020 04:19), Max: 36.6 (06 Jun 2020 04:19)  T(F): 97.9 (06 Jun 2020 04:19), Max: 97.9 (06 Jun 2020 04:19)  HR: 77 (06 Jun 2020 07:58) (59 - 81)  BP: 169/85 (06 Jun 2020 06:08) (152/77 - 174/91)  BP(mean): --  RR: 16 (06 Jun 2020 04:19) (16 - 17)  SpO2: 98% (06 Jun 2020 07:58) (94% - 99%)    06-06    147<H>  |  113<H>  |  37<H>  ----------------------------<  104<H>  4.1   |  29  |  1.70<H>    Ca    8.9      06 Jun 2020 05:21  Mg     1.9     06-06    TPro  5.7<L>  /  Alb  2.3<L>  /  TBili  0.2  /  DBili  <.10  /  AST  26  /  ALT  20  /  AlkPhos  105  06-05                          10.2   8.44  )-----------( 86       ( 06 Jun 2020 05:21 )             33.8       CAPILLARY BLOOD GLUCOSE                  acetaminophen   Tablet .. 650 milliGRAM(s) Oral every 6 hours PRN  albuterol/ipratropium for Nebulization 3 milliLiter(s) Nebulizer every 8 hours  atorvastatin 80 milliGRAM(s) Oral at bedtime  bisacodyl 5 milliGRAM(s) Oral every 12 hours PRN  cefTRIAXone   IVPB 1000 milliGRAM(s) IV Intermittent every 24 hours  donepezil 10 milliGRAM(s) Oral at bedtime  finasteride 5 milliGRAM(s) Oral daily  guaiFENesin   Syrup  (Sugar-Free) 200 milliGRAM(s) Oral every 6 hours PRN  ondansetron Injectable 4 milliGRAM(s) IV Push every 6 hours PRN  pantoprazole    Tablet 40 milliGRAM(s) Oral before breakfast  senna 2 Tablet(s) Oral at bedtime  sodium chloride 0.45%. 1000 milliLiter(s) IV Continuous <Continuous>  tamsulosin 0.4 milliGRAM(s) Oral at bedtime              REVIEW OF SYSTEMS:  CONSTITUTIONAL: No fever, no weight loss, or no fatigue  NECK: No pain, no stiffness  RESPIRATORY: No cough, no wheezing, no chills, no hemoptysis, No shortness of breath  CARDIOVASCULAR: No chest pain, no palpitations, no dizziness, no leg swelling  GASTROINTESTINAL: No abdominal pain. No nausea, no vomiting, no hematemesis; No diarrhea, no constipation. No melena, no hematochezia.  GENITOURINARY: No dysuria, no frequency, no hematuria, no incontinence  NEUROLOGICAL: No headaches, no loss of strength, no numbness, no tremors  SKIN: No itching, no burning  MUSCULOSKELETAL: No joint pain, no swelling; No muscle, no back, no extremity pain  PSYCHIATRIC: No depression, no mood swings,   HEME/LYMPH: No easy bruising, no bleeding gums  ALLERY AND IMMUNOLOGIC: No hives       Consultant(s) Notes Reviewed:  [X] YES  [ ] NO    PHYSICAL EXAM:  GENERAL: NAD  HEAD:  Atraumatic, Normocephalic  EYES: EOMI, PERRLA, conjunctiva and sclera clear  ENMT: No tonsillar erythema, exudates, or enlargement; Moist mucous membranes  NECK: Supple, No JVD  NERVOUS SYSTEM:  Awake & alert  CHEST/LUNG: Clear to auscultation bilaterally; No rales, rhonchi, wheezing,  HEART: Regular rate and rhythm  ABDOMEN: Soft, Nontender, Nondistended; Bowel sounds present  EXTREMITIES:  No clubbing, cyanosis, or edema  LYMPH: No lymphadenopathy noted  SKIN: No rashes      Advanced care planning discussed with patient/family [X] YES   [ ] NO    Advanced care planning discussed with patient/family. Patient's health status was discussed. All appropriate changes have been made regarding patient's end-of-life care. Advanced care planning forms reviewed/discussed/completed.  20 minutes spent.

## 2020-06-06 NOTE — PROGRESS NOTE ADULT - SUBJECTIVE AND OBJECTIVE BOX
INTERVAL HPI/OVERNIGHT EVENTS:  pt seen and examined  no complaints   denies n/v/d/abd pain  tolerating po    MEDICATIONS  (STANDING):  albuterol/ipratropium for Nebulization 3 milliLiter(s) Nebulizer every 8 hours  atorvastatin 80 milliGRAM(s) Oral at bedtime  cefTRIAXone   IVPB 1000 milliGRAM(s) IV Intermittent every 24 hours  donepezil 10 milliGRAM(s) Oral at bedtime  finasteride 5 milliGRAM(s) Oral daily  pantoprazole    Tablet 40 milliGRAM(s) Oral before breakfast  senna 2 Tablet(s) Oral at bedtime  sodium chloride 0.45%. 1000 milliLiter(s) (60 mL/Hr) IV Continuous <Continuous>  tamsulosin 0.4 milliGRAM(s) Oral at bedtime    MEDICATIONS  (PRN):  acetaminophen   Tablet .. 650 milliGRAM(s) Oral every 6 hours PRN Temp greater or equal to 38C (100.4F), Mild Pain (1 - 3)  bisacodyl 5 milliGRAM(s) Oral every 12 hours PRN Constipation  guaiFENesin   Syrup  (Sugar-Free) 200 milliGRAM(s) Oral every 6 hours PRN Cough  ondansetron Injectable 4 milliGRAM(s) IV Push every 6 hours PRN Nausea and/or Vomiting      Allergies    amoxicillin (Unknown)  Levaquin (Unknown)  penicillin (Unknown)    Intolerances          Review of Systems:    General:  No wt loss, fevers, chills, night sweats, fatigue   Eyes:  Good vision, no reported pain  ENT:  No sore throat, pain, runny nose, dysphagia  CV:  No pain, palpitations, hypo/hypertension  Resp:  No dyspnea, cough, tachypnea, wheezing  GI:  No pain, No nausea, No vomiting, No diarrhea, No constipation, No weight loss, No fever, No pruritis, No rectal bleeding, No melena, No dysphagia  :  No pain, bleeding, incontinence, nocturia  Muscle:  No pain, weakness  Neuro:  No weakness, tingling, memory problems  Psych:  No fatigue, insomnia, mood problems, depression  Endocrine:  No polyuria, polydypsia, cold/heat intolerance  Heme:  No petechiae, ecchymosis, easy bruisability  Skin:  No rash, tattoos, scars, edema        Vital Signs Last 24 Hrs  T(C): 36.6 (06 Jun 2020 04:19), Max: 36.6 (06 Jun 2020 04:19)  T(F): 97.9 (06 Jun 2020 04:19), Max: 97.9 (06 Jun 2020 04:19)  HR: 77 (06 Jun 2020 07:58) (59 - 81)  BP: 169/85 (06 Jun 2020 06:08) (152/77 - 174/91)  BP(mean): --  RR: 16 (06 Jun 2020 04:19) (16 - 16)  SpO2: 98% (06 Jun 2020 07:58) (94% - 99%)      LABS:                        10.2   8.44  )-----------( 86       ( 06 Jun 2020 05:21 )             33.8     06-06    147<H>  |  113<H>  |  37<H>  ----------------------------<  104<H>  4.1   |  29  |  1.70<H>    Ca    8.9      06 Jun 2020 05:21  Mg     1.9     06-06    TPro  5.7<L>  /  Alb  2.3<L>  /  TBili  0.2  /  DBili  <.10  /  AST  26  /  ALT  20  /  AlkPhos  105  06-05 06-05-20 @ 07:01  -  06-06-20 @ 07:00  --------------------------------------------------------  IN: 770 mL / OUT: 0 mL / NET: 770 mL        RADIOLOGY & ADDITIONAL TESTS:

## 2020-06-06 NOTE — PROGRESS NOTE ADULT - SUBJECTIVE AND OBJECTIVE BOX
NEPHROLOGY PROGRESS NOTE    CHIEF COMPLAINT:  YOU/CKD    HPI:  Renal fxn slowly better.  Feels well.    ROS:  no SOB    EXAM:  T(F): 97.9 (06-06-20 @ 04:19)  HR: 77 (06-06-20 @ 07:58)  BP: 169/85 (06-06-20 @ 06:08)  RR: 16 (06-06-20 @ 04:19)  SpO2: 98% (06-06-20 @ 07:58)    Conversant, in no apparent distress  Normal respiratory effort, lungs clear bilaterally  Heart RRR with no murmur, no peripheral edema         LABS                             10.2   8.44  )-----------( 86       ( 06 Jun 2020 05:21 )             33.8          06-06    147<H>  |  113<H>  |  37<H>  ----------------------------<  104<H>  4.1   |  29  |  1.70<H>    Ca    8.9      06 Jun 2020 05:21  Mg     1.9     06-06    TPro  5.7<L>  /  Alb  2.3<L>  /  TBili  0.2  /  DBili  <.10  /  AST  26  /  ALT  20  /  AlkPhos  105  06-05        ASSESSMENT:  1.  YOU on CKD(3) due to ATN, better  2.  Mild hypernatremia      PLAN:  Continue 1/2 NS today and dc in AM

## 2020-06-07 DIAGNOSIS — I10 ESSENTIAL (PRIMARY) HYPERTENSION: ICD-10-CM

## 2020-06-07 LAB
ANION GAP SERPL CALC-SCNC: 8 MMOL/L — SIGNIFICANT CHANGE UP (ref 5–17)
BUN SERPL-MCNC: 34 MG/DL — HIGH (ref 7–23)
CALCIUM SERPL-MCNC: 9.3 MG/DL — SIGNIFICANT CHANGE UP (ref 8.5–10.1)
CHLORIDE SERPL-SCNC: 109 MMOL/L — HIGH (ref 96–108)
CO2 SERPL-SCNC: 26 MMOL/L — SIGNIFICANT CHANGE UP (ref 22–31)
CREAT SERPL-MCNC: 1.8 MG/DL — HIGH (ref 0.5–1.3)
GLUCOSE SERPL-MCNC: 88 MG/DL — SIGNIFICANT CHANGE UP (ref 70–99)
MAGNESIUM SERPL-MCNC: 1.9 MG/DL — SIGNIFICANT CHANGE UP (ref 1.6–2.6)
POTASSIUM SERPL-MCNC: 4.2 MMOL/L — SIGNIFICANT CHANGE UP (ref 3.5–5.3)
POTASSIUM SERPL-SCNC: 4.2 MMOL/L — SIGNIFICANT CHANGE UP (ref 3.5–5.3)
SODIUM SERPL-SCNC: 143 MMOL/L — SIGNIFICANT CHANGE UP (ref 135–145)

## 2020-06-07 PROCEDURE — 99223 1ST HOSP IP/OBS HIGH 75: CPT

## 2020-06-07 RX ORDER — METOPROLOL TARTRATE 50 MG
25 TABLET ORAL DAILY
Refills: 0 | Status: DISCONTINUED | OUTPATIENT
Start: 2020-06-07 | End: 2020-06-09

## 2020-06-07 RX ORDER — AMLODIPINE BESYLATE 2.5 MG/1
5 TABLET ORAL ONCE
Refills: 0 | Status: COMPLETED | OUTPATIENT
Start: 2020-06-07 | End: 2020-06-07

## 2020-06-07 RX ORDER — ASPIRIN/CALCIUM CARB/MAGNESIUM 324 MG
81 TABLET ORAL DAILY
Refills: 0 | Status: DISCONTINUED | OUTPATIENT
Start: 2020-06-07 | End: 2020-06-07

## 2020-06-07 RX ORDER — HYDRALAZINE HCL 50 MG
10 TABLET ORAL ONCE
Refills: 0 | Status: COMPLETED | OUTPATIENT
Start: 2020-06-07 | End: 2020-06-07

## 2020-06-07 RX ADMIN — Medication 3 MILLILITER(S): at 15:21

## 2020-06-07 RX ADMIN — Medication 3 MILLILITER(S): at 19:12

## 2020-06-07 RX ADMIN — PANTOPRAZOLE SODIUM 40 MILLIGRAM(S): 20 TABLET, DELAYED RELEASE ORAL at 04:36

## 2020-06-07 RX ADMIN — Medication 3 MILLILITER(S): at 08:20

## 2020-06-07 RX ADMIN — CEFTRIAXONE 100 MILLIGRAM(S): 500 INJECTION, POWDER, FOR SOLUTION INTRAMUSCULAR; INTRAVENOUS at 21:25

## 2020-06-07 RX ADMIN — Medication 10 MILLIGRAM(S): at 06:44

## 2020-06-07 RX ADMIN — SENNA PLUS 2 TABLET(S): 8.6 TABLET ORAL at 21:27

## 2020-06-07 RX ADMIN — AMLODIPINE BESYLATE 5 MILLIGRAM(S): 2.5 TABLET ORAL at 05:03

## 2020-06-07 RX ADMIN — DONEPEZIL HYDROCHLORIDE 10 MILLIGRAM(S): 10 TABLET, FILM COATED ORAL at 21:27

## 2020-06-07 RX ADMIN — AMLODIPINE BESYLATE 5 MILLIGRAM(S): 2.5 TABLET ORAL at 11:42

## 2020-06-07 RX ADMIN — ATORVASTATIN CALCIUM 80 MILLIGRAM(S): 80 TABLET, FILM COATED ORAL at 21:27

## 2020-06-07 RX ADMIN — FINASTERIDE 5 MILLIGRAM(S): 5 TABLET, FILM COATED ORAL at 11:42

## 2020-06-07 RX ADMIN — TAMSULOSIN HYDROCHLORIDE 0.4 MILLIGRAM(S): 0.4 CAPSULE ORAL at 21:27

## 2020-06-07 NOTE — CHART NOTE - NSCHARTNOTEFT_GEN_A_CORE
Called by RN for elevated BP, manual SBP 190s. Patient was seen and evaluated at the bedside, seen resting comfortably, NAD. Patient denies any associated chest pain, headache, SOB, palpitations, dizziness, vision changes. VS trend and labs reviewed.     Vital Signs Last 24 Hrs  T(C): 36.2 (07 Jun 2020 05:27), Max: 36.8 (06 Jun 2020 13:55)  T(F): 97.2 (07 Jun 2020 05:27), Max: 98.2 (06 Jun 2020 13:55)  HR: 68 (07 Jun 2020 06:18) (68 - 79)  BP: 184/95 (07 Jun 2020 06:18) (133/86 - 190/88)  BP(mean): --  RR: 18 (07 Jun 2020 05:27) (17 - 18)  SpO2: 97% (07 Jun 2020 05:27) (94% - 98%)    General: WN/WD NAD. elderly, frail  Respiratory: CTA B/L  CV: RRR, S1S2, no murmurs, rubs or gallops  Abdominal: Soft, NT, ND +BS,  Extremities: No edema, + peripheral pulses  Neurology: A&Ox2      A/P  84 yo male PMHx of Benign prostatic hypertrophy, CAD, MI, s/p CABG with 2 cardiac stents, COPD, Depression, GERD, Hyperlipidemia  Hypertension, Osteoporosis, Peripheral Neuropathy, Dementia.  Now with E Coli sepsis and YOU, treated with rocephin. Called on patient by RN for evaluation of elevated BP trend. Called for similar reason previous night, given 5mg amlodipine with some improvement in BP trend    -no signs/symptoms of hypertensive urgency - denies CP, headache, n/v, SOB, palpitations - no acute distress on exam.  -s/p amlodipine 5mg earlier in shift without any significant improvement in SBP  -Given 10mg IV Hydralazine x 1 while on bedside zoll monitor, continue to monitor BP q15min x 1 hour  -Primary may consider augmenting standing regimen  -SBP trend may improve once no longer requiring IVF  -Continue routine hemodynamic monitoring  -RN to update as necessary

## 2020-06-07 NOTE — PROVIDER CONTACT NOTE (OTHER) - SITUATION
Patient blood pressure 184/95 after 1x dose amlodipine
Patient blood pressure manually 190/88. Patient currently not on blood pressure medication. IV fluids infusing at 60 ml/hr
Pt's blood pressure is 174/91. Pt's blood pressures have been elevated and pt is not on blood pressure medication.

## 2020-06-07 NOTE — PROVIDER CONTACT NOTE (OTHER) - ACTION/TREATMENT ORDERED:
MD will assess pt bedside
10mg hydralazine IV push ordered
5mg amlodipine ordered, IV fluids discontinued,

## 2020-06-07 NOTE — CONSULT NOTE ADULT - ASSESSMENT
85m CAD, He had myocardial infarction at some point, for which PCI was performed.  He had coronary bypass surgery in 1996 and bioprosthetic aortic valve replacement in 2010.  According to his cardiologist, he was recatheterized prior to aortic valve replacement, with some stents having been found to be patent, some stents with significant disease, but overall he was found to have predominantly small vessel disease.  He is now incompletely revascularized.    He additionally has a hx of COPD, Depression, GERD, Hyperlipidemia  Hypertension, Osteoporosis, Peripheral Neuropathy, Dementia.   Was recently admitted to the hospital from 1/17-1/22 with increasing confusion and was noted to have a RLL.  He was treated with a course of abx and the confusion was thought to be related to metabolic encephalopathy.  He was then again admitted to the hospital from 2/14-2/22.  He presented that time with weakness and hypotension and was found to have a GIB.  Pt was initially treated at [facility]"" but transferred to [facility].  He required transfusion stabilized and was discharged home. He is now admitted with SOB, cough, and one episode of vomiting.  He was found hypoxic.  His subsequent course has been notable for Ecoli bacteremia, likely urinary, and persistent confusion.    This morning he had brief wide complex tachycardia.  Consultation is now being obtained.    -no acute ischemia  -has known cad, with some unrevascularized small vessel disease, in addition to prior pci and cabg  -he has a hx of gib, unclear if this is reason enough to avoid the use of asa at this time  - we saw him in january and he was on asa at that time. would resume unless GI suggests risk > benefit  -cont statin    -brief wct  -unclear if this was VT, as rbbb orientation did not change  -is not presently on bb, though he was in the past. would start metoprolol succ 25 daily if not contraindicated  -overall prognosis of vt is based on ef, which was preserved based on last echo within the year. no need to repeat    -no clear vol ol  -preserved ef    -bp has been high  -fluids off  -on ccb  -to add bb unless contraindicated, and can further adjust as needed

## 2020-06-07 NOTE — PROGRESS NOTE ADULT - SUBJECTIVE AND OBJECTIVE BOX
Patient is a 85y old  Male who presents with a chief complaint of SOB, cough (07 Jun 2020 06:38)      INTERVAL HPI/OVERNIGHT EVENTS: Patient seen and examined. NAD. No complaints.    Vital Signs Last 24 Hrs  T(C): 36.2 (07 Jun 2020 05:27), Max: 36.8 (06 Jun 2020 13:55)  T(F): 97.2 (07 Jun 2020 05:27), Max: 98.2 (06 Jun 2020 13:55)  HR: 72 (07 Jun 2020 08:19) (68 - 79)  BP: 175/97 (07 Jun 2020 08:19) (133/86 - 190/88)  BP(mean): --  RR: 18 (07 Jun 2020 05:27) (17 - 18)  SpO2: 96% (07 Jun 2020 08:19) (94% - 97%)    06-07    143  |  109<H>  |  34<H>  ----------------------------<  88  4.2   |  26  |  1.80<H>    Ca    9.3      07 Jun 2020 08:25  Mg     1.9     06-07                            10.2   8.44  )-----------( 86       ( 06 Jun 2020 05:21 )             33.8       CAPILLARY BLOOD GLUCOSE                  acetaminophen   Tablet .. 650 milliGRAM(s) Oral every 6 hours PRN  albuterol/ipratropium for Nebulization 3 milliLiter(s) Nebulizer every 8 hours  atorvastatin 80 milliGRAM(s) Oral at bedtime  bisacodyl 5 milliGRAM(s) Oral every 12 hours PRN  cefTRIAXone   IVPB 1000 milliGRAM(s) IV Intermittent every 24 hours  donepezil 10 milliGRAM(s) Oral at bedtime  finasteride 5 milliGRAM(s) Oral daily  guaiFENesin   Syrup  (Sugar-Free) 200 milliGRAM(s) Oral every 6 hours PRN  ondansetron Injectable 4 milliGRAM(s) IV Push every 6 hours PRN  pantoprazole    Tablet 40 milliGRAM(s) Oral before breakfast  senna 2 Tablet(s) Oral at bedtime  tamsulosin 0.4 milliGRAM(s) Oral at bedtime              REVIEW OF SYSTEMS:  CONSTITUTIONAL: No fever, no weight loss, or no fatigue  NECK: No pain, no stiffness  RESPIRATORY: No cough, no wheezing, no chills, no hemoptysis, No shortness of breath  CARDIOVASCULAR: No chest pain, no palpitations, no dizziness, no leg swelling  GASTROINTESTINAL: No abdominal pain. No nausea, no vomiting, no hematemesis; No diarrhea, no constipation. No melena, no hematochezia.  GENITOURINARY: No dysuria, no frequency, no hematuria, no incontinence  NEUROLOGICAL: No headaches, no loss of strength, no numbness, no tremors  SKIN: No itching, no burning  MUSCULOSKELETAL: No joint pain, no swelling; No muscle, no back, no extremity pain  PSYCHIATRIC: No depression, no mood swings,   HEME/LYMPH: No easy bruising, no bleeding gums  ALLERY AND IMMUNOLOGIC: No hives       Consultant(s) Notes Reviewed:  [X] YES  [ ] NO    PHYSICAL EXAM:  GENERAL: NAD  HEAD:  Atraumatic, Normocephalic  EYES: EOMI, PERRLA, conjunctiva and sclera clear  ENMT: No tonsillar erythema, exudates, or enlargement; Moist mucous membranes  NECK: Supple, No JVD  NERVOUS SYSTEM:  Awake & alert  CHEST/LUNG: Clear to auscultation bilaterally; No rales, rhonchi, wheezing,  HEART: Regular rate and rhythm  ABDOMEN: Soft, Nontender, Nondistended; Bowel sounds present  EXTREMITIES:  No clubbing, cyanosis, or edema  LYMPH: No lymphadenopathy noted  SKIN: No rashes      Advanced care planning discussed with patient/family [X] YES   [ ] NO    Advanced care planning discussed with patient/family. Patient's health status was discussed. All appropriate changes have been made regarding patient's end-of-life care. Advanced care planning forms reviewed/discussed/completed.  20 minutes spent.

## 2020-06-07 NOTE — CONSULT NOTE ADULT - SUBJECTIVE AND OBJECTIVE BOX
[facility]         Serina Poole, Jesse, Rich, Mayelin, Antony Morris        [phone] (office)    CHIEF COMPLAINT: Patient is a 85y old  Male who presents with a chief complaint of SOB, cough (2020 11:01)      HPI: limited hx available. hx obtained from his medical record. hx has been noted to be limited from his wife as well  84 yo male PMHx of Benign prostatic hypertrophy, CAD, He had myocardial infarction at some point, for which PCI was performed.  He had coronary bypass surgery in  and bioprosthetic aortic valve replacement in .  According to his cardiologist, he was recatheterized prior to aortic valve replacement, with some stents having been found to be patent, some stents with significant disease, but overall he was found to have predominantly small vessel disease.  He is now incompletely revascularized.    He additionally has a hx of COPD, Depression, GERD, Hyperlipidemia  Hypertension, Osteoporosis, Peripheral Neuropathy, Dementia.   Was recently admitted to the hospital from - with increasing confusion and was noted to have a RLL.  He was treated with a course of abx and the confusion was thought to be related to metabolic encephalopathy.  He was then again admitted to the hospital from -.  He presented that time with weakness and hypotension and was found to have a GIB.  Pt was initially treated at [facility] but transferred to [facility].  He required transfusion stabilized and was discharged home. He is now admitted with SOB, cough, and one episode of vomiting.  He was found hypoxic.  His subsequent course has been notable for Ecoli bacteremia, likely urinary, and persistent confusion.    This morning he had brief wide complex tachycardia.  Consultation is now being obtained.    PAST MEDICAL & SURGICAL HISTORY:  Myocardial infarction  COPD (chronic obstructive pulmonary disease)  Seasonal allergies  GERD (gastroesophageal reflux disease)  Benign prostatic hypertrophy  Hyperlipidemia  Hypertension  Depression  Peripheral Neuropathy  Osteoporosis  CAD (Coronary Artery Disease): MI, s/p CABG with 2 cardiac stents  S/P inguinal hernia repair: On right side  S/P appendectomy  S/P CABG (coronary artery bypass graft): Done in ; 2 cardiac stents in   S/P AVR (Aortic Valve Replacement): Pig valve (bioprosthetic); done  at [place]      SOCIAL HISTORY: No active tobacco, alcohol or illicit drug use    FAMILY HISTORY:  Family history of renal failure (Sibling)  Family history of MI (myocardial infarction):  at 65  Family history of stroke:  at 65  Family history of amyotrophic lateral sclerosis:  at 67  Family history of stroke:  at 67   No pertinent family history of CAD       MEDICATIONS  (STANDING):  albuterol/ipratropium for Nebulization 3 milliLiter(s) Nebulizer every 8 hours  atorvastatin 80 milliGRAM(s) Oral at bedtime  cefTRIAXone   IVPB 1000 milliGRAM(s) IV Intermittent every 24 hours  donepezil 10 milliGRAM(s) Oral at bedtime  finasteride 5 milliGRAM(s) Oral daily  pantoprazole    Tablet 40 milliGRAM(s) Oral before breakfast  senna 2 Tablet(s) Oral at bedtime  tamsulosin 0.4 milliGRAM(s) Oral at bedtime    MEDICATIONS  (PRN):  acetaminophen   Tablet .. 650 milliGRAM(s) Oral every 6 hours PRN Temp greater or equal to 38C (100.4F), Mild Pain (1 - 3)  bisacodyl 5 milliGRAM(s) Oral every 12 hours PRN Constipation  guaiFENesin   Syrup  (Sugar-Free) 200 milliGRAM(s) Oral every 6 hours PRN Cough  ondansetron Injectable 4 milliGRAM(s) IV Push every 6 hours PRN Nausea and/or Vomiting      Allergies    amoxicillin (Unknown)  Levaquin (Unknown)  penicillin (Unknown)    Intolerances        REVIEW OF SYSTEMS: Is negative for eye, ENT, GI, , allergic, dermatologic, musculoskeletal and neurologic, except as described above.    VITAL SIGNS:   Vital Signs Last 24 Hrs  T(C): 36 (2020 11:42), Max: 36.8 (2020 13:55)  T(F): 96.8 (2020 11:42), Max: 98.2 (2020 13:55)  HR: 84 (2020 11:42) (68 - 84)  BP: 144/86 (2020 11:42) (133/86 - 190/88)  BP(mean): --  RR: 17 (2020 11:42) (17 - 18)  SpO2: 96% (2020 11:42) (94% - 97%)    I&O's Summary    2020 07:01  -  2020 07:00  --------------------------------------------------------  IN: 600 mL / OUT: 0 mL / NET: 600 mL        PHYSICAL EXAM:    Constitutional: NAD, awake and alert, well-developed  Eyes:  EOMI, no oral cyanosis, conjunctivae clear, anicteric.  Pulmonary: Non-labored, breath sounds are clear bilaterally, no wheezing, rales or rhonchi  Cardiovascular:  regular S1 and S2. No murmur.  No rubs, gallops or clicks  Gastrointestinal: Bowel Sounds present, soft, nontender.   Lymph: No peripheral edema.   Neurological: Alert, conf  Skin: No obvious lesions/rashes.   Psych:  conf    LABS: All Labs Reviewed:                        10.2     )-----------( 86       ( 2020 05:21 )             33.8                         10.0   11.72 )-----------( 82       ( 2020 06:38 )             33.5     2020 08:25    143    |  109    |  34     ----------------------------<  88     4.2     |  26     |  1.80   2020 05:21    147    |  113    |  37     ----------------------------<  104    4.1     |  29     |  1.70   2020 06:38    146    |  113    |  41     ----------------------------<  102    4.0     |  27     |  2.10     Ca    9.3        2020 08:25  Ca    8.9        2020 05:21  Ca    8.6        2020 06:38  Mg     1.9       2020 08:25  Mg     1.9       2020 05:21  Mg     2.0       2020 06:38    TPro  5.7    /  Alb  2.3    /  TBili  0.2    /  DBili  <.10   /  AST  26     /  ALT  20     /  AlkPhos  105    2020 06:38          Blood Culture: Organism --  Gram Stain Blood -- Gram Stain --  Specimen Source .Blood Blood-Peripheral  Culture-Blood --    Organism --  Gram Stain Blood -- Gram Stain --  Specimen Source .Blood Blood  Culture-Blood --    Organism Escherichia coli  Gram Stain Blood -- Gram Stain --  Specimen Source .Urine Clean Catch (Midstream)  Culture-Blood --    Organism Blood Culture PCR  Gram Stain Blood -- Gram Stain   Growth in aerobic bottle: Gram Negative Rods  Specimen Source .Blood Blood-Venous  Culture-Blood --            RADIOLOGY:  < from: TTE Echo Doppler w/o Cont (20 @ 14:47) >     EXAM:  ECHO TTE WO CON COMP W DOPPLR         PROCEDURE DATE:  2020        INTERPRETATION:  INDICATION: Chest pain  Sonographer KL    Blood Pressure 149/96    Height 170 cm     Weight 60 kg       BSA 1.79 sq m    Dimensions:    LA Normal Values: 2.0 - 4.0 cm    Ao Normal Values: 2.0 - 3.8 cm  SEPTUM Normal Values: 0.6 - 1.2 cm  PWT Normal Values: 0.6 - 1.1 cm  LVIDd Normal Values: 3.0 - 5.6 cm  LVIDs Normal Values: 1.8 - 4.0 cm      OBSERVATIONS:  Technically difficult and very limited study  Mitral Valve: Thickened leaflets, trace physiologic MR. Mitral annular calcification  Aortic Valve/Aorta: Not visualized  Tricuspid Valve: Not well-visualized  Pulmonic Valve: Not visualized  Left Atrium: Not well-visualized  Right Atrium: Not well-visualized  Left Ventricle: Grossly normal left ventricular systolic function, and endocardium is not well-visualized cannot rule out segmental dysfunction  Right Ventricle: Not well-visualized  Pericardium/Pleura: normal, no significant pericardial effusion.  Pulmonary/RV Pressure: estimated PA systolic pressure of 39 mmHg assuming an RA pressure of 8 mmHg.      Conclusion:   Technically difficult and very limited study  Grossly normal left ventricular systolic function, and endocardium is notwell-visualized cannot rule out segmental dysfunction   Right ventricle is not well-visualized  Overall, valves are not well-visualized. Patient with history of aortic valve replacement, cannot comment on the function of this valve                      RAMÓN VIDAL   This document has been electronically signed. 2020 10:10AM                < end of copied text >    EKG: sr rbbb, lad

## 2020-06-07 NOTE — PROGRESS NOTE ADULT - PROBLEM SELECTOR PLAN 1
WBC trending down - Na remains elevated -   Urine and Blood Cx noted  very unlikely to have LRTI with Gram Neg Rods on Blood Cx -   repeat Blood cx neg -   on Rocephin   - ID following  renal eval noted - CKD - s/p  IVF - serial renal indices -   COPD - emphysema - mucus plugging - Atelectasis - see CT Chest - NEBS TID for mucociliary clearance and COPD management, BG noted  recent GI bleed - off AC - known to [place] [facility] team - H and H noted - bowel rx regimen  valvular heart disease - see TTE - proBNP elev - I and O - monitor for vol overload   Swallow eval - MBS eval noted - hx of dementia and mucus plugging and atelectasis - concern for aspiration   ckd - old records reviewed - renal indices noted - monitor lytes  prognosis guarded  pall care eval for MOLST - DNR DNI.

## 2020-06-07 NOTE — PROGRESS NOTE ADULT - SUBJECTIVE AND OBJECTIVE BOX
NEPHROLOGY PROGRESS NOTE    CHIEF COMPLAINT:  YOU/CKD    HPI:  Renal function has plateaued with Cr in high 1's.  IVF was stopped this AM when BP spiked.      EXAM:  T(F): 97.2 (06-07-20 @ 05:27)  HR: 72 (06-07-20 @ 08:19)  BP: 175/97 (06-07-20 @ 08:19)  RR: 18 (06-07-20 @ 05:27)  SpO2: 96% (06-07-20 @ 08:19)    Conversant, in no apparent distress  Normal respiratory effort, lungs clear bilaterally  Heart RRR with no murmur, no peripheral edema         LABS                             10.2   8.44  )-----------( 86       ( 06 Jun 2020 05:21 )             33.8          06-07    143  |  109<H>  |  34<H>  ----------------------------<  88  4.2   |  26  |  1.80<H>    Ca    9.3      07 Jun 2020 08:25  Mg     1.9     06-07             ASSESSMENT:  1.  YOU on CKD(3) due to ATN, no further improvement on IVF  2.  Mild hypernatremia, resolved  3.  Hypertension, hectic control, salt sensitive?, on alpha/calcium blocker      PLAN:  Keep off IV fluids  Monitor BP response to discontinuation of IVF and titration of calcium blocker and consider add on therapy

## 2020-06-07 NOTE — PROGRESS NOTE ADULT - SUBJECTIVE AND OBJECTIVE BOX
INTERVAL HPI/OVERNIGHT EVENTS:  pt seen and examined  no complaints   denies n/v/d/abd pain  tolerating po    MEDICATIONS  (STANDING):  albuterol/ipratropium for Nebulization 3 milliLiter(s) Nebulizer every 8 hours  atorvastatin 80 milliGRAM(s) Oral at bedtime  cefTRIAXone   IVPB 1000 milliGRAM(s) IV Intermittent every 24 hours  donepezil 10 milliGRAM(s) Oral at bedtime  finasteride 5 milliGRAM(s) Oral daily  pantoprazole    Tablet 40 milliGRAM(s) Oral before breakfast  senna 2 Tablet(s) Oral at bedtime  sodium chloride 0.45%. 1000 milliLiter(s) (60 mL/Hr) IV Continuous <Continuous>  tamsulosin 0.4 milliGRAM(s) Oral at bedtime    MEDICATIONS  (PRN):  acetaminophen   Tablet .. 650 milliGRAM(s) Oral every 6 hours PRN Temp greater or equal to 38C (100.4F), Mild Pain (1 - 3)  bisacodyl 5 milliGRAM(s) Oral every 12 hours PRN Constipation  guaiFENesin   Syrup  (Sugar-Free) 200 milliGRAM(s) Oral every 6 hours PRN Cough  ondansetron Injectable 4 milliGRAM(s) IV Push every 6 hours PRN Nausea and/or Vomiting      Allergies    amoxicillin (Unknown)  Levaquin (Unknown)  penicillin (Unknown)    Intolerances          Review of Systems:    General:  No wt loss, fevers, chills, night sweats, fatigue   Eyes:  Good vision, no reported pain  ENT:  No sore throat, pain, runny nose, dysphagia  CV:  No pain, palpitations, hypo/hypertension  Resp:  No dyspnea, cough, tachypnea, wheezing  GI:  No pain, No nausea, No vomiting, No diarrhea, No constipation, No weight loss, No fever, No pruritis, No rectal bleeding, No melena, No dysphagia  :  No pain, bleeding, incontinence, nocturia  Muscle:  No pain, weakness  Neuro:  No weakness, tingling, memory problems  Psych:  No fatigue, insomnia, mood problems, depression  Endocrine:  No polyuria, polydypsia, cold/heat intolerance  Heme:  No petechiae, ecchymosis, easy bruisability  Skin:  No rash, tattoos, scars, edema        Vital Signs Last 24 Hrs  T(C): 36 (07 Jun 2020 11:42), Max: 36.8 (06 Jun 2020 13:55)  T(F): 96.8 (07 Jun 2020 11:42), Max: 98.2 (06 Jun 2020 13:55)  HR: 84 (07 Jun 2020 11:42) (68 - 84)  BP: 144/86 (07 Jun 2020 11:42) (133/86 - 190/88)  BP(mean): --  RR: 17 (07 Jun 2020 11:42) (17 - 18)  SpO2: 96% (07 Jun 2020 11:42) (94% - 97%)      LABS:                        10.2   8.44  )-----------( 86       ( 06 Jun 2020 05:21 )             33.8     06-07    143  |  109<H>  |  34<H>  ----------------------------<  88  4.2   |  26  |  1.80<H>    Ca    9.3      07 Jun 2020 08:25  Mg     1.9     06-07 06-06-20 @ 07:01  -  06-07-20 @ 07:00  --------------------------------------------------------  IN: 600 mL / OUT: 0 mL / NET: 600 mL        RADIOLOGY & ADDITIONAL TESTS:

## 2020-06-08 ENCOUNTER — TRANSCRIPTION ENCOUNTER (OUTPATIENT)
Age: 85
End: 2020-06-08

## 2020-06-08 LAB
CULTURE RESULTS: SIGNIFICANT CHANGE UP
CULTURE RESULTS: SIGNIFICANT CHANGE UP
SPECIMEN SOURCE: SIGNIFICANT CHANGE UP
SPECIMEN SOURCE: SIGNIFICANT CHANGE UP

## 2020-06-08 PROCEDURE — 99232 SBSQ HOSP IP/OBS MODERATE 35: CPT

## 2020-06-08 RX ORDER — CEPHALEXIN 500 MG
250 CAPSULE ORAL EVERY 8 HOURS
Refills: 0 | Status: DISCONTINUED | OUTPATIENT
Start: 2020-06-08 | End: 2020-06-09

## 2020-06-08 RX ADMIN — Medication 3 MILLILITER(S): at 15:13

## 2020-06-08 RX ADMIN — Medication 250 MILLIGRAM(S): at 14:58

## 2020-06-08 RX ADMIN — TAMSULOSIN HYDROCHLORIDE 0.4 MILLIGRAM(S): 0.4 CAPSULE ORAL at 21:18

## 2020-06-08 RX ADMIN — ATORVASTATIN CALCIUM 80 MILLIGRAM(S): 80 TABLET, FILM COATED ORAL at 21:18

## 2020-06-08 RX ADMIN — Medication 3 MILLILITER(S): at 08:01

## 2020-06-08 RX ADMIN — DONEPEZIL HYDROCHLORIDE 10 MILLIGRAM(S): 10 TABLET, FILM COATED ORAL at 21:18

## 2020-06-08 RX ADMIN — PANTOPRAZOLE SODIUM 40 MILLIGRAM(S): 20 TABLET, DELAYED RELEASE ORAL at 04:28

## 2020-06-08 RX ADMIN — Medication 25 MILLIGRAM(S): at 04:28

## 2020-06-08 RX ADMIN — FINASTERIDE 5 MILLIGRAM(S): 5 TABLET, FILM COATED ORAL at 10:07

## 2020-06-08 RX ADMIN — SENNA PLUS 2 TABLET(S): 8.6 TABLET ORAL at 21:18

## 2020-06-08 RX ADMIN — Medication 3 MILLILITER(S): at 21:39

## 2020-06-08 RX ADMIN — Medication 250 MILLIGRAM(S): at 21:18

## 2020-06-08 NOTE — DISCHARGE NOTE PROVIDER - HOSPITAL COURSE
86 yo male PMHx of Benign prostatic hypertrophy, CAD, MI, s/p CABG with 2 cardiac stents, COPD, Depression, GERD, Hyperlipidemia  Hypertension, Osteoporosis, Peripheral Neuropathy, Dementia.  Pt is a poor historian and so history provided is limited.  Was recently admitted to the hospital from 1/17-1/22 with increasing confusion and was noted to have a RLL.  He was treated with a course of abx and the confusion was thought to be related to metabolic encephalopathy.  He was then again admitted to the hospital from 2/14-2/22.  He presented this time with weakness and hypotension and was found to have a GIB.  Pt was initially treated at [facility] but transferred to [facility].  He required transfusion stabilized and was discharged home. Currently pt is AAOx3 but is unaware of why he was taken to the hospital.  EMS reports that they received a call for SOB, cough, and one episode of vomiting.  Pt was seated at the table during this episode.  Upon their arrival pt was found to have rales with hypoxia SPO2 in the 80s.  Oxygen was placed and pt was taken to the ED.  Wife reports that pt was more confused then usual today which usually indicates an infection.  She also notes that over the last several weeks he has been wetting the bee which is not usual for him and today he was noted to have increased nasal congestion.  History is also limited from wife.        Patient treated for e coli bacteremia from UTI    Repeat cultures negative        >35 minutes spent on discharge

## 2020-06-08 NOTE — PROGRESS NOTE ADULT - PROBLEM SELECTOR PROBLEM 1
[facility]  Daily Note   Name:  Chris Wheeler  Medical Record Number: [id number]   Note Date: 2020                                              Date/Time:  2020 12:50:00   DOL: 1  Pos-Mens Age:  36wk 4d  Birth Gest: 36wk 3d   2020  Birth Weight:  3034 (gms)  Daily Physical Exam   Today's Weight: 3050 (gms)  Chg 24 hrs: 16  Chg 7 days:  --   Head Circ:  36 (cm)  Date: 2020  Change:  0 (cm)  Length:  47.5 (cm)  Change:  0 (cm)   Temperature Heart Rate Resp Rate O2 Sats   37 142 47 96  Intensive cardiac and respiratory monitoring, continuous and/or frequent vital sign monitoring.   Bed Type:  Incubator   General:  Sleeping in NAD   Head/Neck:  Normocephalic.  Anterior fontanelle soft and flat.  Suture lines approximated. +Red reflex bilaterally;  anterior lenses capsule consistent with 36 week gestation. Palate intact.   Chest:  Chest symmetrical. Clear breath sounds bilaterally with good air exchange. Clavicles intact.   Heart:  Regular rate and rhythm; no murmur ; brachial  and  femoral pulses 2-3+ and equal bilaterally; CFT 2-3  seconds.   Abdomen:  Abdomen soft and flat with normal bowel sounds. No masses or organomegaly palpated. 3 vessel  cord.   Genitalia:  Normal  external genitalia. Testes in inguinal canal bilaterally, good ruggae.  Anus patent.  No  sacral dimple.   Extremities  Symmetrical movements; no hip dislocations detected; no abnormalities noted.   Neurologic:  Sleeping but Responsive with exam.  Muscle tone appropriate for gestation.     Skin:  Skin smooth, pink, warm, and intact. No rashes, birthmarks, or lesions noted. Color pale, improved  with CPAP and crying.  Active Diagnoses   Diagnosis Start Date Comment   Respiratory Distress 2020  Syndrome  Nutritional Support 2020  At risk for Hyperbilirubinemia3/  Infectious Screen <=28D 2020  Late  Infant 36 wks 2020  Parental Support 2020  R/O Anemia - congenital  - 2020  other  R/O Hypothyroidism w/o 2020  goiter - congenital  Medications   Active Start Date Start Time Stop Date Dur(d) Comment   Aquaphor 2020 2  Respiratory Support   Respiratory Support Start Date Stop Date Dur(d)                                       Comment   Room Air 2020 2  Labs     CBC Time WBC Hgb Hct Plts Segs Bands Lymph [place] Eos Baso Imm nRBC Retic   03/23/20 05:34 14.2 13.7 38.4 189 40.20 45.50 8.90 5.40 0.00 5.30   Chem1 Time Na K Cl CO2 BUN Cr Glu BS Glu Ca   2020 05:45 144 4.4 111 21 14 0.60 85 9.4   Liver Function Time T Bili D Bili Blood Type Marylu AST ALT GGT LDH NH3 Lactate   2020 05:45 4.4 <0.2 34 7   Chem2 Time iCa Osm Phos Mg TG Alk Phos T Prot Alb Pre Alb   2020 05:45 6.2 1.7 59 97 4.5 3.3   Blood Gas Time pH pCO2 pO2 HCO3 BE Type Settings   2020 10:55 7.35 45 25 24 -1 Vcord  Intake/Output  Actual Intake   Fluid Type Bran/oz Dex % Prot g/kg Prot g/100mL Amount Comment  TPN 10 3 5.94 154  Breast Milk-Donor 20 25  Planned Intake Prot Prot feeds/  Fluid Type Bran/oz Dex % g/kg g/100mL Amt mL/feed day mL/hr mL/kg/day Comment    TPN 11 3.5 4.45 240 10 78.69  Output   Urine Amount:268 mL 3.7 mL/kg/hr Calculation:24 hrs  Total Output:   268 mL 3.7 mL/kg/hr 87.9 mL/kg/day Calculation:24 hrs  Stools: 1  Nutritional Support   Diagnosis Start Date End Date  Nutritional Support 2020   History   Initial glucose 43. Started on vTPN 80 ml/kg/d. As of 3/23 Glucose has been fine. Baby is on vanilla TPN and small  feeds of 5 mL q3h DBM     Plan   Follow glucoses.   Continue vTPN  - increase TF  advance feeds to 15 mL q3h - consider advance further later  At risk for Hyperbilirubinemia   Diagnosis Start Date End Date  At risk for Hyperbilirubinemia 2020   History   MBT O+, antibody negative. Babay is O+. bili on 3/23 is 4.4 mgs%   Plan   Follow T/D bili   Respiratory Distress Syndrome   Diagnosis Start Date End Date  Respiratory Distress  Syndrome 2020   History   36w3d repeat c/s. s/p BMTZ 2-3w PTD. Respiratory distress at birth. CXR c/w mild RDS. Improved on CPAP, quickly  weaned to 24% FiO2. Gas ok. baby was weaned off the CPAP to RA. CXR 3/23 is clear   Plan   Observe in RA  Infectious Screen <=28D   Diagnosis Start Date End Date  Infectious Screen <=28D 2020   History   Repeat c/s. AROM at c/s. GBS unknown. Respiratory distress attributed to RDS/TTN. as of 3/23 - CBC benign   Plan   Screening CBC. Monitor respiratory status. Obtain blood culture and/or start antibiotics based on clinical course.  R/O Anemia - congenital - other   Diagnosis Start Date End Date  R/O Anemia - congenital - other 2020   History   Placental accreda and previa with moderate amount of maternal blood loss. Tachypnea and mild pallor on admission.  H/H 11.9/35%. Given NS bolus x1. 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   Plan   Monitor work of breathing and H/H prn.  Late  Infant 36 wks   Diagnosis Start Date End Date  Late  Infant 36 wks 2020   History   Repeat c/s.      Plan   Provide developmentally appropriate care.  Parental Support   Diagnosis Start Date End Date  Parental Support 2020   History   Parents not . First child together, but each has previous children. Father updated at bedside. Consents signed  with mother by Dr Dickerson.   Plan   Continue to support.  Hypothyroidism w/o goiter - congenital   Diagnosis Start Date End Date  R/O Hypothyroidism w/o goiter - congenital 2020   History   maternal h/o Hashimoto's, on Synthoid. No evidence of TPO Ab in maternal EPIC chart.   Plan   Follow  screen results.  Health Maintenance   Maternal Labs  RPR/Serology: Non-Reactive  HIV: Negative  Rubella: Immune  GBS:  Unknown  HBsAg:  Negative    Screening   Date Comment  2020   Immunization   Date Type Comment  2020 Ordered Hepatitis B  ___________________________________________  Wayne  Bacteremia due to Escherichia coli MD Lanette

## 2020-06-08 NOTE — PROGRESS NOTE ADULT - SUBJECTIVE AND OBJECTIVE BOX
Patient is a 85y old  Male who presents with a chief complaint of SOB, cough (08 Jun 2020 09:58)      INTERVAL HPI/OVERNIGHT EVENTS: Patient seen and examined. NAD. No complaints.    Vital Signs Last 24 Hrs  T(C): 36.3 (08 Jun 2020 04:38), Max: 36.4 (07 Jun 2020 21:01)  T(F): 97.3 (08 Jun 2020 04:38), Max: 97.5 (07 Jun 2020 21:01)  HR: 76 (08 Jun 2020 10:24) (76 - 86)  BP: 131/82 (08 Jun 2020 10:24) (125/75 - 154/78)  BP(mean): --  RR: 17 (08 Jun 2020 04:38) (17 - 17)  SpO2: 92% (08 Jun 2020 10:24) (92% - 96%)    06-07    143  |  109<H>  |  34<H>  ----------------------------<  88  4.2   |  26  |  1.80<H>    Ca    9.3      07 Jun 2020 08:25  Mg     1.9     06-07          CAPILLARY BLOOD GLUCOSE                  acetaminophen   Tablet .. 650 milliGRAM(s) Oral every 6 hours PRN  albuterol/ipratropium for Nebulization 3 milliLiter(s) Nebulizer every 8 hours  atorvastatin 80 milliGRAM(s) Oral at bedtime  bisacodyl 5 milliGRAM(s) Oral every 12 hours PRN  donepezil 10 milliGRAM(s) Oral at bedtime  finasteride 5 milliGRAM(s) Oral daily  guaiFENesin   Syrup  (Sugar-Free) 200 milliGRAM(s) Oral every 6 hours PRN  metoprolol succinate ER 25 milliGRAM(s) Oral daily  ondansetron Injectable 4 milliGRAM(s) IV Push every 6 hours PRN  pantoprazole    Tablet 40 milliGRAM(s) Oral before breakfast  senna 2 Tablet(s) Oral at bedtime  tamsulosin 0.4 milliGRAM(s) Oral at bedtime              REVIEW OF SYSTEMS:  CONSTITUTIONAL: No fever, no weight loss, or no fatigue  NECK: No pain, no stiffness  RESPIRATORY: No cough, no wheezing, no chills, no hemoptysis, No shortness of breath  CARDIOVASCULAR: No chest pain, no palpitations, no dizziness, no leg swelling  GASTROINTESTINAL: No abdominal pain. No nausea, no vomiting, no hematemesis; No diarrhea, no constipation. No melena, no hematochezia.  GENITOURINARY: No dysuria, no frequency, no hematuria, no incontinence  NEUROLOGICAL: No headaches, no loss of strength, no numbness, no tremors  SKIN: No itching, no burning  MUSCULOSKELETAL: No joint pain, no swelling; No muscle, no back, no extremity pain  PSYCHIATRIC: No depression, no mood swings,   HEME/LYMPH: No easy bruising, no bleeding gums  ALLERY AND IMMUNOLOGIC: No hives       Consultant(s) Notes Reviewed:  [X] YES  [ ] NO    PHYSICAL EXAM:  GENERAL: NAD  HEAD:  Atraumatic, Normocephalic  EYES: EOMI, PERRLA, conjunctiva and sclera clear  ENMT: No tonsillar erythema, exudates, or enlargement; Moist mucous membranes  NECK: Supple, No JVD  NERVOUS SYSTEM:  Awake & alert  CHEST/LUNG: Clear to auscultation bilaterally; No rales, rhonchi, wheezing,  HEART: Regular rate and rhythm  ABDOMEN: Soft, Nontender, Nondistended; Bowel sounds present  EXTREMITIES:  No clubbing, cyanosis, or edema  LYMPH: No lymphadenopathy noted  SKIN: No rashes      Advanced care planning discussed with patient/family [X] YES   [ ] NO    Advanced care planning discussed with patient/family. Patient's health status was discussed. All appropriate changes have been made regarding patient's end-of-life care. Advanced care planning forms reviewed/discussed/completed.  20 minutes spent.

## 2020-06-08 NOTE — PROGRESS NOTE ADULT - ASSESSMENT
85m CAD c/b MI (remote past) PCI with prior stents patent with small vessel disease (incompletely revascularized),  COPD, Depression, GERD, Hyperlipidemia  Hypertension, Osteoporosis, Peripheral Neuropathy, Dementia.   Recently admitted for RLL PNA from 1/17-1/22 with increasing confusion was treated with a course of abx and the confusion was thought to be related to metabolic encephalopathy.  Repeat admission for weakness and hypotension from 2/14-2/22 with GIB, was initially treated at [facility] but transferred to [facility] stabilized after transfusions then d/c home. He is now admitted with SOB, cough, and one episode of vomiting.  He was found hypoxic.  His subsequent course has been notable for Ecoli bacteremia, likely urinary, and persistent confusion.  Brief wide complex tachycardia.  Consultation is now being obtained.    CAD  - no acute ischemia  -has known cad, with some unrevascularized small vessel disease, in addition to prior pci and cabg  -he has a hx of gib, unclear if this is reason enough to avoid the use of asa at this time  - we saw him in january and he was on asa at that time. Would resume unless GI suggests risk > benefit  -cont statin    brief wct  -unclear if this was VT, as rbbb orientation did not change  -Cont metoprolol succ 25 daily if not contraindicated, has been on it in the past  -overall prognosis of vt is based on ef, which was preserved based on last echo within the year. No need to repeat  - Monitor electrolytes, maintain K >4 Mag >2.  Replete prn.     CKD  - Renal following  - Avoid nephrotoxic agents.  - Hyponatremia resolved.     -no clear vol ol  -preserved ef    HTN  -bp has been high range 125-184  -fluids off  -Cont BB off CCB  -would check orthostatic BP  -If continues to be elevated would restart CCB if orthostatic BP negative with hold parameters.    Will continue to follow    ANIRUDH Chandler-BC  Cardiology [employer] [id number]

## 2020-06-08 NOTE — PROGRESS NOTE ADULT - PROBLEM SELECTOR PLAN 1
E. Coli Sepsis POA  Likely from urine  Continue ceftriaxone  Change to keflex on discharge until 6/16  ID f/u

## 2020-06-08 NOTE — PROGRESS NOTE ADULT - ASSESSMENT
84 yo male PMHx of Benign prostatic hypertrophy, CAD, MI, s/p CABG with 2 cardiac stents, COPD, Depression, GERD, Hyperlipidemia  Hypertension, Osteoporosis, Peripheral Neuropathy, Dementia. adm with SOB, cough, one episode of vomiting hypoxia SPO2 in the 80s confusion and concerns for PNA but no compelling symptoms, noted E coli bacteremia and E coli in urine

## 2020-06-08 NOTE — DISCHARGE NOTE PROVIDER - NSDCMRMEDTOKEN_GEN_ALL_CORE_FT
albuterol 90 mcg/inh inhalation aerosol: 2 puff(s) inhaled every 6 hours, As needed, Shortness of Breath and/or Wheezing  atorvastatin 80 mg oral tablet: 1 tab(s) orally once a day (at bedtime)  donepezil 10 mg oral tablet: 1 tab(s) orally once a day (at bedtime)  finasteride 5 mg oral tablet: 1 tab(s) orally once a day  melatonin 5 mg oral tablet: 1 tab(s) orally once a day (at bedtime), As needed, Insomnia  pantoprazole 40 mg oral delayed release tablet: 1 tab(s) orally once a day (before a meal)  sucralfate 1 g/10 mL oral suspension: 10 milliliter(s) orally 4 times a day (before meals and at bedtime)  tamsulosin 0.4 mg oral capsule: 1 cap(s) orally once a day (at bedtime) albuterol 90 mcg/inh inhalation aerosol: 2 puff(s) inhaled every 6 hours, As needed, Shortness of Breath and/or Wheezing  atorvastatin 80 mg oral tablet: 1 tab(s) orally once a day (at bedtime)  Bacid (LAC) oral capsule: 1 cap(s) orally 3 times a day  bisacodyl 5 mg oral delayed release tablet: 1 tab(s) orally every 12 hours, As needed, Constipation  cephalexin 250 mg oral capsule: 1 cap(s) orally every 8 hours -- stop after 9 days  donepezil 10 mg oral tablet: 1 tab(s) orally once a day (at bedtime)  finasteride 5 mg oral tablet: 1 tab(s) orally once a day  guaiFENesin 100 mg/5 mL oral liquid: 10 milliliter(s) orally every 6 hours, As needed, Cough  melatonin 5 mg oral tablet: 1 tab(s) orally once a day (at bedtime), As needed, Insomnia  metoprolol succinate 25 mg oral tablet, extended release: 1 tab(s) orally once a day  pantoprazole 40 mg oral delayed release tablet: 1 tab(s) orally once a day (before a meal)  senna oral tablet: 2 tab(s) orally once a day (at bedtime)  sucralfate 1 g/10 mL oral suspension: 10 milliliter(s) orally 4 times a day (before meals and at bedtime)  tamsulosin 0.4 mg oral capsule: 1 cap(s) orally once a day (at bedtime)

## 2020-06-08 NOTE — CHART NOTE - NSCHARTNOTEFT_GEN_A_CORE
Assessment: 85 year old male dx rule out sepsis history dementia COPD GERD HTN hyperlipidemia. Visited pt at bedside this am. Pt ate breakfast. Appetite is fair to good. Pt c/o disliking thickened liquids. Explained the importance of avoiding thin liquids as per MBS results. Pt likes Ensure supplement which is provided BID. Will rx increasing to TID. Food preferences are obtained, menus adjusted. Pt is tolerating [facility] soft foods.     Factors impacting intake: [ ] none [ ] nausea  [ ] vomiting [ ] diarrhea [ ] constipation  [x ]chewing problems [ x] swallowing issues  [ x] other: dislikes thickened liquids     Diet Presciption: Diet, Dysphagia 2 Mechanical Soft-Nectar Consistency Fluid:   DASH/TLC {Sodium & Cholesterol Restricted}  Supplement Feeding Modality:  Oral  Ensure Enlive Servings Per Day:  1       Frequency:  Two Times a day (06-04-20 @ 13:13)    Intake: %    Current Weight: Weight (kg): 69.5 (06-03 @ 02:15)  % Weight Change    Pertinent Medications: MEDICATIONS  (STANDING):  albuterol/ipratropium for Nebulization 3 milliLiter(s) Nebulizer every 8 hours  atorvastatin 80 milliGRAM(s) Oral at bedtime  donepezil 10 milliGRAM(s) Oral at bedtime  finasteride 5 milliGRAM(s) Oral daily  metoprolol succinate ER 25 milliGRAM(s) Oral daily  pantoprazole    Tablet 40 milliGRAM(s) Oral before breakfast  senna 2 Tablet(s) Oral at bedtime  tamsulosin 0.4 milliGRAM(s) Oral at bedtime    MEDICATIONS  (PRN):  acetaminophen   Tablet .. 650 milliGRAM(s) Oral every 6 hours PRN Temp greater or equal to 38C (100.4F), Mild Pain (1 - 3)  bisacodyl 5 milliGRAM(s) Oral every 12 hours PRN Constipation  guaiFENesin   Syrup  (Sugar-Free) 200 milliGRAM(s) Oral every 6 hours PRN Cough  ondansetron Injectable 4 milliGRAM(s) IV Push every 6 hours PRN Nausea and/or Vomiting    Pertinent Labs: 06-07 Na143 mmol/L Glu 88 mg/dL K+ 4.2 mmol/L Cr  1.80 mg/dL<H> BUN 34 mg/dL<H> 06-05 Alb 2.3 g/dL<L>     CAPILLARY BLOOD GLUCOSE        Skin: no pressure ulcers noted     Estimated Needs:   [x ] no change since previous assessment  [ ] recalculated:     Previous Nutrition Diagnosis:   [ ] Inadequate Energy Intake [ ]Inadequate Oral Intake [ ] Excessive Energy Intake   [ ] Underweight [x ] Increased Nutrient Needs [ ] Overweight/Obesity   [ ] Altered GI Function [ ] Unintended Weight Loss [ ] Food & Nutrition Related Knowledge Deficit [ ] Malnutrition     Nutrition Diagnosis is [ ] ongoing  [ x] resolved [ ] not applicable     New Nutrition Diagnosis: [ x] not applicable       Interventions:   Recommend  [ ] Change Diet To:  [ ] Nutrition Supplement  [ ] Nutrition Support  [ x] Other: continue current diet order; rx increasing Ensure to TID; honor pt's food preferences/tolerances.     Monitoring and Evaluation:   [x ] PO intake [ x ] Tolerance to diet prescription [ x ] weights [ x ] labs[ x ] follow up per protocol  [ ] other:

## 2020-06-08 NOTE — PROGRESS NOTE ADULT - SUBJECTIVE AND OBJECTIVE BOX
INTERVAL HPI/OVERNIGHT EVENTS:  pt seen and examined  denies n/v/d/abd pain  tolerating po    MEDICATIONS  (STANDING):  albuterol/ipratropium for Nebulization 3 milliLiter(s) Nebulizer every 8 hours  atorvastatin 80 milliGRAM(s) Oral at bedtime  cefTRIAXone   IVPB 1000 milliGRAM(s) IV Intermittent every 24 hours  donepezil 10 milliGRAM(s) Oral at bedtime  finasteride 5 milliGRAM(s) Oral daily  pantoprazole    Tablet 40 milliGRAM(s) Oral before breakfast  senna 2 Tablet(s) Oral at bedtime  sodium chloride 0.45%. 1000 milliLiter(s) (60 mL/Hr) IV Continuous <Continuous>  tamsulosin 0.4 milliGRAM(s) Oral at bedtime    MEDICATIONS  (PRN):  acetaminophen   Tablet .. 650 milliGRAM(s) Oral every 6 hours PRN Temp greater or equal to 38C (100.4F), Mild Pain (1 - 3)  bisacodyl 5 milliGRAM(s) Oral every 12 hours PRN Constipation  guaiFENesin   Syrup  (Sugar-Free) 200 milliGRAM(s) Oral every 6 hours PRN Cough  ondansetron Injectable 4 milliGRAM(s) IV Push every 6 hours PRN Nausea and/or Vomiting      Allergies    amoxicillin (Unknown)  Levaquin (Unknown)  penicillin (Unknown)    Intolerances          Review of Systems:    General:  No wt loss, fevers, chills, night sweats, fatigue   Eyes:  Good vision, no reported pain  ENT:  No sore throat, pain, runny nose, dysphagia  CV:  No pain, palpitations, hypo/hypertension  Resp:  No dyspnea, cough, tachypnea, wheezing  GI:  No pain, No nausea, No vomiting, No diarrhea, No constipation, No weight loss, No fever, No pruritis, No rectal bleeding, No melena, No dysphagia  :  No pain, bleeding, incontinence, nocturia  Muscle:  No pain, weakness  Neuro:  No weakness, tingling, memory problems  Psych:  No fatigue, insomnia, mood problems, depression  Endocrine:  No polyuria, polydypsia, cold/heat intolerance  Heme:  No petechiae, ecchymosis, easy bruisability  Skin:  No rash, tattoos, scars, edema        Vital Signs Last 24 Hrs  T(C): 36 (07 Jun 2020 11:42), Max: 36.8 (06 Jun 2020 13:55)  T(F): 96.8 (07 Jun 2020 11:42), Max: 98.2 (06 Jun 2020 13:55)  HR: 84 (07 Jun 2020 11:42) (68 - 84)  BP: 144/86 (07 Jun 2020 11:42) (133/86 - 190/88)  BP(mean): --  RR: 17 (07 Jun 2020 11:42) (17 - 18)  SpO2: 96% (07 Jun 2020 11:42) (94% - 97%)      Constitutional: NAD  HEENT: ncat  Neck: No LAD  Gastrointestinal: soft nt nd  Extremities: No peripheral edema  Vascular: + peripheral pulses  Neurological: Awake alert appropriate        LABS:                        10.2   8.44  )-----------( 86       ( 06 Jun 2020 05:21 )             33.8     06-07    143  |  109<H>  |  34<H>  ----------------------------<  88  4.2   |  26  |  1.80<H>    Ca    9.3      07 Jun 2020 08:25  Mg     1.9     06-07 06-06-20 @ 07:01  -  06-07-20 @ 07:00  --------------------------------------------------------  IN: 600 mL / OUT: 0 mL / NET: 600 mL        RADIOLOGY & ADDITIONAL TESTS:

## 2020-06-08 NOTE — PROGRESS NOTE ADULT - SUBJECTIVE AND OBJECTIVE BOX
Patient is a 85y old  Male who presents with a chief complaint of SOB, cough (08 Jun 2020 12:22)    Patient seen in follow up for       PAST MEDICAL HISTORY:  Myocardial infarction  COPD (chronic obstructive pulmonary disease)  Seasonal allergies  GERD (gastroesophageal reflux disease)  Benign prostatic hypertrophy  Hyperlipidemia  Hypertension  Asthma  Depression  Peripheral Neuropathy  Asthma  S/P AVR (Aortic Valve Replacement)  S/P CABG (Coronary Artery Bypass Graft)  Osteoporosis  CAD (Coronary Artery Disease)  HTN (Hypertension)  Dyslipidemia    MEDICATIONS  (STANDING):  albuterol/ipratropium for Nebulization 3 milliLiter(s) Nebulizer every 8 hours  atorvastatin 80 milliGRAM(s) Oral at bedtime  cephalexin 250 milliGRAM(s) Oral every 8 hours  donepezil 10 milliGRAM(s) Oral at bedtime  finasteride 5 milliGRAM(s) Oral daily  metoprolol succinate ER 25 milliGRAM(s) Oral daily  pantoprazole    Tablet 40 milliGRAM(s) Oral before breakfast  senna 2 Tablet(s) Oral at bedtime  tamsulosin 0.4 milliGRAM(s) Oral at bedtime    MEDICATIONS  (PRN):  acetaminophen   Tablet .. 650 milliGRAM(s) Oral every 6 hours PRN Temp greater or equal to 38C (100.4F), Mild Pain (1 - 3)  bisacodyl 5 milliGRAM(s) Oral every 12 hours PRN Constipation  guaiFENesin   Syrup  (Sugar-Free) 200 milliGRAM(s) Oral every 6 hours PRN Cough  ondansetron Injectable 4 milliGRAM(s) IV Push every 6 hours PRN Nausea and/or Vomiting    T(C): 36.3 (06-08-20 @ 12:16), Max: 36.4 (06-07-20 @ 21:01)  HR: 71 (06-08-20 @ 12:16) (68 - 86)  BP: 127/77 (06-08-20 @ 12:16) (125/75 - 190/88)  RR: 16 (06-08-20 @ 12:16) (16 - 18)  SpO2: 94% (06-08-20 @ 12:16) (92% - 97%)  Wt(kg): --  I&O's Detail    07 Jun 2020 07:01  -  08 Jun 2020 07:00  --------------------------------------------------------  IN:    Oral Fluid: 320 mL  Total IN: 320 mL    OUT:    Voided: 1200 mL  Total OUT: 1200 mL    Total NET: -880 mL      08 Jun 2020 07:01  -  08 Jun 2020 14:41  --------------------------------------------------------  IN:  Total IN: 0 mL    OUT:    Voided: 400 mL  Total OUT: 400 mL    Total NET: -400 mL          PHYSICAL EXAM:  General: No distress  Respiratory: b/l air entry  Cardiovascular: S1 S2  Gastrointestinal: soft  Extremities:  edema          06-07    143  |  109<H>  |  34<H>  ----------------------------<  88  4.2   |  26  |  1.80<H>    Ca    9.3      07 Jun 2020 08:25  Mg     1.9     06-07                Sodium, Serum: 143 (06-07 @ 08:25)  Sodium, Serum: 147 (06-06 @ 05:21)  Sodium, Serum: 146 (06-05 @ 06:38)    Creatinine, Serum: 1.80 (06-07 @ 08:25)  Creatinine, Serum: 1.70 (06-06 @ 05:21)  Creatinine, Serum: 2.10 (06-05 @ 06:38)    Potassium, Serum: 4.2 (06-07 @ 08:25)  Potassium, Serum: 4.1 (06-06 @ 05:21)  Potassium, Serum: 4.0 (06-05 @ 06:38)    Hemoglobin: 10.2 (06-06 @ 05:21)  Hemoglobin: 10.0 (06-05 @ 06:38) Patient is a 85y old  Male who presents with a chief complaint of SOB, cough (08 Jun 2020 12:22)    Patient seen in follow up for CKD.   No new complaints.        PAST MEDICAL HISTORY:  Myocardial infarction  COPD (chronic obstructive pulmonary disease)  Seasonal allergies  GERD (gastroesophageal reflux disease)  Benign prostatic hypertrophy  Hyperlipidemia  Hypertension  Asthma  Depression  Peripheral Neuropathy  Asthma  S/P AVR (Aortic Valve Replacement)  S/P CABG (Coronary Artery Bypass Graft)  Osteoporosis  CAD (Coronary Artery Disease)  HTN (Hypertension)  Dyslipidemia    MEDICATIONS  (STANDING):  albuterol/ipratropium for Nebulization 3 milliLiter(s) Nebulizer every 8 hours  atorvastatin 80 milliGRAM(s) Oral at bedtime  cephalexin 250 milliGRAM(s) Oral every 8 hours  donepezil 10 milliGRAM(s) Oral at bedtime  finasteride 5 milliGRAM(s) Oral daily  metoprolol succinate ER 25 milliGRAM(s) Oral daily  pantoprazole    Tablet 40 milliGRAM(s) Oral before breakfast  senna 2 Tablet(s) Oral at bedtime  tamsulosin 0.4 milliGRAM(s) Oral at bedtime    MEDICATIONS  (PRN):  acetaminophen   Tablet .. 650 milliGRAM(s) Oral every 6 hours PRN Temp greater or equal to 38C (100.4F), Mild Pain (1 - 3)  bisacodyl 5 milliGRAM(s) Oral every 12 hours PRN Constipation  guaiFENesin   Syrup  (Sugar-Free) 200 milliGRAM(s) Oral every 6 hours PRN Cough  ondansetron Injectable 4 milliGRAM(s) IV Push every 6 hours PRN Nausea and/or Vomiting    T(C): 36.3 (06-08-20 @ 12:16), Max: 36.4 (06-07-20 @ 21:01)  HR: 71 (06-08-20 @ 12:16) (68 - 86)  BP: 127/77 (06-08-20 @ 12:16) (125/75 - 190/88)  RR: 16 (06-08-20 @ 12:16) (16 - 18)  SpO2: 94% (06-08-20 @ 12:16) (92% - 97%)  Wt(kg): --  I&O's Detail    07 Jun 2020 07:01  -  08 Jun 2020 07:00  --------------------------------------------------------  IN:    Oral Fluid: 320 mL  Total IN: 320 mL    OUT:    Voided: 1200 mL  Total OUT: 1200 mL    Total NET: -880 mL      08 Jun 2020 07:01  -  08 Jun 2020 14:41  --------------------------------------------------------  IN:  Total IN: 0 mL    OUT:    Voided: 400 mL  Total OUT: 400 mL    Total NET: -400 mL          PHYSICAL EXAM:  General: No distress  Respiratory: b/l air entry  Cardiovascular: S1 S2  Gastrointestinal: soft  Extremities:  no edema          06-07    143  |  109<H>  |  34<H>  ----------------------------<  88  4.2   |  26  |  1.80<H>    Ca    9.3      07 Jun 2020 08:25  Mg     1.9     06-07                Sodium, Serum: 143 (06-07 @ 08:25)  Sodium, Serum: 147 (06-06 @ 05:21)  Sodium, Serum: 146 (06-05 @ 06:38)    Creatinine, Serum: 1.80 (06-07 @ 08:25)  Creatinine, Serum: 1.70 (06-06 @ 05:21)  Creatinine, Serum: 2.10 (06-05 @ 06:38)    Potassium, Serum: 4.2 (06-07 @ 08:25)  Potassium, Serum: 4.1 (06-06 @ 05:21)  Potassium, Serum: 4.0 (06-05 @ 06:38)    Hemoglobin: 10.2 (06-06 @ 05:21)  Hemoglobin: 10.0 (06-05 @ 06:38)

## 2020-06-08 NOTE — PROGRESS NOTE ADULT - ASSESSMENT
1.	CKD 3/4, Prerenal azotemia, ? Mild ATN  2.	E coli bacteremia,   3.	Hypertension    Stable renal indices. HIDA negative. To continue current meds. On abx. Monitor BP trend. Titrate BP meds as needed. Salt restriction.   Will follow electrolytes and renal function trend. 1.	CKD 3, Prerenal azotemia  2.	E coli bacteremia,   3.	Hypertension    Stable renal indices. HIDA negative. To continue current meds. On abx. Monitor BP trend. Titrate BP meds as needed. Salt restriction.   Will follow electrolytes and renal function trend.

## 2020-06-08 NOTE — DISCHARGE NOTE PROVIDER - CARE PROVIDER_API CALL
Mike Weems  Cardiovascular Diseases  [address] [place] [address] [phone]  Phone: [phone]  Fax: [phone]  Established Patient  Follow Up Time: 1 week

## 2020-06-08 NOTE — PROGRESS NOTE ADULT - PROBLEM SELECTOR PLAN 1
started on Toprol XL for HTN management  very unlikely to have LRTI with Gram Neg Rods on Blood Cx -   repeat Blood cx neg -  - s/p  Rocephin   - ID follow up reviewed -   renal eval noted - CKD - s/p  IVF - serial renal indices -   COPD - emphysema - mucus plugging - Atelectasis - see CT Chest - NEBS TID for mucociliary clearance and COPD management, BG noted  recent GI bleed - off AC - known to [facility] - H and H noted - bowel rx regimen  valvular heart disease - see TTE - proBNP elev - I and O - monitor for vol overload   Swallow eval - MBS eval noted - hx of dementia and mucus plugging and atelectasis - concern for aspiration   ckd - old records reviewed - renal indices noted - monitor lytes  prognosis guarded  pall care eval for MOLST - DNR DNI.

## 2020-06-08 NOTE — PROGRESS NOTE ADULT - PROBLEM SELECTOR PLAN 1
improving  CT showing fluid in small bowel no obvious wall thickening or obstruction, no abd pain/diarrhea, doubt enterocolitis  gb with stones/sludge and non specific gb wall thickening; lfts not suggesting biliary obstruction; hida neg  repeat bld cxs ngtd; sp abx  diet as tolerated  to follow

## 2020-06-08 NOTE — PROGRESS NOTE ADULT - SUBJECTIVE AND OBJECTIVE BOX
infectious diseases progress note:    ARABELLA ESPARZA is a 85y y. o. Male patient    Patient reports: "I would like to leave today"    ROS:    EYES:  Negative  blurry vision or double vision  GASTROINTESTINAL:  Negative for nausea, vomiting, diarrhea  -otherwise negative except for subjective    Allergies    amoxicillin (Unknown)  Levaquin (Unknown)  penicillin (Unknown)    Intolerances        ANTIBIOTICS/RELEVANT:  antimicrobials    immunologic:    OTHER:  acetaminophen   Tablet .. 650 milliGRAM(s) Oral every 6 hours PRN  albuterol/ipratropium for Nebulization 3 milliLiter(s) Nebulizer every 8 hours  atorvastatin 80 milliGRAM(s) Oral at bedtime  bisacodyl 5 milliGRAM(s) Oral every 12 hours PRN  donepezil 10 milliGRAM(s) Oral at bedtime  finasteride 5 milliGRAM(s) Oral daily  guaiFENesin   Syrup  (Sugar-Free) 200 milliGRAM(s) Oral every 6 hours PRN  metoprolol succinate ER 25 milliGRAM(s) Oral daily  ondansetron Injectable 4 milliGRAM(s) IV Push every 6 hours PRN  pantoprazole    Tablet 40 milliGRAM(s) Oral before breakfast  senna 2 Tablet(s) Oral at bedtime  tamsulosin 0.4 milliGRAM(s) Oral at bedtime      Objective:  Last 24-Vital Signs Last 24 Hrs  T(C): 36.3 (08 Jun 2020 04:38), Max: 36.4 (07 Jun 2020 21:01)  T(F): 97.3 (08 Jun 2020 04:38), Max: 97.5 (07 Jun 2020 21:01)  HR: 76 (08 Jun 2020 10:24) (76 - 86)  BP: 131/82 (08 Jun 2020 10:24) (125/75 - 154/78)  BP(mean): --  RR: 17 (08 Jun 2020 04:38) (17 - 17)  SpO2: 92% (08 Jun 2020 10:24) (92% - 96%)    T(C): 36.3 (06-08-20 @ 04:38), Max: 36.8 (06-06-20 @ 13:55)  T(F): 97.3 (06-08-20 @ 04:38), Max: 98.2 (06-06-20 @ 13:55)  T(C): 36.3 (06-08-20 @ 04:38), Max: 36.8 (06-06-20 @ 13:55)  T(F): 97.3 (06-08-20 @ 04:38), Max: 98.2 (06-06-20 @ 13:55)  T(C): 36.3 (06-08-20 @ 04:38), Max: 36.8 (06-06-20 @ 13:55)  T(F): 97.3 (06-08-20 @ 04:38), Max: 98.2 (06-06-20 @ 13:55)    PHYSICAL EXAM:  Constitutional: Well-developed, well nourished  Eyes: PERRLA, EOMI  Ear/Nose/Throat: oropharynx normal	  Neck: no JVD, no lymphadenopathy, supple  Respiratory: no accessory muscle use, lung fields bilaterally clear  Cardiovascular: RRR, normal S1, S2 no m/r/g  Gastrointestinal: soft, NT, no HSM, BS-normal  Extremities: no clubbing, no cyanosis, edema absent  Neuro: patient alert, oriented and appropriate  Skin: no sig lesions      LABS:      WBC 8.44  06-06 @ 05:21  WBC 11.72  06-05 @ 06:38  WBC 16.22  06-04 @ 08:09  WBC 12.42  06-03 @ 08:19  WBC 16.04  06-02 @ 22:07      06-07    143  |  109<H>  |  34<H>  ----------------------------<  88  4.2   |  26  |  1.80<H>    Ca    9.3      07 Jun 2020 08:25  Mg     1.9     06-07        Creatinine, Serum: 1.80 mg/dL (06-07-20 @ 08:25)  Creatinine, Serum: 1.70 mg/dL (06-06-20 @ 05:21)  Creatinine, Serum: 2.10 mg/dL (06-05-20 @ 06:38)  Creatinine, Serum: 2.10 mg/dL (06-04-20 @ 08:09)  Creatinine, Serum: 2.00 mg/dL (06-03-20 @ 08:19)  Creatinine, Serum: 1.90 mg/dL (06-02-20 @ 22:07)    eGFR if Non African American: 34 mL/min/1.73M2 (06.07.20 @ 08:25)        MICROBIOLOGY:        RADIOLOGY & ADDITIONAL STUDIES:

## 2020-06-08 NOTE — PROGRESS NOTE ADULT - SUBJECTIVE AND OBJECTIVE BOX
Date/Time Patient Seen:  		  Referring MD:   Data Reviewed	       Patient is a 85y old  Male who presents with a chief complaint of SOB, cough (07 Jun 2020 12:26)      Subjective/HPI     PAST MEDICAL & SURGICAL HISTORY:  Myocardial infarction  COPD (chronic obstructive pulmonary disease)  Seasonal allergies  GERD (gastroesophageal reflux disease)  Benign prostatic hypertrophy  Hyperlipidemia  Hypertension  Asthma: Also COPD component  Depression  Peripheral Neuropathy  Asthma  S/P AVR (Aortic Valve Replacement)  S/P CABG (Coronary Artery Bypass Graft)  Osteoporosis  CAD (Coronary Artery Disease): MI, s/p CABG with 2 cardiac stents  HTN (Hypertension)  Dyslipidemia  S/P inguinal hernia repair: On right side  S/P appendectomy  S/P CABG (coronary artery bypass graft): Done in 1996; 2 cardiac stents in 1998  S/P CABG (Coronary Artery Bypass Graft)  S/P AVR (Aortic Valve Replacement): Pig valve (bioprosthetic); done 2010 at [place]        Medication list         MEDICATIONS  (STANDING):  albuterol/ipratropium for Nebulization 3 milliLiter(s) Nebulizer every 8 hours  atorvastatin 80 milliGRAM(s) Oral at bedtime  donepezil 10 milliGRAM(s) Oral at bedtime  finasteride 5 milliGRAM(s) Oral daily  metoprolol succinate ER 25 milliGRAM(s) Oral daily  pantoprazole    Tablet 40 milliGRAM(s) Oral before breakfast  senna 2 Tablet(s) Oral at bedtime  tamsulosin 0.4 milliGRAM(s) Oral at bedtime    MEDICATIONS  (PRN):  acetaminophen   Tablet .. 650 milliGRAM(s) Oral every 6 hours PRN Temp greater or equal to 38C (100.4F), Mild Pain (1 - 3)  bisacodyl 5 milliGRAM(s) Oral every 12 hours PRN Constipation  guaiFENesin   Syrup  (Sugar-Free) 200 milliGRAM(s) Oral every 6 hours PRN Cough  ondansetron Injectable 4 milliGRAM(s) IV Push every 6 hours PRN Nausea and/or Vomiting         Vitals log        ICU Vital Signs Last 24 Hrs  T(C): 36.3 (08 Jun 2020 04:38), Max: 36.4 (07 Jun 2020 21:01)  T(F): 97.3 (08 Jun 2020 04:38), Max: 97.5 (07 Jun 2020 21:01)  HR: 81 (08 Jun 2020 04:38) (70 - 86)  BP: 154/78 (08 Jun 2020 04:38) (125/75 - 175/97)  BP(mean): --  ABP: --  ABP(mean): --  RR: 17 (08 Jun 2020 04:38) (17 - 17)  SpO2: 94% (08 Jun 2020 04:38) (94% - 96%)           Input and Output:  I&O's Detail    06 Jun 2020 07:01  -  07 Jun 2020 07:00  --------------------------------------------------------  IN:    sodium chloride 0.45%: 600 mL  Total IN: 600 mL    OUT:  Total OUT: 0 mL    Total NET: 600 mL      07 Jun 2020 07:01  -  08 Jun 2020 06:48  --------------------------------------------------------  IN:    Oral Fluid: 320 mL  Total IN: 320 mL    OUT:    Voided: 1200 mL  Total OUT: 1200 mL    Total NET: -880 mL          Lab Data    06-07    143  |  109<H>  |  34<H>  ----------------------------<  88  4.2   |  26  |  1.80<H>    Ca    9.3      07 Jun 2020 08:25  Mg     1.9     06-07              Review of Systems	      Objective     Physical Examination    heart s1s2  lung dec BS  abd soft  on room air      Pertinent Lab findings & Imaging      Ava:  NO   Adequate UO     I&O's Detail    06 Jun 2020 07:01  -  07 Jun 2020 07:00  --------------------------------------------------------  IN:    sodium chloride 0.45%: 600 mL  Total IN: 600 mL    OUT:  Total OUT: 0 mL    Total NET: 600 mL      07 Jun 2020 07:01 - 08 Jun 2020 06:48  --------------------------------------------------------  IN:    Oral Fluid: 320 mL  Total IN: 320 mL    OUT:    Voided: 1200 mL  Total OUT: 1200 mL    Total NET: -880 mL               Discussed with:     Cultures:	        Radiology

## 2020-06-08 NOTE — PROGRESS NOTE ADULT - SUBJECTIVE AND OBJECTIVE BOX
[facility] -- Serina Poole, Rich Molina Pannella, Patel, Savella  Office # [phone]      Follow Up:  wide complex tachycardia	      Subjective/Observations: Seen and examined.  Lying flat, feels better today.  No reports of cp, sob or palpitations.  No tele events noted.  Resting comfortably.       REVIEW OF SYSTEMS: All other review of systems is negative unless indicated above    PAST MEDICAL & SURGICAL HISTORY:  Myocardial infarction  COPD (chronic obstructive pulmonary disease)  Seasonal allergies  GERD (gastroesophageal reflux disease)  Benign prostatic hypertrophy  Hyperlipidemia  Hypertension  Depression  Peripheral Neuropathy  Osteoporosis  CAD (Coronary Artery Disease): MI, s/p CABG with 2 cardiac stents  S/P inguinal hernia repair: On right side  S/P appendectomy  S/P CABG (coronary artery bypass graft): Done in 1996; 2 cardiac stents in 1998  S/P AVR (Aortic Valve Replacement): Pig valve (bioprosthetic); done 2010 at [place]      MEDICATIONS  (STANDING):  albuterol/ipratropium for Nebulization 3 milliLiter(s) Nebulizer every 8 hours  atorvastatin 80 milliGRAM(s) Oral at bedtime  donepezil 10 milliGRAM(s) Oral at bedtime  finasteride 5 milliGRAM(s) Oral daily  metoprolol succinate ER 25 milliGRAM(s) Oral daily  pantoprazole    Tablet 40 milliGRAM(s) Oral before breakfast  senna 2 Tablet(s) Oral at bedtime  tamsulosin 0.4 milliGRAM(s) Oral at bedtime    MEDICATIONS  (PRN):  acetaminophen   Tablet .. 650 milliGRAM(s) Oral every 6 hours PRN Temp greater or equal to 38C (100.4F), Mild Pain (1 - 3)  bisacodyl 5 milliGRAM(s) Oral every 12 hours PRN Constipation  guaiFENesin   Syrup  (Sugar-Free) 200 milliGRAM(s) Oral every 6 hours PRN Cough  ondansetron Injectable 4 milliGRAM(s) IV Push every 6 hours PRN Nausea and/or Vomiting      Allergies    amoxicillin (Unknown)  Levaquin (Unknown)  penicillin (Unknown)    Intolerances            Vital Signs Last 24 Hrs  T(C): 36.3 (08 Jun 2020 04:38), Max: 36.4 (07 Jun 2020 21:01)  T(F): 97.3 (08 Jun 2020 04:38), Max: 97.5 (07 Jun 2020 21:01)  HR: 82 (08 Jun 2020 08:01) (81 - 86)  BP: 154/78 (08 Jun 2020 04:38) (125/75 - 154/78)  BP(mean): --  RR: 17 (08 Jun 2020 04:38) (17 - 17)  SpO2: 94% (08 Jun 2020 04:38) (94% - 96%)    I&O's Summary    07 Jun 2020 07:01  -  08 Jun 2020 07:00  --------------------------------------------------------  IN: 320 mL / OUT: 1200 mL / NET: -880 mL          PHYSICAL EXAM:  TELE: SR 70s  Constitutional: NAD, awake and alert, well-developed  HEENT: Moist Mucous Membranes, Anicteric  Pulmonary: Non-labored, breath sounds are with scattered rhonchi and expiratory wheeze, lying flat resp tx ongoing.    Cardiovascular: Regular, S1 and S2, No murmurs with valve sound noted, No rubs, gallops or clicks  Gastrointestinal: Bowel Sounds present, soft, nontender.   Lymph: No peripheral edema. No lymphadenopathy.  Skin: No visible rashes or ulcers.  Psych:  Mood & affect appropriate    LABS: All Labs Reviewed:                        10.2   8.44  )-----------( 86       ( 06 Jun 2020 05:21 )             33.8     07 Jun 2020 08:25    143    |  109    |  34     ----------------------------<  88     4.2     |  26     |  1.80   06 Jun 2020 05:21    147    |  113    |  37     ----------------------------<  104    4.1     |  29     |  1.70     Ca    9.3        07 Jun 2020 08:25  Ca    8.9        06 Jun 2020 05:21  Mg     1.9       07 Jun 2020 08:25  Mg     1.9       06 Jun 2020 05:21  < from: 12 Lead ECG (06.02.20 @ 22:07) >  Ventricular Rate 83 BPM    Atrial Rate 83 BPM    P-R Interval 208 ms    QRS Duration 126 ms    Q-T Interval 418 ms    QTC Calculation(Bezet) 491 ms    P Axis 22 degrees    R Axis -54 degrees    T Axis 33 degrees    Diagnosis Line Normal sinus rhythm  Left axis deviation  Right bundle branch block  Abnormal ECG  When compared with ECG of 20-JAN-2020 08:59,  premature ventricular complexes are no longer present  premature atrial complexes are no longer present  Confirmed by Waylon Molina MD ([id number]) on 6/3/2020 3:17:47 PM    < end of copied text >    < from: TTE Echo Doppler w/o Cont (01.20.20 @ 14:47) >   EXAM:  ECHO TTE WO CON COMP W DOPPLR         PROCEDURE DATE:  01/20/2020        INTERPRETATION:  INDICATION: Chest pain  Sonographer KL    Blood Pressure 149/96    Height 170 cm     Weight 60 kg       BSA 1.79 sq m    Dimensions:    LA Normal Values: 2.0 - 4.0 cm    Ao Normal Values: 2.0 - 3.8 cm  SEPTUM Normal Values: 0.6 - 1.2 cm  PWT Normal Values: 0.6 - 1.1 cm  LVIDd Normal Values: 3.0 - 5.6 cm  LVIDs Normal Values: 1.8 - 4.0 cm      OBSERVATIONS:  Technically difficult and very limited study  Mitral Valve: Thickened leaflets, trace physiologic MR. Mitral annular calcification  Aortic Valve/Aorta: Not visualized  Tricuspid Valve: Not well-visualized  Pulmonic Valve: Not visualized  Left Atrium: Not well-visualized  Right Atrium: Not well-visualized  Left Ventricle: Grossly normal left ventricular systolic function, and endocardium is not well-visualized cannot rule out segmental dysfunction  Right Ventricle: Not well-visualized  Pericardium/Pleura: normal, no significant pericardial effusion.  Pulmonary/RV Pressure: estimated PA systolic pressure of 39 mmHg assuming an RA pressure of 8 mmHg.      Conclusion:   Technically difficult and very limited study  Grossly normal left ventricular systolic function, and endocardium is notwell-visualized cannot rule out segmental dysfunction   Right ventricle is not well-visualized  Overall, valves are not well-visualized. Patient with history of aortic valve replacement, cannot comment on the function of this valve                      RAMÓN YOUNG   This document has been electronically signed. Jan 21 2020 10:10AM    < end of copied text >    < from: NM Hepatobiliary Imaging (06.04.20 @ 18:18) >  EXAM:  NM HEPATOBILIARY IMG                            PROCEDURE DATE:  06/04/2020          INTERPRETATION:   RADIOPHARMACEUTICAL: 3.0 mCi Tc-99m-Mebrofenin, I.V.    CLINICAL STATEMENT: 85-year-old male with Escherichia coli bacteremia and abdominal pain    TECHNIQUE:   Dynamic imaging of the anterior abdomen was performed for 60 minutes following injection of radiotracer. Static images of the abdomen in the anterior, right lateral, and BURGER views were obtained immediately thereafter.    FINDINGS: There is prompt, homogeneous uptake of radiotracer by the hepatocytes. Activity is first seen in the gallbladder at 55 minutes and in the bowel at 20 minutes. There is good clearance of activity from the liver by the end of the study.    IMPRESSION: Hepatobiliary scan demonstrates:    No evidence of acute cholecystitis.                      < end of copied text >

## 2020-06-08 NOTE — DISCHARGE NOTE PROVIDER - NSDCCPCAREPLAN_GEN_ALL_CORE_FT
PRINCIPAL DISCHARGE DIAGNOSIS  Diagnosis: Sepsis  Assessment and Plan of Treatment: Finish course of antibiotics.

## 2020-06-08 NOTE — PROGRESS NOTE ADULT - PROBLEM SELECTOR PLAN 1
picture appears more consistent with recurrent aspiration events and no clear acute airspace disease at this point. No clear source/localization. Noted elevated procalcitonin, wbc, and fever and  with E coli bacteremia-improved  -recommend to finish course with Keflex  250mg PO TID with last day 6/16

## 2020-06-09 ENCOUNTER — TRANSCRIPTION ENCOUNTER (OUTPATIENT)
Age: 85
End: 2020-06-09

## 2020-06-09 VITALS — TEMPERATURE: 98 F | OXYGEN SATURATION: 92 % | RESPIRATION RATE: 16 BRPM

## 2020-06-09 PROCEDURE — 94640 AIRWAY INHALATION TREATMENT: CPT

## 2020-06-09 PROCEDURE — 87640 STAPH A DNA AMP PROBE: CPT

## 2020-06-09 PROCEDURE — 71250 CT THORAX DX C-: CPT

## 2020-06-09 PROCEDURE — 97116 GAIT TRAINING THERAPY: CPT

## 2020-06-09 PROCEDURE — 36415 COLL VENOUS BLD VENIPUNCTURE: CPT

## 2020-06-09 PROCEDURE — 80048 BASIC METABOLIC PNL TOTAL CA: CPT

## 2020-06-09 PROCEDURE — 70450 CT HEAD/BRAIN W/O DYE: CPT

## 2020-06-09 PROCEDURE — 84443 ASSAY THYROID STIM HORMONE: CPT

## 2020-06-09 PROCEDURE — 83880 ASSAY OF NATRIURETIC PEPTIDE: CPT

## 2020-06-09 PROCEDURE — 87086 URINE CULTURE/COLONY COUNT: CPT

## 2020-06-09 PROCEDURE — 83735 ASSAY OF MAGNESIUM: CPT

## 2020-06-09 PROCEDURE — 87040 BLOOD CULTURE FOR BACTERIA: CPT

## 2020-06-09 PROCEDURE — 74176 CT ABD & PELVIS W/O CONTRAST: CPT

## 2020-06-09 PROCEDURE — 85027 COMPLETE CBC AUTOMATED: CPT

## 2020-06-09 PROCEDURE — 74230 X-RAY XM SWLNG FUNCJ C+: CPT

## 2020-06-09 PROCEDURE — 81001 URINALYSIS AUTO W/SCOPE: CPT

## 2020-06-09 PROCEDURE — 84145 PROCALCITONIN (PCT): CPT

## 2020-06-09 PROCEDURE — 86140 C-REACTIVE PROTEIN: CPT

## 2020-06-09 PROCEDURE — 76705 ECHO EXAM OF ABDOMEN: CPT

## 2020-06-09 PROCEDURE — 96375 TX/PRO/DX INJ NEW DRUG ADDON: CPT

## 2020-06-09 PROCEDURE — 93005 ELECTROCARDIOGRAM TRACING: CPT

## 2020-06-09 PROCEDURE — 83690 ASSAY OF LIPASE: CPT

## 2020-06-09 PROCEDURE — 92611 MOTION FLUOROSCOPY/SWALLOW: CPT

## 2020-06-09 PROCEDURE — 99232 SBSQ HOSP IP/OBS MODERATE 35: CPT

## 2020-06-09 PROCEDURE — 97110 THERAPEUTIC EXERCISES: CPT

## 2020-06-09 PROCEDURE — 71045 X-RAY EXAM CHEST 1 VIEW: CPT

## 2020-06-09 PROCEDURE — 82803 BLOOD GASES ANY COMBINATION: CPT

## 2020-06-09 PROCEDURE — 78226 HEPATOBILIARY SYSTEM IMAGING: CPT

## 2020-06-09 PROCEDURE — 80076 HEPATIC FUNCTION PANEL: CPT

## 2020-06-09 PROCEDURE — 99285 EMERGENCY DEPT VISIT HI MDM: CPT

## 2020-06-09 PROCEDURE — 87186 SC STD MICRODIL/AGAR DIL: CPT

## 2020-06-09 PROCEDURE — 96365 THER/PROPH/DIAG IV INF INIT: CPT

## 2020-06-09 PROCEDURE — 83605 ASSAY OF LACTIC ACID: CPT

## 2020-06-09 PROCEDURE — A9698: CPT

## 2020-06-09 PROCEDURE — A9537: CPT

## 2020-06-09 PROCEDURE — 87635 SARS-COV-2 COVID-19 AMP PRB: CPT

## 2020-06-09 PROCEDURE — 87150 DNA/RNA AMPLIFIED PROBE: CPT

## 2020-06-09 PROCEDURE — 97530 THERAPEUTIC ACTIVITIES: CPT

## 2020-06-09 PROCEDURE — 87641 MR-STAPH DNA AMP PROBE: CPT

## 2020-06-09 PROCEDURE — 80053 COMPREHEN METABOLIC PANEL: CPT

## 2020-06-09 PROCEDURE — 97161 PT EVAL LOW COMPLEX 20 MIN: CPT

## 2020-06-09 RX ORDER — SENNA PLUS 8.6 MG/1
2 TABLET ORAL
Qty: 0 | Refills: 0 | DISCHARGE
Start: 2020-06-09

## 2020-06-09 RX ORDER — METOPROLOL TARTRATE 50 MG
1 TABLET ORAL
Qty: 0 | Refills: 0 | DISCHARGE
Start: 2020-06-09

## 2020-06-09 RX ORDER — CEPHALEXIN 500 MG
1 CAPSULE ORAL
Qty: 0 | Refills: 0 | DISCHARGE
Start: 2020-06-09

## 2020-06-09 RX ADMIN — Medication 3 MILLILITER(S): at 07:33

## 2020-06-09 RX ADMIN — FINASTERIDE 5 MILLIGRAM(S): 5 TABLET, FILM COATED ORAL at 11:20

## 2020-06-09 RX ADMIN — Medication 250 MILLIGRAM(S): at 05:18

## 2020-06-09 RX ADMIN — Medication 250 MILLIGRAM(S): at 14:04

## 2020-06-09 RX ADMIN — PANTOPRAZOLE SODIUM 40 MILLIGRAM(S): 20 TABLET, DELAYED RELEASE ORAL at 05:18

## 2020-06-09 RX ADMIN — Medication 25 MILLIGRAM(S): at 05:18

## 2020-06-09 NOTE — PROGRESS NOTE ADULT - SUBJECTIVE AND OBJECTIVE BOX
Patient is a 85y old  Male who presents with a chief complaint of SOB, cough (09 Jun 2020 06:53)      INTERVAL HPI/OVERNIGHT EVENTS: Patient seen and examined. NAD. No complaints.    Vital Signs Last 24 Hrs  T(C): 36.4 (09 Jun 2020 04:43), Max: 36.9 (08 Jun 2020 20:35)  T(F): 97.5 (09 Jun 2020 04:43), Max: 98.5 (08 Jun 2020 20:35)  HR: 66 (09 Jun 2020 07:33) (66 - 85)  BP: 160/84 (09 Jun 2020 04:43) (127/77 - 160/84)  BP(mean): --  RR: 17 (09 Jun 2020 04:43) (16 - 17)  SpO2: 94% (09 Jun 2020 07:33) (94% - 96%)              CAPILLARY BLOOD GLUCOSE                  acetaminophen   Tablet .. 650 milliGRAM(s) Oral every 6 hours PRN  albuterol/ipratropium for Nebulization 3 milliLiter(s) Nebulizer every 8 hours  atorvastatin 80 milliGRAM(s) Oral at bedtime  bisacodyl 5 milliGRAM(s) Oral every 12 hours PRN  cephalexin 250 milliGRAM(s) Oral every 8 hours  donepezil 10 milliGRAM(s) Oral at bedtime  finasteride 5 milliGRAM(s) Oral daily  guaiFENesin   Syrup  (Sugar-Free) 200 milliGRAM(s) Oral every 6 hours PRN  metoprolol succinate ER 25 milliGRAM(s) Oral daily  ondansetron Injectable 4 milliGRAM(s) IV Push every 6 hours PRN  pantoprazole    Tablet 40 milliGRAM(s) Oral before breakfast  senna 2 Tablet(s) Oral at bedtime  tamsulosin 0.4 milliGRAM(s) Oral at bedtime              REVIEW OF SYSTEMS:  CONSTITUTIONAL: No fever, no weight loss, or no fatigue  NECK: No pain, no stiffness  RESPIRATORY: No cough, no wheezing, no chills, no hemoptysis, No shortness of breath  CARDIOVASCULAR: No chest pain, no palpitations, no dizziness, no leg swelling  GASTROINTESTINAL: No abdominal pain. No nausea, no vomiting, no hematemesis; No diarrhea, no constipation. No melena, no hematochezia.  GENITOURINARY: No dysuria, no frequency, no hematuria, no incontinence  NEUROLOGICAL: No headaches, no loss of strength, no numbness, no tremors  SKIN: No itching, no burning  MUSCULOSKELETAL: No joint pain, no swelling; No muscle, no back, no extremity pain  PSYCHIATRIC: No depression, no mood swings,   HEME/LYMPH: No easy bruising, no bleeding gums  ALLERY AND IMMUNOLOGIC: No hives       Consultant(s) Notes Reviewed:  [X] YES  [ ] NO    PHYSICAL EXAM:  GENERAL: NAD  HEAD:  Atraumatic, Normocephalic  EYES: EOMI, PERRLA, conjunctiva and sclera clear  ENMT: No tonsillar erythema, exudates, or enlargement; Moist mucous membranes  NECK: Supple, No JVD  NERVOUS SYSTEM:  Awake & alert  CHEST/LUNG: Clear to auscultation bilaterally; No rales, rhonchi, wheezing,  HEART: Regular rate and rhythm  ABDOMEN: Soft, Nontender, Nondistended; Bowel sounds present  EXTREMITIES:  No clubbing, cyanosis, or edema  LYMPH: No lymphadenopathy noted  SKIN: No rashes      Advanced care planning discussed with patient/family [X] YES   [ ] NO    Advanced care planning discussed with patient/family. Patient's health status was discussed. All appropriate changes have been made regarding patient's end-of-life care. Advanced care planning forms reviewed/discussed/completed.  20 minutes spent.

## 2020-06-09 NOTE — PROGRESS NOTE ADULT - PROBLEM SELECTOR PROBLEM 4
Benign prostatic hypertrophy
Dementia
Altered mental status

## 2020-06-09 NOTE — PROGRESS NOTE ADULT - PROBLEM SELECTOR PLAN 4
Continue flomax and proscar
Continue namenda, aricept
will follow

## 2020-06-09 NOTE — PROGRESS NOTE ADULT - PROBLEM SELECTOR PLAN 3
Better controlled  Continue toprol
Better controlled  Continue toprol
Continue namenda, aricept
Patient does not have a h/o HTN  IVF were stopped this morning  Norvasc was given yesterday and today -- will continue for now  Cardio consult for wide complex QRS seen on tele
will follow

## 2020-06-09 NOTE — PROGRESS NOTE ADULT - PROBLEM SELECTOR PROBLEM 2
Altered mental status
Pneumonia

## 2020-06-09 NOTE — PROGRESS NOTE ADULT - SUBJECTIVE AND OBJECTIVE BOX
INTERVAL HPI/OVERNIGHT EVENTS:  pt seen and examined  offers no gi complaints  tolerating po  no new labs    MEDICATIONS  (STANDING):  albuterol/ipratropium for Nebulization 3 milliLiter(s) Nebulizer every 8 hours  atorvastatin 80 milliGRAM(s) Oral at bedtime  cephalexin 250 milliGRAM(s) Oral every 8 hours  donepezil 10 milliGRAM(s) Oral at bedtime  finasteride 5 milliGRAM(s) Oral daily  metoprolol succinate ER 25 milliGRAM(s) Oral daily  pantoprazole    Tablet 40 milliGRAM(s) Oral before breakfast  senna 2 Tablet(s) Oral at bedtime  tamsulosin 0.4 milliGRAM(s) Oral at bedtime    MEDICATIONS  (PRN):  acetaminophen   Tablet .. 650 milliGRAM(s) Oral every 6 hours PRN Temp greater or equal to 38C (100.4F), Mild Pain (1 - 3)  bisacodyl 5 milliGRAM(s) Oral every 12 hours PRN Constipation  guaiFENesin   Syrup  (Sugar-Free) 200 milliGRAM(s) Oral every 6 hours PRN Cough  ondansetron Injectable 4 milliGRAM(s) IV Push every 6 hours PRN Nausea and/or Vomiting      Allergies    amoxicillin (Unknown)  Levaquin (Unknown)  penicillin (Unknown)    Intolerances        Review of Systems:    General:  No wt loss, fevers, chills, night sweats, fatigue   Eyes:  Good vision, no reported pain  ENT:  No sore throat, pain, runny nose, dysphagia  CV:  No pain, palpitations, hypo/hypertension  Resp:  No dyspnea, cough, tachypnea, wheezing  GI:  No pain, No nausea, No vomiting, No diarrhea, No constipation, No weight loss, No fever, No pruritis, No rectal bleeding, No melena, No dysphagia  :  No pain, bleeding, incontinence, nocturia  Muscle:  No pain, weakness  Neuro:  No weakness, tingling, memory problems  Psych:  No fatigue, insomnia, mood problems, depression  Endocrine:  No polyuria, polydypsia, cold/heat intolerance  Heme:  No petechiae, ecchymosis, easy bruisability  Skin:  No rash, tattoos, scars, edema      Vital Signs Last 24 Hrs  T(C): 36.4 (09 Jun 2020 04:43), Max: 36.9 (08 Jun 2020 20:35)  T(F): 97.5 (09 Jun 2020 04:43), Max: 98.5 (08 Jun 2020 20:35)  HR: 66 (09 Jun 2020 07:33) (66 - 85)  BP: 160/84 (09 Jun 2020 04:43) (127/77 - 160/84)  BP(mean): --  RR: 17 (09 Jun 2020 04:43) (16 - 17)  SpO2: 94% (09 Jun 2020 07:33) (94% - 96%)    PHYSICAL EXAM:    Constitutional: NAD  HEENT: ncat  Neck: No LAD  Gastrointestinal: soft nt nd  Extremities: No peripheral edema  Vascular: + peripheral pulses  Neurological: Awake alert appropriate periods of confusion        LABS:                RADIOLOGY & ADDITIONAL TESTS:

## 2020-06-09 NOTE — PROGRESS NOTE ADULT - PROBLEM SELECTOR PROBLEM 5
Benign prostatic hypertrophy
CKD (chronic kidney disease) stage 3, GFR 30-59 ml/min
CKD (chronic kidney disease) stage 3, GFR 30-59 ml/min
Dementia
CKD (chronic kidney disease) stage 3, GFR 30-59 ml/min

## 2020-06-09 NOTE — PROGRESS NOTE ADULT - SUBJECTIVE AND OBJECTIVE BOX
Date/Time Patient Seen:  		  Referring MD:   Data Reviewed	       Patient is a 85y old  Male who presents with a chief complaint of SOB, cough (08 Jun 2020 14:40)      Subjective/HPI     PAST MEDICAL & SURGICAL HISTORY:  Myocardial infarction  COPD (chronic obstructive pulmonary disease)  Seasonal allergies  GERD (gastroesophageal reflux disease)  Benign prostatic hypertrophy  Hyperlipidemia  Hypertension  Asthma: Also COPD component  Depression  Peripheral Neuropathy  Asthma  S/P AVR (Aortic Valve Replacement)  S/P CABG (Coronary Artery Bypass Graft)  Osteoporosis  CAD (Coronary Artery Disease): MI, s/p CABG with 2 cardiac stents  HTN (Hypertension)  Dyslipidemia  S/P inguinal hernia repair: On right side  S/P appendectomy  S/P CABG (coronary artery bypass graft): Done in 1996; 2 cardiac stents in 1998  S/P CABG (Coronary Artery Bypass Graft)  S/P AVR (Aortic Valve Replacement): Pig valve (bioprosthetic); done 2010 at [place]        Medication list         MEDICATIONS  (STANDING):  albuterol/ipratropium for Nebulization 3 milliLiter(s) Nebulizer every 8 hours  atorvastatin 80 milliGRAM(s) Oral at bedtime  cephalexin 250 milliGRAM(s) Oral every 8 hours  donepezil 10 milliGRAM(s) Oral at bedtime  finasteride 5 milliGRAM(s) Oral daily  metoprolol succinate ER 25 milliGRAM(s) Oral daily  pantoprazole    Tablet 40 milliGRAM(s) Oral before breakfast  senna 2 Tablet(s) Oral at bedtime  tamsulosin 0.4 milliGRAM(s) Oral at bedtime    MEDICATIONS  (PRN):  acetaminophen   Tablet .. 650 milliGRAM(s) Oral every 6 hours PRN Temp greater or equal to 38C (100.4F), Mild Pain (1 - 3)  bisacodyl 5 milliGRAM(s) Oral every 12 hours PRN Constipation  guaiFENesin   Syrup  (Sugar-Free) 200 milliGRAM(s) Oral every 6 hours PRN Cough  ondansetron Injectable 4 milliGRAM(s) IV Push every 6 hours PRN Nausea and/or Vomiting         Vitals log        ICU Vital Signs Last 24 Hrs  T(C): 36.4 (09 Jun 2020 04:43), Max: 36.9 (08 Jun 2020 20:35)  T(F): 97.5 (09 Jun 2020 04:43), Max: 98.5 (08 Jun 2020 20:35)  HR: 66 (09 Jun 2020 04:43) (66 - 85)  BP: 160/84 (09 Jun 2020 04:43) (127/77 - 160/84)  BP(mean): --  ABP: --  ABP(mean): --  RR: 17 (09 Jun 2020 04:43) (16 - 17)  SpO2: 95% (09 Jun 2020 04:43) (92% - 96%)           Input and Output:  I&O's Detail    07 Jun 2020 07:01  -  08 Jun 2020 07:00  --------------------------------------------------------  IN:    Oral Fluid: 320 mL  Total IN: 320 mL    OUT:    Voided: 1200 mL  Total OUT: 1200 mL    Total NET: -880 mL      08 Jun 2020 07:01  -  09 Jun 2020 06:54  --------------------------------------------------------  IN:  Total IN: 0 mL    OUT:    Incontinent per Condom Catheter: 800 mL    Voided: 400 mL  Total OUT: 1200 mL    Total NET: -1200 mL          Lab Data    06-07    143  |  109<H>  |  34<H>  ----------------------------<  88  4.2   |  26  |  1.80<H>    Ca    9.3      07 Jun 2020 08:25  Mg     1.9     06-07              Review of Systems	      Objective     Physical Examination    heart s1s2  lung dec BS  abd soft      Pertinent Lab findings & Imaging      Ava:  NO   Adequate UO     I&O's Detail    07 Jun 2020 07:01  -  08 Jun 2020 07:00  --------------------------------------------------------  IN:    Oral Fluid: 320 mL  Total IN: 320 mL    OUT:    Voided: 1200 mL  Total OUT: 1200 mL    Total NET: -880 mL      08 Jun 2020 07:01  -  09 Jun 2020 06:54  --------------------------------------------------------  IN:  Total IN: 0 mL    OUT:    Incontinent per Condom Catheter: 800 mL    Voided: 400 mL  Total OUT: 1200 mL    Total NET: -1200 mL               Discussed with:     Cultures:	        Radiology

## 2020-06-09 NOTE — PROGRESS NOTE ADULT - REASON FOR ADMISSION
SOB, cough

## 2020-06-09 NOTE — PROGRESS NOTE ADULT - ATTENDING COMMENTS
Advanced care planning was discussed with patient and family.  Advanced care planning forms were reviewed and discussed.  Risks, benefits and alternatives of gastroenterologic procedures were discussed in detail and all questions were answered.    30 minutes spent.
Chart reviewed    Patient seen and examined     Agree with plan as outlined above
I saw and examined the patient personally. Spoke with above provider regarding this case. I reviewed the above findings completely.  I agree with the above history, physical, and plan which I have edited where appropriate.
Advanced care planning was discussed with patient and family.  Advanced care planning forms were reviewed and discussed.  Risks, benefits and alternatives of gastroenterologic procedures were discussed in detail and all questions were answered.    30 minutes spent.
D/C to MICHELLE

## 2020-06-09 NOTE — PROGRESS NOTE ADULT - PROBLEM SELECTOR PROBLEM 3
Accelerated hypertension
Dementia
Leukocytosis

## 2020-06-09 NOTE — PROGRESS NOTE ADULT - PROBLEM SELECTOR PLAN 1
started on Toprol XL for HTN management  very unlikely to have LRTI with Gram Neg Rods on Blood Cx -   repeat Blood cx neg -  - s/p  Rocephin   - ID follow up reviewed -  on Keflex now  renal eval noted - CKD - s/p  IVF - serial renal indices -   COPD - emphysema - mucus plugging - Atelectasis - see CT Chest - NEBS TID for mucociliary clearance and COPD management, BG noted  recent GI bleed - off AC - known to [place] [facility] team - H and H noted - bowel rx regimen  valvular heart disease - see TTE - proBNP elev - I and O - monitor for vol overload   Swallow eval - MBS eval noted - hx of dementia and mucus plugging and atelectasis - concern for aspiration   ckd - old records reviewed - renal indices noted - monitor lytes  prognosis guarded  pall care eval for MOLST - DNR DNI.

## 2020-06-09 NOTE — DISCHARGE NOTE NURSING/CASE MANAGEMENT/SOCIAL WORK - PATIENT PORTAL LINK FT
You can access the FollowMyHealth Patient Portal offered by [facility] by registering at the following website: [email or web address]. By joining [facility]’s FollowMyHealth portal, you will also be able to view your health information using other applications (apps) compatible with our system.

## 2020-06-09 NOTE — PROGRESS NOTE ADULT - SUBJECTIVE AND OBJECTIVE BOX
[facility] -- Serina Poole Grossman, [place], Arturo Dick Savella, Goodger: Office # [phone]    Follow Up: wide complex tachycardia    Subjective/Observations: Patient seen and examined. Patient awake and alert, resting comfortably in bed. States he feels tired. Tolerating room air. No complaints of chest pain, SOB, LE edema, cough. No signs of orthopnea or PND.    REVIEW OF SYSTEMS: All review of systems is negative for eye, ENT, GI, , allergic, dermatologic, musculoskeletal and neurologic except as described above    PAST MEDICAL & SURGICAL HISTORY:  Myocardial infarction  COPD (chronic obstructive pulmonary disease)  Seasonal allergies  GERD (gastroesophageal reflux disease)  Benign prostatic hypertrophy  Hyperlipidemia  Hypertension  Hypertension  Depression  Peripheral Neuropathy  Osteoporosis  CAD (Coronary Artery Disease): MI, s/p CABG with 2 cardiac stents  S/P inguinal hernia repair: On right side  S/P appendectomy  S/P CABG (coronary artery bypass graft): Done in 1996; 2 cardiac stents in 1998  S/P AVR (Aortic Valve Replacement): Pig valve (bioprosthetic); done 2010 at [place]    MEDICATIONS  (STANDING):  albuterol/ipratropium for Nebulization 3 milliLiter(s) Nebulizer every 8 hours  atorvastatin 80 milliGRAM(s) Oral at bedtime  cephalexin 250 milliGRAM(s) Oral every 8 hours  donepezil 10 milliGRAM(s) Oral at bedtime  finasteride 5 milliGRAM(s) Oral daily  metoprolol succinate ER 25 milliGRAM(s) Oral daily  pantoprazole    Tablet 40 milliGRAM(s) Oral before breakfast  senna 2 Tablet(s) Oral at bedtime  tamsulosin 0.4 milliGRAM(s) Oral at bedtime    MEDICATIONS  (PRN):  acetaminophen   Tablet .. 650 milliGRAM(s) Oral every 6 hours PRN Temp greater or equal to 38C (100.4F), Mild Pain (1 - 3)  bisacodyl 5 milliGRAM(s) Oral every 12 hours PRN Constipation  guaiFENesin   Syrup  (Sugar-Free) 200 milliGRAM(s) Oral every 6 hours PRN Cough  ondansetron Injectable 4 milliGRAM(s) IV Push every 6 hours PRN Nausea and/or Vomiting    Allergies  amoxicillin (Unknown)  Levaquin (Unknown)  penicillin (Unknown)    Vital Signs Last 24 Hrs  T(C): 36.5 (09 Jun 2020 12:00), Max: 36.9 (08 Jun 2020 20:35)  T(F): 97.7 (09 Jun 2020 12:00), Max: 98.5 (08 Jun 2020 20:35)  HR: 66 (09 Jun 2020 07:33) (66 - 85)  BP: 160/84 (09 Jun 2020 04:43) (151/96 - 160/84)  BP(mean): --  RR: 16 (09 Jun 2020 12:00) (16 - 17)  SpO2: 92% (09 Jun 2020 12:00) (92% - 96%)    I&O's Summary    08 Jun 2020 07:01  -  09 Jun 2020 07:00  --------------------------------------------------------  IN: 0 mL / OUT: 1200 mL / NET: -1200 mL  TELE: SR with 1HB 60-80s, 5 beats NSVT   PHYSICAL EXAM   Appearance: NAD, no distress, alert,  HEENT: Moist Mucous Membranes, Anicteric  Cardiovascular: Regular rate and rhythm, Normal S1 S2, No JVD, No murmurs, No rubs, gallops or clicks  Respiratory: Non-labored, Clear to auscultation, No rales, No rhonchi, No wheezing.   Gastrointestinal:  Soft, Non-tender, + BS  Neurologic: Non-focal  Skin: Warm and dry, No visible rashes or ulcers, No ecchymosis, No cyanosis  Musculoskeletal: No clubbing, No cyanosis, No joint swelling/tenderness  Psychiatry: Mood & affect appropriate  Lymph: No peripheral edema.     LABS: All Labs Reviewed:    07 Jun 2020 08:25    143    |  109    |  34     ----------------------------<  88     4.2     |  26     |  1.80     Ca    9.3        07 Jun 2020 08:25  Mg     1.9       07 Jun 2020 08:25    12 Lead ECG:   Ventricular Rate 83 BPM  Atrial Rate 83 BPM  P-R Interval 208 ms  QRS Duration 126 ms  Q-T Interval 418 ms  QTC Calculation(Bezet) 491 ms  P Axis 22 degrees  R Axis -54 degrees  T Axis 33 degrees  Diagnosis Line Normal sinus rhythm  Left axis deviation  Right bundle branch block  Abnormal ECG  When compared with ECG of 20-JAN-2020 08:59,  premature ventricular complexes are no longer present  premature atrial complexes are no longer present  Confirmed by Waylon Molina MD ([id number]) on 6/3/2020 3:17:47 PM (06-02-20 @ 22:07)    < from: TTE Echo Doppler w/o Cont (01.20.20 @ 14:47) >  Dimensions:    LA Normal Values: 2.0 - 4.0 cm    Ao Normal Values: 2.0 - 3.8 cm  SEPTUM Normal Values: 0.6 - 1.2 cm  PWT Normal Values: 0.6 - 1.1 cm  LVIDd Normal Values: 3.0 - 5.6 cm  LVIDs Normal Values: 1.8 - 4.0 cm      OBSERVATIONS:  Technically difficult and very limited study  Mitral Valve: Thickened leaflets, trace physiologic MR. Mitral annular calcification  Aortic Valve/Aorta: Not visualized  Tricuspid Valve: Not well-visualized  Pulmonic Valve: Not visualized  Left Atrium: Not well-visualized  Right Atrium: Not well-visualized  Left Ventricle: Grossly normal left ventricular systolic function, and endocardium is not well-visualized cannot rule out segmental dysfunction  Right Ventricle: Not well-visualized  Pericardium/Pleura: normal, no significant pericardial effusion.  Pulmonary/RV Pressure: estimated PA systolic pressure of 39 mmHg assuming an RA pressure of 8 mmHg.      Conclusion:   Technically difficult and very limited study  Grossly normal left ventricular systolic function, and endocardium is notwell-visualized cannot rule out segmental dysfunction   Right ventricle is not well-visualized  Overall, valves are not well-visualized. Patient with history of aortic valve replacement, cannot comment on the function of this valve  < end of copied text >    < from: Cardiac Cath Lab (06.22.09 @ 09:11) >  Ventricles: Global left ventricular function was normal. EF estimated by  contrast ventriculography was 65 %.  Valves: Aortic valve: There was severe aortic stenosis. There was mild  (1+/4+) aortic insufficiency.  Coronary vessels: The coronary circulation is right dominant.  LM:      LM: Normal.  LAD:  Proximal LAD: There was a 100 % stenosis.  CX:      Proximal circumflex: There was a tubular 10 % stenosis at the site  of a prior stent.  OM1: There was a tubular 100 % stenosis at the site of a prior stent.  RCA:      Proximal RCA: There was a 100 % stenosis.  GRAFTS:      Graft to the mid LAD: The graft was a normal sized LIMA.  Graft to the 1st obtuse marginal: The graft was a normal sized saphenous  vein graft from the aorta. There was a 100 % stenosis at the graft ostium.  Graft to the RPDA: The graft was a medium to large sized saphenous vein  graft from the aorta. Graft angiography showed minor luminal  irregularities.  Complications: There were no complications.  Recommendations:  For consideration of AVR, which would be a reoperation.    < end of copied text >    < from: CT Chest No Cont (06.02.20 @ 23:58) >  IMPRESSION:  Residual linear opacity in the right lower lobe, whichhas decreased in extent since 2/14/2020. Follow-up to resolution would be helpful to exclude potential underlying neoplasm.  Debris/secretion in the trachea extending to the left-sided airways. Recommend clinical correlation to assess aspiration.  Nonspecific fluid-filled small bowel loops, which can be seen in the setting of enterocolitis. Recommend clinical correlation.  Cholelithiasis. Recommend clinical correlation to assess acute cholecystitis.  Asymmetric bladder wall thickening with evidence of bladder trabeculation/diverticula. Recommend clinical correlation to assess urinary tract infection. Follow-up will be helpful to exclude potential underlying neoplasm.  Additional findings as described.    < end of copied text >    < from: CT Head No Cont (06.02.20 @ 23:57) >  IMPRESSION:     No acute intracranial hemorrhage, large cortical infarct or mass effect. Additional findings as described. If clinically indicated, short-term follow-up or MRI may be obtained for further evaluation.    < end of copied text >

## 2020-06-09 NOTE — PROGRESS NOTE ADULT - PROBLEM SELECTOR PLAN 1
resolving  CT showing fluid in small bowel no obvious wall thickening or obstruction, no abd pain/diarrhea, doubt enterocolitis  gb with stones/sludge and non specific gb wall thickening; lfts not suggesting biliary obstruction; hida neg  repeat bld cxs ngtd; on po abx  diet as tolerated  gi wise stable, dc planning in progress

## 2020-08-21 ENCOUNTER — INPATIENT (INPATIENT)
Facility: HOSPITAL | Age: 85
LOS: 2 days | Discharge: OTHER INSTITUTION | DRG: 683 | End: 2020-08-24
Attending: STUDENT IN AN ORGANIZED HEALTH CARE EDUCATION/TRAINING PROGRAM | Admitting: STUDENT IN AN ORGANIZED HEALTH CARE EDUCATION/TRAINING PROGRAM
Payer: MEDICARE

## 2020-08-21 VITALS
OXYGEN SATURATION: 96 % | HEIGHT: 71 IN | DIASTOLIC BLOOD PRESSURE: 90 MMHG | TEMPERATURE: 98 F | WEIGHT: 164.91 LBS | SYSTOLIC BLOOD PRESSURE: 169 MMHG | HEART RATE: 73 BPM | RESPIRATION RATE: 16 BRPM

## 2020-08-21 DIAGNOSIS — Z98.89 OTHER SPECIFIED POSTPROCEDURAL STATES: Chronic | ICD-10-CM

## 2020-08-21 DIAGNOSIS — J44.9 CHRONIC OBSTRUCTIVE PULMONARY DISEASE, UNSPECIFIED: ICD-10-CM

## 2020-08-21 DIAGNOSIS — J44.1 CHRONIC OBSTRUCTIVE PULMONARY DISEASE WITH (ACUTE) EXACERBATION: ICD-10-CM

## 2020-08-21 DIAGNOSIS — N17.9 ACUTE KIDNEY FAILURE, UNSPECIFIED: ICD-10-CM

## 2020-08-21 DIAGNOSIS — I10 ESSENTIAL (PRIMARY) HYPERTENSION: ICD-10-CM

## 2020-08-21 DIAGNOSIS — K21.9 GASTRO-ESOPHAGEAL REFLUX DISEASE WITHOUT ESOPHAGITIS: ICD-10-CM

## 2020-08-21 DIAGNOSIS — E78.5 HYPERLIPIDEMIA, UNSPECIFIED: ICD-10-CM

## 2020-08-21 DIAGNOSIS — Z29.9 ENCOUNTER FOR PROPHYLACTIC MEASURES, UNSPECIFIED: ICD-10-CM

## 2020-08-21 DIAGNOSIS — R26.0 ATAXIC GAIT: ICD-10-CM

## 2020-08-21 DIAGNOSIS — S09.90XA UNSPECIFIED INJURY OF HEAD, INITIAL ENCOUNTER: ICD-10-CM

## 2020-08-21 DIAGNOSIS — Z95.1 PRESENCE OF AORTOCORONARY BYPASS GRAFT: Chronic | ICD-10-CM

## 2020-08-21 DIAGNOSIS — F03.90 UNSPECIFIED DEMENTIA WITHOUT BEHAVIORAL DISTURBANCE: ICD-10-CM

## 2020-08-21 DIAGNOSIS — I25.10 ATHEROSCLEROTIC HEART DISEASE OF NATIVE CORONARY ARTERY WITHOUT ANGINA PECTORIS: ICD-10-CM

## 2020-08-21 DIAGNOSIS — F32.9 MAJOR DEPRESSIVE DISORDER, SINGLE EPISODE, UNSPECIFIED: ICD-10-CM

## 2020-08-21 LAB
ALBUMIN SERPL ELPH-MCNC: 3.3 G/DL — SIGNIFICANT CHANGE UP (ref 3.3–5)
ALP SERPL-CCNC: 125 U/L — HIGH (ref 40–120)
ALT FLD-CCNC: 16 U/L — SIGNIFICANT CHANGE UP (ref 12–78)
ANION GAP SERPL CALC-SCNC: 4 MMOL/L — LOW (ref 5–17)
APPEARANCE UR: CLEAR — SIGNIFICANT CHANGE UP
AST SERPL-CCNC: 18 U/L — SIGNIFICANT CHANGE UP (ref 15–37)
BACTERIA # UR AUTO: ABNORMAL
BASOPHILS # BLD AUTO: 0.09 K/UL — SIGNIFICANT CHANGE UP (ref 0–0.2)
BASOPHILS NFR BLD AUTO: 1.2 % — SIGNIFICANT CHANGE UP (ref 0–2)
BILIRUB SERPL-MCNC: 0.4 MG/DL — SIGNIFICANT CHANGE UP (ref 0.2–1.2)
BILIRUB UR-MCNC: NEGATIVE — SIGNIFICANT CHANGE UP
BUN SERPL-MCNC: 40 MG/DL — HIGH (ref 7–23)
CALCIUM SERPL-MCNC: 9 MG/DL — SIGNIFICANT CHANGE UP (ref 8.5–10.1)
CHLORIDE SERPL-SCNC: 113 MMOL/L — HIGH (ref 96–108)
CO2 SERPL-SCNC: 26 MMOL/L — SIGNIFICANT CHANGE UP (ref 22–31)
COLOR SPEC: YELLOW — SIGNIFICANT CHANGE UP
CREAT SERPL-MCNC: 2.6 MG/DL — HIGH (ref 0.5–1.3)
DIFF PNL FLD: ABNORMAL
EOSINOPHIL # BLD AUTO: 0.77 K/UL — HIGH (ref 0–0.5)
EOSINOPHIL NFR BLD AUTO: 10.6 % — HIGH (ref 0–6)
EPI CELLS # UR: SIGNIFICANT CHANGE UP
GLUCOSE SERPL-MCNC: 89 MG/DL — SIGNIFICANT CHANGE UP (ref 70–99)
GLUCOSE UR QL: NEGATIVE — SIGNIFICANT CHANGE UP
HCT VFR BLD CALC: 44.7 % — SIGNIFICANT CHANGE UP (ref 39–50)
HGB BLD-MCNC: 13.5 G/DL — SIGNIFICANT CHANGE UP (ref 13–17)
IMM GRANULOCYTES NFR BLD AUTO: 0.3 % — SIGNIFICANT CHANGE UP (ref 0–1.5)
KETONES UR-MCNC: NEGATIVE — SIGNIFICANT CHANGE UP
LEUKOCYTE ESTERASE UR-ACNC: NEGATIVE — SIGNIFICANT CHANGE UP
LYMPHOCYTES # BLD AUTO: 2.36 K/UL — SIGNIFICANT CHANGE UP (ref 1–3.3)
LYMPHOCYTES # BLD AUTO: 32.5 % — SIGNIFICANT CHANGE UP (ref 13–44)
MCHC RBC-ENTMCNC: 24.9 PG — LOW (ref 27–34)
MCHC RBC-ENTMCNC: 30.2 GM/DL — LOW (ref 32–36)
MCV RBC AUTO: 82.5 FL — SIGNIFICANT CHANGE UP (ref 80–100)
MONOCYTES # BLD AUTO: 0.94 K/UL — HIGH (ref 0–0.9)
MONOCYTES NFR BLD AUTO: 12.9 % — SIGNIFICANT CHANGE UP (ref 2–14)
NEUTROPHILS # BLD AUTO: 3.09 K/UL — SIGNIFICANT CHANGE UP (ref 1.8–7.4)
NEUTROPHILS NFR BLD AUTO: 42.5 % — LOW (ref 43–77)
NITRITE UR-MCNC: NEGATIVE — SIGNIFICANT CHANGE UP
NRBC # BLD: 0 /100 WBCS — SIGNIFICANT CHANGE UP (ref 0–0)
NT-PROBNP SERPL-SCNC: 4112 PG/ML — HIGH (ref 0–450)
PH UR: 5 — SIGNIFICANT CHANGE UP (ref 5–8)
PLATELET # BLD AUTO: 145 K/UL — LOW (ref 150–400)
POTASSIUM SERPL-MCNC: 4.4 MMOL/L — SIGNIFICANT CHANGE UP (ref 3.5–5.3)
POTASSIUM SERPL-SCNC: 4.4 MMOL/L — SIGNIFICANT CHANGE UP (ref 3.5–5.3)
PROT SERPL-MCNC: 7 G/DL — SIGNIFICANT CHANGE UP (ref 6–8.3)
PROT UR-MCNC: NEGATIVE — SIGNIFICANT CHANGE UP
RBC # BLD: 5.42 M/UL — SIGNIFICANT CHANGE UP (ref 4.2–5.8)
RBC # FLD: 19 % — HIGH (ref 10.3–14.5)
RBC CASTS # UR COMP ASSIST: SIGNIFICANT CHANGE UP /HPF (ref 0–4)
SARS-COV-2 RNA SPEC QL NAA+PROBE: SIGNIFICANT CHANGE UP
SODIUM SERPL-SCNC: 143 MMOL/L — SIGNIFICANT CHANGE UP (ref 135–145)
SP GR SPEC: 1.01 — SIGNIFICANT CHANGE UP (ref 1.01–1.02)
TROPONIN I SERPL-MCNC: 0.03 NG/ML — SIGNIFICANT CHANGE UP (ref 0.01–0.04)
UROBILINOGEN FLD QL: NEGATIVE — SIGNIFICANT CHANGE UP
WBC # BLD: 7.27 K/UL — SIGNIFICANT CHANGE UP (ref 3.8–10.5)
WBC # FLD AUTO: 7.27 K/UL — SIGNIFICANT CHANGE UP (ref 3.8–10.5)
WBC UR QL: SIGNIFICANT CHANGE UP

## 2020-08-21 PROCEDURE — 70450 CT HEAD/BRAIN W/O DYE: CPT | Mod: 26

## 2020-08-21 PROCEDURE — 72125 CT NECK SPINE W/O DYE: CPT | Mod: 26

## 2020-08-21 PROCEDURE — 99285 EMERGENCY DEPT VISIT HI MDM: CPT

## 2020-08-21 PROCEDURE — 71045 X-RAY EXAM CHEST 1 VIEW: CPT | Mod: 26

## 2020-08-21 PROCEDURE — 99223 1ST HOSP IP/OBS HIGH 75: CPT | Mod: GC

## 2020-08-21 PROCEDURE — 93010 ELECTROCARDIOGRAM REPORT: CPT

## 2020-08-21 RX ORDER — SERTRALINE 25 MG/1
100 TABLET, FILM COATED ORAL DAILY
Refills: 0 | Status: DISCONTINUED | OUTPATIENT
Start: 2020-08-21 | End: 2020-08-24

## 2020-08-21 RX ORDER — LACTOBACILLUS ACIDOPHILUS 100MM CELL
1 CAPSULE ORAL DAILY
Refills: 0 | Status: DISCONTINUED | OUTPATIENT
Start: 2020-08-21 | End: 2020-08-24

## 2020-08-21 RX ORDER — SODIUM CHLORIDE 9 MG/ML
1000 INJECTION INTRAMUSCULAR; INTRAVENOUS; SUBCUTANEOUS
Refills: 0 | Status: DISCONTINUED | OUTPATIENT
Start: 2020-08-21 | End: 2020-08-24

## 2020-08-21 RX ORDER — SODIUM CHLORIDE 9 MG/ML
500 INJECTION INTRAMUSCULAR; INTRAVENOUS; SUBCUTANEOUS ONCE
Refills: 0 | Status: COMPLETED | OUTPATIENT
Start: 2020-08-21 | End: 2020-08-21

## 2020-08-21 RX ORDER — DONEPEZIL HYDROCHLORIDE 10 MG/1
10 TABLET, FILM COATED ORAL AT BEDTIME
Refills: 0 | Status: DISCONTINUED | OUTPATIENT
Start: 2020-08-21 | End: 2020-08-24

## 2020-08-21 RX ORDER — SENNA PLUS 8.6 MG/1
2 TABLET ORAL AT BEDTIME
Refills: 0 | Status: DISCONTINUED | OUTPATIENT
Start: 2020-08-21 | End: 2020-08-24

## 2020-08-21 RX ORDER — METOPROLOL TARTRATE 50 MG
25 TABLET ORAL DAILY
Refills: 0 | Status: DISCONTINUED | OUTPATIENT
Start: 2020-08-21 | End: 2020-08-21

## 2020-08-21 RX ORDER — HEPARIN SODIUM 5000 [USP'U]/ML
5000 INJECTION INTRAVENOUS; SUBCUTANEOUS EVERY 12 HOURS
Refills: 0 | Status: DISCONTINUED | OUTPATIENT
Start: 2020-08-21 | End: 2020-08-24

## 2020-08-21 RX ORDER — FINASTERIDE 5 MG/1
5 TABLET, FILM COATED ORAL DAILY
Refills: 0 | Status: DISCONTINUED | OUTPATIENT
Start: 2020-08-21 | End: 2020-08-24

## 2020-08-21 RX ORDER — TIOTROPIUM BROMIDE 18 UG/1
1 CAPSULE ORAL; RESPIRATORY (INHALATION) DAILY
Refills: 0 | Status: DISCONTINUED | OUTPATIENT
Start: 2020-08-21 | End: 2020-08-24

## 2020-08-21 RX ORDER — ALBUTEROL 90 UG/1
2 AEROSOL, METERED ORAL
Refills: 0 | Status: COMPLETED | OUTPATIENT
Start: 2020-08-21 | End: 2021-07-20

## 2020-08-21 RX ORDER — ACETAMINOPHEN 500 MG
650 TABLET ORAL EVERY 4 HOURS
Refills: 0 | Status: DISCONTINUED | OUTPATIENT
Start: 2020-08-21 | End: 2020-08-24

## 2020-08-21 RX ORDER — TAMSULOSIN HYDROCHLORIDE 0.4 MG/1
0.4 CAPSULE ORAL AT BEDTIME
Refills: 0 | Status: DISCONTINUED | OUTPATIENT
Start: 2020-08-21 | End: 2020-08-24

## 2020-08-21 RX ORDER — LANOLIN ALCOHOL/MO/W.PET/CERES
5 CREAM (GRAM) TOPICAL AT BEDTIME
Refills: 0 | Status: DISCONTINUED | OUTPATIENT
Start: 2020-08-21 | End: 2020-08-24

## 2020-08-21 RX ORDER — ATORVASTATIN CALCIUM 80 MG/1
80 TABLET, FILM COATED ORAL AT BEDTIME
Refills: 0 | Status: DISCONTINUED | OUTPATIENT
Start: 2020-08-21 | End: 2020-08-24

## 2020-08-21 RX ORDER — DONEPEZIL HYDROCHLORIDE 10 MG/1
1 TABLET, FILM COATED ORAL
Qty: 0 | Refills: 0 | DISCHARGE

## 2020-08-21 RX ORDER — PANTOPRAZOLE SODIUM 20 MG/1
40 TABLET, DELAYED RELEASE ORAL
Refills: 0 | Status: DISCONTINUED | OUTPATIENT
Start: 2020-08-21 | End: 2020-08-24

## 2020-08-21 RX ORDER — SUCRALFATE 1 G
1 TABLET ORAL
Refills: 0 | Status: DISCONTINUED | OUTPATIENT
Start: 2020-08-21 | End: 2020-08-24

## 2020-08-21 RX ORDER — TIOTROPIUM BROMIDE 18 UG/1
1 CAPSULE ORAL; RESPIRATORY (INHALATION) DAILY
Refills: 0 | Status: COMPLETED | OUTPATIENT
Start: 2020-08-21 | End: 2021-07-20

## 2020-08-21 RX ORDER — ALBUTEROL 90 UG/1
2 AEROSOL, METERED ORAL
Refills: 0 | Status: DISCONTINUED | OUTPATIENT
Start: 2020-08-21 | End: 2020-08-24

## 2020-08-21 RX ORDER — TETANUS TOXOID, REDUCED DIPHTHERIA TOXOID AND ACELLULAR PERTUSSIS VACCINE, ADSORBED 5; 2.5; 8; 8; 2.5 [IU]/.5ML; [IU]/.5ML; UG/.5ML; UG/.5ML; UG/.5ML
0.5 SUSPENSION INTRAMUSCULAR ONCE
Refills: 0 | Status: COMPLETED | OUTPATIENT
Start: 2020-08-21 | End: 2020-08-21

## 2020-08-21 RX ORDER — METOPROLOL TARTRATE 50 MG
25 TABLET ORAL DAILY
Refills: 0 | Status: DISCONTINUED | OUTPATIENT
Start: 2020-08-21 | End: 2020-08-24

## 2020-08-21 RX ADMIN — HEPARIN SODIUM 5000 UNIT(S): 5000 INJECTION INTRAVENOUS; SUBCUTANEOUS at 18:44

## 2020-08-21 RX ADMIN — ATORVASTATIN CALCIUM 80 MILLIGRAM(S): 80 TABLET, FILM COATED ORAL at 22:08

## 2020-08-21 RX ADMIN — TETANUS TOXOID, REDUCED DIPHTHERIA TOXOID AND ACELLULAR PERTUSSIS VACCINE, ADSORBED 0.5 MILLILITER(S): 5; 2.5; 8; 8; 2.5 SUSPENSION INTRAMUSCULAR at 13:58

## 2020-08-21 RX ADMIN — TIOTROPIUM BROMIDE 1 CAPSULE(S): 18 CAPSULE ORAL; RESPIRATORY (INHALATION) at 12:29

## 2020-08-21 RX ADMIN — TAMSULOSIN HYDROCHLORIDE 0.4 MILLIGRAM(S): 0.4 CAPSULE ORAL at 21:49

## 2020-08-21 RX ADMIN — ALBUTEROL 2 PUFF(S): 90 AEROSOL, METERED ORAL at 12:30

## 2020-08-21 RX ADMIN — Medication 125 MILLIGRAM(S): at 12:29

## 2020-08-21 RX ADMIN — Medication 5 MILLIGRAM(S): at 21:49

## 2020-08-21 RX ADMIN — SENNA PLUS 2 TABLET(S): 8.6 TABLET ORAL at 22:08

## 2020-08-21 RX ADMIN — SODIUM CHLORIDE 500 MILLILITER(S): 9 INJECTION INTRAMUSCULAR; INTRAVENOUS; SUBCUTANEOUS at 15:48

## 2020-08-21 RX ADMIN — SODIUM CHLORIDE 50 MILLILITER(S): 9 INJECTION INTRAMUSCULAR; INTRAVENOUS; SUBCUTANEOUS at 19:51

## 2020-08-21 RX ADMIN — Medication 1 GRAM(S): at 18:45

## 2020-08-21 RX ADMIN — DONEPEZIL HYDROCHLORIDE 10 MILLIGRAM(S): 10 TABLET, FILM COATED ORAL at 21:50

## 2020-08-21 NOTE — H&P ADULT - ATTENDING COMMENTS
Addition of:     Thrombocytopenia: Plt 145k, previusly were lower. Will continue to monitor.     Remainder as above.

## 2020-08-21 NOTE — H&P ADULT - PROBLEM SELECTOR PLAN 5
Telephone Encounter by Elizabet Carmona RN at 03/15/18 03:18 PM     Author:  Elizabet Carmona RN Service:  (none) Author Type:  Registered Nurse     Filed:  03/15/18 03:18 PM Encounter Date:  3/15/2018 Status:  Signed     :  Elizabet Carmona RN (Registered Nurse)            Letter entered in epic.[SL1.1M]       Revision History        User Key Date/Time User Provider Type Action    > SL1.1 03/15/18 03:18 PM Elizabet Carmona RN Registered Nurse Sign    M - Manual             Patient with history of CAD s/p CABG  - Echo from 1/20/2020 showing HFpEF, w/ grossly normal LV function  - Continue on home metoprolol  - Monitor vital signs Chronic  - Pt with history of dementia taking donepezil 10mg  - Continue inpatient

## 2020-08-21 NOTE — H&P ADULT - PROBLEM SELECTOR PLAN 2
Pt presenting after multiple falls today  - Closed laceration on left jaw with multiple lacerations on the face, shoulder and knees  - Pt not an AC, minimal concern for internal bleeding  - CT brain and spine: negative for acute disease  - Gait instability, will place fall precaution, ambulate with assistance  - Pt received Tdap booster

## 2020-08-21 NOTE — CONSULT NOTE ADULT - SUBJECTIVE AND OBJECTIVE BOX
Patient is a 85y old  Male who presents with a chief complaint of YOU, Fall (21 Aug 2020 16:28)    HPI:  84 yo male with PMH of COPD, dementia, HTN, HLD, CAD s/p CABG, CKD,  BPH, Depression, GERD, osteoporosis, peripheral neuropathy presents to the ED after fall. Patient is a poor historian. According to patient, he slipped on the floor while wearing socks and was taken to the ED. Patient denies any head trauma, however, per chart review patient did hit his head. He denies fever, chills, headache, chest pain, shortness of breath, abdominal pain, nausea, vomiting, diarrhea or constipation. Attempted to contact wife Larisa Shelley at [phone].     In the ED:  VS Temp 97.6F | HR 73 | /90 | RR 16 | SpO2 96% RA  CBC WBC 7.27, Hgb 13.5, HCt 44.7  CMP: BUN 40, Cr 2.6 (baseline 1.7)  UA grossly normal  CT brain and spine: negative for acute disease  CXR: There are chronic lung changes with increased reticular opacities right upper lobe. Study is limited by rotation. Patient is status post sternotomy and CABG. Aorta is ectatic and tortuous. Left lung is grossly clear.  EKG: Sinus brian with arrhythmia RBBB (21 Aug 2020 16:10)    Renal consult called for YOU. History obtained from chart and patient.       PAST MEDICAL HISTORY:  Dementia  Myocardial infarction  COPD (chronic obstructive pulmonary disease)  Seasonal allergies  GERD (gastroesophageal reflux disease)  Benign prostatic hypertrophy  Hyperlipidemia  Hypertension  Asthma  Depression  Peripheral Neuropathy  Asthma  S/P AVR (Aortic Valve Replacement)  S/P CABG (Coronary Artery Bypass Graft)  Osteoporosis  CAD (Coronary Artery Disease)  HTN (Hypertension)  Dyslipidemia      PAST SURGICAL HISTORY:  S/P inguinal hernia repair  S/P appendectomy  S/P CABG (coronary artery bypass graft)  S/P CABG (Coronary Artery Bypass Graft)  S/P AVR (Aortic Valve Replacement)      FAMILY HISTORY:  Family history of renal failure (Sibling)  Family history of MI (myocardial infarction):  at 65  Family history of stroke:  at 65  Family history of amyotrophic lateral sclerosis:  at 67  Family history of stroke:  at 67      SOCIAL HISTORY: No smoking or alcohol use     Allergies    amoxicillin (Unknown)  Levaquin (Unknown)  penicillin (Unknown)    Intolerances      Home Medications:  albuterol 90 mcg/inh inhalation aerosol: 2 puff(s) inhaled every 6 hours, As needed, Shortness of Breath and/or Wheezing (21 Aug 2020 16:39)  Bacid (LAC) oral capsule: 1 cap(s) orally 3 times a day (21 Aug 2020 16:39)  bisacodyl 5 mg oral delayed release tablet: 1 tab(s) orally every 12 hours, As needed, Constipation (21 Aug 2020 16:39)  cephalexin 250 mg oral capsule: 1 cap(s) orally every 8 hours -- stop after 9 days (21 Aug 2020 16:39)  donepezil 10 mg oral tablet: 1 tab(s) orally once a day (at bedtime) (21 Aug 2020 16:39)  metoprolol succinate 25 mg oral tablet, extended release: 1 tab(s) orally once a day (21 Aug 2020 16:39)  senna oral tablet: 2 tab(s) orally once a day (at bedtime) (21 Aug 2020 16:39)  sertraline 100 mg oral tablet: 1 tab(s) orally once a day (21 Aug 2020 16:39)    MEDICATIONS  (STANDING):  tiotropium 18 MICROgram(s) Capsule 1 Capsule(s) Inhalation daily    MEDICATIONS  (PRN):  ALBUTerol    90 MICROgram(s) HFA Inhaler 2 Puff(s) Inhalation every 15 minutes PRN Shortness of Breath      REVIEW OF SYSTEMS:  General: no distress  Respiratory: No cough, SOB  Cardiovascular: No CP or Palpitations	  Gastrointestinal: No nausea, Vomiting. No diarrhea  Genitourinary: No urinary complaints	  Musculoskeletal: No new rash or lesions	  all other systems negative    T(F): 97.6 (20 @ 11:29), Max: 97.6 (20 @ 11:29)  HR: 73 (20 @ 11:29) (73 - 73)  BP: 169/90 (20 @ 11:29) (169/90 - 169/90)  RR: 16 (20 @ 11:29) (16 - 16)  SpO2: 96% (20 @ 11:29) (96% - 96%)  Wt(kg): --    PHYSICAL EXAM:  General: NAD  Respiratory: b/l air entry  Cardiovascular: S1 S2  Gastrointestinal: soft  Extremities: no edema            143  |  113<H>  |  40<H>  ----------------------------<  89  4.4   |  26  |  2.60<H>    Ca    9.0      21 Aug 2020 13:03    TPro  7.0  /  Alb  3.3  /  TBili  0.4  /  DBili  x   /  AST  18  /  ALT  16  /  AlkPhos  125<H>                            13.5   7.27  )-----------( 145      ( 21 Aug 2020 13:03 )             44.7       Potassium, Serum: 4.4 mmol/L ( @ 13:03)  Blood Urea Nitrogen, Serum: 40 mg/dL ( @ 13:03)  Calcium, Total Serum: 9.0 mg/dL ( @ 13:03)  Hemoglobin: 13.5 g/dL ( @ 13:03)      Creatinine, Serum: 2.60 ( @ 13:03)      Urinalysis Basic - ( 21 Aug 2020 15:11 )    Color: Yellow / Appearance: Clear / S.015 / pH: x  Gluc: x / Ketone: Negative  / Bili: Negative / Urobili: Negative   Blood: x / Protein: Negative / Nitrite: Negative   Leuk Esterase: Negative / RBC: 0-2 /HPF / WBC 0-2   Sq Epi: x / Non Sq Epi: Occasional / Bacteria: Occasional      LIVER FUNCTIONS - ( 21 Aug 2020 13:03 )  Alb: 3.3 g/dL / Pro: 7.0 g/dL / ALK PHOS: 125 U/L / ALT: 16 U/L / AST: 18 U/L / GGT: x           CARDIAC MARKERS ( 21 Aug 2020 13:03 )  .029 ng/mL / x     / x     / x     / x              < from: Xray Chest 1 View-PORTABLE IMMEDIATE (20 @ 12:05) >    EXAM:  XR CHEST PORTABLE IMMED 1V                            PROCEDURE DATE:  2020          INTERPRETATION:  PROCEDURE: AP view of the chest.    CLINICAL INFORMATION: Wheezing.    COMPARISON: 2020.    FINDINGS AND  IMPRESSION: There arechronic lung changes with increased reticular opacities right upper lobe. Study is limited by rotation. Patient is status post sternotomy and CABG. Aorta is ectatic and tortuous. Left lung is grossly clear.        PALMIRA HAYWARD M.D., ATTENDING RADIOLOGIST  This document has been electronically signed. Aug 21 2020 12:13PM                < end of copied text >

## 2020-08-21 NOTE — H&P ADULT - PROBLEM SELECTOR PLAN 10
Heparin   IMPROVE VTE Individual Risk Assessment        RISK                                                          Points  [  ] Previous VTE                                                3  [  ] Thrombophilia                                             2  [  ] Lower limb paralysis                                   2        (unable to hold up >15 seconds)    [  ] Current Cancer                                             2         (within 6 months)  [  ] Immobilization > 24 hrs                              1  [  ] ICU/CCU stay > 24 hours                             1  [ x ] Age > 60                                                         1    IMPROVE VTE Score:         [     1    ]    Total Risk Factor Score:    0 - 1:   Consider IPC  >2 - 3:  Thromboprophylaxis required (enoxaparin or SQ heparin)        >4:   High Risk: Thromboprophylaxis required (enoxaparin or SQ heparin), optional add IPC  **If CONTRAINDICATION to enoxaparin or SQ heparin, USE IPCs**

## 2020-08-21 NOTE — H&P ADULT - NSHPPHYSICALEXAM_GEN_ALL_CORE
T(C): 36.4 (08-21-20 @ 11:29), Max: 36.4 (08-21-20 @ 11:29)  HR: 73 (08-21-20 @ 11:29) (73 - 73)  BP: 169/90 (08-21-20 @ 11:29) (169/90 - 169/90)  RR: 16 (08-21-20 @ 11:29) (16 - 16)  SpO2: 96% (08-21-20 @ 11:29) (96% - 96%)    GENERAL: patient appears well, no acute distress, appropriate, pleasant  EYES: sclera clear, no exudates  ENMT: oropharynx clear without erythema, no exudates, moist mucous membranes  NECK: supple, soft  LUNGS: good air entry bilaterally, clear to auscultation, symmetric breath sounds, no wheezing or rhonchi appreciated  HEART: soft S1/S2, regular rate and rhythm, no murmurs noted, no lower extremity edema  GASTROINTESTINAL: abdomen is soft, nontender, nondistended, normoactive bowel sounds, no palpable masses  INTEGUMENT: Laceration on the right jaw line, abrasion to right cheek bone, left upper back, bilateral knees with bleeding  MUSCULOSKELETAL: no clubbing or cyanosis, no obvious deformity  NEUROLOGIC: A&Ox2 to person and time not place, good muscle tone in 4 extremities, 5/5 strength in all 4 extremities, moving all 4 extremities, no obvious sensory deficits

## 2020-08-21 NOTE — ED PROVIDER NOTE - CARE PLAN
Principal Discharge DX:	Closed head injury, initial encounter  Secondary Diagnosis:	Abrasion  Secondary Diagnosis:	COPD exacerbation Principal Discharge DX:	YOU (acute kidney injury)  Secondary Diagnosis:	Abrasion  Secondary Diagnosis:	COPD exacerbation  Secondary Diagnosis:	Closed head injury, initial encounter

## 2020-08-21 NOTE — ED PROVIDER NOTE - PHYSICAL EXAMINATION
Vital signs as available reviewed.  General:  Comfortable, no acute distress.  Head:  Normocephalic, atraumatic.  Eyes:  Conjunctiva pink, no icterus.  Cardiovascular:  Regular rate, no obvious murmur.  Respiratory:  tachypneic with bilateral wheezing.  Abdomen:  Soft, non-tender.  Musculoskeletal:  No deformity or calf tenderness. + right elbow abrasion without tenderness to palpation and with full range of motion. + right knee erythema without tenderness to palpation and with full range of motion.  Neurologic: Alert and oriented to self, place, not date, moving all extremities.  Skin:  Warm and dry. Multiple areas of old bruising.

## 2020-08-21 NOTE — CONSULT NOTE ADULT - SUBJECTIVE AND OBJECTIVE BOX
Date/Time Patient Seen:  		  Referring MD:   Data Reviewed	       Patient is a 85y old  Male who presents with a chief complaint of YOU, Fall (21 Aug 2020 16:10)      Subjective/HPI  in bed  seen and examined  vs and meds reviewed  labs reviewed  H and P reviewed  ER provider note reviewed    86 yo male with PMH of COPD, dementia, HTN, HLD, CAD s/p CABG, CKD,  BPH, Depression, GERD, osteoporosis, peripheral neuropathy presents to the ED after fall. Patient is a poor historian. According to patient, he slipped on the floor while wearing socks and was taken to the ED. Patient denies any head trauma, however, per chart review patient did hit his head. He denies fever, chills, headache, chest pain, shortness of breath, abdominal pain, nausea, vomiting, diarrhea or constipation. Attempted to contact wife Larisa Shelley at [phone].       FAMILY HISTORY:  Family history of amyotrophic lateral sclerosis,  at 67  Family history of MI (myocardial infarction),  at 65  Family history of stroke,  at 65  Family history of stroke,  at 67    Sibling  Still living? No  Family history of renal failure, Age at diagnosis: Age Unknown.     Social History:  Social History (marital status, living situation, occupation, tobacco use, alcohol and drug use, and sexual history): Alcohol: Denies past or present use  	Tobacco: Former smoker; quit in , smoked 1/2 ppd for 10 years  	Drugs: Denies past or present use    	Lives with: Wife  	Ambulates with walker  	Performs ADLs with assistance    	Code Status:     Tobacco Screening:  · Core Measure Site	Yes  · Has the patient used tobacco in the past 30 days?	No    Risk Assessment:    Present on Admission:  Deep Venous Thrombosis	no  Pulmonary Embolus	no  Urinary Catheter	no  Central Venous Catheter/PICC Line	no  Surgical Site Incision	no  Pressure Ulcer(s)	no     Heart Failure:  Does this patient have a history of or has been diagnosed with heart failure? unknown.     PAST MEDICAL & SURGICAL HISTORY:  Dementia  Myocardial infarction  COPD (chronic obstructive pulmonary disease)  Seasonal allergies  GERD (gastroesophageal reflux disease)  Benign prostatic hypertrophy  Hyperlipidemia  Hypertension  Asthma: Also COPD component  Depression  Peripheral Neuropathy  Asthma  S/P AVR (Aortic Valve Replacement)  S/P CABG (Coronary Artery Bypass Graft)  Osteoporosis  CAD (Coronary Artery Disease): MI, s/p CABG with 2 cardiac stents  HTN (Hypertension)  Dyslipidemia  S/P inguinal hernia repair: On right side  S/P appendectomy  S/P CABG (coronary artery bypass graft): Done in ; 2 cardiac stents in   S/P CABG (Coronary Artery Bypass Graft)  S/P AVR (Aortic Valve Replacement): Pig valve (bioprosthetic); done  at [facility]        Medication list         MEDICATIONS  (STANDING):  tiotropium 18 MICROgram(s) Capsule 1 Capsule(s) Inhalation daily    MEDICATIONS  (PRN):  ALBUTerol    90 MICROgram(s) HFA Inhaler 2 Puff(s) Inhalation every 15 minutes PRN Shortness of Breath         Vitals log        ICU Vital Signs Last 24 Hrs  T(C): 36.4 (21 Aug 2020 11:29), Max: 36.4 (21 Aug 2020 11:29)  T(F): 97.6 (21 Aug 2020 11:29), Max: 97.6 (21 Aug 2020 11:29)  HR: 73 (21 Aug 2020 11:29) (73 - 73)  BP: 169/90 (21 Aug 2020 11:29) (169/90 - 169/90)  BP(mean): --  ABP: --  ABP(mean): --  RR: 16 (21 Aug 2020 11:29) (16 - 16)  SpO2: 96% (21 Aug 2020 11:29) (96% - 96%)           Input and Output:  I&O's Detail      Lab Data                        13.5   7.27  )-----------( 145      ( 21 Aug 2020 13:03 )             44.7     08-21    143  |  113<H>  |  40<H>  ----------------------------<  89  4.4   |  26  |  2.60<H>    Ca    9.0      21 Aug 2020 13:03    TPro  7.0  /  Alb  3.3  /  TBili  0.4  /  DBili  x   /  AST  18  /  ALT  16  /  AlkPhos  125<H>  08-21      CARDIAC MARKERS ( 21 Aug 2020 13:03 )  .029 ng/mL / x     / x     / x     / x            Review of Systems	  fall      Objective     Physical Examination    heart s1s2  lung dc BS  abd soft      Pertinent Lab findings & Imaging      Escalante:  NO   Adequate UO     I&O's Detail           Discussed with:     Cultures:	        Radiology      EXAM:  CT CERVICAL SPINE                            PROCEDURE DATE:  2020          INTERPRETATION:  CT cervical spine without contrast    History injury    There is no fracture or acute subluxation or prevertebral soft tissue widening. There is moderately severe multilevel degenerative disc arthropathy notable for mild spinal stenosis at C4-5.    IMPRESSION:  No acute findings              MOSES BURNS M.D., ATTENDING RADIOLOGIST  This document has been electronically signed. Aug 21 2020 12:52PM                EXAM:  XR CHEST PORTABLE IMMED 1V                            PROCEDURE DATE:  2020          INTERPRETATION:  PROCEDURE: AP view of the chest.    CLINICAL INFORMATION: Wheezing.    COMPARISON: 2020.    FINDINGS AND  IMPRESSION: There are chronic lung changes with increased reticular opacities right upper lobe. Study is limited by rotation. Patient is status post sternotomy and CABG. Aorta is ectatic and tortuous. Left lung is grossly clear.                PALMIRA HAYWARD M.D., ATTENDING RADIOLOGIST  This document has been electronically signed. Aug 21 2020 12:13PM              EXAM:  ECHO TTE WO CON COMP W DOPPLR         PROCEDURE DATE:  2020        INTERPRETATION:  INDICATION: Chest pain  Sonographer KL    Blood Pressure 149/96    Height 170 cm     Weight 60 kg       BSA 1.79 sq m    Dimensions:    LA Normal Values: 2.0 - 4.0 cm    Ao Normal Values: 2.0 - 3.8 cm  SEPTUM Normal Values: 0.6 - 1.2 cm  PWT Normal Values: 0.6 - 1.1 cm  LVIDd Normal Values: 3.0 - 5.6 cm  LVIDs Normal Values: 1.8 - 4.0 cm      OBSERVATIONS:  Technically difficult and very limited study  Mitral Valve: Thickened leaflets, trace physiologic MR. Mitral annular calcification  Aortic Valve/Aorta: Not visualized  Tricuspid Valve: Not well-visualized  Pulmonic Valve: Not visualized  Left Atrium: Not well-visualized  Right Atrium: Not well-visualized  Left Ventricle: Grossly normal left ventricular systolic function, and endocardium is not well-visualized cannot rule out segmental dysfunction  Right Ventricle: Not well-visualized  Pericardium/Pleura: normal, no significant pericardial effusion.  Pulmonary/RV Pressure: estimated PA systolic pressure of 39 mmHg assuming an RA pressure of 8 mmHg.      Conclusion:   Technically difficult and very limited study  Grossly normal left ventricular systolic function, and endocardium is not well-visualized cannot rule out segmental dysfunction   Right ventricle is not well-visualized  Overall, valves are not well-visualized. Patient with history of aortic valve replacement, cannot comment on the function of this valve                      RAMÓN VIDAL   This document has been electronically signed. 2020 10:10AM

## 2020-08-21 NOTE — H&P ADULT - HISTORY OF PRESENT ILLNESS
84 yo male with PMH of COPD, dementia, HTN, HLD, CAD s/p CABG, CKD,  BPH, Depression, GERD, osteoporosis, peripheral neuropathy presents to the ED after fall. Patient is a poor historian. According to patient, he slipped on the floor while wearing socks and was taken to the ED. Patient denies any head trauma, however, per chart review patient did hit his head. He denies fever, chills, headache, chest pain, shortness of breath, abdominal pain, nausea, vomiting, diarrhea or constipation. Attempted to contact wife Larisa Shelley at [phone].     In the ED:  VS Temp 97.6F | HR 73 | /90 | RR 16 | SpO2 96% RA  CBC WBC 7.27, Hgb 13.5, HCt 44.7  CMP: BUN 40, Cr 2.6 (baseline 1.7)  UA grossly normal  CT brain and spine: negative for acute disease  CXR: There are chronic lung changes with increased reticular opacities right upper lobe. Study is limited by rotation. Patient is status post sternotomy and CABG. Aorta is ectatic and tortuous. Left lung is grossly clear.  EKG: Sinus brian with arrhythmia RBBB 84 yo male with PMH of COPD, dementia, HTN, HLD, CAD s/p CABG, CKD,  BPH, Depression, GERD, osteoporosis, peripheral neuropathy presents to the ED after fall. Patient is a poor historian. According to patient, he slipped on the floor while wearing socks not using his walker and was taken to the ED. Patient denies any head trauma. Per chart review, patient fell multiple times today. Patient denied any lightheadedness or dizziness prior to fall. Patient noted to be tachypneic by ED physician but appears to be breathing comfortable now and denies any current shortness of breath. He denies fever, chills, headache, chest pain, abdominal pain, nausea, vomiting, diarrhea or constipation. Attempted to contact wife Larisa Shelley at [phone].     In the ED:  VS Temp 97.6F | HR 73 | /90 | RR 16 | SpO2 96% RA  CBC WBC 7.27, Hgb 13.5, HCt 44.7  CMP: BUN 40, Cr 2.6 (baseline 1.7)  UA grossly normal  CT brain and spine: negative for acute disease  CXR: There are chronic lung changes with increased reticular opacities right upper lobe. Study is limited by rotation. Patient is status post sternotomy and CABG. Aorta is ectatic and tortuous. Left lung is grossly clear.  EKG: Sinus brian with arrhythmia RBBB  s/p 500 L bolus, albuterol, tdap, IV solumedrol, tiotropium 84 yo male with PMH of COPD, dementia, HTN, HLD, CAD s/p CABG, CKD,  BPH, Depression, GERD, osteoporosis, peripheral neuropathy presents to the ED after fall. Patient is a poor historian. According to patient, he slipped on the floor while wearing socks not using his walker and was taken to the ED. Patient denies any head trauma. Per chart review, patient fell multiple times today. Patient denied any lightheadedness or dizziness prior to fall. Patient noted to be tachypneic by ED physician but appears to be breathing comfortable now and denies any current shortness of breath. He denies fever, chills, headache, chest pain, abdominal pain, nausea, vomiting, diarrhea or constipation. Attempted to contact wife Larisa Shelley at [phone] on four different occasions but was unable to reach.     In the ED:  VS Temp 97.6F | HR 73 | /90 | RR 16 | SpO2 96% RA  CBC WBC 7.27, Hgb 13.5, HCt 44.7  CMP: BUN 40, Cr 2.6 (baseline 1.7)  UA grossly normal  CT brain and spine: negative for acute disease  CXR: There are chronic lung changes with increased reticular opacities right upper lobe. Study is limited by rotation. Patient is status post sternotomy and CABG. Aorta is ectatic and tortuous. Left lung is grossly clear.  EKG: Sinus brian with arrhythmia RBBB  s/p 500 L bolus, albuterol, tdap, IV solumedrol, tiotropium

## 2020-08-21 NOTE — ED ADULT NURSE NOTE - PSH
S/P appendectomy    S/P AVR (Aortic Valve Replacement)  Pig valve (bioprosthetic); done 2010 at [facility]  S/P CABG (coronary artery bypass graft)  Done in 1996; 2 cardiac stents in 1998  S/P inguinal hernia repair  On right side

## 2020-08-21 NOTE — ED PROVIDER NOTE - PSH
S/P appendectomy    S/P AVR (Aortic Valve Replacement)  Pig valve (bioprosthetic); done 2010 at [facility]  S/P CABG (coronary artery bypass graft)  Done in 1996; 2 cardiac stents in 1998  S/P inguinal hernia repair  On right side

## 2020-08-21 NOTE — CONSULT NOTE ADULT - PROBLEM SELECTOR RECOMMENDATION 4
COPD - established diagnosis  cont Inhaler regimen for now  currently tolerating RA  no resp distress  monitor VS and Sat  seasonal vaccination discussion

## 2020-08-21 NOTE — ED ADULT NURSE NOTE - OBJECTIVE STATEMENT
pt BIBA for reported multiple falls at home.  pt forgetful and overestimated his boundaries.  multiple ecchymosis and scabs noted scattered throughout

## 2020-08-21 NOTE — H&P ADULT - PROBLEM SELECTOR PLAN 3
Chronic  - Pt presented tachypneic  - s/p albuterol, IV solumedrol, tiotropium  - Pt tolerating RA presently  - Pulmonary Dr. Isidro consulted, continue inhaler  - Monitor vital signs  - CXR: There are chronic lung changes with increased reticular opacities right upper lobe.

## 2020-08-21 NOTE — ED PROVIDER NOTE - CLINICAL SUMMARY MEDICAL DECISION MAKING FREE TEXT BOX
s/p mechanical fall but with shortness of breath and wheeze on exam. patient unreliable historian- will do work up for shortness of breath / fall.

## 2020-08-21 NOTE — PATIENT PROFILE ADULT - VISION (WITH CORRECTIVE LENSES IF THE PATIENT USUALLY WEARS THEM):
Wears Glasses. did not bring/Partially impaired: cannot see medication labels or newsprint, but can see obstacles in path, and the surrounding layout; can count fingers at arm's length

## 2020-08-21 NOTE — H&P ADULT - PROBLEM SELECTOR PLAN 4
Chronic  - Pt with history of dementia taking donepezil 10mg  - Continue inpatient Chronic  - /90 on admission, BP elevated  - Continue home metoprolol 25 mg  - Monitor vitals

## 2020-08-21 NOTE — H&P ADULT - PROBLEM SELECTOR PLAN 1
YOU on CKD  - Pt presenting with Cr 2.6, baseline 1.7  - Etiology unknown at this time, could be component of dehydration due to underlying dementia  - s/p 500 mL bolus  - avoid nephrotoxic medications  - Nephro Dr. Haji consulted, f/u recs  - F/u AM CMP YOU on CKD  - Pt presenting with Cr 2.6, baseline 1.7  - Etiology unknown at this time, could be component of dehydration due to underlying dementia  - s/p 500 mL bolus  - avoid nephrotoxic medications  - Nephro Dr. Haji consulted, recs appreciated  - continue gentle IV hydration  - Renal US if renal indices do not improve  - F/u AM CMP

## 2020-08-21 NOTE — H&P ADULT - ASSESSMENT
84 yo male with PMH of COPD, dementia, HTN, HLD, CAD s/p CABG, BPH, Depression, GERD, osteoporosis, peripheral neuropathy presents to the ED after fall admitted for YOU.

## 2020-08-21 NOTE — H&P ADULT - NSICDXPASTSURGICALHX_GEN_ALL_CORE_FT
PAST SURGICAL HISTORY:  S/P appendectomy     S/P AVR (Aortic Valve Replacement) Pig valve (bioprosthetic); done 2010 at [facility]    S/P CABG (coronary artery bypass graft) Done in 1996; 2 cardiac stents in 1998    S/P inguinal hernia repair On right side

## 2020-08-21 NOTE — CONSULT NOTE ADULT - PROBLEM SELECTOR RECOMMENDATION 3
Ataxic gait  YOU  HF  CAD  Dementia  fall prec  supportive care  pt assessment  gait training  lives at home

## 2020-08-21 NOTE — ED ADULT TRIAGE NOTE - CHIEF COMPLAINT QUOTE
pt to ED via ambulance for evaluation after fall  pt has dementia, has been increasingly unsteady, this is 2nd mechanical fall today  1st time pt refused transport, 2nd time, hit head, no LOC, no blood thinners

## 2020-08-21 NOTE — ED ADULT NURSE NOTE - PMH
Benign prostatic hypertrophy    CAD (Coronary Artery Disease)  MI, s/p CABG with 2 cardiac stents  COPD (chronic obstructive pulmonary disease)    Dementia    Depression    GERD (gastroesophageal reflux disease)    Hyperlipidemia    Hypertension    Myocardial infarction    Osteoporosis    Peripheral Neuropathy    Seasonal allergies

## 2020-08-21 NOTE — ED PROVIDER NOTE - PROGRESS NOTE DETAILS
Spoke with wife, patient had several falls today. Wife made aware of patient's condition and plan to admit.

## 2020-08-21 NOTE — H&P ADULT - NSICDXPASTMEDICALHX_GEN_ALL_CORE_FT
PAST MEDICAL HISTORY:  Benign prostatic hypertrophy     CAD (Coronary Artery Disease) MI, s/p CABG with 2 cardiac stents    COPD (chronic obstructive pulmonary disease)     Dementia     Depression     GERD (gastroesophageal reflux disease)     Hyperlipidemia     Hypertension     Myocardial infarction     Osteoporosis     Peripheral Neuropathy     Seasonal allergies

## 2020-08-21 NOTE — H&P ADULT - NSHPREVIEWOFSYSTEMS_GEN_ALL_CORE
Unable to accurately assess due to underlying dementia. However, as per patient:    Review of Systems:   Constitutional: Denies fever, chills, diaphoresis  HEENT: Denies headache, dizziness or lightheadedness  Respiratory: Denies SOB, cough, wheezing  Cardiovascular: Denies chest pain, palpitations  Gastrointestinal: Denies nausea, vomiting, diarrhea, constipation, abdominal pain, bloody stools  Genitourinary: Denies dysuria, increased frequency, urgency or hematuria  Skin/Breast: Denies rashes or itching  Musculoskeletal: Denies muscle aches, joint swelling or muscle weakness  Neurologic: Denies loss of sensation, numbness or tingling

## 2020-08-21 NOTE — CONSULT NOTE ADULT - ASSESSMENT
1.	YOU, CKD 3: Prerenal azotemia  2.	S/p fall  3.	Hypertension    IV hydration. Fall precautions. Renal sonogram if renal indices do not improve. To continue current meds.   Monitor BP trend. Titrate BP meds as needed. Salt restriction. Avoid nephrotoxic meds as possible. Avoid ACEI, ARB, NSAIDs and IV contrast.   Will follow electrolytes and renal function trend. Further recommendations pending clinical course. Thank you for the courtesy of this referral.

## 2020-08-21 NOTE — H&P ADULT - PROBLEM SELECTOR PLAN 6
Chronic  - /90 on admission, BP elevated  - Continue home metoprolol 25 mg Patient with history of CAD s/p CABG  - Echo from 1/20/2020 showing HFpEF, w/ grossly normal LV function  - Continue on home metoprolol  - Monitor vital signs

## 2020-08-21 NOTE — H&P ADULT - NSHPSOCIALHISTORY_GEN_ALL_CORE
Alcohol: Denies past or present use  Tobacco: Former smoker; quit in 1987, smoked 1/2 ppd for 10 years  Drugs: Denies past or present use    Lives with: Wife  Ambulates with walker  Performs ADLs with assistance    Code Status: Alcohol: Denies past or present use  Tobacco: Former smoker; quit in 1987, smoked 1/2 ppd for 10 years  Drugs: Denies past or present use    Lives with: Wife  Ambulates with walker  Performs ADLs with assistance    Code Status: DNR and DNI. Previous GOC documented. Attempted to reach Larisa patients wife but unable to reach. PRevious GOC documentation reviewed.

## 2020-08-21 NOTE — ED PROVIDER NOTE - NS ED ROS FT
Constitutional: No fever.  Neurological: No headache.  Eyes: No vision changes.    Ears, Nose, Mouth, Throat: No congestion.  Cardiovascular: No chest pain.  Respiratory: No difficulty breathing.  Gastrointestinal: No nausea or vomiting.  Genitourinary: No dysuria.  Musculoskeletal: No joint pain.  Integumentary (skin and/or breast): No rash.

## 2020-08-21 NOTE — ED PROVIDER NOTE - OBJECTIVE STATEMENT
85 M history of COPD, dementia, htn, hld, CAD here s/p fall. He fell multiple times today. he states he lost his balance. Denies preceding headache, chest pain, shortness of breath, nausea, vomiting. + head injury. patient noted to be tachypneic during interview. He report he uses a walker at home but was not using it when he fell. patient poor historian.

## 2020-08-21 NOTE — H&P ADULT - PROBLEM SELECTOR PLAN 8
Chronic  - Pt with history of GI bleed  - Continue on home pantoprazole, bowel regimen  - Monitor stools

## 2020-08-22 LAB
ALBUMIN SERPL ELPH-MCNC: 3 G/DL — LOW (ref 3.3–5)
ALP SERPL-CCNC: 118 U/L — SIGNIFICANT CHANGE UP (ref 40–120)
ALT FLD-CCNC: 18 U/L — SIGNIFICANT CHANGE UP (ref 12–78)
ANION GAP SERPL CALC-SCNC: 7 MMOL/L — SIGNIFICANT CHANGE UP (ref 5–17)
AST SERPL-CCNC: 22 U/L — SIGNIFICANT CHANGE UP (ref 15–37)
BASOPHILS # BLD AUTO: 0.01 K/UL — SIGNIFICANT CHANGE UP (ref 0–0.2)
BASOPHILS NFR BLD AUTO: 0.2 % — SIGNIFICANT CHANGE UP (ref 0–2)
BILIRUB SERPL-MCNC: 0.3 MG/DL — SIGNIFICANT CHANGE UP (ref 0.2–1.2)
BUN SERPL-MCNC: 46 MG/DL — HIGH (ref 7–23)
CALCIUM SERPL-MCNC: 9.4 MG/DL — SIGNIFICANT CHANGE UP (ref 8.5–10.1)
CHLORIDE SERPL-SCNC: 109 MMOL/L — HIGH (ref 96–108)
CO2 SERPL-SCNC: 25 MMOL/L — SIGNIFICANT CHANGE UP (ref 22–31)
CREAT SERPL-MCNC: 2.2 MG/DL — HIGH (ref 0.5–1.3)
EOSINOPHIL # BLD AUTO: 0 K/UL — SIGNIFICANT CHANGE UP (ref 0–0.5)
EOSINOPHIL NFR BLD AUTO: 0 % — SIGNIFICANT CHANGE UP (ref 0–6)
GLUCOSE SERPL-MCNC: 183 MG/DL — HIGH (ref 70–99)
HCT VFR BLD CALC: 41.2 % — SIGNIFICANT CHANGE UP (ref 39–50)
HGB BLD-MCNC: 12.4 G/DL — LOW (ref 13–17)
IMM GRANULOCYTES NFR BLD AUTO: 0.4 % — SIGNIFICANT CHANGE UP (ref 0–1.5)
LYMPHOCYTES # BLD AUTO: 0.79 K/UL — LOW (ref 1–3.3)
LYMPHOCYTES # BLD AUTO: 14.6 % — SIGNIFICANT CHANGE UP (ref 13–44)
MCHC RBC-ENTMCNC: 24.8 PG — LOW (ref 27–34)
MCHC RBC-ENTMCNC: 30.1 GM/DL — LOW (ref 32–36)
MCV RBC AUTO: 82.6 FL — SIGNIFICANT CHANGE UP (ref 80–100)
MONOCYTES # BLD AUTO: 0.3 K/UL — SIGNIFICANT CHANGE UP (ref 0–0.9)
MONOCYTES NFR BLD AUTO: 5.5 % — SIGNIFICANT CHANGE UP (ref 2–14)
NEUTROPHILS # BLD AUTO: 4.29 K/UL — SIGNIFICANT CHANGE UP (ref 1.8–7.4)
NEUTROPHILS NFR BLD AUTO: 79.3 % — HIGH (ref 43–77)
NRBC # BLD: 0 /100 WBCS — SIGNIFICANT CHANGE UP (ref 0–0)
PLATELET # BLD AUTO: 168 K/UL — SIGNIFICANT CHANGE UP (ref 150–400)
POTASSIUM SERPL-MCNC: 4.6 MMOL/L — SIGNIFICANT CHANGE UP (ref 3.5–5.3)
POTASSIUM SERPL-SCNC: 4.6 MMOL/L — SIGNIFICANT CHANGE UP (ref 3.5–5.3)
PROT SERPL-MCNC: 6.7 G/DL — SIGNIFICANT CHANGE UP (ref 6–8.3)
RBC # BLD: 4.99 M/UL — SIGNIFICANT CHANGE UP (ref 4.2–5.8)
RBC # FLD: 18.8 % — HIGH (ref 10.3–14.5)
SODIUM SERPL-SCNC: 141 MMOL/L — SIGNIFICANT CHANGE UP (ref 135–145)
WBC # BLD: 5.41 K/UL — SIGNIFICANT CHANGE UP (ref 3.8–10.5)
WBC # FLD AUTO: 5.41 K/UL — SIGNIFICANT CHANGE UP (ref 3.8–10.5)

## 2020-08-22 PROCEDURE — 99233 SBSQ HOSP IP/OBS HIGH 50: CPT

## 2020-08-22 RX ORDER — CALAMINE AND ZINC OXIDE AND PHENOL 160; 10 MG/ML; MG/ML
1 LOTION TOPICAL
Refills: 0 | Status: DISCONTINUED | OUTPATIENT
Start: 2020-08-22 | End: 2020-08-24

## 2020-08-22 RX ORDER — HYDROCORTISONE 1 %
1 OINTMENT (GRAM) TOPICAL
Refills: 0 | Status: DISCONTINUED | OUTPATIENT
Start: 2020-08-22 | End: 2020-08-24

## 2020-08-22 RX ORDER — COLLAGENASE CLOSTRIDIUM HIST. 250 UNIT/G
1 OINTMENT (GRAM) TOPICAL DAILY
Refills: 0 | Status: DISCONTINUED | OUTPATIENT
Start: 2020-08-22 | End: 2020-08-24

## 2020-08-22 RX ADMIN — HEPARIN SODIUM 5000 UNIT(S): 5000 INJECTION INTRAVENOUS; SUBCUTANEOUS at 17:44

## 2020-08-22 RX ADMIN — DONEPEZIL HYDROCHLORIDE 10 MILLIGRAM(S): 10 TABLET, FILM COATED ORAL at 22:04

## 2020-08-22 RX ADMIN — SENNA PLUS 2 TABLET(S): 8.6 TABLET ORAL at 22:04

## 2020-08-22 RX ADMIN — ATORVASTATIN CALCIUM 80 MILLIGRAM(S): 80 TABLET, FILM COATED ORAL at 22:04

## 2020-08-22 RX ADMIN — TAMSULOSIN HYDROCHLORIDE 0.4 MILLIGRAM(S): 0.4 CAPSULE ORAL at 22:04

## 2020-08-22 RX ADMIN — Medication 1 GRAM(S): at 11:38

## 2020-08-22 RX ADMIN — TIOTROPIUM BROMIDE 1 CAPSULE(S): 18 CAPSULE ORAL; RESPIRATORY (INHALATION) at 06:43

## 2020-08-22 RX ADMIN — Medication 1 TABLET(S): at 11:38

## 2020-08-22 RX ADMIN — SERTRALINE 100 MILLIGRAM(S): 25 TABLET, FILM COATED ORAL at 11:38

## 2020-08-22 RX ADMIN — Medication 1 GRAM(S): at 00:45

## 2020-08-22 RX ADMIN — PANTOPRAZOLE SODIUM 40 MILLIGRAM(S): 20 TABLET, DELAYED RELEASE ORAL at 06:43

## 2020-08-22 RX ADMIN — Medication 25 MILLIGRAM(S): at 06:43

## 2020-08-22 RX ADMIN — Medication 1 GRAM(S): at 06:42

## 2020-08-22 RX ADMIN — FINASTERIDE 5 MILLIGRAM(S): 5 TABLET, FILM COATED ORAL at 11:38

## 2020-08-22 RX ADMIN — HEPARIN SODIUM 5000 UNIT(S): 5000 INJECTION INTRAVENOUS; SUBCUTANEOUS at 06:42

## 2020-08-22 RX ADMIN — Medication 5 MILLIGRAM(S): at 22:04

## 2020-08-22 RX ADMIN — Medication 1 APPLICATION(S): at 17:44

## 2020-08-22 RX ADMIN — Medication 1 GRAM(S): at 17:44

## 2020-08-22 NOTE — PHYSICAL THERAPY INITIAL EVALUATION ADULT - ADDITIONAL COMMENTS
Patient lives with wife and 24/7 HHA who assists patient and wife in condo.  Patient has stair lift to second floor. Patient was able to ambulate independently with RW but requires some assistance with ADLS.  Patient states he has stall shower.

## 2020-08-22 NOTE — PHYSICAL THERAPY INITIAL EVALUATION ADULT - PERTINENT HX OF CURRENT PROBLEM, REHAB EVAL
85 M history of COPD, dementia, htn, hld, CAD here s/p fall. He fell multiple times. He states he lost his balance. Denies preceding headache, chest pain, shortness of breath, nausea, vomiting. + head injury. He report he uses a walker at home but was not using it when he fell. patient poor historian.

## 2020-08-22 NOTE — PROGRESS NOTE ADULT - ATTENDING COMMENTS
called wife Deisi [phone] - no response, left VM called family member Deisi [phone] - no response, left VM called and updated wife Larisa Shelley at [phone] on status of YOU on CKD, dispo for MICHELLE and itchiness for which she states she uses 1% cortisone cream PRN

## 2020-08-22 NOTE — PROGRESS NOTE ADULT - ASSESSMENT
1.	YOU, CKD 3: Prerenal azotemia  2.	S/p fall  3.	Hypertension    ImpV hydration. Fall precautions. Renal sonogram if renal indices do not improve. To continue current meds.   Monitor BP trend. Titrate BP meds as needed. Salt restriction. Avoid nephrotoxic meds as possible. Avoid ACEI, ARB, NSAIDs and IV contrast.   Will follow electrolytes and renal function trend. 1.	YOU, CKD 3: Prerenal azotemia  2.	S/p fall  3.	Hypertension    Improving renal indices. Continue IVF. Fall precautions. To continue current meds. Monitor BP trend. Titrate BP meds as needed. Salt restriction.   Avoid nephrotoxic meds as possible. Avoid ACEI, ARB, NSAIDs and IV contrast. Will follow electrolytes and renal function trend.

## 2020-08-22 NOTE — PROGRESS NOTE ADULT - SUBJECTIVE AND OBJECTIVE BOX
Patient is a 85y old  Male who presents with a chief complaint of YOU, Fall (22 Aug 2020 11:57)    Patient seen in follow up for YOU.  Pt resting in bed.        PAST MEDICAL HISTORY:  Dementia  Myocardial infarction  COPD (chronic obstructive pulmonary disease)  Seasonal allergies  GERD (gastroesophageal reflux disease)  Benign prostatic hypertrophy  Hyperlipidemia  Hypertension  Asthma  Depression  Peripheral Neuropathy  Asthma  S/P AVR (Aortic Valve Replacement)  S/P CABG (Coronary Artery Bypass Graft)  Osteoporosis  CAD (Coronary Artery Disease)  HTN (Hypertension)  Dyslipidemia    MEDICATIONS  (STANDING):  atorvastatin 80 milliGRAM(s) Oral at bedtime  donepezil 10 milliGRAM(s) Oral at bedtime  finasteride 5 milliGRAM(s) Oral daily  heparin   Injectable 5000 Unit(s) SubCutaneous every 12 hours  lactobacillus acidophilus 1 Tablet(s) Oral daily  melatonin 5 milliGRAM(s) Oral at bedtime  metoprolol succinate ER 25 milliGRAM(s) Oral daily  pantoprazole    Tablet 40 milliGRAM(s) Oral before breakfast  senna 2 Tablet(s) Oral at bedtime  sertraline 100 milliGRAM(s) Oral daily  sodium chloride 0.9%. 1000 milliLiter(s) (50 mL/Hr) IV Continuous <Continuous>  sucralfate suspension 1 Gram(s) Oral four times a day  tamsulosin 0.4 milliGRAM(s) Oral at bedtime  tiotropium 18 MICROgram(s) Capsule 1 Capsule(s) Inhalation daily    MEDICATIONS  (PRN):  acetaminophen   Tablet .. 650 milliGRAM(s) Oral every 4 hours PRN Mild Pain (1 - 3)  ALBUTerol    90 MICROgram(s) HFA Inhaler 2 Puff(s) Inhalation every 15 minutes PRN Shortness of Breath  bisacodyl 5 milliGRAM(s) Oral every 12 hours PRN Constipation  calamine/zinc oxide Lotion 1 Application(s) Topical two times a day PRN Rash and/or Itching  hydrocortisone 1% Cream 1 Application(s) Topical two times a day PRN Rash and/or Itching    T(C): 36.4 (20 @ 04:48), Max: 36.6 (20 @ 18:46)  HR: 58 (20 @ 04:48) (50 - 73)  BP: 161/79 (20 @ 04:48) (136/73 - 169/90)  RR: 17 (20 @ 04:48)  SpO2: 93% (20 @ 04:48)  Wt(kg): --  I&O's Detail    21 Aug 2020 07:01  -  22 Aug 2020 07:00  --------------------------------------------------------  IN:    sodium chloride 0.9%.: 600 mL  Total IN: 600 mL    OUT:  Total OUT: 0 mL    Total NET: 600 mL        PHYSICAL EXAM:  General: No distress  Respiratory: b/l air entry  Cardiovascular: S1 S2  Gastrointestinal: soft  Extremities:  edema                     LABORATORY:                        12.4   5.41  )-----------( 168      ( 22 Aug 2020 07:53 )             41.2         141  |  109<H>  |  46<H>  ----------------------------<  183<H>  4.6   |  25  |  2.20<H>    Ca    9.4      22 Aug 2020 07:53    TPro  6.7  /  Alb  3.0<L>  /  TBili  0.3  /  DBili  x   /  AST  22  /  ALT  18  /  AlkPhos  118      Sodium, Serum: 141 mmol/L ( @ 07:53)  Sodium, Serum: 143 mmol/L ( @ 13:03)    Potassium, Serum: 4.6 mmol/L ( 07:53)  Potassium, Serum: 4.4 mmol/L ( @ 13:03)    Hemoglobin: 12.4 g/dL ( @ 07:53)  Hemoglobin: 13.5 g/dL ( @ 13:03)    Creatinine, Serum 2.20 ( 07:53)  Creatinine, Serum 2.60 ( @ 13:03)    CARDIAC MARKERS ( 21 Aug 2020 13:03 )  .029 ng/mL / x     / x     / x     / x          LIVER FUNCTIONS - ( 22 Aug 2020 07:53 )  Alb: 3.0 g/dL / Pro: 6.7 g/dL / ALK PHOS: 118 U/L / ALT: 18 U/L / AST: 22 U/L / GGT: x           Urinalysis Basic - ( 21 Aug 2020 15:11 )    Color: Yellow / Appearance: Clear / S.015 / pH: x  Gluc: x / Ketone: Negative  / Bili: Negative / Urobili: Negative   Blood: x / Protein: Negative / Nitrite: Negative   Leuk Esterase: Negative / RBC: 0-2 /HPF / WBC 0-2   Sq Epi: x / Non Sq Epi: Occasional / Bacteria: Occasional Patient is a 85y old  Male who presents with a chief complaint of YOU, Fall (22 Aug 2020 11:57)    Patient seen in follow up for YOU.  Pt resting in bed.        PAST MEDICAL HISTORY:  Dementia  Myocardial infarction  COPD (chronic obstructive pulmonary disease)  Seasonal allergies  GERD (gastroesophageal reflux disease)  Benign prostatic hypertrophy  Hyperlipidemia  Hypertension  Asthma  Depression  Peripheral Neuropathy  Asthma  S/P AVR (Aortic Valve Replacement)  S/P CABG (Coronary Artery Bypass Graft)  Osteoporosis  CAD (Coronary Artery Disease)  HTN (Hypertension)  Dyslipidemia    MEDICATIONS  (STANDING):  atorvastatin 80 milliGRAM(s) Oral at bedtime  donepezil 10 milliGRAM(s) Oral at bedtime  finasteride 5 milliGRAM(s) Oral daily  heparin   Injectable 5000 Unit(s) SubCutaneous every 12 hours  lactobacillus acidophilus 1 Tablet(s) Oral daily  melatonin 5 milliGRAM(s) Oral at bedtime  metoprolol succinate ER 25 milliGRAM(s) Oral daily  pantoprazole    Tablet 40 milliGRAM(s) Oral before breakfast  senna 2 Tablet(s) Oral at bedtime  sertraline 100 milliGRAM(s) Oral daily  sodium chloride 0.9%. 1000 milliLiter(s) (50 mL/Hr) IV Continuous <Continuous>  sucralfate suspension 1 Gram(s) Oral four times a day  tamsulosin 0.4 milliGRAM(s) Oral at bedtime  tiotropium 18 MICROgram(s) Capsule 1 Capsule(s) Inhalation daily    MEDICATIONS  (PRN):  acetaminophen   Tablet .. 650 milliGRAM(s) Oral every 4 hours PRN Mild Pain (1 - 3)  ALBUTerol    90 MICROgram(s) HFA Inhaler 2 Puff(s) Inhalation every 15 minutes PRN Shortness of Breath  bisacodyl 5 milliGRAM(s) Oral every 12 hours PRN Constipation  calamine/zinc oxide Lotion 1 Application(s) Topical two times a day PRN Rash and/or Itching  hydrocortisone 1% Cream 1 Application(s) Topical two times a day PRN Rash and/or Itching    T(C): 36.4 (20 @ 04:48), Max: 36.6 (20 @ 18:46)  HR: 58 (20 @ 04:48) (50 - 73)  BP: 161/79 (20 @ 04:48) (136/73 - 169/90)  RR: 17 (20 @ 04:48)  SpO2: 93% (20 @ 04:48)  Wt(kg): --  I&O's Detail    21 Aug 2020 07:01  -  22 Aug 2020 07:00  --------------------------------------------------------  IN:    sodium chloride 0.9%.: 600 mL  Total IN: 600 mL    OUT:  Total OUT: 0 mL    Total NET: 600 mL        PHYSICAL EXAM:  General: No distress  Respiratory: b/l air entry  Cardiovascular: S1 S2  Gastrointestinal: soft  Extremities:  no edema                     LABORATORY:                        12.4   5.41  )-----------( 168      ( 22 Aug 2020 07:53 )             41.2         141  |  109<H>  |  46<H>  ----------------------------<  183<H>  4.6   |  25  |  2.20<H>    Ca    9.4      22 Aug 2020 07:53    TPro  6.7  /  Alb  3.0<L>  /  TBili  0.3  /  DBili  x   /  AST  22  /  ALT  18  /  AlkPhos  118      Sodium, Serum: 141 mmol/L ( 07:53)  Sodium, Serum: 143 mmol/L ( @ 13:03)    Potassium, Serum: 4.6 mmol/L ( 07:53)  Potassium, Serum: 4.4 mmol/L ( @ 13:03)    Hemoglobin: 12.4 g/dL ( @ 07:53)  Hemoglobin: 13.5 g/dL ( @ 13:03)    Creatinine, Serum 2.20 ( 07:53)  Creatinine, Serum 2.60 ( @ 13:03)    CARDIAC MARKERS ( 21 Aug 2020 13:03 )  .029 ng/mL / x     / x     / x     / x          LIVER FUNCTIONS - ( 22 Aug 2020 07:53 )  Alb: 3.0 g/dL / Pro: 6.7 g/dL / ALK PHOS: 118 U/L / ALT: 18 U/L / AST: 22 U/L / GGT: x           Urinalysis Basic - ( 21 Aug 2020 15:11 )    Color: Yellow / Appearance: Clear / S.015 / pH: x  Gluc: x / Ketone: Negative  / Bili: Negative / Urobili: Negative   Blood: x / Protein: Negative / Nitrite: Negative   Leuk Esterase: Negative / RBC: 0-2 /HPF / WBC 0-2   Sq Epi: x / Non Sq Epi: Occasional / Bacteria: Occasional

## 2020-08-22 NOTE — PROGRESS NOTE ADULT - SUBJECTIVE AND OBJECTIVE BOX
Date/Time Patient Seen:  		  Referring MD:   Data Reviewed	       Patient is a 85y old  Male who presents with a chief complaint of YOU, Fall (21 Aug 2020 16:28)      Subjective/HPI     PAST MEDICAL & SURGICAL HISTORY:  Dementia  Myocardial infarction  COPD (chronic obstructive pulmonary disease)  Seasonal allergies  GERD (gastroesophageal reflux disease)  Benign prostatic hypertrophy  Hyperlipidemia  Hypertension  Asthma: Also COPD component  Depression  Peripheral Neuropathy  Asthma  S/P AVR (Aortic Valve Replacement)  S/P CABG (Coronary Artery Bypass Graft)  Osteoporosis  CAD (Coronary Artery Disease): MI, s/p CABG with 2 cardiac stents  HTN (Hypertension)  Dyslipidemia  S/P inguinal hernia repair: On right side  S/P appendectomy  S/P CABG (coronary artery bypass graft): Done in 1996; 2 cardiac stents in 1998  S/P CABG (Coronary Artery Bypass Graft)  S/P AVR (Aortic Valve Replacement): Pig valve (bioprosthetic); done 2010 at [facility]        Medication list         MEDICATIONS  (STANDING):  atorvastatin 80 milliGRAM(s) Oral at bedtime  donepezil 10 milliGRAM(s) Oral at bedtime  finasteride 5 milliGRAM(s) Oral daily  heparin   Injectable 5000 Unit(s) SubCutaneous every 12 hours  lactobacillus acidophilus 1 Tablet(s) Oral daily  melatonin 5 milliGRAM(s) Oral at bedtime  metoprolol succinate ER 25 milliGRAM(s) Oral daily  pantoprazole    Tablet 40 milliGRAM(s) Oral before breakfast  senna 2 Tablet(s) Oral at bedtime  sertraline 100 milliGRAM(s) Oral daily  sodium chloride 0.9%. 1000 milliLiter(s) (50 mL/Hr) IV Continuous <Continuous>  sucralfate suspension 1 Gram(s) Oral four times a day  tamsulosin 0.4 milliGRAM(s) Oral at bedtime  tiotropium 18 MICROgram(s) Capsule 1 Capsule(s) Inhalation daily    MEDICATIONS  (PRN):  acetaminophen   Tablet .. 650 milliGRAM(s) Oral every 4 hours PRN Mild Pain (1 - 3)  ALBUTerol    90 MICROgram(s) HFA Inhaler 2 Puff(s) Inhalation every 15 minutes PRN Shortness of Breath  bisacodyl 5 milliGRAM(s) Oral every 12 hours PRN Constipation         Vitals log        ICU Vital Signs Last 24 Hrs  T(C): 36.4 (22 Aug 2020 04:48), Max: 36.6 (21 Aug 2020 18:46)  T(F): 97.5 (22 Aug 2020 04:48), Max: 97.9 (21 Aug 2020 18:46)  HR: 58 (22 Aug 2020 04:48) (50 - 73)  BP: 161/79 (22 Aug 2020 04:48) (136/73 - 169/90)  BP(mean): --  ABP: --  ABP(mean): --  RR: 17 (22 Aug 2020 04:48) (15 - 17)  SpO2: 93% (22 Aug 2020 04:48) (93% - 98%)           Input and Output:  I&O's Detail    21 Aug 2020 07:01  -  22 Aug 2020 07:00  --------------------------------------------------------  IN:    sodium chloride 0.9%.: 600 mL  Total IN: 600 mL    OUT:  Total OUT: 0 mL    Total NET: 600 mL          Lab Data                        13.5   7.27  )-----------( 145      ( 21 Aug 2020 13:03 )             44.7     08-21    143  |  113<H>  |  40<H>  ----------------------------<  89  4.4   |  26  |  2.60<H>    Ca    9.0      21 Aug 2020 13:03    TPro  7.0  /  Alb  3.3  /  TBili  0.4  /  DBili  x   /  AST  18  /  ALT  16  /  AlkPhos  125<H>  08-21      CARDIAC MARKERS ( 21 Aug 2020 13:03 )  .029 ng/mL / x     / x     / x     / x            Review of Systems	      Objective     Physical Examination    heart s1s2  lung dec BS  abd soft  head nc  head at  on RA  verbal  alert      Pertinent Lab findings & Imaging      Ava:  NO   Adequate UO     I&O's Detail    21 Aug 2020 07:01  -  22 Aug 2020 07:00  --------------------------------------------------------  IN:    sodium chloride 0.9%.: 600 mL  Total IN: 600 mL    OUT:  Total OUT: 0 mL    Total NET: 600 mL               Discussed with:     Cultures:	        Radiology

## 2020-08-22 NOTE — PROGRESS NOTE ADULT - SUBJECTIVE AND OBJECTIVE BOX
Patient is a 85y old  Male who presents with a chief complaint of YOU, Fall (22 Aug 2020 07:20)      FROM ADMISSION H+P:   HPI:  86 yo male with PMH of COPD, dementia, HTN, HLD, CAD s/p CABG, CKD,  BPH, Depression, GERD, osteoporosis, peripheral neuropathy presents to the ED after fall. Patient is a poor historian. According to patient, he slipped on the floor while wearing socks not using his walker and was taken to the ED. Patient denies any head trauma. Per chart review, patient fell multiple times today. Patient denied any lightheadedness or dizziness prior to fall. Patient noted to be tachypneic by ED physician but appears to be breathing comfortable now and denies any current shortness of breath. He denies fever, chills, headache, chest pain, abdominal pain, nausea, vomiting, diarrhea or constipation. Attempted to contact wife Larisa Shelley at [phone] on four different occasions but was unable to reach.     In the ED:  VS Temp 97.6F | HR 73 | /90 | RR 16 | SpO2 96% RA  CBC WBC 7.27, Hgb 13.5, HCt 44.7  CMP: BUN 40, Cr 2.6 (baseline 1.7)  UA grossly normal  CT brain and spine: negative for acute disease  CXR: There are chronic lung changes with increased reticular opacities right upper lobe. Study is limited by rotation. Patient is status post sternotomy and CABG. Aorta is ectatic and tortuous. Left lung is grossly clear.  EKG: Sinus brian with arrhythmia RBBB  s/p 500 L bolus, albuterol, tdap, IV solumedrol, tiotropium (21 Aug 2020 16:10)      INTERVAL HPI/OVERNIGHT EVENTS: Patient was seen and examined. No acute events occurred overnight. No new complaints. Feels well. Has some itchiness at his neck. When asked who to contact for updates, states his brother - no record on file, spoke to RN who states wife Deisi is the contact [phone] - called but no answer, ODALIS left.    I&O's Summary    21 Aug 2020 07:01  -  22 Aug 2020 07:00  --------------------------------------------------------  IN: 600 mL / OUT: 0 mL / NET: 600 mL        PAST MEDICAL & SURGICAL HISTORY:  Dementia  Myocardial infarction  COPD (chronic obstructive pulmonary disease)  Seasonal allergies  GERD (gastroesophageal reflux disease)  Benign prostatic hypertrophy  Hyperlipidemia  Hypertension  Depression  Peripheral Neuropathy  Osteoporosis  CAD (Coronary Artery Disease): MI, s/p CABG with 2 cardiac stents  S/P inguinal hernia repair: On right side  S/P appendectomy  S/P CABG (coronary artery bypass graft): Done in ; 2 cardiac stents in   S/P AVR (Aortic Valve Replacement): Pig valve (bioprosthetic); done  at [facility]      LABS:                        12.4   5.41  )-----------( 168      ( 22 Aug 2020 07:53 )             41.2     08-    141  |  109<H>  |  46<H>  ----------------------------<  183<H>  4.6   |  25  |  2.20<H>    Ca    9.4      22 Aug 2020 07:53    TPro  6.7  /  Alb  3.0<L>  /  TBili  0.3  /  DBili  x   /  AST  22  /  ALT  18  /  AlkPhos  118  08-      Urinalysis Basic - ( 21 Aug 2020 15:11 )    Color: Yellow / Appearance: Clear / S.015 / pH: x  Gluc: x / Ketone: Negative  / Bili: Negative / Urobili: Negative   Blood: x / Protein: Negative / Nitrite: Negative   Leuk Esterase: Negative / RBC: 0-2 /HPF / WBC 0-2   Sq Epi: x / Non Sq Epi: Occasional / Bacteria: Occasional      CAPILLARY BLOOD GLUCOSE            Urinalysis Basic - ( 21 Aug 2020 15:11 )    Color: Yellow / Appearance: Clear / S.015 / pH: x  Gluc: x / Ketone: Negative  / Bili: Negative / Urobili: Negative   Blood: x / Protein: Negative / Nitrite: Negative   Leuk Esterase: Negative / RBC: 0-2 /HPF / WBC 0-2   Sq Epi: x / Non Sq Epi: Occasional / Bacteria: Occasional        MEDICATIONS  (STANDING):  atorvastatin 80 milliGRAM(s) Oral at bedtime  donepezil 10 milliGRAM(s) Oral at bedtime  finasteride 5 milliGRAM(s) Oral daily  heparin   Injectable 5000 Unit(s) SubCutaneous every 12 hours  lactobacillus acidophilus 1 Tablet(s) Oral daily  melatonin 5 milliGRAM(s) Oral at bedtime  metoprolol succinate ER 25 milliGRAM(s) Oral daily  pantoprazole    Tablet 40 milliGRAM(s) Oral before breakfast  senna 2 Tablet(s) Oral at bedtime  sertraline 100 milliGRAM(s) Oral daily  sodium chloride 0.9%. 1000 milliLiter(s) (50 mL/Hr) IV Continuous <Continuous>  sucralfate suspension 1 Gram(s) Oral four times a day  tamsulosin 0.4 milliGRAM(s) Oral at bedtime  tiotropium 18 MICROgram(s) Capsule 1 Capsule(s) Inhalation daily    MEDICATIONS  (PRN):  acetaminophen   Tablet .. 650 milliGRAM(s) Oral every 4 hours PRN Mild Pain (1 - 3)  ALBUTerol    90 MICROgram(s) HFA Inhaler 2 Puff(s) Inhalation every 15 minutes PRN Shortness of Breath  bisacodyl 5 milliGRAM(s) Oral every 12 hours PRN Constipation  calamine/zinc oxide Lotion 1 Application(s) Topical two times a day PRN Rash and/or Itching      Review of Systems (limited due to dementia)  General: no fever, chills  Eyes: no vision changes  ENT: no hearing changes, nasal congestion, or sore throat  CV: no chest pain or palpitations  Pulm: no SOB, wheezing, cough, or hemoptysis  Abd/GI: no nausea, vomiting, diarrhea, constipation, abd pain  : no dysuria, hematuria, urinary frequency  MSK: no joint pain or myalgias  Neuro: no syncope, dizziness, focal weakness  Psych: no anxiety or depression  Endo: no heat or cold intolerance  Skin: admits chronic pruritis on upper back/neck    RADIOLOGY & ADDITIONAL TESTS:    Imaging Personally Reviewed:  [x] YES  [ ] NO    Consultant(s) Notes Reviewed:  [x] YES  [ ] NO    PHYSICAL EXAM:  T(C): 36.4 (20 @ 04:48), Max: 36.6 (20 @ 18:46)  HR: 58 (20 @ 04:48) (50 - 69)  BP: 161/79 (20 @ 04:48) (136/73 - 161/79)  RR: 17 (20 @ 04:48) (15 - 17)  SpO2: 93% (20 @ 04:48) (93% - 98%)    GENERAL: elderly male in NAD in bed, closing his eyes but interactive - mumbling at times  HEAD:  Atraumatic, Normocephalic  EYES: EOMI, PERRLA, conjunctiva and sclera clear  ENMT: No tonsillar erythema, exudates, or enlargement; Moist mucous membranes  NECK: Supple, No JVD, Normal thyroid  NERVOUS SYSTEM:  Awake and alert, lethargic but arousable and conversive, Moves all extremities, sensation to light touch intact   CHEST/LUNG: Clear to auscultation bilaterally; No rales, rhonchi, wheezing, or rubs  HEART: Regular rate and rhythm; No murmurs, rubs, or gallops  ABDOMEN: Soft, Nontender, Nondistended; Bowel sounds present  EXTREMITIES:  2+ Peripheral Pulses, No clubbing, cyanosis, or edema  LYMPH: No lymphadenopathy noted  SKIN: warm, well perfused; mild erythema in upper back by neck;  healed laceration noted on right cheek    Care Discussed with Consultants/Other Providers [x] YES  [ ] NO Patient is a 85y old  Male who presents with a chief complaint of YOU, Fall (22 Aug 2020 07:20)      FROM ADMISSION H+P:   HPI:  86 yo male with PMH of COPD, dementia, HTN, HLD, CAD s/p CABG, CKD,  BPH, Depression, GERD, osteoporosis, peripheral neuropathy presents to the ED after fall. Patient is a poor historian. According to patient, he slipped on the floor while wearing socks not using his walker and was taken to the ED. Patient denies any head trauma. Per chart review, patient fell multiple times today. Patient denied any lightheadedness or dizziness prior to fall. Patient noted to be tachypneic by ED physician but appears to be breathing comfortable now and denies any current shortness of breath. He denies fever, chills, headache, chest pain, abdominal pain, nausea, vomiting, diarrhea or constipation. Attempted to contact wife Larisa Shelley at [phone] on four different occasions but was unable to reach.     In the ED:  VS Temp 97.6F | HR 73 | /90 | RR 16 | SpO2 96% RA  CBC WBC 7.27, Hgb 13.5, HCt 44.7  CMP: BUN 40, Cr 2.6 (baseline 1.7)  UA grossly normal  CT brain and spine: negative for acute disease  CXR: There are chronic lung changes with increased reticular opacities right upper lobe. Study is limited by rotation. Patient is status post sternotomy and CABG. Aorta is ectatic and tortuous. Left lung is grossly clear.  EKG: Sinus brian with arrhythmia RBBB  s/p 500 L bolus, albuterol, tdap, IV solumedrol, tiotropium (21 Aug 2020 16:10)      INTERVAL HPI/OVERNIGHT EVENTS: Patient was seen and examined. No acute events occurred overnight. No new complaints. Feels well. Has some itchiness at his neck. When asked who to contact for updates, states his brother - no record on file, spoke to RN who states family member Deisi is the contact [phone] - called but no answer, ODALIS left.    I&O's Summary    21 Aug 2020 07:01  -  22 Aug 2020 07:00  --------------------------------------------------------  IN: 600 mL / OUT: 0 mL / NET: 600 mL        PAST MEDICAL & SURGICAL HISTORY:  Dementia  Myocardial infarction  COPD (chronic obstructive pulmonary disease)  Seasonal allergies  GERD (gastroesophageal reflux disease)  Benign prostatic hypertrophy  Hyperlipidemia  Hypertension  Depression  Peripheral Neuropathy  Osteoporosis  CAD (Coronary Artery Disease): MI, s/p CABG with 2 cardiac stents  S/P inguinal hernia repair: On right side  S/P appendectomy  S/P CABG (coronary artery bypass graft): Done in ; 2 cardiac stents in   S/P AVR (Aortic Valve Replacement): Pig valve (bioprosthetic); done  at [place]      LABS:                        12.4   5.41  )-----------( 168      ( 22 Aug 2020 07:53 )             41.2     08-    141  |  109<H>  |  46<H>  ----------------------------<  183<H>  4.6   |  25  |  2.20<H>    Ca    9.4      22 Aug 2020 07:53    TPro  6.7  /  Alb  3.0<L>  /  TBili  0.3  /  DBili  x   /  AST  22  /  ALT  18  /  AlkPhos  118  08-      Urinalysis Basic - ( 21 Aug 2020 15:11 )    Color: Yellow / Appearance: Clear / S.015 / pH: x  Gluc: x / Ketone: Negative  / Bili: Negative / Urobili: Negative   Blood: x / Protein: Negative / Nitrite: Negative   Leuk Esterase: Negative / RBC: 0-2 /HPF / WBC 0-2   Sq Epi: x / Non Sq Epi: Occasional / Bacteria: Occasional      CAPILLARY BLOOD GLUCOSE            Urinalysis Basic - ( 21 Aug 2020 15:11 )    Color: Yellow / Appearance: Clear / S.015 / pH: x  Gluc: x / Ketone: Negative  / Bili: Negative / Urobili: Negative   Blood: x / Protein: Negative / Nitrite: Negative   Leuk Esterase: Negative / RBC: 0-2 /HPF / WBC 0-2   Sq Epi: x / Non Sq Epi: Occasional / Bacteria: Occasional        MEDICATIONS  (STANDING):  atorvastatin 80 milliGRAM(s) Oral at bedtime  donepezil 10 milliGRAM(s) Oral at bedtime  finasteride 5 milliGRAM(s) Oral daily  heparin   Injectable 5000 Unit(s) SubCutaneous every 12 hours  lactobacillus acidophilus 1 Tablet(s) Oral daily  melatonin 5 milliGRAM(s) Oral at bedtime  metoprolol succinate ER 25 milliGRAM(s) Oral daily  pantoprazole    Tablet 40 milliGRAM(s) Oral before breakfast  senna 2 Tablet(s) Oral at bedtime  sertraline 100 milliGRAM(s) Oral daily  sodium chloride 0.9%. 1000 milliLiter(s) (50 mL/Hr) IV Continuous <Continuous>  sucralfate suspension 1 Gram(s) Oral four times a day  tamsulosin 0.4 milliGRAM(s) Oral at bedtime  tiotropium 18 MICROgram(s) Capsule 1 Capsule(s) Inhalation daily    MEDICATIONS  (PRN):  acetaminophen   Tablet .. 650 milliGRAM(s) Oral every 4 hours PRN Mild Pain (1 - 3)  ALBUTerol    90 MICROgram(s) HFA Inhaler 2 Puff(s) Inhalation every 15 minutes PRN Shortness of Breath  bisacodyl 5 milliGRAM(s) Oral every 12 hours PRN Constipation  calamine/zinc oxide Lotion 1 Application(s) Topical two times a day PRN Rash and/or Itching      Review of Systems (limited due to dementia)  General: no fever, chills  Eyes: no vision changes  ENT: no hearing changes, nasal congestion, or sore throat  CV: no chest pain or palpitations  Pulm: no SOB, wheezing, cough, or hemoptysis  Abd/GI: no nausea, vomiting, diarrhea, constipation, abd pain  : no dysuria, hematuria, urinary frequency  MSK: no joint pain or myalgias  Neuro: no syncope, dizziness, focal weakness  Psych: no anxiety or depression  Endo: no heat or cold intolerance  Skin: admits chronic pruritis on upper back/neck    RADIOLOGY & ADDITIONAL TESTS:    Imaging Personally Reviewed:  [x] YES  [ ] NO    Consultant(s) Notes Reviewed:  [x] YES  [ ] NO    PHYSICAL EXAM:  T(C): 36.4 (20 @ 04:48), Max: 36.6 (20 @ 18:46)  HR: 58 (20 @ 04:48) (50 - 69)  BP: 161/79 (20 @ 04:48) (136/73 - 161/79)  RR: 17 (20 @ 04:48) (15 - 17)  SpO2: 93% (20 @ 04:48) (93% - 98%)    GENERAL: elderly male in NAD in bed, closing his eyes but interactive - mumbling at times  HEAD:  Atraumatic, Normocephalic  EYES: EOMI, PERRLA, conjunctiva and sclera clear  ENMT: No tonsillar erythema, exudates, or enlargement; Moist mucous membranes  NECK: Supple, No JVD, Normal thyroid  NERVOUS SYSTEM:  Awake and alert, lethargic but arousable and conversive, Moves all extremities, sensation to light touch intact   CHEST/LUNG: Clear to auscultation bilaterally; No rales, rhonchi, wheezing, or rubs  HEART: Regular rate and rhythm; No murmurs, rubs, or gallops  ABDOMEN: Soft, Nontender, Nondistended; Bowel sounds present  EXTREMITIES:  2+ Peripheral Pulses, No clubbing, cyanosis, or edema  LYMPH: No lymphadenopathy noted  SKIN: warm, well perfused; mild erythema in upper back by neck;  healed laceration noted on right cheek    Care Discussed with Consultants/Other Providers [x] YES  [ ] NO Patient is a 85y old  Male who presents with a chief complaint of YOU, Fall (22 Aug 2020 07:20)      FROM ADMISSION H+P:   HPI:  86 yo male with PMH of COPD, dementia, HTN, HLD, CAD s/p CABG, CKD,  BPH, Depression, GERD, osteoporosis, peripheral neuropathy presents to the ED after fall. Patient is a poor historian. According to patient, he slipped on the floor while wearing socks not using his walker and was taken to the ED. Patient denies any head trauma. Per chart review, patient fell multiple times today. Patient denied any lightheadedness or dizziness prior to fall. Patient noted to be tachypneic by ED physician but appears to be breathing comfortable now and denies any current shortness of breath. He denies fever, chills, headache, chest pain, abdominal pain, nausea, vomiting, diarrhea or constipation. Attempted to contact wife Larisa Shelley at [phone] on four different occasions but was unable to reach.     In the ED:  VS Temp 97.6F | HR 73 | /90 | RR 16 | SpO2 96% RA  CBC WBC 7.27, Hgb 13.5, HCt 44.7  CMP: BUN 40, Cr 2.6 (baseline 1.7)  UA grossly normal  CT brain and spine: negative for acute disease  CXR: There are chronic lung changes with increased reticular opacities right upper lobe. Study is limited by rotation. Patient is status post sternotomy and CABG. Aorta is ectatic and tortuous. Left lung is grossly clear.  EKG: Sinus brian with arrhythmia RBBB  s/p 500 L bolus, albuterol, tdap, IV solumedrol, tiotropium (21 Aug 2020 16:10)      INTERVAL HPI/OVERNIGHT EVENTS: Patient was seen and examined. No acute events occurred overnight. No new complaints. Feels well. Has some itchiness at his neck. When asked who to contact for updates, states his brother - no record on file. Called wife Larisa Shelley at [phone] and updated her.    I&O's Summary    21 Aug 2020 07:01  -  22 Aug 2020 07:00  --------------------------------------------------------  IN: 600 mL / OUT: 0 mL / NET: 600 mL        PAST MEDICAL & SURGICAL HISTORY:  Dementia  Myocardial infarction  COPD (chronic obstructive pulmonary disease)  Seasonal allergies  GERD (gastroesophageal reflux disease)  Benign prostatic hypertrophy  Hyperlipidemia  Hypertension  Depression  Peripheral Neuropathy  Osteoporosis  CAD (Coronary Artery Disease): MI, s/p CABG with 2 cardiac stents  S/P inguinal hernia repair: On right side  S/P appendectomy  S/P CABG (coronary artery bypass graft): Done in ; 2 cardiac stents in   S/P AVR (Aortic Valve Replacement): Pig valve (bioprosthetic); done  at [facility]      LABS:                        12.4   5.41  )-----------( 168      ( 22 Aug 2020 07:53 )             41.2     08-    141  |  109<H>  |  46<H>  ----------------------------<  183<H>  4.6   |  25  |  2.20<H>    Ca    9.4      22 Aug 2020 07:53    TPro  6.7  /  Alb  3.0<L>  /  TBili  0.3  /  DBili  x   /  AST  22  /  ALT  18  /  AlkPhos  118  08-      Urinalysis Basic - ( 21 Aug 2020 15:11 )    Color: Yellow / Appearance: Clear / S.015 / pH: x  Gluc: x / Ketone: Negative  / Bili: Negative / Urobili: Negative   Blood: x / Protein: Negative / Nitrite: Negative   Leuk Esterase: Negative / RBC: 0-2 /HPF / WBC 0-2   Sq Epi: x / Non Sq Epi: Occasional / Bacteria: Occasional      CAPILLARY BLOOD GLUCOSE            Urinalysis Basic - ( 21 Aug 2020 15:11 )    Color: Yellow / Appearance: Clear / S.015 / pH: x  Gluc: x / Ketone: Negative  / Bili: Negative / Urobili: Negative   Blood: x / Protein: Negative / Nitrite: Negative   Leuk Esterase: Negative / RBC: 0-2 /HPF / WBC 0-2   Sq Epi: x / Non Sq Epi: Occasional / Bacteria: Occasional        MEDICATIONS  (STANDING):  atorvastatin 80 milliGRAM(s) Oral at bedtime  donepezil 10 milliGRAM(s) Oral at bedtime  finasteride 5 milliGRAM(s) Oral daily  heparin   Injectable 5000 Unit(s) SubCutaneous every 12 hours  lactobacillus acidophilus 1 Tablet(s) Oral daily  melatonin 5 milliGRAM(s) Oral at bedtime  metoprolol succinate ER 25 milliGRAM(s) Oral daily  pantoprazole    Tablet 40 milliGRAM(s) Oral before breakfast  senna 2 Tablet(s) Oral at bedtime  sertraline 100 milliGRAM(s) Oral daily  sodium chloride 0.9%. 1000 milliLiter(s) (50 mL/Hr) IV Continuous <Continuous>  sucralfate suspension 1 Gram(s) Oral four times a day  tamsulosin 0.4 milliGRAM(s) Oral at bedtime  tiotropium 18 MICROgram(s) Capsule 1 Capsule(s) Inhalation daily    MEDICATIONS  (PRN):  acetaminophen   Tablet .. 650 milliGRAM(s) Oral every 4 hours PRN Mild Pain (1 - 3)  ALBUTerol    90 MICROgram(s) HFA Inhaler 2 Puff(s) Inhalation every 15 minutes PRN Shortness of Breath  bisacodyl 5 milliGRAM(s) Oral every 12 hours PRN Constipation  calamine/zinc oxide Lotion 1 Application(s) Topical two times a day PRN Rash and/or Itching      Review of Systems (limited due to dementia)  General: no fever, chills  Eyes: no vision changes  ENT: no hearing changes, nasal congestion, or sore throat  CV: no chest pain or palpitations  Pulm: no SOB, wheezing, cough, or hemoptysis  Abd/GI: no nausea, vomiting, diarrhea, constipation, abd pain  : no dysuria, hematuria, urinary frequency  MSK: no joint pain or myalgias  Neuro: no syncope, dizziness, focal weakness  Psych: no anxiety or depression  Endo: no heat or cold intolerance  Skin: admits chronic pruritis on upper back/neck    RADIOLOGY & ADDITIONAL TESTS:    Imaging Personally Reviewed:  [x] YES  [ ] NO    Consultant(s) Notes Reviewed:  [x] YES  [ ] NO    PHYSICAL EXAM:  T(C): 36.4 (20 @ 04:48), Max: 36.6 (20 @ 18:46)  HR: 58 (20 @ 04:48) (50 - 69)  BP: 161/79 (20 @ 04:48) (136/73 - 161/79)  RR: 17 (20 @ 04:48) (15 - 17)  SpO2: 93% (20 @ 04:48) (93% - 98%)    GENERAL: elderly male in NAD in bed, closing his eyes but interactive - mumbling at times  HEAD:  Atraumatic, Normocephalic  EYES: EOMI, PERRLA, conjunctiva and sclera clear  ENMT: No tonsillar erythema, exudates, or enlargement; Moist mucous membranes  NECK: Supple, No JVD, Normal thyroid  NERVOUS SYSTEM:  Awake and alert, lethargic but arousable and conversive, Moves all extremities, sensation to light touch intact   CHEST/LUNG: Clear to auscultation bilaterally; No rales, rhonchi, wheezing, or rubs  HEART: Regular rate and rhythm; No murmurs, rubs, or gallops  ABDOMEN: Soft, Nontender, Nondistended; Bowel sounds present  EXTREMITIES:  2+ Peripheral Pulses, No clubbing, cyanosis, or edema  LYMPH: No lymphadenopathy noted  SKIN: warm, well perfused; mild erythema in upper back by neck;  healed laceration noted on right cheek    Care Discussed with Consultants/Other Providers [x] YES  [ ] NO

## 2020-08-22 NOTE — PHYSICAL THERAPY INITIAL EVALUATION ADULT - GENERAL OBSERVATIONS, REHAB EVAL
Pt rec'd supine in bed, (+) redness on right cheek bone and multiple cuts and abrasions on bilateral LE's noted.

## 2020-08-23 ENCOUNTER — TRANSCRIPTION ENCOUNTER (OUTPATIENT)
Age: 85
End: 2020-08-23

## 2020-08-23 LAB
ANION GAP SERPL CALC-SCNC: 5 MMOL/L — SIGNIFICANT CHANGE UP (ref 5–17)
BUN SERPL-MCNC: 46 MG/DL — HIGH (ref 7–23)
CALCIUM SERPL-MCNC: 8.7 MG/DL — SIGNIFICANT CHANGE UP (ref 8.5–10.1)
CHLORIDE SERPL-SCNC: 115 MMOL/L — HIGH (ref 96–108)
CO2 SERPL-SCNC: 25 MMOL/L — SIGNIFICANT CHANGE UP (ref 22–31)
CREAT SERPL-MCNC: 1.9 MG/DL — HIGH (ref 0.5–1.3)
GLUCOSE SERPL-MCNC: 77 MG/DL — SIGNIFICANT CHANGE UP (ref 70–99)
HCT VFR BLD CALC: 37.6 % — LOW (ref 39–50)
HGB BLD-MCNC: 11.5 G/DL — LOW (ref 13–17)
MCHC RBC-ENTMCNC: 24.7 PG — LOW (ref 27–34)
MCHC RBC-ENTMCNC: 30.6 GM/DL — LOW (ref 32–36)
MCV RBC AUTO: 80.9 FL — SIGNIFICANT CHANGE UP (ref 80–100)
NRBC # BLD: 0 /100 WBCS — SIGNIFICANT CHANGE UP (ref 0–0)
PLATELET # BLD AUTO: 134 K/UL — LOW (ref 150–400)
POTASSIUM SERPL-MCNC: 3.6 MMOL/L — SIGNIFICANT CHANGE UP (ref 3.5–5.3)
POTASSIUM SERPL-SCNC: 3.6 MMOL/L — SIGNIFICANT CHANGE UP (ref 3.5–5.3)
RBC # BLD: 4.65 M/UL — SIGNIFICANT CHANGE UP (ref 4.2–5.8)
RBC # FLD: 18.5 % — HIGH (ref 10.3–14.5)
SODIUM SERPL-SCNC: 145 MMOL/L — SIGNIFICANT CHANGE UP (ref 135–145)
WBC # BLD: 6.19 K/UL — SIGNIFICANT CHANGE UP (ref 3.8–10.5)
WBC # FLD AUTO: 6.19 K/UL — SIGNIFICANT CHANGE UP (ref 3.8–10.5)

## 2020-08-23 PROCEDURE — 99233 SBSQ HOSP IP/OBS HIGH 50: CPT

## 2020-08-23 RX ADMIN — ATORVASTATIN CALCIUM 80 MILLIGRAM(S): 80 TABLET, FILM COATED ORAL at 22:31

## 2020-08-23 RX ADMIN — Medication 1 GRAM(S): at 06:13

## 2020-08-23 RX ADMIN — HEPARIN SODIUM 5000 UNIT(S): 5000 INJECTION INTRAVENOUS; SUBCUTANEOUS at 17:02

## 2020-08-23 RX ADMIN — HEPARIN SODIUM 5000 UNIT(S): 5000 INJECTION INTRAVENOUS; SUBCUTANEOUS at 06:14

## 2020-08-23 RX ADMIN — TAMSULOSIN HYDROCHLORIDE 0.4 MILLIGRAM(S): 0.4 CAPSULE ORAL at 22:31

## 2020-08-23 RX ADMIN — Medication 1 GRAM(S): at 23:03

## 2020-08-23 RX ADMIN — DONEPEZIL HYDROCHLORIDE 10 MILLIGRAM(S): 10 TABLET, FILM COATED ORAL at 22:31

## 2020-08-23 RX ADMIN — SODIUM CHLORIDE 50 MILLILITER(S): 9 INJECTION INTRAMUSCULAR; INTRAVENOUS; SUBCUTANEOUS at 17:02

## 2020-08-23 RX ADMIN — SENNA PLUS 2 TABLET(S): 8.6 TABLET ORAL at 22:31

## 2020-08-23 RX ADMIN — Medication 1 APPLICATION(S): at 11:48

## 2020-08-23 RX ADMIN — TIOTROPIUM BROMIDE 1 CAPSULE(S): 18 CAPSULE ORAL; RESPIRATORY (INHALATION) at 06:14

## 2020-08-23 RX ADMIN — FINASTERIDE 5 MILLIGRAM(S): 5 TABLET, FILM COATED ORAL at 11:48

## 2020-08-23 RX ADMIN — Medication 1 GRAM(S): at 17:02

## 2020-08-23 RX ADMIN — Medication 1 GRAM(S): at 11:48

## 2020-08-23 RX ADMIN — Medication 1 GRAM(S): at 01:57

## 2020-08-23 RX ADMIN — SERTRALINE 100 MILLIGRAM(S): 25 TABLET, FILM COATED ORAL at 11:48

## 2020-08-23 RX ADMIN — Medication 5 MILLIGRAM(S): at 22:31

## 2020-08-23 RX ADMIN — PANTOPRAZOLE SODIUM 40 MILLIGRAM(S): 20 TABLET, DELAYED RELEASE ORAL at 06:13

## 2020-08-23 RX ADMIN — Medication 1 TABLET(S): at 11:47

## 2020-08-23 NOTE — PROGRESS NOTE ADULT - PROBLEM SELECTOR PLAN 4
does not seem to be in acute exacerbation  continue Inhaler regimen  no need for systemic steroids  pulm Dr. Isidro following
does not seem to be in acute exacerbation  continue Inhaler regimen  no need for systemic steroids  pulm Dr. Isidro following

## 2020-08-23 NOTE — PROGRESS NOTE ADULT - ASSESSMENT
1.	YOU, CKD 3: Prerenal azotemia  2.	S/p fall  3.	Hypertension    Improving renal indices. To continue current meds. Monitor BP trend. Titrate BP meds as needed. Salt restriction.   Avoid nephrotoxic meds as possible. Avoid ACEI, ARB, NSAIDs and IV contrast. Will follow electrolytes and renal function trend.

## 2020-08-23 NOTE — DISCHARGE NOTE PROVIDER - CARE PROVIDER_API CALL
Mike Weems  Cardiovascular Diseases  [address] [place] [address], [facility] [address]  [place], [place] [id number]  Phone: [phone]  Fax: [phone]  Follow Up Time:

## 2020-08-23 NOTE — PROGRESS NOTE ADULT - SUBJECTIVE AND OBJECTIVE BOX
Subjective:       Patient ID: Dacia Hamilton is a 73 y.o. female.    Chief Complaint: rituxan  HPI:   oN Rituxan maintenance for hemolytic anemia. wentr to ED dx with the Flu and on Tamiflu since last week.   Receiving dialysis , taken off myfortil for renal immunosuppression to help alleviate anemia some. Was delayed by 1 week due to flu, here for rx today  REVIEW OF SYSTEMS:     No issues on 14 point review today    PHYSICAL EXAM:     Vitals:    02/23/17 0931   BP: (!) 185/84   Pulse: 71   Resp: 18   Temp: 98.4 °F (36.9 °C)       GENERAL: Comfortable looking patient. Patient is in no distress.  Awake, alert and oriented to time, person and place.  No anxiety, or agitation.      HEENT: Normal conjunctivae and eyelids. WNL.  PERRLA 3 to 4 mm. No icterus, no pallor, no congestion, and no discharge noted.     NECK:  Supple. Trachea is central.  No crepitus.  No JVD or masses.    RESPIRATORY:  No intercostal retractions.  No dullness to percussion.  Chest is clear to auscultation.  No rales, rhonchi or wheezes.  No crepitus.  Good air entry bilaterally.    CARDIOVASCULAR:  S1 and S2 are normally heard without murmurs or gallops.  All peripheral pulses are present.    ABDOMEN:  Normal abdomen.  No hepatosplenomegaly.  No free fluid.  Bowel sounds are present.  No hernia noted. No masses.  No rebound or tenderness.  No guarding or rigidity.  Umbilicus is midline.    LYMPHATICS:  No axillary, cervical, supraclavicular, submental, or inguinal lymphadenopathy.    SKIN/MUSCULOSKELETAL:  There is no evidence of excoriation marks or ecchmosis.  No rashes.  No cyanosis.  No clubbing.  No joint or skeletal deformities noted.  Normal range of motion.    NEUROLOGIC:  Higher functions are appropriate.  No cranial nerve deficits.  Normal fadia.  Normal strength.  Motor and sensory functions are normal.  Deep tendon reflexes are normal.    GENITAL/RECTAL:  Exams are deferred.      Laboratory:     CBC:  Lab Results   Component Value  Date    WBC 4.65 02/23/2017    RBC 2.92 (L) 02/23/2017    HGB 9.5 (L) 02/23/2017    HCT 31.0 (L) 02/23/2017     (H) 02/23/2017    MCH 32.5 (H) 02/23/2017    MCHC 30.6 (L) 02/23/2017    RDW 16.7 (H) 02/23/2017     02/23/2017    MPV 9.6 02/23/2017    GRAN 3.1 02/23/2017    GRAN 66.5 02/23/2017    LYMPH 0.7 (L) 02/23/2017    LYMPH 15.9 (L) 02/23/2017    MONO 0.4 02/23/2017    MONO 9.5 02/23/2017    EOS 0.4 02/23/2017    BASO 0.03 02/23/2017    EOSINOPHIL 7.5 02/23/2017    BASOPHIL 0.6 02/23/2017       BMP: BMP  Lab Results   Component Value Date     02/23/2017    K 3.8 02/23/2017    CL 96 02/23/2017    CO2 36 (H) 02/23/2017    BUN 14 02/23/2017    CREATININE 2.01 (H) 02/23/2017    CALCIUM 8.5 02/23/2017    ANIONGAP 7 (L) 02/23/2017    ESTGFRAFRICA 28 (A) 02/23/2017    EGFRNONAA 24 (A) 02/23/2017       LFT:   Lab Results   Component Value Date    ALT 25 02/23/2017    AST 24 02/23/2017    GGT 25 09/02/2014    ALKPHOS 62 02/23/2017    BILITOT 0.6 02/23/2017         Assessment/Plan:       1. Dyspnea on exertion    2. Severe anemia    3. 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 ESRD (end stage renal disease)    5. S/P kidney transplant        Getting AV access placed next week for dialysis.   Receiving epogen during dialysis  Change rituxan to q month  rtc 4 weeks with cbc, cmp   proceed with rituxan today   Date/Time Patient Seen:  		  Referring MD:   Data Reviewed	       Patient is a 85y old  Male who presents with a chief complaint of YOU, Fall (22 Aug 2020 13:11)      Subjective/HPI     PAST MEDICAL & SURGICAL HISTORY:  Dementia  Myocardial infarction  COPD (chronic obstructive pulmonary disease)  Seasonal allergies  GERD (gastroesophageal reflux disease)  Benign prostatic hypertrophy  Hyperlipidemia  Hypertension  Asthma: Also COPD component  Depression  Peripheral Neuropathy  Asthma  S/P AVR (Aortic Valve Replacement)  S/P CABG (Coronary Artery Bypass Graft)  Osteoporosis  CAD (Coronary Artery Disease): MI, s/p CABG with 2 cardiac stents  HTN (Hypertension)  Dyslipidemia  S/P inguinal hernia repair: On right side  S/P appendectomy  S/P CABG (coronary artery bypass graft): Done in 1996; 2 cardiac stents in 1998  S/P CABG (Coronary Artery Bypass Graft)  S/P AVR (Aortic Valve Replacement): Pig valve (bioprosthetic); done 2010 at [place]        Medication list         MEDICATIONS  (STANDING):  atorvastatin 80 milliGRAM(s) Oral at bedtime  collagenase Ointment 1 Application(s) Topical daily  donepezil 10 milliGRAM(s) Oral at bedtime  finasteride 5 milliGRAM(s) Oral daily  heparin   Injectable 5000 Unit(s) SubCutaneous every 12 hours  lactobacillus acidophilus 1 Tablet(s) Oral daily  melatonin 5 milliGRAM(s) Oral at bedtime  metoprolol succinate ER 25 milliGRAM(s) Oral daily  pantoprazole    Tablet 40 milliGRAM(s) Oral before breakfast  senna 2 Tablet(s) Oral at bedtime  sertraline 100 milliGRAM(s) Oral daily  sodium chloride 0.9%. 1000 milliLiter(s) (50 mL/Hr) IV Continuous <Continuous>  sucralfate suspension 1 Gram(s) Oral four times a day  tamsulosin 0.4 milliGRAM(s) Oral at bedtime  tiotropium 18 MICROgram(s) Capsule 1 Capsule(s) Inhalation daily    MEDICATIONS  (PRN):  acetaminophen   Tablet .. 650 milliGRAM(s) Oral every 4 hours PRN Mild Pain (1 - 3)  ALBUTerol    90 MICROgram(s) HFA Inhaler 2 Puff(s) Inhalation every 15 minutes PRN Shortness of Breath  bisacodyl 5 milliGRAM(s) Oral every 12 hours PRN Constipation  calamine/zinc oxide Lotion 1 Application(s) Topical two times a day PRN Rash and/or Itching  hydrocortisone 1% Cream 1 Application(s) Topical two times a day PRN Rash and/or Itching         Vitals log        ICU Vital Signs Last 24 Hrs  T(C): 36.4 (23 Aug 2020 04:47), Max: 36.5 (22 Aug 2020 13:19)  T(F): 97.6 (23 Aug 2020 04:47), Max: 97.7 (22 Aug 2020 13:19)  HR: 51 (23 Aug 2020 04:47) (48 - 51)  BP: 159/81 (23 Aug 2020 04:47) (153/84 - 159/81)  BP(mean): --  ABP: --  ABP(mean): --  RR: 18 (23 Aug 2020 04:47) (18 - 19)  SpO2: 93% (23 Aug 2020 04:47) (93% - 95%)           Input and Output:  I&O's Detail    21 Aug 2020 07:01  -  22 Aug 2020 07:00  --------------------------------------------------------  IN:    sodium chloride 0.9%.: 600 mL  Total IN: 600 mL    OUT:  Total OUT: 0 mL    Total NET: 600 mL          Lab Data                        12.4   5.41  )-----------( 168      ( 22 Aug 2020 07:53 )             41.2     08-22    141  |  109<H>  |  46<H>  ----------------------------<  183<H>  4.6   |  25  |  2.20<H>    Ca    9.4      22 Aug 2020 07:53    TPro  6.7  /  Alb  3.0<L>  /  TBili  0.3  /  DBili  x   /  AST  22  /  ALT  18  /  AlkPhos  118  08-22      CARDIAC MARKERS ( 21 Aug 2020 13:03 )  .029 ng/mL / x     / x     / x     / x            Review of Systems	      Objective     Physical Examination    heart s1s2  lung dec BS  abd soft  on RA    Pertinent Lab findings & Imaging      Ava:  NO   Adequate UO     I&O's Detail    21 Aug 2020 07:01  -  22 Aug 2020 07:00  --------------------------------------------------------  IN:    sodium chloride 0.9%.: 600 mL  Total IN: 600 mL    OUT:  Total OUT: 0 mL    Total NET: 600 mL               Discussed with:     Cultures:	        Radiology

## 2020-08-23 NOTE — PROGRESS NOTE ADULT - PROBLEM SELECTOR PLAN 3
Chronic, stable  - Continue home metoprolol 25mg  - Monitor vitals
Chronic, stable  - Continue home metoprolol 25mg  - Monitor vitals

## 2020-08-23 NOTE — DISCHARGE NOTE PROVIDER - HOSPITAL COURSE
FROM ADMISSION H+P:     HPI:    86 yo male with PMH of COPD, dementia, HTN, HLD, CAD s/p CABG, CKD,  BPH, Depression, GERD, osteoporosis, peripheral neuropathy presents to the ED after fall. Patient is a poor historian. According to patient, he slipped on the floor while wearing socks not using his walker and was taken to the ED. Patient denies any head trauma. Per chart review, patient fell multiple times today. Patient denied any lightheadedness or dizziness prior to fall. Patient noted to be tachypneic by ED physician but appears to be breathing comfortable now and denies any current shortness of breath. He denies fever, chills, headache, chest pain, abdominal pain, nausea, vomiting, diarrhea or constipation. Attempted to contact wife Larisa Shelley at [phone] on four different occasions but was unable to reach.         In the ED:    VS Temp 97.6F | HR 73 | /90 | RR 16 | SpO2 96% RA    CBC WBC 7.27, Hgb 13.5, HCt 44.7    CMP: BUN 40, Cr 2.6 (baseline 1.7)    UA grossly normal    CT brain and spine: negative for acute disease    CXR: There are chronic lung changes with increased reticular opacities right upper lobe. Study is limited by rotation. Patient is status post sternotomy and CABG. Aorta is ectatic and tortuous. Left lung is grossly clear.    EKG: Sinus brian with arrhythmia RBBB    s/p 500 L bolus, albuterol, tdap, IV solumedrol, tiotropium (21 Aug 2020 16:10)            ---    HOSPITAL COURSE: Patient was admitted for YOU on CKD3 and weakness. He was treated with IV fluids and his creatinine improved to around his baseline. He improved throughout his hospital course. He was evaluated by Physical Therapy, who recommended Subacute Rehabilitation. He was thought to have a COPD exacerbation on admission, but his pulmonary status remained stable and he remained on his maintenance medications after being evaluated by pulmonology. He is stable for discharge to rehab facility.        ---    CONSULTANTS:     Pulpari Haji    ---    I, the attending physician, was physically present for the key portions of the evaluation and management (E/M) service provided.  I agree with the above findings which I have reviewed and edited where appropriate.  The total amount of time spent preparing the discharge and follow up plan was -- minutes. FROM ADMISSION H+P:     HPI:    86 yo male with PMH of COPD, dementia, HTN, HLD, CAD s/p CABG, CKD,  BPH, Depression, GERD, osteoporosis, peripheral neuropathy presents to the ED after fall. Patient is a poor historian. According to patient, he slipped on the floor while wearing socks not using his walker and was taken to the ED. Patient denies any head trauma. Per chart review, patient fell multiple times today. Patient denied any lightheadedness or dizziness prior to fall. Patient noted to be tachypneic by ED physician but appears to be breathing comfortable now and denies any current shortness of breath. He denies fever, chills, headache, chest pain, abdominal pain, nausea, vomiting, diarrhea or constipation. Attempted to contact wife Larisa Shelley at [phone] on four different occasions but was unable to reach.         In the ED:    VS Temp 97.6F | HR 73 | /90 | RR 16 | SpO2 96% RA    CBC WBC 7.27, Hgb 13.5, HCt 44.7    CMP: BUN 40, Cr 2.6 (baseline 1.7)    UA grossly normal    CT brain and spine: negative for acute disease    CXR: There are chronic lung changes with increased reticular opacities right upper lobe. Study is limited by rotation. Patient is status post sternotomy and CABG. Aorta is ectatic and tortuous. Left lung is grossly clear.    EKG: Sinus brian with arrhythmia RBBB    s/p 500 L bolus, albuterol, tdap, IV solumedrol, tiotropium (21 Aug 2020 16:10)            ---    HOSPITAL COURSE: Patient was admitted for YOU on CKD3 and weakness. He was treated with IV fluids and his creatinine improved to around his baseline. He improved throughout his hospital course. He was evaluated by Physical Therapy, who recommended Subacute Rehabilitation. He was thought to have a COPD exacerbation on admission, but his pulmonary status remained stable and he remained on his maintenance medications after being evaluated by pulmonology. He is stable for discharge to rehab facility.        Patient seen and examined at bedside on day of discharge. Awake and interactive but confused and forgetful. No complaints. Had concern for his wife being alone. Wife has been visiting, states that he is forgetful. I spoke to wife and let patient speak to wife on phone as well. He is amenable to discharge to rehab. Denies chest pain, palpitations, dyspnea, headache, dizziness, abdominal pain, n/v/d.         GENERAL: elderly male in NAD    HEAD:  Atraumatic, Normocephalic    EYES: EOMI, PERRLA, conjunctiva and sclera clear    ENMT: No tonsillar erythema, exudates, or enlargement; Moist mucous membranes    NECK: Supple, No JVD, Normal thyroid    NERVOUS SYSTEM:  Awake and alert - confused at baseline, Moves all extremities, sensation to light touch intact     CHEST/LUNG: Clear to auscultation bilaterally; No rales, rhonchi, wheezing, or rubs    HEART: Regular rate and rhythm; No murmurs, rubs, or gallops    ABDOMEN: Soft, Nontender, Nondistended; Bowel sounds present    EXTREMITIES:  2+ Peripheral Pulses, No clubbing, cyanosis, or edema    LYMPH: No lymphadenopathy noted    SKIN: warm, well perfused; healed laceration noted on right cheek        ---    CONSULTANTS:     Landy Haji    ---    I, the attending physician, was physically present for the key portions of the evaluation and management (E/M) service provided.  I agree with the above findings which I have reviewed and edited where appropriate.  The total amount of time spent preparing the discharge and follow up plan was 38 minutes.

## 2020-08-23 NOTE — PROGRESS NOTE ADULT - PROBLEM SELECTOR PLAN 10
VTE ppx: Heparin subQ   IMPROVE VTE Individual Risk Assessment        RISK                                                          Points  [  ] Previous VTE                                                3  [  ] Thrombophilia                                             2  [  ] Lower limb paralysis                                   2        (unable to hold up >15 seconds)    [  ] Current Cancer                                             2         (within 6 months)  [  ] Immobilization > 24 hrs                              1  [  ] ICU/CCU stay > 24 hours                             1  [ x ] Age > 60                                                         1    IMPROVE VTE Score:         [     1    ]    Total Risk Factor Score:    0 - 1:   Consider IPC  >2 - 3:  Thromboprophylaxis required (enoxaparin or SQ heparin)        >4:   High Risk: Thromboprophylaxis required (enoxaparin or SQ heparin), optional add IPC  **If CONTRAINDICATION to enoxaparin or SQ heparin, USE IPCs**
VTE ppx: Heparin subQ   Thrombocytopenia - chronic, stable. can continue with chemical VTE ppx.

## 2020-08-23 NOTE — PROGRESS NOTE ADULT - PROBLEM SELECTOR PLAN 2
Pt presenting after multiple falls today  - Closed laceration on left jaw with multiple lacerations on the face, shoulder and knees  - Pt not an AC, minimal concern for internal bleeding  - CT brain and spine: negative for acute disease  - Gait instability, will place fall precaution, ambulate with assistance  - Pt received Tdap booster
Pt presenting after multiple falls today  - Closed laceration on left jaw with multiple lacerations on the face, shoulder and knees  - Pt not an AC, minimal concern for internal bleeding  - CT brain and spine: negative for acute disease  - Gait instability, will place fall precaution, ambulate with assistance  - Pt received Tdap booster

## 2020-08-23 NOTE — PROGRESS NOTE ADULT - PROBLEM SELECTOR PLAN 8
Chronic  - Pt with history of GI bleed  - Continue on home pantoprazole, bowel regimen  - Monitor stools
Chronic  - Pt with history of GI bleed  - Continue on home pantoprazole, bowel regimen  - Monitor stools

## 2020-08-23 NOTE — PROGRESS NOTE ADULT - PROBLEM SELECTOR PLAN 5
Chronic  - Pt with history of dementia taking donepezil 10mg  - Continue inpatient
Chronic  - Pt with history of dementia taking donepezil 10mg  - Continue inpatient

## 2020-08-23 NOTE — DISCHARGE NOTE PROVIDER - NSDCMRMEDTOKEN_GEN_ALL_CORE_FT
albuterol 90 mcg/inh inhalation aerosol: 2 puff(s) inhaled every 6 hours, As needed, Shortness of Breath and/or Wheezing  atorvastatin 80 mg oral tablet: 1 tab(s) orally once a day (at bedtime)  Bacid (LAC) oral capsule: 1 cap(s) orally 3 times a day  bisacodyl 5 mg oral delayed release tablet: 1 tab(s) orally every 12 hours, As needed, Constipation  donepezil 10 mg oral tablet: 1 tab(s) orally once a day (at bedtime)  finasteride 5 mg oral tablet: 1 tab(s) orally once a day  melatonin 5 mg oral tablet: 1 tab(s) orally once a day (at bedtime), As needed, Insomnia  metoprolol succinate 25 mg oral tablet, extended release: 1 tab(s) orally once a day  pantoprazole 40 mg oral delayed release tablet: 1 tab(s) orally once a day (before a meal)  senna oral tablet: 2 tab(s) orally once a day (at bedtime)  sertraline 100 mg oral tablet: 1 tab(s) orally once a day  sucralfate 1 g/10 mL oral suspension: 10 milliliter(s) orally 4 times a day (before meals and at bedtime)  tamsulosin 0.4 mg oral capsule: 1 cap(s) orally once a day (at bedtime) acetaminophen 325 mg oral tablet: 2 tab(s) orally every 4 hours, As needed, Mild Pain (1 - 3)  albuterol 90 mcg/inh inhalation aerosol: 2 puff(s) inhaled every 6 hours, As needed, Shortness of Breath and/or Wheezing  atorvastatin 80 mg oral tablet: 1 tab(s) orally once a day (at bedtime)  Bacid (LAC) oral capsule: 1 cap(s) orally 3 times a day  bisacodyl 5 mg oral delayed release tablet: 1 tab(s) orally every 12 hours, As needed, Constipation  collagenase 250 units/g topical ointment: 1 application topically once a day to left heel  donepezil 10 mg oral tablet: 1 tab(s) orally once a day (at bedtime)  finasteride 5 mg oral tablet: 1 tab(s) orally once a day  guaiFENesin 100 mg/5 mL oral liquid: 10 milliliter(s) orally every 6 hours, As needed, Cough  hydrocortisone 1% topical cream: 1 application topically 2 times a day, As needed, Rash and/or Itching (upper back/neck area)  melatonin 5 mg oral tablet: 1 tab(s) orally once a day (at bedtime), As needed, Insomnia  metoprolol succinate 25 mg oral tablet, extended release: 1 tab(s) orally once a day  pantoprazole 40 mg oral delayed release tablet: 1 tab(s) orally once a day (before a meal)  senna oral tablet: 2 tab(s) orally once a day (at bedtime)  sertraline 100 mg oral tablet: 1 tab(s) orally once a day  sucralfate 1 g/10 mL oral suspension: 10 milliliter(s) orally 4 times a day (before meals and at bedtime)  tamsulosin 0.4 mg oral capsule: 1 cap(s) orally once a day (at bedtime)  tiotropium 18 mcg inhalation capsule: 1 cap(s) inhaled once a day

## 2020-08-23 NOTE — DISCHARGE NOTE PROVIDER - NSDCCPCAREPLAN_GEN_ALL_CORE_FT
PRINCIPAL DISCHARGE DIAGNOSIS  Diagnosis: YOU (acute kidney injury)  Assessment and Plan of Treatment: YOU on CKD3, improved with IV fluids. Creatinine is at baseline. Encourage oral hydration.      SECONDARY DISCHARGE DIAGNOSES  Diagnosis: Ataxic gait  Assessment and Plan of Treatment: Weakness and ataxic gait. Recommended for MICHELLE by PT.    Diagnosis: COPD (chronic obstructive pulmonary disease)  Assessment and Plan of Treatment: stable. continue home inhalers.    Diagnosis: Closed head injury, initial encounter  Assessment and Plan of Treatment: stable, no acute intracranial bleed    Diagnosis: Abrasion  Assessment and Plan of Treatment: s/p fall. stable.    Diagnosis: Hypertension  Assessment and Plan of Treatment: continue home toprol XL. monitor hemodynamics.    Diagnosis: Dementia  Assessment and Plan of Treatment: c/w donepezil. fall and aspiration precautions    Diagnosis: GERD (gastroesophageal reflux disease)  Assessment and Plan of Treatment: continue home protonix    Diagnosis: CAD (Coronary Artery Disease)  Assessment and Plan of Treatment: continue home statin and beta blocker.    Diagnosis: Hyperlipidemia  Assessment and Plan of Treatment: continue home statin    Diagnosis: Pruritus  Assessment and Plan of Treatment: chronic, stable. continue 1% cortisone cream to affected area (upper back/neck) as per home regimen.    Diagnosis: Depression  Assessment and Plan of Treatment: continue home sertraline PRINCIPAL DISCHARGE DIAGNOSIS  Diagnosis: YOU (acute kidney injury)  Assessment and Plan of Treatment: YOU on CKD3, improved with IV fluids. Creatinine is at baseline (1.7) on discharge. Encourage oral hydration.      SECONDARY DISCHARGE DIAGNOSES  Diagnosis: Ataxic gait  Assessment and Plan of Treatment: Weakness and ataxic gait. Recommended for MICHELLE by PT.    Diagnosis: COPD (chronic obstructive pulmonary disease)  Assessment and Plan of Treatment: stable. continue inhalers.    Diagnosis: Closed head injury, initial encounter  Assessment and Plan of Treatment: stable, no acute intracranial bleed    Diagnosis: Abrasion  Assessment and Plan of Treatment: s/p fall. stable.    Diagnosis: Hypertension  Assessment and Plan of Treatment: continue home toprol XL. monitor hemodynamics.    Diagnosis: Dementia  Assessment and Plan of Treatment: c/w donepezil. fall and aspiration precautions    Diagnosis: GERD (gastroesophageal reflux disease)  Assessment and Plan of Treatment: continue home protonix    Diagnosis: CAD (Coronary Artery Disease)  Assessment and Plan of Treatment: continue home statin and beta blocker.    Diagnosis: Hyperlipidemia  Assessment and Plan of Treatment: continue home statin    Diagnosis: Pruritus  Assessment and Plan of Treatment: chronic, stable. continue 1% cortisone cream to affected area (upper back/neck) as per home regimen.    Diagnosis: Depression  Assessment and Plan of Treatment: continue home sertraline    Diagnosis: Thrombocytopenia  Assessment and Plan of Treatment: chronic, stable. no evidence of acute bleeding.

## 2020-08-23 NOTE — PROGRESS NOTE ADULT - SUBJECTIVE AND OBJECTIVE BOX
Patient is a 85y old  Male who presents with a chief complaint of YOU, Fall (22 Aug 2020 11:57)    Patient seen in follow up for YOU.  Pt resting in bed.        PAST MEDICAL HISTORY:  Dementia  Myocardial infarction  COPD (chronic obstructive pulmonary disease)  Seasonal allergies  GERD (gastroesophageal reflux disease)  Benign prostatic hypertrophy  Hyperlipidemia  Hypertension  Asthma  Depression  Peripheral Neuropathy  Asthma  S/P AVR (Aortic Valve Replacement)  S/P CABG (Coronary Artery Bypass Graft)  Osteoporosis  CAD (Coronary Artery Disease)  HTN (Hypertension)  Dyslipidemia    MEDICATIONS  (STANDING):  atorvastatin 80 milliGRAM(s) Oral at bedtime  collagenase Ointment 1 Application(s) Topical daily  donepezil 10 milliGRAM(s) Oral at bedtime  finasteride 5 milliGRAM(s) Oral daily  heparin   Injectable 5000 Unit(s) SubCutaneous every 12 hours  lactobacillus acidophilus 1 Tablet(s) Oral daily  melatonin 5 milliGRAM(s) Oral at bedtime  metoprolol succinate ER 25 milliGRAM(s) Oral daily  pantoprazole    Tablet 40 milliGRAM(s) Oral before breakfast  senna 2 Tablet(s) Oral at bedtime  sertraline 100 milliGRAM(s) Oral daily  sodium chloride 0.9%. 1000 milliLiter(s) (50 mL/Hr) IV Continuous <Continuous>  sucralfate suspension 1 Gram(s) Oral four times a day  tamsulosin 0.4 milliGRAM(s) Oral at bedtime  tiotropium 18 MICROgram(s) Capsule 1 Capsule(s) Inhalation daily    MEDICATIONS  (PRN):  acetaminophen   Tablet .. 650 milliGRAM(s) Oral every 4 hours PRN Mild Pain (1 - 3)  ALBUTerol    90 MICROgram(s) HFA Inhaler 2 Puff(s) Inhalation every 15 minutes PRN Shortness of Breath  bisacodyl 5 milliGRAM(s) Oral every 12 hours PRN Constipation  calamine/zinc oxide Lotion 1 Application(s) Topical two times a day PRN Rash and/or Itching  guaiFENesin   Syrup  (Sugar-Free) 200 milliGRAM(s) Oral every 6 hours PRN Cough  hydrocortisone 1% Cream 1 Application(s) Topical two times a day PRN Rash and/or Itching    T(C): 36.6 (08-23-20 @ 13:13), Max: 36.6 (08-23-20 @ 13:13)  HR: 52 (08-23-20 @ 13:13) (48 - 58)  BP: 116/80 (08-23-20 @ 13:13) (116/80 - 161/79)  RR: 18 (08-23-20 @ 13:13)  SpO2: 94% (08-23-20 @ 13:13)  Wt(kg): --  I&O's Detail    22 Aug 2020 07:01  -  23 Aug 2020 07:00  --------------------------------------------------------  IN:    sodium chloride 0.9%.: 600 mL  Total IN: 600 mL    OUT:  Total OUT: 0 mL    Total NET: 600 mL        PHYSICAL EXAM:  General: No distress  Respiratory: b/l air entry  Cardiovascular: S1 S2  Gastrointestinal: soft  Extremities:  no edema           LABORATORY:                        11.5   6.19  )-----------( 134      ( 23 Aug 2020 07:38 )             37.6     08-23    145  |  115<H>  |  46<H>  ----------------------------<  77  3.6   |  25  |  1.90<H>    Ca    8.7      23 Aug 2020 07:38    TPro  6.7  /  Alb  3.0<L>  /  TBili  0.3  /  DBili  x   /  AST  22  /  ALT  18  /  AlkPhos  118  08-22    Sodium, Serum: 145 mmol/L (08-23 @ 07:38)  Sodium, Serum: 141 mmol/L (08-22 @ 07:53)    Potassium, Serum: 3.6 mmol/L (08-23 @ 07:38)  Potassium, Serum: 4.6 mmol/L (08-22 @ 07:53)    Hemoglobin: 11.5 g/dL (08-23 @ 07:38)  Hemoglobin: 12.4 g/dL (08-22 @ 07:53)  Hemoglobin: 13.5 g/dL (08-21 @ 13:03)    Creatinine, Serum 1.90 (08-23 @ 07:38)  Creatinine, Serum 2.20 (08-22 @ 07:53)  Creatinine, Serum 2.60 (08-21 @ 13:03)        LIVER FUNCTIONS - ( 22 Aug 2020 07:53 )  Alb: 3.0 g/dL / Pro: 6.7 g/dL / ALK PHOS: 118 U/L / ALT: 18 U/L / AST: 22 U/L / GGT: x

## 2020-08-23 NOTE — PROGRESS NOTE ADULT - SUBJECTIVE AND OBJECTIVE BOX
Patient is a 85y old  Male who presents with a chief complaint of YOU, Fall (23 Aug 2020 06:31)      FROM ADMISSION H+P:   HPI:  84 yo male with PMH of COPD, dementia, HTN, HLD, CAD s/p CABG, CKD,  BPH, Depression, GERD, osteoporosis, peripheral neuropathy presents to the ED after fall. Patient is a poor historian. According to patient, he slipped on the floor while wearing socks not using his walker and was taken to the ED. Patient denies any head trauma. Per chart review, patient fell multiple times today. Patient denied any lightheadedness or dizziness prior to fall. Patient noted to be tachypneic by ED physician but appears to be breathing comfortable now and denies any current shortness of breath. He denies fever, chills, headache, chest pain, abdominal pain, nausea, vomiting, diarrhea or constipation. Attempted to contact wife Larisa Shelley at [phone] on four different occasions but was unable to reach.     In the ED:  VS Temp 97.6F | HR 73 | /90 | RR 16 | SpO2 96% RA  CBC WBC 7.27, Hgb 13.5, HCt 44.7  CMP: BUN 40, Cr 2.6 (baseline 1.7)  UA grossly normal  CT brain and spine: negative for acute disease  CXR: There are chronic lung changes with increased reticular opacities right upper lobe. Study is limited by rotation. Patient is status post sternotomy and CABG. Aorta is ectatic and tortuous. Left lung is grossly clear.  EKG: Sinus brian with arrhythmia RBBB  s/p 500 L bolus, albuterol, tdap, IV solumedrol, tiotropium (21 Aug 2020 16:10)      INTERVAL HPI/OVERNIGHT EVENTS: Patient was seen and examined. No acute events occurred overnight. No new complaints - much more awake today, states everything is fine. Renal indices improving. Dispo planning for [facility].    I&O's Summary    22 Aug 2020 07:01  -  23 Aug 2020 07:00  --------------------------------------------------------  IN: 600 mL / OUT: 0 mL / NET: 600 mL        PAST MEDICAL & SURGICAL HISTORY:  Dementia  Myocardial infarction  COPD (chronic obstructive pulmonary disease)  Seasonal allergies  GERD (gastroesophageal reflux disease)  Benign prostatic hypertrophy  Hyperlipidemia  Hypertension  Depression  Peripheral Neuropathy  Osteoporosis  CAD (Coronary Artery Disease): MI, s/p CABG with 2 cardiac stents  S/P inguinal hernia repair: On right side  S/P appendectomy  S/P CABG (coronary artery bypass graft): Done in ; 2 cardiac stents in   S/P AVR (Aortic Valve Replacement): Pig valve (bioprosthetic); done  at [facility]      LABS:                        11.5   6.19  )-----------( 134      ( 23 Aug 2020 07:38 )             37.6     08-23    145  |  115<H>  |  46<H>  ----------------------------<  77  3.6   |  25  |  1.90<H>    Ca    8.7      23 Aug 2020 07:38    TPro  6.7  /  Alb  3.0<L>  /  TBili  0.3  /  DBili  x   /  AST  22  /  ALT  18  /  AlkPhos  118  08-22      Urinalysis Basic - ( 21 Aug 2020 15:11 )    Color: Yellow / Appearance: Clear / S.015 / pH: x  Gluc: x / Ketone: Negative  / Bili: Negative / Urobili: Negative   Blood: x / Protein: Negative / Nitrite: Negative   Leuk Esterase: Negative / RBC: 0-2 /HPF / WBC 0-2   Sq Epi: x / Non Sq Epi: Occasional / Bacteria: Occasional      CAPILLARY BLOOD GLUCOSE            Urinalysis Basic - ( 21 Aug 2020 15:11 )    Color: Yellow / Appearance: Clear / S.015 / pH: x  Gluc: x / Ketone: Negative  / Bili: Negative / Urobili: Negative   Blood: x / Protein: Negative / Nitrite: Negative   Leuk Esterase: Negative / RBC: 0-2 /HPF / WBC 0-2   Sq Epi: x / Non Sq Epi: Occasional / Bacteria: Occasional        MEDICATIONS  (STANDING):  atorvastatin 80 milliGRAM(s) Oral at bedtime  collagenase Ointment 1 Application(s) Topical daily  donepezil 10 milliGRAM(s) Oral at bedtime  finasteride 5 milliGRAM(s) Oral daily  heparin   Injectable 5000 Unit(s) SubCutaneous every 12 hours  lactobacillus acidophilus 1 Tablet(s) Oral daily  melatonin 5 milliGRAM(s) Oral at bedtime  metoprolol succinate ER 25 milliGRAM(s) Oral daily  pantoprazole    Tablet 40 milliGRAM(s) Oral before breakfast  senna 2 Tablet(s) Oral at bedtime  sertraline 100 milliGRAM(s) Oral daily  sodium chloride 0.9%. 1000 milliLiter(s) (50 mL/Hr) IV Continuous <Continuous>  sucralfate suspension 1 Gram(s) Oral four times a day  tamsulosin 0.4 milliGRAM(s) Oral at bedtime  tiotropium 18 MICROgram(s) Capsule 1 Capsule(s) Inhalation daily    MEDICATIONS  (PRN):  acetaminophen   Tablet .. 650 milliGRAM(s) Oral every 4 hours PRN Mild Pain (1 - 3)  ALBUTerol    90 MICROgram(s) HFA Inhaler 2 Puff(s) Inhalation every 15 minutes PRN Shortness of Breath  bisacodyl 5 milliGRAM(s) Oral every 12 hours PRN Constipation  calamine/zinc oxide Lotion 1 Application(s) Topical two times a day PRN Rash and/or Itching  hydrocortisone 1% Cream 1 Application(s) Topical two times a day PRN Rash and/or Itching      Review of Systems  General: no fever, chills  Eyes: no vision changes  ENT: no hearing changes, nasal congestion, or sore throat  CV: no chest pain or palpitations  Pulm: no SOB, wheezing, cough, or hemoptysis  Abd/GI: no nausea, vomiting, diarrhea, constipation, abd pain  : no dysuria, hematuria, urinary frequency  MSK: no joint pain or myalgias  Neuro: no syncope, dizziness, focal weakness  Psych: no anxiety or depression  Skin: no more itchiness      RADIOLOGY & ADDITIONAL TESTS:    Imaging Personally Reviewed:  [x] YES  [ ] NO    Consultant(s) Notes Reviewed:  [x] YES  [ ] NO    PHYSICAL EXAM:  T(C): 36.4 (20 @ 04:47), Max: 36.5 (20 @ 13:19)  HR: 51 (20 @ 04:47) (48 - 51)  BP: 159/81 (20 @ 04:47) (153/84 - 159/81)  RR: 18 (20 @ 04:47) (18 - 19)  SpO2: 93% (20 @ 04:47) (93% - 95%)    GENERAL: elderly male in NAD  HEAD:  Atraumatic, Normocephalic  EYES: EOMI, PERRLA, conjunctiva and sclera clear  ENMT: No tonsillar erythema, exudates, or enlargement; Moist mucous membranes  NECK: Supple, No JVD, Normal thyroid  NERVOUS SYSTEM:  Awake and alert, Moves all extremities, sensation to light touch intact   CHEST/LUNG: Clear to auscultation bilaterally; No rales, rhonchi, wheezing, or rubs  HEART: Regular rate and rhythm; No murmurs, rubs, or gallops  ABDOMEN: Soft, Nontender, Nondistended; Bowel sounds present  EXTREMITIES:  2+ Peripheral Pulses, No clubbing, cyanosis, or edema  LYMPH: No lymphadenopathy noted  SKIN: warm, well perfused; healed laceration noted on right cheek    Care Discussed with Consultants/Other Providers [x] YES  [ ] NO

## 2020-08-23 NOTE — PROGRESS NOTE ADULT - PROBLEM SELECTOR PLAN 6
Patient with history of CAD s/p CABG  - Echo from 1/20/2020 showing HFpEF, w/ grossly normal LV function  - Continue on home metoprolol  - Monitor vital signs
Patient with history of CAD s/p CABG  - Echo from 1/20/2020 showing HFpEF, w/ grossly normal LV function  - Continue on home metoprolol  - Monitor vital signs
Stable

## 2020-08-24 ENCOUNTER — TRANSCRIPTION ENCOUNTER (OUTPATIENT)
Age: 85
End: 2020-08-24

## 2020-08-24 VITALS
OXYGEN SATURATION: 93 % | HEART RATE: 52 BPM | RESPIRATION RATE: 19 BRPM | SYSTOLIC BLOOD PRESSURE: 149 MMHG | DIASTOLIC BLOOD PRESSURE: 94 MMHG | TEMPERATURE: 98 F

## 2020-08-24 LAB
ANION GAP SERPL CALC-SCNC: 4 MMOL/L — LOW (ref 5–17)
BUN SERPL-MCNC: 38 MG/DL — HIGH (ref 7–23)
CALCIUM SERPL-MCNC: 9.1 MG/DL — SIGNIFICANT CHANGE UP (ref 8.5–10.1)
CHLORIDE SERPL-SCNC: 110 MMOL/L — HIGH (ref 96–108)
CO2 SERPL-SCNC: 28 MMOL/L — SIGNIFICANT CHANGE UP (ref 22–31)
CREAT SERPL-MCNC: 1.7 MG/DL — HIGH (ref 0.5–1.3)
GLUCOSE SERPL-MCNC: 76 MG/DL — SIGNIFICANT CHANGE UP (ref 70–99)
HCT VFR BLD CALC: 42.6 % — SIGNIFICANT CHANGE UP (ref 39–50)
HGB BLD-MCNC: 12.6 G/DL — LOW (ref 13–17)
MCHC RBC-ENTMCNC: 24.5 PG — LOW (ref 27–34)
MCHC RBC-ENTMCNC: 29.6 GM/DL — LOW (ref 32–36)
MCV RBC AUTO: 82.7 FL — SIGNIFICANT CHANGE UP (ref 80–100)
NRBC # BLD: 0 /100 WBCS — SIGNIFICANT CHANGE UP (ref 0–0)
PLATELET # BLD AUTO: 138 K/UL — LOW (ref 150–400)
POTASSIUM SERPL-MCNC: 3.2 MMOL/L — LOW (ref 3.5–5.3)
POTASSIUM SERPL-SCNC: 3.2 MMOL/L — LOW (ref 3.5–5.3)
RBC # BLD: 5.15 M/UL — SIGNIFICANT CHANGE UP (ref 4.2–5.8)
RBC # FLD: 18 % — HIGH (ref 10.3–14.5)
SODIUM SERPL-SCNC: 142 MMOL/L — SIGNIFICANT CHANGE UP (ref 135–145)
WBC # BLD: 6.41 K/UL — SIGNIFICANT CHANGE UP (ref 3.8–10.5)
WBC # FLD AUTO: 6.41 K/UL — SIGNIFICANT CHANGE UP (ref 3.8–10.5)

## 2020-08-24 PROCEDURE — 84484 ASSAY OF TROPONIN QUANT: CPT

## 2020-08-24 PROCEDURE — 81001 URINALYSIS AUTO W/SCOPE: CPT

## 2020-08-24 PROCEDURE — 94640 AIRWAY INHALATION TREATMENT: CPT

## 2020-08-24 PROCEDURE — 80048 BASIC METABOLIC PNL TOTAL CA: CPT

## 2020-08-24 PROCEDURE — 99285 EMERGENCY DEPT VISIT HI MDM: CPT | Mod: 25

## 2020-08-24 PROCEDURE — 86769 SARS-COV-2 COVID-19 ANTIBODY: CPT

## 2020-08-24 PROCEDURE — 97116 GAIT TRAINING THERAPY: CPT

## 2020-08-24 PROCEDURE — 72125 CT NECK SPINE W/O DYE: CPT

## 2020-08-24 PROCEDURE — 93005 ELECTROCARDIOGRAM TRACING: CPT

## 2020-08-24 PROCEDURE — 83880 ASSAY OF NATRIURETIC PEPTIDE: CPT

## 2020-08-24 PROCEDURE — 87635 SARS-COV-2 COVID-19 AMP PRB: CPT

## 2020-08-24 PROCEDURE — 36415 COLL VENOUS BLD VENIPUNCTURE: CPT

## 2020-08-24 PROCEDURE — 83036 HEMOGLOBIN GLYCOSYLATED A1C: CPT

## 2020-08-24 PROCEDURE — 85027 COMPLETE CBC AUTOMATED: CPT

## 2020-08-24 PROCEDURE — 80053 COMPREHEN METABOLIC PANEL: CPT

## 2020-08-24 PROCEDURE — 90715 TDAP VACCINE 7 YRS/> IM: CPT

## 2020-08-24 PROCEDURE — 99239 HOSP IP/OBS DSCHRG MGMT >30: CPT

## 2020-08-24 PROCEDURE — 90471 IMMUNIZATION ADMIN: CPT

## 2020-08-24 PROCEDURE — 70450 CT HEAD/BRAIN W/O DYE: CPT

## 2020-08-24 PROCEDURE — 97162 PT EVAL MOD COMPLEX 30 MIN: CPT

## 2020-08-24 PROCEDURE — 71045 X-RAY EXAM CHEST 1 VIEW: CPT

## 2020-08-24 PROCEDURE — 97530 THERAPEUTIC ACTIVITIES: CPT

## 2020-08-24 PROCEDURE — 96374 THER/PROPH/DIAG INJ IV PUSH: CPT

## 2020-08-24 RX ORDER — TIOTROPIUM BROMIDE 18 UG/1
1 CAPSULE ORAL; RESPIRATORY (INHALATION)
Qty: 0 | Refills: 0 | DISCHARGE
Start: 2020-08-24

## 2020-08-24 RX ORDER — HYDROCORTISONE 1 %
1 OINTMENT (GRAM) TOPICAL
Qty: 0 | Refills: 0 | DISCHARGE
Start: 2020-08-24

## 2020-08-24 RX ORDER — POTASSIUM CHLORIDE 20 MEQ
40 PACKET (EA) ORAL EVERY 4 HOURS
Refills: 0 | Status: COMPLETED | OUTPATIENT
Start: 2020-08-24 | End: 2020-08-24

## 2020-08-24 RX ORDER — COLLAGENASE CLOSTRIDIUM HIST. 250 UNIT/G
1 OINTMENT (GRAM) TOPICAL
Qty: 0 | Refills: 0 | DISCHARGE
Start: 2020-08-24

## 2020-08-24 RX ORDER — ACETAMINOPHEN 500 MG
2 TABLET ORAL
Qty: 0 | Refills: 0 | DISCHARGE
Start: 2020-08-24

## 2020-08-24 RX ADMIN — HEPARIN SODIUM 5000 UNIT(S): 5000 INJECTION INTRAVENOUS; SUBCUTANEOUS at 05:59

## 2020-08-24 RX ADMIN — Medication 1 GRAM(S): at 05:59

## 2020-08-24 RX ADMIN — SODIUM CHLORIDE 50 MILLILITER(S): 9 INJECTION INTRAMUSCULAR; INTRAVENOUS; SUBCUTANEOUS at 05:59

## 2020-08-24 RX ADMIN — TIOTROPIUM BROMIDE 1 CAPSULE(S): 18 CAPSULE ORAL; RESPIRATORY (INHALATION) at 05:59

## 2020-08-24 RX ADMIN — Medication 40 MILLIEQUIVALENT(S): at 14:15

## 2020-08-24 RX ADMIN — Medication 1 GRAM(S): at 11:22

## 2020-08-24 RX ADMIN — Medication 25 MILLIGRAM(S): at 05:59

## 2020-08-24 RX ADMIN — SERTRALINE 100 MILLIGRAM(S): 25 TABLET, FILM COATED ORAL at 11:22

## 2020-08-24 RX ADMIN — Medication 40 MILLIEQUIVALENT(S): at 11:22

## 2020-08-24 RX ADMIN — FINASTERIDE 5 MILLIGRAM(S): 5 TABLET, FILM COATED ORAL at 11:22

## 2020-08-24 RX ADMIN — Medication 1 TABLET(S): at 11:22

## 2020-08-24 RX ADMIN — Medication 1 APPLICATION(S): at 11:22

## 2020-08-24 RX ADMIN — PANTOPRAZOLE SODIUM 40 MILLIGRAM(S): 20 TABLET, DELAYED RELEASE ORAL at 05:59

## 2020-08-24 NOTE — PROGRESS NOTE ADULT - PROBLEM SELECTOR PROBLEM 1
COPD (chronic obstructive pulmonary disease)
YOU (acute kidney injury)
YOU (acute kidney injury)

## 2020-08-24 NOTE — GOALS OF CARE CONVERSATION - ADVANCED CARE PLANNING - CONVERSATION DETAILS
spoke to pt, inquired how many yrs he was : 18,  no children , contacted pt wife, Larisa on phone, [place], simple lay terms slowly explained to wife, reviewed copy of molst from 6/2020 by wife over phone.Wife unable to locate original molst at home.   molst updated, wife aware of pt resuscitation wishes,  28 yrs to pt.  wife agrees to DNR, DNI updated KELLEY: pt would not want to feeding tube, he would want pleasure feeds, liked the idea of chocolate ice cream.  wants pt treated: IV fluids & antibiotics, goal for pt to [facility], request [facility].  Will pass on information to CM.   PC RN available for assist to wife.

## 2020-08-24 NOTE — DISCHARGE NOTE NURSING/CASE MANAGEMENT/SOCIAL WORK - PATIENT PORTAL LINK FT
You can access the FollowMyHealth Patient Portal offered by [facility] by registering at the following website: [email or web address]. By joining [email or web address]’s FollowMyHealth portal, you will also be able to view your health information using other applications (apps) compatible with our system.

## 2020-08-24 NOTE — CHART NOTE - NSCHARTNOTEFT_GEN_A_CORE
The patient was seen and examined on the day of discharge by the attending physician. Pt stable for discharge. Please see discharge note for additional information regarding the hospital course and the day of discharge.     Vital Signs Last 24 Hrs  T(C): 36.5 (24 Aug 2020 05:37), Max: 36.6 (23 Aug 2020 13:13)  T(F): 97.7 (24 Aug 2020 05:37), Max: 97.8 (23 Aug 2020 13:13)  HR: 64 (24 Aug 2020 05:58) (49 - 64)  BP: 179/83 (24 Aug 2020 05:37) (116/80 - 179/83)  RR: 20 (24 Aug 2020 05:37) (18 - 20)  SpO2: 93% (24 Aug 2020 05:37) (93% - 95%)    YOU on CKD 3 - resolved    [facility] completed: Pt is DNR    Spoke to wife Larisa, LIOR, Palliative

## 2020-08-24 NOTE — PROGRESS NOTE ADULT - ASSESSMENT
1.	YOU, CKD 3: Prerenal azotemia  2.	S/p fall  3.	Hypertension  4.	Hypokalemia    Improving renal indices. Will d/c IVF. Potassium supps. Monitor BP trend. Titrate BP meds as needed. Salt restriction.   Avoid nephrotoxic meds as possible. Avoid ACEI, ARB, NSAIDs and IV contrast. Will follow electrolytes and renal function trend.

## 2020-08-24 NOTE — PROGRESS NOTE ADULT - SUBJECTIVE AND OBJECTIVE BOX
Patient is a 85y old  Male who presents with a chief complaint of YOU, Fall (22 Aug 2020 11:57)  Patient seen in follow up for YOU.    Pt resting in bed. No new complaints.        PAST MEDICAL HISTORY:  Dementia  Myocardial infarction  COPD (chronic obstructive pulmonary disease)  Seasonal allergies  GERD (gastroesophageal reflux disease)  Benign prostatic hypertrophy  Hyperlipidemia  Hypertension  Asthma  Depression  Peripheral Neuropathy  Asthma  S/P AVR (Aortic Valve Replacement)  S/P CABG (Coronary Artery Bypass Graft)  Osteoporosis  CAD (Coronary Artery Disease)  HTN (Hypertension)  Dyslipidemia    MEDICATIONS  (STANDING):  atorvastatin 80 milliGRAM(s) Oral at bedtime  collagenase Ointment 1 Application(s) Topical daily  donepezil 10 milliGRAM(s) Oral at bedtime  finasteride 5 milliGRAM(s) Oral daily  heparin   Injectable 5000 Unit(s) SubCutaneous every 12 hours  lactobacillus acidophilus 1 Tablet(s) Oral daily  melatonin 5 milliGRAM(s) Oral at bedtime  metoprolol succinate ER 25 milliGRAM(s) Oral daily  pantoprazole    Tablet 40 milliGRAM(s) Oral before breakfast  potassium chloride    Tablet ER 40 milliEquivalent(s) Oral every 4 hours  senna 2 Tablet(s) Oral at bedtime  sertraline 100 milliGRAM(s) Oral daily  sodium chloride 0.9%. 1000 milliLiter(s) (50 mL/Hr) IV Continuous <Continuous>  sucralfate suspension 1 Gram(s) Oral four times a day  tamsulosin 0.4 milliGRAM(s) Oral at bedtime  tiotropium 18 MICROgram(s) Capsule 1 Capsule(s) Inhalation daily    MEDICATIONS  (PRN):  acetaminophen   Tablet .. 650 milliGRAM(s) Oral every 4 hours PRN Mild Pain (1 - 3)  ALBUTerol    90 MICROgram(s) HFA Inhaler 2 Puff(s) Inhalation every 15 minutes PRN Shortness of Breath  bisacodyl 5 milliGRAM(s) Oral every 12 hours PRN Constipation  calamine/zinc oxide Lotion 1 Application(s) Topical two times a day PRN Rash and/or Itching  guaiFENesin   Syrup  (Sugar-Free) 200 milliGRAM(s) Oral every 6 hours PRN Cough  hydrocortisone 1% Cream 1 Application(s) Topical two times a day PRN Rash and/or Itching    T(C): 36.5 (08-24-20 @ 05:37), Max: 36.6 (08-23-20 @ 13:13)  HR: 64 (08-24-20 @ 05:58) (48 - 64)  BP: 179/83 (08-24-20 @ 05:37) (116/80 - 179/83)  RR: 20 (08-24-20 @ 05:37)  SpO2: 93% (08-24-20 @ 05:37)  Wt(kg): --  I&O's Detail    23 Aug 2020 07:01  -  24 Aug 2020 07:00  --------------------------------------------------------  IN:    sodium chloride 0.9%.: 600 mL  Total IN: 600 mL    OUT:    Stool: 1 mL  Total OUT: 1 mL    Total NET: 599 mL      24 Aug 2020 07:01  -  24 Aug 2020 10:59  --------------------------------------------------------  IN:    sodium chloride 0.9%.: 150 mL  Total IN: 150 mL    OUT:  Total OUT: 0 mL    Total NET: 150 mL        PHYSICAL EXAM:  General: No distress  Respiratory: b/l air entry  Cardiovascular: S1 S2  Gastrointestinal: soft  Extremities:  no edema             LABORATORY:                        12.6   6.41  )-----------( 138      ( 24 Aug 2020 09:10 )             42.6     08-24    142  |  110<H>  |  38<H>  ----------------------------<  76  3.2<L>   |  28  |  1.70<H>    Ca    9.1      24 Aug 2020 09:10      Sodium, Serum: 142 mmol/L (08-24 @ 09:10)  Sodium, Serum: 145 mmol/L (08-23 @ 07:38)    Potassium, Serum: 3.2 mmol/L (08-24 @ 09:10)  Potassium, Serum: 3.6 mmol/L (08-23 @ 07:38)    Hemoglobin: 12.6 g/dL (08-24 @ 09:10)  Hemoglobin: 11.5 g/dL (08-23 @ 07:38)  Hemoglobin: 12.4 g/dL (08-22 @ 07:53)  Hemoglobin: 13.5 g/dL (08-21 @ 13:03)    Creatinine, Serum 1.70 (08-24 @ 09:10)  Creatinine, Serum 1.90 (08-23 @ 07:38)  Creatinine, Serum 2.20 (08-22 @ 07:53)  Creatinine, Serum 2.60 (08-21 @ 13:03)

## 2020-08-24 NOTE — PROGRESS NOTE ADULT - SUBJECTIVE AND OBJECTIVE BOX
Date/Time Patient Seen:  		  Referring MD:   Data Reviewed	       Patient is a 85y old  Male who presents with a chief complaint of YOU, Fall (23 Aug 2020 19:58)      Subjective/HPI     PAST MEDICAL & SURGICAL HISTORY:  Dementia  Myocardial infarction  COPD (chronic obstructive pulmonary disease)  Seasonal allergies  GERD (gastroesophageal reflux disease)  Benign prostatic hypertrophy  Hyperlipidemia  Hypertension  Asthma: Also COPD component  Depression  Peripheral Neuropathy  Asthma  S/P AVR (Aortic Valve Replacement)  S/P CABG (Coronary Artery Bypass Graft)  Osteoporosis  CAD (Coronary Artery Disease): MI, s/p CABG with 2 cardiac stents  HTN (Hypertension)  Dyslipidemia  S/P inguinal hernia repair: On right side  S/P appendectomy  S/P CABG (coronary artery bypass graft): Done in 1996; 2 cardiac stents in 1998  S/P CABG (Coronary Artery Bypass Graft)  S/P AVR (Aortic Valve Replacement): Pig valve (bioprosthetic); done 2010 at [facility]        Medication list         MEDICATIONS  (STANDING):  atorvastatin 80 milliGRAM(s) Oral at bedtime  collagenase Ointment 1 Application(s) Topical daily  donepezil 10 milliGRAM(s) Oral at bedtime  finasteride 5 milliGRAM(s) Oral daily  heparin   Injectable 5000 Unit(s) SubCutaneous every 12 hours  lactobacillus acidophilus 1 Tablet(s) Oral daily  melatonin 5 milliGRAM(s) Oral at bedtime  metoprolol succinate ER 25 milliGRAM(s) Oral daily  pantoprazole    Tablet 40 milliGRAM(s) Oral before breakfast  senna 2 Tablet(s) Oral at bedtime  sertraline 100 milliGRAM(s) Oral daily  sodium chloride 0.9%. 1000 milliLiter(s) (50 mL/Hr) IV Continuous <Continuous>  sucralfate suspension 1 Gram(s) Oral four times a day  tamsulosin 0.4 milliGRAM(s) Oral at bedtime  tiotropium 18 MICROgram(s) Capsule 1 Capsule(s) Inhalation daily    MEDICATIONS  (PRN):  acetaminophen   Tablet .. 650 milliGRAM(s) Oral every 4 hours PRN Mild Pain (1 - 3)  ALBUTerol    90 MICROgram(s) HFA Inhaler 2 Puff(s) Inhalation every 15 minutes PRN Shortness of Breath  bisacodyl 5 milliGRAM(s) Oral every 12 hours PRN Constipation  calamine/zinc oxide Lotion 1 Application(s) Topical two times a day PRN Rash and/or Itching  guaiFENesin   Syrup  (Sugar-Free) 200 milliGRAM(s) Oral every 6 hours PRN Cough  hydrocortisone 1% Cream 1 Application(s) Topical two times a day PRN Rash and/or Itching         Vitals log        ICU Vital Signs Last 24 Hrs  T(C): 36.5 (24 Aug 2020 05:37), Max: 36.6 (23 Aug 2020 13:13)  T(F): 97.7 (24 Aug 2020 05:37), Max: 97.8 (23 Aug 2020 13:13)  HR: 64 (24 Aug 2020 05:58) (49 - 64)  BP: 179/83 (24 Aug 2020 05:37) (116/80 - 179/83)  BP(mean): --  ABP: --  ABP(mean): --  RR: 20 (24 Aug 2020 05:37) (18 - 20)  SpO2: 93% (24 Aug 2020 05:37) (93% - 95%)           Input and Output:  I&O's Detail    22 Aug 2020 07:01  -  23 Aug 2020 07:00  --------------------------------------------------------  IN:    sodium chloride 0.9%.: 600 mL  Total IN: 600 mL    OUT:  Total OUT: 0 mL    Total NET: 600 mL      23 Aug 2020 07:01  -  24 Aug 2020 06:38  --------------------------------------------------------  IN:    sodium chloride 0.9%.: 600 mL  Total IN: 600 mL    OUT:    Stool: 1 mL  Total OUT: 1 mL    Total NET: 599 mL          Lab Data                        11.5   6.19  )-----------( 134      ( 23 Aug 2020 07:38 )             37.6     08-23    145  |  115<H>  |  46<H>  ----------------------------<  77  3.6   |  25  |  1.90<H>    Ca    8.7      23 Aug 2020 07:38    TPro  6.7  /  Alb  3.0<L>  /  TBili  0.3  /  DBili  x   /  AST  22  /  ALT  18  /  AlkPhos  118  08-22            Review of Systems	      Objective     Physical Examination    heart s1s2  lung dec BS  abd soft  head nc  head at  on RA      Pertinent Lab findings & Imaging      Ava:  NO   Adequate UO     I&O's Detail    22 Aug 2020 07:01  -  23 Aug 2020 07:00  --------------------------------------------------------  IN:    sodium chloride 0.9%.: 600 mL  Total IN: 600 mL    OUT:  Total OUT: 0 mL    Total NET: 600 mL      23 Aug 2020 07:01  -  24 Aug 2020 06:38  --------------------------------------------------------  IN:    sodium chloride 0.9%.: 600 mL  Total IN: 600 mL    OUT:    Stool: 1 mL  Total OUT: 1 mL    Total NET: 599 mL               Discussed with:     Cultures:	        Radiology

## 2020-08-24 NOTE — PROGRESS NOTE ADULT - PROBLEM SELECTOR PLAN 1
Fall - ataxic gait - PT - fall prec - assist with ADL  COPD - on Inhaler regimen - on RA - no acute sx - seasonal vaccination  Heart Disease - cvs rx regimen and BP control - diet control  CKD - YOU - on IVF - monitor for vol overload - I and O - serial renal indices -   Dementia - alert - on Donezepil - supportive measures  GOC discussion - documented - pt is DNR.
fall - ataxic gait - PT - fall prec - assist with ADL  COPD - on Inhaler regimen - on RA - no acute sx - seasonal vaccination  Heart Disease - cvs rx regimen and BP control - diet control  CKD - YOU - on IVF - monitor for vol overload - I and O - serial renal indices -   Dementia - alert - on Donezepil - supportive measures  GOC discussion - documented - pt is DNR
fall - ataxic gait - PT - fall prec - assist with ADL  COPD - on Inhaler regimen - on RA - no acute sx - seasonal vaccination  Heart Disease - cvs rx regimen and BP control - diet control  CKD - YOU - on IVF - monitor for vol overload - I and O - serial renal indices -   Dementia - alert - on Donezepil - supportive measures  GOC discussion - documented - pt is DNR.
YOU on CKD3, likely prerenal azotemia, improving with IV fluids - close to baseline  - Pt presenting with Cr 2.6, baseline 1.7  - avoid nephrotoxic medication  - continue gentle IV hydration  - monitor renal indices  - nephro Dr. Haji follow up  - PT eval noted - rachell MARTINEZ, planning for dc tomorrow 8/24
YOU on CKD3, likely prerenal azotemia, improving with IV fluids  - Pt presenting with Cr 2.6, baseline 1.7  - avoid nephrotoxic medication  - continue gentle IV hydration  - monitor renal indices  - nephro Dr. Haji follow up  - PT eval noted - rec [facility]

## 2020-09-18 NOTE — SWALLOW VFSS/MBS ASSESSMENT ADULT - COMMENTS
Clinical swallow assessment completed 6/3, at which time pt was recommended soft solids with thin liquids. MBS was recommended to r/o silent aspiration. No masses; no nipple discharge Clinical swallow assessment completed 6/3, at which time pt was recommended soft solids with thin liquids. MBS was recommended to r/o silent aspiration.    Per discussion with RN, pt was NPO for ultrasound this AM. Pt is now s/p ultrasound and able to participate in PO trials for purposes of study.

## 2020-11-04 ENCOUNTER — INPATIENT (INPATIENT)
Facility: HOSPITAL | Age: 85
LOS: 2 days | Discharge: HOME | DRG: 191 | End: 2020-11-07
Attending: INTERNAL MEDICINE | Admitting: INTERNAL MEDICINE
Payer: MEDICARE

## 2020-11-04 VITALS — HEIGHT: 71 IN

## 2020-11-04 DIAGNOSIS — E78.5 HYPERLIPIDEMIA, UNSPECIFIED: ICD-10-CM

## 2020-11-04 DIAGNOSIS — N40.0 BENIGN PROSTATIC HYPERPLASIA WITHOUT LOWER URINARY TRACT SYMPTOMS: ICD-10-CM

## 2020-11-04 DIAGNOSIS — J44.9 CHRONIC OBSTRUCTIVE PULMONARY DISEASE, UNSPECIFIED: ICD-10-CM

## 2020-11-04 DIAGNOSIS — N17.9 ACUTE KIDNEY FAILURE, UNSPECIFIED: ICD-10-CM

## 2020-11-04 DIAGNOSIS — J44.1 CHRONIC OBSTRUCTIVE PULMONARY DISEASE WITH (ACUTE) EXACERBATION: ICD-10-CM

## 2020-11-04 DIAGNOSIS — F32.9 MAJOR DEPRESSIVE DISORDER, SINGLE EPISODE, UNSPECIFIED: ICD-10-CM

## 2020-11-04 DIAGNOSIS — I25.10 ATHEROSCLEROTIC HEART DISEASE OF NATIVE CORONARY ARTERY WITHOUT ANGINA PECTORIS: ICD-10-CM

## 2020-11-04 DIAGNOSIS — Z98.89 OTHER SPECIFIED POSTPROCEDURAL STATES: Chronic | ICD-10-CM

## 2020-11-04 DIAGNOSIS — E87.0 HYPEROSMOLALITY AND HYPERNATREMIA: ICD-10-CM

## 2020-11-04 DIAGNOSIS — Z95.1 PRESENCE OF AORTOCORONARY BYPASS GRAFT: Chronic | ICD-10-CM

## 2020-11-04 DIAGNOSIS — I10 ESSENTIAL (PRIMARY) HYPERTENSION: ICD-10-CM

## 2020-11-04 DIAGNOSIS — K21.9 GASTRO-ESOPHAGEAL REFLUX DISEASE WITHOUT ESOPHAGITIS: ICD-10-CM

## 2020-11-04 DIAGNOSIS — Z29.9 ENCOUNTER FOR PROPHYLACTIC MEASURES, UNSPECIFIED: ICD-10-CM

## 2020-11-04 DIAGNOSIS — F03.90 UNSPECIFIED DEMENTIA WITHOUT BEHAVIORAL DISTURBANCE: ICD-10-CM

## 2020-11-04 LAB
ALBUMIN SERPL ELPH-MCNC: 3.1 G/DL — LOW (ref 3.3–5)
ALP SERPL-CCNC: 121 U/L — HIGH (ref 40–120)
ALT FLD-CCNC: 13 U/L — SIGNIFICANT CHANGE UP (ref 12–78)
ANION GAP SERPL CALC-SCNC: 5 MMOL/L — SIGNIFICANT CHANGE UP (ref 5–17)
APPEARANCE UR: CLEAR — SIGNIFICANT CHANGE UP
APTT BLD: 38.8 SEC — HIGH (ref 27.5–35.5)
AST SERPL-CCNC: 13 U/L — LOW (ref 15–37)
BACTERIA # UR AUTO: ABNORMAL
BASE EXCESS BLDV CALC-SCNC: 0.3 MMOL/L — SIGNIFICANT CHANGE UP (ref -2–2)
BASOPHILS # BLD AUTO: 0.1 K/UL — SIGNIFICANT CHANGE UP (ref 0–0.2)
BASOPHILS NFR BLD AUTO: 1.4 % — SIGNIFICANT CHANGE UP (ref 0–2)
BILIRUB SERPL-MCNC: 0.3 MG/DL — SIGNIFICANT CHANGE UP (ref 0.2–1.2)
BILIRUB UR-MCNC: NEGATIVE — SIGNIFICANT CHANGE UP
BLOOD GAS COMMENTS, VENOUS: SIGNIFICANT CHANGE UP
BUN SERPL-MCNC: 37 MG/DL — HIGH (ref 7–23)
CALCIUM SERPL-MCNC: 9.1 MG/DL — SIGNIFICANT CHANGE UP (ref 8.5–10.1)
CHLORIDE SERPL-SCNC: 118 MMOL/L — HIGH (ref 96–108)
CK MB BLD-MCNC: 5 % — HIGH (ref 0–3.5)
CK MB CFR SERPL CALC: 1.9 NG/ML — SIGNIFICANT CHANGE UP (ref 0–3.6)
CK SERPL-CCNC: 38 U/L — SIGNIFICANT CHANGE UP (ref 26–308)
CO2 SERPL-SCNC: 25 MMOL/L — SIGNIFICANT CHANGE UP (ref 22–31)
COLOR SPEC: YELLOW — SIGNIFICANT CHANGE UP
COMMENT - URINE: SIGNIFICANT CHANGE UP
COMMENT - URINE: SIGNIFICANT CHANGE UP
CREAT SERPL-MCNC: 2.4 MG/DL — HIGH (ref 0.5–1.3)
DIFF PNL FLD: NEGATIVE — SIGNIFICANT CHANGE UP
EOSINOPHIL # BLD AUTO: 0.51 K/UL — HIGH (ref 0–0.5)
EOSINOPHIL NFR BLD AUTO: 6.9 % — HIGH (ref 0–6)
EPI CELLS # UR: SIGNIFICANT CHANGE UP
GLUCOSE SERPL-MCNC: 172 MG/DL — HIGH (ref 70–99)
GLUCOSE UR QL: NEGATIVE — SIGNIFICANT CHANGE UP
HCO3 BLDV-SCNC: 23 MMOL/L — SIGNIFICANT CHANGE UP (ref 21–29)
HCT VFR BLD CALC: 46.6 % — SIGNIFICANT CHANGE UP (ref 39–50)
HGB BLD-MCNC: 14.2 G/DL — SIGNIFICANT CHANGE UP (ref 13–17)
HOROWITZ INDEX BLDV+IHG-RTO: 21 — SIGNIFICANT CHANGE UP
IMM GRANULOCYTES NFR BLD AUTO: 0.3 % — SIGNIFICANT CHANGE UP (ref 0–1.5)
INR BLD: 1 RATIO — SIGNIFICANT CHANGE UP (ref 0.88–1.16)
KETONES UR-MCNC: NEGATIVE — SIGNIFICANT CHANGE UP
LACTATE SERPL-SCNC: 1.8 MMOL/L — SIGNIFICANT CHANGE UP (ref 0.7–2)
LEUKOCYTE ESTERASE UR-ACNC: NEGATIVE — SIGNIFICANT CHANGE UP
LYMPHOCYTES # BLD AUTO: 2.51 K/UL — SIGNIFICANT CHANGE UP (ref 1–3.3)
LYMPHOCYTES # BLD AUTO: 34 % — SIGNIFICANT CHANGE UP (ref 13–44)
MCHC RBC-ENTMCNC: 24.9 PG — LOW (ref 27–34)
MCHC RBC-ENTMCNC: 30.5 GM/DL — LOW (ref 32–36)
MCV RBC AUTO: 81.8 FL — SIGNIFICANT CHANGE UP (ref 80–100)
MONOCYTES # BLD AUTO: 0.65 K/UL — SIGNIFICANT CHANGE UP (ref 0–0.9)
MONOCYTES NFR BLD AUTO: 8.8 % — SIGNIFICANT CHANGE UP (ref 2–14)
NEUTROPHILS # BLD AUTO: 3.59 K/UL — SIGNIFICANT CHANGE UP (ref 1.8–7.4)
NEUTROPHILS NFR BLD AUTO: 48.6 % — SIGNIFICANT CHANGE UP (ref 43–77)
NITRITE UR-MCNC: NEGATIVE — SIGNIFICANT CHANGE UP
NRBC # BLD: 0 /100 WBCS — SIGNIFICANT CHANGE UP (ref 0–0)
NT-PROBNP SERPL-SCNC: 8953 PG/ML — HIGH (ref 0–450)
PCO2 BLDV: 58 MMHG — HIGH (ref 35–50)
PH BLDV: 7.28 — LOW (ref 7.35–7.45)
PH UR: 5 — SIGNIFICANT CHANGE UP (ref 5–8)
PLATELET # BLD AUTO: 149 K/UL — LOW (ref 150–400)
POTASSIUM SERPL-MCNC: 4.2 MMOL/L — SIGNIFICANT CHANGE UP (ref 3.5–5.3)
POTASSIUM SERPL-SCNC: 4.2 MMOL/L — SIGNIFICANT CHANGE UP (ref 3.5–5.3)
PROT SERPL-MCNC: 6.8 G/DL — SIGNIFICANT CHANGE UP (ref 6–8.3)
PROT UR-MCNC: 30 MG/DL
PROTHROM AB SERPL-ACNC: 11.7 SEC — SIGNIFICANT CHANGE UP (ref 10.6–13.6)
RAPID RVP RESULT: SIGNIFICANT CHANGE UP
RBC # BLD: 5.7 M/UL — SIGNIFICANT CHANGE UP (ref 4.2–5.8)
RBC # FLD: 16.3 % — HIGH (ref 10.3–14.5)
RBC CASTS # UR COMP ASSIST: NEGATIVE /HPF — SIGNIFICANT CHANGE UP (ref 0–4)
SAO2 % BLDV: 62 % — LOW (ref 67–88)
SARS-COV-2 RNA SPEC QL NAA+PROBE: SIGNIFICANT CHANGE UP
SODIUM SERPL-SCNC: 148 MMOL/L — HIGH (ref 135–145)
SP GR SPEC: 1.01 — SIGNIFICANT CHANGE UP (ref 1.01–1.02)
TROPONIN I SERPL-MCNC: <.015 NG/ML — SIGNIFICANT CHANGE UP (ref 0.01–0.04)
UROBILINOGEN FLD QL: NEGATIVE — SIGNIFICANT CHANGE UP
WBC # BLD: 7.38 K/UL — SIGNIFICANT CHANGE UP (ref 3.8–10.5)
WBC # FLD AUTO: 7.38 K/UL — SIGNIFICANT CHANGE UP (ref 3.8–10.5)
WBC UR QL: NEGATIVE — SIGNIFICANT CHANGE UP

## 2020-11-04 PROCEDURE — 99285 EMERGENCY DEPT VISIT HI MDM: CPT

## 2020-11-04 PROCEDURE — 71045 X-RAY EXAM CHEST 1 VIEW: CPT | Mod: 26

## 2020-11-04 PROCEDURE — 93010 ELECTROCARDIOGRAM REPORT: CPT

## 2020-11-04 RX ORDER — PANTOPRAZOLE SODIUM 20 MG/1
40 TABLET, DELAYED RELEASE ORAL
Refills: 0 | Status: DISCONTINUED | OUTPATIENT
Start: 2020-11-04 | End: 2020-11-07

## 2020-11-04 RX ORDER — ALBUTEROL 90 UG/1
2 AEROSOL, METERED ORAL EVERY 8 HOURS
Refills: 0 | Status: DISCONTINUED | OUTPATIENT
Start: 2020-11-04 | End: 2020-11-07

## 2020-11-04 RX ORDER — ATORVASTATIN CALCIUM 80 MG/1
80 TABLET, FILM COATED ORAL AT BEDTIME
Refills: 0 | Status: DISCONTINUED | OUTPATIENT
Start: 2020-11-04 | End: 2020-11-07

## 2020-11-04 RX ORDER — SUCRALFATE 1 G
10 TABLET ORAL
Refills: 0 | Status: DISCONTINUED | OUTPATIENT
Start: 2020-11-04 | End: 2020-11-07

## 2020-11-04 RX ORDER — AZITHROMYCIN 500 MG/1
500 TABLET, FILM COATED ORAL ONCE
Refills: 0 | Status: COMPLETED | OUTPATIENT
Start: 2020-11-04 | End: 2020-11-04

## 2020-11-04 RX ORDER — SERTRALINE 25 MG/1
100 TABLET, FILM COATED ORAL DAILY
Refills: 0 | Status: DISCONTINUED | OUTPATIENT
Start: 2020-11-04 | End: 2020-11-07

## 2020-11-04 RX ORDER — DONEPEZIL HYDROCHLORIDE 10 MG/1
10 TABLET, FILM COATED ORAL AT BEDTIME
Refills: 0 | Status: DISCONTINUED | OUTPATIENT
Start: 2020-11-04 | End: 2020-11-07

## 2020-11-04 RX ORDER — METOPROLOL TARTRATE 50 MG
25 TABLET ORAL DAILY
Refills: 0 | Status: DISCONTINUED | OUTPATIENT
Start: 2020-11-04 | End: 2020-11-07

## 2020-11-04 RX ORDER — FINASTERIDE 5 MG/1
5 TABLET, FILM COATED ORAL DAILY
Refills: 0 | Status: DISCONTINUED | OUTPATIENT
Start: 2020-11-04 | End: 2020-11-07

## 2020-11-04 RX ORDER — FLUTICASONE PROPIONATE 220 MCG
2 AEROSOL WITH ADAPTER (GRAM) INHALATION
Qty: 0 | Refills: 0 | DISCHARGE

## 2020-11-04 RX ORDER — SERTRALINE 25 MG/1
1 TABLET, FILM COATED ORAL
Qty: 0 | Refills: 0 | DISCHARGE

## 2020-11-04 RX ORDER — SODIUM CHLORIDE 9 MG/ML
1000 INJECTION INTRAMUSCULAR; INTRAVENOUS; SUBCUTANEOUS
Refills: 0 | Status: DISCONTINUED | OUTPATIENT
Start: 2020-11-04 | End: 2020-11-04

## 2020-11-04 RX ORDER — TAMSULOSIN HYDROCHLORIDE 0.4 MG/1
0.4 CAPSULE ORAL AT BEDTIME
Refills: 0 | Status: DISCONTINUED | OUTPATIENT
Start: 2020-11-04 | End: 2020-11-07

## 2020-11-04 RX ORDER — SODIUM CHLORIDE 9 MG/ML
1000 INJECTION, SOLUTION INTRAVENOUS
Refills: 0 | Status: DISCONTINUED | OUTPATIENT
Start: 2020-11-04 | End: 2020-11-07

## 2020-11-04 RX ORDER — LACTOBACILLUS ACIDOPHILUS 100MM CELL
1 CAPSULE ORAL
Qty: 0 | Refills: 0 | DISCHARGE

## 2020-11-04 RX ORDER — LEVALBUTEROL 1.25 MG/.5ML
1 SOLUTION, CONCENTRATE RESPIRATORY (INHALATION)
Qty: 0 | Refills: 0 | DISCHARGE

## 2020-11-04 RX ORDER — SODIUM CHLORIDE 9 MG/ML
1000 INJECTION INTRAMUSCULAR; INTRAVENOUS; SUBCUTANEOUS ONCE
Refills: 0 | Status: COMPLETED | OUTPATIENT
Start: 2020-11-04 | End: 2020-11-04

## 2020-11-04 RX ORDER — HEPARIN SODIUM 5000 [USP'U]/ML
5000 INJECTION INTRAVENOUS; SUBCUTANEOUS EVERY 12 HOURS
Refills: 0 | Status: DISCONTINUED | OUTPATIENT
Start: 2020-11-04 | End: 2020-11-07

## 2020-11-04 RX ORDER — TIOTROPIUM BROMIDE 18 UG/1
1 CAPSULE ORAL; RESPIRATORY (INHALATION) DAILY
Refills: 0 | Status: DISCONTINUED | OUTPATIENT
Start: 2020-11-04 | End: 2020-11-07

## 2020-11-04 RX ORDER — IPRATROPIUM/ALBUTEROL SULFATE 18-103MCG
3 AEROSOL WITH ADAPTER (GRAM) INHALATION ONCE
Refills: 0 | Status: COMPLETED | OUTPATIENT
Start: 2020-11-04 | End: 2020-11-04

## 2020-11-04 RX ORDER — LANOLIN ALCOHOL/MO/W.PET/CERES
5 CREAM (GRAM) TOPICAL AT BEDTIME
Refills: 0 | Status: DISCONTINUED | OUTPATIENT
Start: 2020-11-04 | End: 2020-11-07

## 2020-11-04 RX ORDER — FLUTICASONE PROPIONATE 220 MCG
1 AEROSOL WITH ADAPTER (GRAM) INHALATION
Qty: 0 | Refills: 0 | DISCHARGE

## 2020-11-04 RX ORDER — INFLUENZA VIRUS VACCINE 15; 15; 15; 15 UG/.5ML; UG/.5ML; UG/.5ML; UG/.5ML
0.5 SUSPENSION INTRAMUSCULAR ONCE
Refills: 0 | Status: DISCONTINUED | OUTPATIENT
Start: 2020-11-04 | End: 2020-11-04

## 2020-11-04 RX ADMIN — DONEPEZIL HYDROCHLORIDE 10 MILLIGRAM(S): 10 TABLET, FILM COATED ORAL at 21:09

## 2020-11-04 RX ADMIN — ATORVASTATIN CALCIUM 80 MILLIGRAM(S): 80 TABLET, FILM COATED ORAL at 21:09

## 2020-11-04 RX ADMIN — TAMSULOSIN HYDROCHLORIDE 0.4 MILLIGRAM(S): 0.4 CAPSULE ORAL at 21:09

## 2020-11-04 RX ADMIN — Medication 125 MILLIGRAM(S): at 18:02

## 2020-11-04 RX ADMIN — SODIUM CHLORIDE 1000 MILLILITER(S): 9 INJECTION INTRAMUSCULAR; INTRAVENOUS; SUBCUTANEOUS at 17:00

## 2020-11-04 RX ADMIN — SODIUM CHLORIDE 1000 MILLILITER(S): 9 INJECTION INTRAMUSCULAR; INTRAVENOUS; SUBCUTANEOUS at 18:00

## 2020-11-04 RX ADMIN — SODIUM CHLORIDE 50 MILLILITER(S): 9 INJECTION INTRAMUSCULAR; INTRAVENOUS; SUBCUTANEOUS at 20:49

## 2020-11-04 RX ADMIN — Medication 5 MILLIGRAM(S): at 21:55

## 2020-11-04 RX ADMIN — AZITHROMYCIN 255 MILLIGRAM(S): 500 TABLET, FILM COATED ORAL at 18:30

## 2020-11-04 RX ADMIN — Medication 3 MILLILITER(S): at 18:02

## 2020-11-04 NOTE — H&P ADULT - PROBLEM SELECTOR PLAN 5
Patient with history of CAD s/p CABG  - Echo from 1/20/2020 w/ grossly normal LV function  - Continue on home metoprolol Patient with history of CAD and CABGx3 in 1994 with 2 subsequent stents  -Echo from 1/20/2020 w/ grossly normal LV function per chart review however on HIE shows technically difficult study with no recorded EF  -will order TTE for evaluiation as patient has orthopnea, PND, and SOB with exertion  -likely more COPD however possible CHF exacerbation as well  -Continue on home metoprolol with hold parameters -Patient with history of CAD and CABGx3 in 1994 with 2 subsequent stents  -Echo from 1/20/2020 w/ grossly normal LV function per chart review however on HIE shows technically difficult study with no recorded EF  -will order TTE for evaluation as patient has orthopnea, PND, and SOB with exertion  -likely more COPD however possible CHF exacerbation as well  -Continue on home metoprolol with hold parameters

## 2020-11-04 NOTE — H&P ADULT - NEGATIVE OPHTHALMOLOGIC SYMPTOMS
no
no loss of vision R/no pain R/no loss of vision L/no blurred vision L/no blurred vision R/no pain L

## 2020-11-04 NOTE — H&P ADULT - PROBLEM SELECTOR PLAN 8
Chronic  - taking donepezil 10mg at home, continue -Chronic  - taking donepezil 10mg at home, continue -Chronic  -taking donepezil 10mg at home, continue  -fall precations  -PT consulted -Chronic  -taking donepezil 10mg at home, continue  -fall precautions  -PT consulted

## 2020-11-04 NOTE — H&P ADULT - NSICDXPASTSURGICALHX_GEN_ALL_CORE_FT
PAST SURGICAL HISTORY:  S/P appendectomy     S/P AVR (Aortic Valve Replacement) Pig valve (bioprosthetic); done 2010 at [place]    S/P CABG (coronary artery bypass graft) Done in 1994; 2 cardiac stents in 1998    S/P inguinal hernia repair

## 2020-11-04 NOTE — H&P ADULT - PROBLEM SELECTOR PLAN 3
Chronic, /90 on admission, BP elevated  - Continue home metoprolol with hold parameters  - Monitor routine hemodynamics -N elevated 148 on admission, likely dehydration  -received 1L NS IV fluids in ED  -encourage PO hydration  -will follow up AM Na levels and hydrate appropriately -N elevated 148 on admission, likely dehydration  -received 1L NS IV fluids in ED, will give gentle IV hydration with NS @55cc/hr  -encourage PO hydration  -f/u AM BMP -N elevated 148 on admission, likely dehydration  -received 1L NS IV fluids in ED, will give gentle IV hydration with D5W @50cc/hr  -encourage PO hydration  -f/u AM BMP

## 2020-11-04 NOTE — ED PROVIDER NOTE - CARE PLAN
Principal Discharge DX:	COPD exacerbation   Principal Discharge DX:	COPD exacerbation  Secondary Diagnosis:	Shortness of breath

## 2020-11-04 NOTE — H&P ADULT - PROBLEM SELECTOR PLAN 4
Patient with history of CAD s/p CABG  - Echo from 1/20/2020 w/ grossly normal LV function  - Continue on home metoprolol Chronic, /90 on admission, BP elevated  - Continue home metoprolol with hold parameters  - Monitor routine hemodynamics Chronic, /90 on admission, down to 140/90  -Continue home metoprolol with hold parameters  -EKG showed NSR 62 bpm with 1st degree HB, LAD, and RBBB, unchanged from priors  -Monitor routine hemodynamics -Chronic, /90 on admission, down to 140/90  -Continue home metoprolol with hold parameters  -EKG showed NSR 62 bpm with 1st degree HB, LAD, and RBBB, unchanged from priors  -f/u TTE in AM  -Monitor routine hemodynamics

## 2020-11-04 NOTE — H&P ADULT - HISTORY OF PRESENT ILLNESS
84 yo male with PMH of COPD, dementia, HTN, HLD, CAD s/p CABG, CKD,  BPH, Depression, GERD, osteoporosis, peripheral neuropathy presents to the ED with cough and shortness of breath. States the shortness of breath has been ongoing for the past week. Denies any recent sick contacts, recent travel, or any known covid exposure.    Of note, pt was recently admitted to [facility] for YOU on CKD and COPD exacerbation from 8/21-8/24, had received IV fluids, Duonebs, and IV solumedrol.     ED Course  Vitals: T 98.2, HR 62, /90, O2 100 on 2L via NC  Significant Labs: Na 148, BUN 37, Cr 2.4, Blood Glucose 172, Albumin 3.1, Alk phos 121, eGFR 24, Pro-BNP 8953  s/p IV Zithromax 500mg x 1, 1L NS bolus x1, Duonebs x 1, IV Solumedrol 125mg x1  EKG:  CXR: Slight bilateral atelectases are presently seen. Cardiac and aortic findings stable as above 86 yo male with PMH of COPD not on home O2, mild dementia, HTN, HLD, CAD s/p triple CABG 1994 and 2 subsequent stents, CKD 3,  BPH, Depression, GERD, osteoporosis, peripheral neuropathy presents to the ED with cough and shortness of breath x a few weeks. States the shortness of breath has been ongoing for the past few weeks but has worsened within the last few days. Spoke to wife Larisa Beebe ([phone]) who confirmed that the patient has been having a productive cough and has been having difficulty sleeping because he cant lay down flat. States he has chronic COPD and has a new PCP who comes to the house and has never been hospitalized previously for COPD exacerbation or intubated. Denies any recent sick contacts, recent travel, or any known covid exposure of late. He states he does not leave the house much as he is frail and has difficulty walking. He denies any fevers, chills, CP, palpitations, abd pain, N, V, D, urinary symptoms or numbness, weakness or swelling of the legs. Him and his wife state that they have not been able to see many of his specialists lately including his cardiologist and pulmonologist because they dont drive and because of recent events with COVID. They have 24/7 aids at home and request help finding specialists that make house calls. Per wife, she states that the pt was recently diagnosed with a "kidney infection" by his PCP but doesnt think he was given any medications for this and denies being referred to a nephrologist. Of note, pt was recently admitted to [facility] for YOU on CKD and COPD exacerbation from 8/21-8/24, had received IV fluids, Duonebs, and IV solumedrol.     ED Course: received IV Zithromax 500mg x 1, 1L NS bolus x1, Duonebs x 1, IV Solumedrol 125mg x1  Vitals: T 98.2, HR 62, /90 -->140/90, O2 100% on 2L via NC  Significant Labs: Na 148, BUN 37, Cr 2.4, Blood Glucose 172, Albumin 3.1, Alk phos 121, eGFR 24, Pro-BNP 8953, negative COVID and RVP PCR  EKG: NSR 62 bpm with 1st degree HB, LAD, RBBB, no significant changes from prior  CXR: Slight bilateral atelectases are presently seen.

## 2020-11-04 NOTE — H&P ADULT - NEUROLOGICAL DETAILS
alert and oriented x 3/responds to pain/responds to verbal commands/sensation intact alert and oriented x 3/sensation intact/responds to verbal commands/responds to pain/cranial nerves intact/strength decreased

## 2020-11-04 NOTE — H&P ADULT - RS GEN PE MLT RESP DETAILS PC
wheezes/rhonchi/diffuse no rhonchi/diminished breath sounds, R/wheezes/no rales/diminished breath sounds, L/no chest wall tenderness

## 2020-11-04 NOTE — H&P ADULT - MUSCULOSKELETAL
details… detailed exam normal/no joint erythema/ROM intact/no joint swelling normal strength/normal/ROM intact/no joint swelling/no joint erythema

## 2020-11-04 NOTE — ED PROVIDER NOTE - PSH
S/P appendectomy    S/P AVR (Aortic Valve Replacement)  Pig valve (bioprosthetic); done 2010 at [place]  S/P CABG (coronary artery bypass graft)  Done in 1996; 2 cardiac stents in 1998  S/P inguinal hernia repair  On right side

## 2020-11-04 NOTE — H&P ADULT - NSHPSOCIALHISTORY_GEN_ALL_CORE
Social History:    Marital Status:  (  x )    (   ) Single    (   )    (  )   Occupation:   Lives with: (  ) alone  (  ) children   ( x ) spouse   (  ) parents  ( x ) other -- 24/7 aids    Substance Use (street drugs): (  ) never used  (  ) other:  Tobacco Usage:  (   ) never smoked   ( x  ) former smoker   (   ) current smoker  (     ) pack year  (   1987     ) last cigarette date  Alcohol Usage: socially      Health Management     For female:   Last Mammo: (     ) No  (    ) Yes  (    ) Normal  (    ) Date   Last Pap: (     ) No  (    ) Yes  (    ) Normal   (    ) Date     For male:  Last prostate exam: (     ) No  (    ) Yes  (    ) Date  (    ) Normal    Immunization Hx:   (  ) flu shot                               (     ) date --> has not received this year but usually gets every year  (  ) pneumonia shot               (     ) date --> unknown however pt states UTD  ( x ) tetanus                               (   Aug 2020  ) date     (    x ) Advanced Directives: (     ) None    (   x   ) DNR    ( x    ) DNI    (   x  ) Health Care Proxy: wife Larisa Shelley

## 2020-11-04 NOTE — ED ADULT TRIAGE NOTE - CHIEF COMPLAINT QUOTE
pt from home, c/o diff breathing chronic, worse for a couple days, WOODY, on ems arrival oxygen sat 94%, given nebs by ems, denies fever, denies body aches

## 2020-11-04 NOTE — H&P ADULT - ASSESSMENT
84 yo male with PMH of COPD, dementia, HTN, HLD, CAD s/p CABG, CKD,  BPH, Depression, GERD, osteoporosis, peripheral neuropathy presents to the ED with cough and shortness of breath x 1 week. Admitted for COPD exacerbation.   84 yo male with PMH of COPD, dementia, HTN, HLD, CAD s/p CABG, CKD,  BPH, Depression, GERD, osteoporosis, peripheral neuropathy presents to the ED with cough and shortness of breath x 1 week. Admitted for COPD exacerbation with possible acute CHF exacerbation as well.    84 yo male with PMH of COPD, dementia, HTN, HLD, CAD s/p CABG, CKD,  BPH, Depression, GERD, osteoporosis, peripheral neuropathy presents to the ED with cough and shortness of breath x 1 week. Admitted for COPD exacerbation with possible acute CHF exacerbation as well, and YOU on CKD.

## 2020-11-04 NOTE — H&P ADULT - PROBLEM SELECTOR PLAN 9
Chronic  - Pt with history of GI bleed  - Continue on home pantoprazole -Chronic  - Continue on home pantoprazole

## 2020-11-04 NOTE — CONSULT NOTE ADULT - PROBLEM SELECTOR RECOMMENDATION 9
86 yo male with PMH of COPD, dementia, HTN, HLD, CAD s/p CABG, CKD,  BPH, Depression, GERD, osteoporosis, peripheral neuropathy presents to the ED with cough and shortness of breath. States the shortness of breath has been ongoing for the past week. Denies any recent sick contacts, recent travel, or any known covid exposure.  copd - proventil and spiriva inhaler regimen - seasonal regimen - will check VBG  heart disease - valv heart disease - proBNP elev on admission - I and O - monitor for vol overload -   ataxic gait - fall prec - PT assessment - gait training  mild Dementia - alert - verbal at present -   CKD YOU - I and O - serial renal indices -   nutritional optimization   sob - likely multifactorial - does not appear to be related to COPD ex - would explore CKD YOU Heart disease as etiol.

## 2020-11-04 NOTE — H&P ADULT - ATTENDING COMMENTS
Pt seen, examined, case & care plan d/w pt, residents at detail.  AM Labs   D/W DR Isidro -Pulmonary.  PT eval.

## 2020-11-04 NOTE — H&P ADULT - PROBLEM SELECTOR PLAN 1
Patient presenting with 1 week h/o shortness of breath and cough  -??On O2 at home  -Now with increasing oxygen demand, continue O2 supplementation, keep O2>88  -Pulm(Dr. Isidro consulted) Patient presenting with 1 week h/o shortness of breath and cough  -??On O2 at home  -Now with increasing oxygen demand, continue O2 supplementation, keep O2>88  -s/p IV Zithromax 500mg x 1, IV Solumedrol 125mg x 1  -Pulm(Dr. Isidro consulted) Patient presenting with productive cough and SOB for last few weeks, not on home O2  -BIBEMS with aerosol mask on 15L subsequently placed on 2L NC and now oxygenating well on RA 98%  -likely 2/2 COPD exacerbation only as COVID and RVP neg however elevated ProBNP 8953 with HFpEF per chart review, will work up for CHF exacerbation with TTE  -wbc and lactate wnl, afebrile without hx of fevers per patient  -blood and urine cx pending in ED  -CXR showed Slight bilateral atelectases are presently seen.   -s/p IV Zithromax 500mg x 1, IV Solumedrol 125mg x 1, Duoneb 3mL neb x1 in ED  -unverified allergies, pt and wife unsure of pcn or flouroquinolone reactions, pt likely told as child  -continue IV zithromax for full course to cover for pulmonary infection leading to COPD exacerbation  -continue duonebs neb q6 prn  -on home Flovent, Xopenex and albuterol prn  -continue O2 supplementation if needed, keep SpO2 >88%  -most recent TTE 1/20 technically difficult study, no EF noted, will order repeat to evaluate   -Pulm(Dr. Isidro consulted) -Patient presenting with productive cough and SOB for last few weeks, not on home O2  -BIBEMS with aerosol mask on 15L subsequently placed on 2L NC and now oxygenating well on RA 98%  -likely 2/2 COPD exacerbation only as COVID and RVP neg however elevated ProBNP 8953 with HFpEF per chart review, will work up for CHF exacerbation with TTE  -wbc and lactate wnl, afebrile without hx of fevers per patient  -blood and urine cx pending in ED  -CXR showed Slight bilateral atelectases are presently seen.   -s/p IV Zithromax 500mg x 1, IV Solumedrol 125mg x 1, Duoneb 3mL neb x1 in ED  -unverified allergies, pt and wife unsure of pcn or flouroquinolone reactions, pt likely told as child  -will hold off abx for now as per pulm unlikely infectious etiology  -continue albuterol and tiotropium inhaler regimen as per pulm recs  -continue O2 supplementation if needed, keep SpO2 >88%  -most recent TTE 1/20 technically difficult study, no EF noted, will order repeat to evaluate   -Pulm(Dr. Isidro consulted), recs appreciated

## 2020-11-04 NOTE — H&P ADULT - PROBLEM SELECTOR PLAN 7
Chronic  - taking donepezil 10mg at home, continue Chronic  - Continue on home sertraline 100 mg -Chronic  - Continue on home sertraline 100 mg

## 2020-11-04 NOTE — ED ADULT NURSE NOTE - OBJECTIVE STATEMENT
Received the patient in the Er. Patient is alert and oriented. Skin warm and dry. C/O breathing difficulty for 3 days. Duo neb given by ambulance. patient is breathing normally.

## 2020-11-04 NOTE — H&P ADULT - NEGATIVE ENMT SYMPTOMS
no nasal congestion/no post-nasal discharge/no tinnitus/no nasal discharge/no hearing difficulty/no vertigo/no sinus symptoms

## 2020-11-04 NOTE — H&P ADULT - GASTROINTESTINAL DETAILS
no distention/soft/normal/nontender/no masses palpable/bowel sounds normal soft/nontender/no distention/no organomegaly/bowel sounds normal/normal/no masses palpable/no bruit/no guarding/no rebound tenderness

## 2020-11-04 NOTE — ED ADULT NURSE NOTE - NSIMPLEMENTINTERV_GEN_ALL_ED
Implemented All Fall with Harm Risk Interventions:  [place] to call system. Call bell, personal items and telephone within reach. Instruct patient to call for assistance. Room bathroom lighting operational. Non-slip footwear when patient is off stretcher. Physically safe environment: no spills, clutter or unnecessary equipment. Stretcher in lowest position, wheels locked, appropriate side rails in place. Provide visual cue, wrist band, yellow gown, etc. Monitor gait and stability. Monitor for mental status changes and reorient to person, place, and time. Review medications for side effects contributing to fall risk. Reinforce activity limits and safety measures with patient and family. Provide visual clues: red socks.

## 2020-11-04 NOTE — ED PROVIDER NOTE - OBJECTIVE STATEMENT
87 yo M p/w co feeling SOB x past ~ 1 week. No chest pain. Pos cough. no acute sputum. no neck / back pain. no weakness / dizziness. no known fever/chills. no abd pain. no n/v/d. No recent trauma. No recent travel. no known covid exposure / prior infxn. no agg/allev factors. No other inj or co.

## 2020-11-04 NOTE — H&P ADULT - PROBLEM SELECTOR PLAN 10
DVT PPX: DVT PPX: heparin 5000mg q8 SQ    IMPROVE VTE Individual Risk Assessment          RISK                                                          Points  [  ] Previous VTE                                                3  [  ] Thrombophilia                                             2  [  ] Lower limb paralysis                                   2        (unable to hold up >15 seconds)    [  ] Current Cancer                                             2         (within 6 months)  [  ] Immobilization > 24 hrs                              1  [  ] ICU/CCU stay > 24 hours                             1  [ x] Age > 60                                                         1    IMPROVE VTE Score:         [   1      ] DVT PPX: heparin 5000mg q8 SQ    IMPROVE VTE Individual Risk Assessment          RISK                                                          Points  [  ] Previous VTE                                                3  [  ] Thrombophilia                                             2  [  ] Lower limb paralysis                                   2        (unable to hold up >15 seconds)    [  ] Current Cancer                                             2         (within 6 months)  [  ] Immobilization > 24 hrs                              1  [  ] ICU/CCU stay > 24 hours                             1  [ x] Age > 60                                                         1    IMPROVE VTE Score:         [   1      ]    #11 foot ulcers  -appear chronic, will have wound care RN check lesions and use off loading boots DVT PPX: heparin 5000mg q 12 SQ    IMPROVE VTE Individual Risk Assessment          RISK                                                          Points  [  ] Previous VTE                                                3  [  ] Thrombophilia                                             2  [  ] Lower limb paralysis                                   2        (unable to hold up >15 seconds)    [  ] Current Cancer                                             2         (within 6 months)  [  ] Immobilization > 24 hrs                              1  [  ] ICU/CCU stay > 24 hours                             1  [ x] Age > 60                                                         1    IMPROVE VTE Score:         [   1      ]    #11 foot ulcers  -appear chronic, will have wound care RN check lesions and use off loading boots

## 2020-11-04 NOTE — ED ADULT NURSE NOTE - PSH
S/P appendectomy    S/P AVR (Aortic Valve Replacement)  Pig valve (bioprosthetic); done 2010 at [place]  S/P CABG (coronary artery bypass graft)  Done in 1996; 2 cardiac stents in 1998  S/P inguinal hernia repair  On right side

## 2020-11-04 NOTE — CONSULT NOTE ADULT - SUBJECTIVE AND OBJECTIVE BOX
Date/Time Patient Seen:  		  Referring MD:   Data Reviewed	       Patient is a 86y old  Male who presents with a chief complaint of Shortness of breath (04 Nov 2020 18:27)      Subjective/HPI  in bed  seen and examined  vs and meds reviewed  labs reviewed  h and p reviewed  er provider note reviewed  lives at home    ex smoker  non drinker  retired  family hx - ashd - htn - op -   84 yo male with PMH of COPD, dementia, HTN, HLD, CAD s/p CABG, CKD,  BPH, Depression, GERD, osteoporosis, peripheral neuropathy presents to the ED with cough and shortness of breath. States the shortness of breath has been ongoing for the past week. Denies any recent sick contacts, recent travel, or any known covid exposure.    Of note, pt was recently admitted to [facility] for YOU on CKD and COPD exacerbation from 8/21-8/24, had received IV fluids, Duonebs, and IV solumedrol.     ED Course  Vitals: T 98.2, HR 62, /90, O2 100 on 2L via NC  Significant Labs: Na 148, BUN 37, Cr 2.4, Blood Glucose 172, Albumin 3.1, Alk phos 121, eGFR 24, Pro-BNP 8953  s/p IV Zithromax 500mg x 1, 1L NS bolus x1, Duonebs x 1, IV Solumedrol 125mg x1  EKG:  CXR: Slight bilateral atelectases are presently seen. Cardiac and aortic findings stable as above       PAST MEDICAL & SURGICAL HISTORY:  Dementia  mild    Myocardial infarction  s/p CABGx3 in 1994 and 2 stents    COPD (chronic obstructive pulmonary disease)  no home O2    Seasonal allergies    GERD (gastroesophageal reflux disease)    Benign prostatic hypertrophy    Hyperlipidemia    Hypertension    Asthma  Also COPD component    Depression    Peripheral Neuropathy    Asthma    S/P AVR (Aortic Valve Replacement)    S/P CABG (Coronary Artery Bypass Graft)    Osteoporosis    CAD (Coronary Artery Disease)  MI, s/p CABG with 2 cardiac stents    HTN (Hypertension)    Dyslipidemia    S/P inguinal hernia repair    S/P appendectomy    S/P CABG (coronary artery bypass graft)  Done in 1994; 2 cardiac stents in 1998    S/P CABG (Coronary Artery Bypass Graft)    S/P AVR (Aortic Valve Replacement)  Pig valve (bioprosthetic); done 2010 at [place]          Medication list         MEDICATIONS  (STANDING):    MEDICATIONS  (PRN):         Vitals log        ICU Vital Signs Last 24 Hrs  T(C): 36.8 (04 Nov 2020 16:42), Max: 36.8 (04 Nov 2020 16:42)  T(F): 98.2 (04 Nov 2020 16:42), Max: 98.2 (04 Nov 2020 16:42)  HR: 64 (04 Nov 2020 18:39) (62 - 64)  BP: 140/90 (04 Nov 2020 18:06) (140/90 - 180/90)  BP(mean): --  ABP: --  ABP(mean): --  RR: 14 (04 Nov 2020 18:39) (14 - 14)  SpO2: 98% (04 Nov 2020 18:39) (98% - 100%)           Input and Output:  I&O's Detail      Lab Data                        14.2   7.38  )-----------( 149      ( 04 Nov 2020 17:21 )             46.6     11-04    148<H>  |  118<H>  |  37<H>  ----------------------------<  172<H>  4.2   |  25  |  2.40<H>    Ca    9.1      04 Nov 2020 17:58    TPro  6.8  /  Alb  3.1<L>  /  TBili  0.3  /  DBili  x   /  AST  13<L>  /  ALT  13  /  AlkPhos  121<H>  11-04      CARDIAC MARKERS ( 04 Nov 2020 17:58 )  <.015 ng/mL / x     / 38 U/L / x     / 1.9 ng/mL        Review of Systems	  sob  bowles      Objective     Physical Examination    heart s1s2  lung dec BS  abd soft  head nc  extr no gross edema  alert  verbal  head nc      Pertinent Lab findings & Imaging      Escalante:  NO   Adequate UO     I&O's Detail           Discussed with:     Cultures:	        Radiology        EXAM:  XR CHEST AP OR PA 1V                            PROCEDURE DATE:  11/04/2020          INTERPRETATION:  AP chest on November 4, 2020 at 5:14 PM. Patient is short of breath and has cough.    Heart is magnified by technique. Sternotomy again noted.    Diffusely aneurysmal aorta again noted.    The above findings are similar to August 21 of this year.    Present film shows slight linear atelectases are present at the left base and slight linear atelectases are also seen off the right hilum.    IMPRESSION: Slight bilateral atelectases are presently seen. Cardiac and aortic findings stable as above.            SUPRIYA KHAN M.D., ATTENDING RADIOLOGIST  This document has been electronically signed. Nov 4 2020  6:00PM

## 2020-11-04 NOTE — H&P ADULT - PROBLEM SELECTOR PLAN 2
YOU on CKD Stage IV, likely 2/2 dehydration  - Pt presenting with Cr 2.6, baseline 1.7  - s/p 1L NS bolus x1  - avoid nephrotoxic medications  - start IV hydration  - F/u AM CMP. -YOU on CKD Stage 3, likely 2/2 dehydration, vague hx of kidney infection per wife, pt denies any urinary symptoms at this time  -Pt presenting with Cr 2.6, baseline 1.7  -s/p 1L NS bolus x1, avoid overhydration due to possible CHF exacerbation, chart review shows HFpEF  -UA and cx pending in ED  -avoid nephrotoxic medications  -F/u AM CMP. -YOU on CKD Stage 3, likely 2/2 dehydration, vague hx of kidney infection per wife, pt denies any urinary symptoms at this time  -Pt presenting with Cr 2.6, baseline 1.7  -s/p 1L NS bolus x1, will give very gentle fluid hydration @55cc/hr as pt appears dry on exam  -avoid overhydration   -UA and cx pending in ED  -avoid nephrotoxic medications  -F/u AM CMP.

## 2020-11-04 NOTE — H&P ADULT - NSICDXPASTMEDICALHX_GEN_ALL_CORE_FT
PAST MEDICAL HISTORY:  Benign prostatic hypertrophy     CAD (Coronary Artery Disease) MI, s/p CABG with 2 cardiac stents    COPD (chronic obstructive pulmonary disease) no home O2    Dementia mild    Depression     GERD (gastroesophageal reflux disease)     Hyperlipidemia     Hypertension     Myocardial infarction s/p CABGx3 in 1994 and 2 stents    Osteoporosis     Peripheral Neuropathy     Seasonal allergies

## 2020-11-05 ENCOUNTER — TRANSCRIPTION ENCOUNTER (OUTPATIENT)
Age: 85
End: 2020-11-05

## 2020-11-05 LAB
A1C WITH ESTIMATED AVERAGE GLUCOSE RESULT: 6.2 % — HIGH (ref 4–5.6)
ANION GAP SERPL CALC-SCNC: 8 MMOL/L — SIGNIFICANT CHANGE UP (ref 5–17)
BASOPHILS # BLD AUTO: 0.01 K/UL — SIGNIFICANT CHANGE UP (ref 0–0.2)
BASOPHILS NFR BLD AUTO: 0.3 % — SIGNIFICANT CHANGE UP (ref 0–2)
BUN SERPL-MCNC: 36 MG/DL — HIGH (ref 7–23)
CALCIUM SERPL-MCNC: 9.3 MG/DL — SIGNIFICANT CHANGE UP (ref 8.5–10.1)
CHLORIDE SERPL-SCNC: 112 MMOL/L — HIGH (ref 96–108)
CO2 SERPL-SCNC: 26 MMOL/L — SIGNIFICANT CHANGE UP (ref 22–31)
CREAT SERPL-MCNC: 2.1 MG/DL — HIGH (ref 0.5–1.3)
CRP SERPL-MCNC: 0.12 MG/DL — SIGNIFICANT CHANGE UP (ref 0–0.4)
CULTURE RESULTS: SIGNIFICANT CHANGE UP
EOSINOPHIL # BLD AUTO: 0 K/UL — SIGNIFICANT CHANGE UP (ref 0–0.5)
EOSINOPHIL NFR BLD AUTO: 0 % — SIGNIFICANT CHANGE UP (ref 0–6)
ESTIMATED AVERAGE GLUCOSE: 131 MG/DL — HIGH (ref 68–114)
GLUCOSE SERPL-MCNC: 147 MG/DL — HIGH (ref 70–99)
HCT VFR BLD CALC: 40.9 % — SIGNIFICANT CHANGE UP (ref 39–50)
HGB BLD-MCNC: 12.6 G/DL — LOW (ref 13–17)
IMM GRANULOCYTES NFR BLD AUTO: 0.8 % — SIGNIFICANT CHANGE UP (ref 0–1.5)
LYMPHOCYTES # BLD AUTO: 0.88 K/UL — LOW (ref 1–3.3)
LYMPHOCYTES # BLD AUTO: 24.2 % — SIGNIFICANT CHANGE UP (ref 13–44)
MAGNESIUM SERPL-MCNC: 1.8 MG/DL — SIGNIFICANT CHANGE UP (ref 1.6–2.6)
MCHC RBC-ENTMCNC: 24.9 PG — LOW (ref 27–34)
MCHC RBC-ENTMCNC: 30.8 GM/DL — LOW (ref 32–36)
MCV RBC AUTO: 80.7 FL — SIGNIFICANT CHANGE UP (ref 80–100)
MONOCYTES # BLD AUTO: 0.06 K/UL — SIGNIFICANT CHANGE UP (ref 0–0.9)
MONOCYTES NFR BLD AUTO: 1.7 % — LOW (ref 2–14)
NEUTROPHILS # BLD AUTO: 2.65 K/UL — SIGNIFICANT CHANGE UP (ref 1.8–7.4)
NEUTROPHILS NFR BLD AUTO: 73 % — SIGNIFICANT CHANGE UP (ref 43–77)
NRBC # BLD: 0 /100 WBCS — SIGNIFICANT CHANGE UP (ref 0–0)
PLATELET # BLD AUTO: 155 K/UL — SIGNIFICANT CHANGE UP (ref 150–400)
POTASSIUM SERPL-MCNC: 5 MMOL/L — SIGNIFICANT CHANGE UP (ref 3.5–5.3)
POTASSIUM SERPL-SCNC: 5 MMOL/L — SIGNIFICANT CHANGE UP (ref 3.5–5.3)
RBC # BLD: 5.07 M/UL — SIGNIFICANT CHANGE UP (ref 4.2–5.8)
RBC # FLD: 16.4 % — HIGH (ref 10.3–14.5)
SARS-COV-2 IGG SERPL QL IA: NEGATIVE — SIGNIFICANT CHANGE UP
SARS-COV-2 IGM SERPL IA-ACNC: <0.1 INDEX — SIGNIFICANT CHANGE UP
SODIUM SERPL-SCNC: 146 MMOL/L — HIGH (ref 135–145)
SPECIMEN SOURCE: SIGNIFICANT CHANGE UP
WBC # BLD: 3.63 K/UL — LOW (ref 3.8–10.5)
WBC # FLD AUTO: 3.63 K/UL — LOW (ref 3.8–10.5)

## 2020-11-05 PROCEDURE — 93306 TTE W/DOPPLER COMPLETE: CPT | Mod: 26

## 2020-11-05 RX ORDER — GENTAMICIN SULFATE 3 MG/ML
1 SOLUTION/ DROPS OPHTHALMIC
Refills: 0 | Status: DISCONTINUED | OUTPATIENT
Start: 2020-11-05 | End: 2020-11-07

## 2020-11-05 RX ORDER — MULTIVIT-MIN/FERROUS GLUCONATE 9 MG/15 ML
1 LIQUID (ML) ORAL DAILY
Refills: 0 | Status: CANCELLED | OUTPATIENT
Start: 2020-11-05 | End: 2020-11-07

## 2020-11-05 RX ADMIN — TAMSULOSIN HYDROCHLORIDE 0.4 MILLIGRAM(S): 0.4 CAPSULE ORAL at 21:11

## 2020-11-05 RX ADMIN — FINASTERIDE 5 MILLIGRAM(S): 5 TABLET, FILM COATED ORAL at 13:13

## 2020-11-05 RX ADMIN — Medication 10 MILLIGRAM(S): at 01:15

## 2020-11-05 RX ADMIN — DONEPEZIL HYDROCHLORIDE 10 MILLIGRAM(S): 10 TABLET, FILM COATED ORAL at 21:11

## 2020-11-05 RX ADMIN — Medication 25 MILLIGRAM(S): at 05:49

## 2020-11-05 RX ADMIN — GENTAMICIN SULFATE 1 DROP(S): 3 SOLUTION/ DROPS OPHTHALMIC at 17:24

## 2020-11-05 RX ADMIN — HEPARIN SODIUM 5000 UNIT(S): 5000 INJECTION INTRAVENOUS; SUBCUTANEOUS at 05:50

## 2020-11-05 RX ADMIN — ATORVASTATIN CALCIUM 80 MILLIGRAM(S): 80 TABLET, FILM COATED ORAL at 21:11

## 2020-11-05 RX ADMIN — GENTAMICIN SULFATE 1 DROP(S): 3 SOLUTION/ DROPS OPHTHALMIC at 15:48

## 2020-11-05 RX ADMIN — ALBUTEROL 2 PUFF(S): 90 AEROSOL, METERED ORAL at 15:48

## 2020-11-05 RX ADMIN — PANTOPRAZOLE SODIUM 40 MILLIGRAM(S): 20 TABLET, DELAYED RELEASE ORAL at 05:49

## 2020-11-05 RX ADMIN — SODIUM CHLORIDE 50 MILLILITER(S): 9 INJECTION, SOLUTION INTRAVENOUS at 06:30

## 2020-11-05 RX ADMIN — Medication 10 MILLIGRAM(S): at 13:13

## 2020-11-05 RX ADMIN — GENTAMICIN SULFATE 1 DROP(S): 3 SOLUTION/ DROPS OPHTHALMIC at 21:11

## 2020-11-05 RX ADMIN — HEPARIN SODIUM 5000 UNIT(S): 5000 INJECTION INTRAVENOUS; SUBCUTANEOUS at 17:24

## 2020-11-05 RX ADMIN — SERTRALINE 100 MILLIGRAM(S): 25 TABLET, FILM COATED ORAL at 13:13

## 2020-11-05 RX ADMIN — Medication 10 MILLIGRAM(S): at 05:49

## 2020-11-05 RX ADMIN — Medication 10 MILLIGRAM(S): at 17:23

## 2020-11-05 NOTE — DIETITIAN INITIAL EVALUATION ADULT. - PROBLEM SELECTOR PLAN 5
-Patient with history of CAD and CABGx3 in 1994 with 2 subsequent stents  -Echo from 1/20/2020 w/ grossly normal LV function per chart review however on HIE shows technically difficult study with no recorded EF  -will order TTE for evaluation as patient has orthopnea, PND, and SOB with exertion  -likely more COPD however possible CHF exacerbation as well  -Continue on home metoprolol with hold parameters

## 2020-11-05 NOTE — DISCHARGE NOTE PROVIDER - NSDCACTIVITY_GEN_ALL_CORE
Do not make important decisions Do not drive or operate machinery/Do not make important decisions/Walking - Indoors allowed/No heavy lifting/straining/Bathing allowed

## 2020-11-05 NOTE — DISCHARGE NOTE PROVIDER - PROVIDER TOKENS
PEDIATRIC NEUROLOGY FOLLOWUP     DATE OF SERVICE:   1/27/2020    PRIMARY CARE PHYSICIAN:   Adwoa Lomeli MD     Subjective     Adrián Mukherjee is a 8  Yrs 11  Mos male. There were no encounter diagnoses..  Adrián is being seen in the Pediatric Neurology Clinic at [facility] on 1/27/2020.  He is accompanied to today's visit by his {MOTHER/FATHER:[id number]}.  Last office visit: 11/26/2019.    INTERIM HISTORY:      No problems updated.     No notes on file       1.  I discussed the diagnosis of headache, headache triggers and potential treatment options.  2.  I reviewed lifestyle factors that can help control headaches, specifically regular meals, regular sleep and appropriate water intake.  3.  Recommend laboratory testing including CBC, CMP, ferritin, urinalysis, vitamin B12, vitamin D25.  4.  Recommend repeating MRI of the brain.  5.  Due to frequency and intensity of the headaches, recommend starting daily headache prophylaxis with Propranolol.  The starting dose will be 5 mg twice daily x1 week, then 10 mg twice daily.  6.  Recommend to keep headache diary and bring it to the next appointment.  7.  Mother should call with a progress report in 1 month.  Based on the report, we will decide if we want to increase the dose of Propranolol further.       12/12/2019- MRI brain.  1. A few punctate left frontal T2/FLAIR hyperintense foci are unchanged and nonspecific, possibly secondary to migraine headaches, prior insult, or microangiopathy.  2. Minimal left ethmoidal sinus mucosal disease noted.  3. Right mastoid and middle ear effusions. Please correlate for symptoms of otomastoiditis.       HISTORY OF PRESENT ILLNESS:  Shay is an 8-year-old boy with a history of headaches for approximately 2 years.  Headaches started gradually and progressively increased in frequency and intensity.  For the past 4 months headaches have occurred daily.  Pain appears to be continuous.  Pain is localized in the frontal and  temporal head region.  Mother rates the pain intensity 8/10.  He describes the pain as throbbing.  Occasionally, he has abdominal discomfort and diarrhea associated with the headache.  He denies nausea, vomiting, sensitivity to light and sound.  Mother has been giving him Tylenol or Ibuprofen on average 4 doses per week without significant benefit.  He missed 5-6 school days due to headaches.     REVIEW OF LIFESTYLE:  He goes to bed at 7:00 p.m.  He gets up at 8:00 a.m.  He has difficulty sleeping.  He eats breakfast daily.  He drinks 8 glasses of water per day.       Information was obtained from:  {MOTHER/FATHER:589951}.  I personally reviewed medical records from:  ZULAY, GLEDNY.    Past Medical History:  Past Medical History:   Diagnosis Date   • Developmental delay 11/22/2016   • Head trauma 7/2015   • Hypothyroidism     euthyroid off meds   • Learning problem 11/22/2016   • Ptosis 11/22/2016   • Seizure (CMS/HCC)     X 1. Questionable   • Staring spell 11/22/2016       Social History:  Social History     Patient does not qualify to have social determinant information on file (likely too young).   Social History Narrative   • Not on file       Family History:  Family History   Problem Relation Age of Onset   • Depression Mother    • Migraines Mother    • Stroke/TIA Paternal Grandmother    • Hypertension Maternal Grandmother        Past Surgical History:   Past Surgical History:   Procedure Laterality Date   • Adenoidectomy w/ myringotomy and tubes     • Tympanostomy tube placement         Patient is currently taking:  Current Outpatient Medications   Medication Sig Dispense Refill   • ergocalciferol (DRISDOL) 1.25 mg (50,000 units) capsule Take 1 capsule by mouth 1 day a week. 6 capsule 0   • propranolol (INDERAL) 10 MG tablet Take 1/2 tb twice daily x 1 week, then 1 tb twice daily. 60 tablet 2   • cetirizine (CETIRIZINE HCL CHILDRENS) 5 MG/5ML solution Take 10 mLs by mouth daily as needed (Allergies). 118 mL 11   •  Lactase 4500 units Tab Take 4,500 Units by mouth daily as needed (Before eating meals or snacks with dairy/milk). 90 tablet 0   • ibuprofen (MOTRIN,ADVIL) 100 MG/5ML suspension Take 15.4 mLs by mouth every 6 hours as needed for Pain. (Please round dose to 15 mL.) 240 mL 0   • acetaminophen, TYLENOL, 80 MG/0.8ML solution Take 10 mg/kg by mouth every 4 hours as needed for Fever.     • Multiple Vitamins-Minerals (MULTIVITAMIN PO) Take 1 tablet by mouth daily.       No current facility-administered medications for this visit.        ALLERGIES:   Allergen Reactions   • Amoxicillin Nausea & Vomiting       PRIOR TESTING:  ***    NEUROIMAGING:      {DT Last IMG Result:209366}    I personally reviewed previous laboratory tests and imaging.    The nurse's note was reviewed and approved.    REVIEW OF SYSTEMS:   All systems including constitutional, eyes, ENT, cardiorespiratory, GI, , hematologic, endocrinologic, allergic/immunologic, skin, musculoskeletal, neurological, psychological were reviewed.  There are no additional symptoms.     Objective     PHYSICAL EXAMINATION:   GENERAL:  Adrián is alert.  He is not in acute cardiorespiratory distress.   VITAL SIGNS:  There were no vitals taken for this visit.  HEENT:  Head is normocephalic, atraumatic.  Mucous membranes are moist.   NECK:  Neck is supple, without masses, lymphadenopathy and thyromegaly.  CHEST:  Lung fields are clear to auscultation bilaterally.  Heart examination shows regular rate and rhythm and no murmur.   ABDOMEN:  Abdomen is soft, nondistended, nontender, without hepatosplenomegaly.   SPINE:  Spine is straight, with no evidence of spinal dysraphism.   EXTREMITIES:  Extremities are warm and well perfused.   SKIN:  Skin is clear, without any rashes, no signs of neurocutaneous stigmata.     NEUROLOGIC EXAMINATION:   MENTAL STATUS:   Adrián is alert and oriented to person, time and place.  Recent and remote memory is appropriate.  Attention span and  concentration appeared appropriate.  Cognition:  Fund of knowledge is appropriate for age.  Speech is clear and fluent, naming and repetition are within normal limits.  CRANIAL NERVES:   II: Visual fields are full to confrontation.  Fundi were visualized and there is no evidence of papilledema, no optic nerve pallor.   III,IV,VI: Pupils are equal, reactive to light and accommodation.  Extraocular movements are intact.   V: Facial sensation is intact to light touch and temperature.  VII: Face is symmetric.   VIII: Hearing appears to be appropriate to environmental sounds.   IX,X: Uvula is midline and there is positive gag reflex.   XI: Sternocleidomastoid and trapezius muscles strength is full and symmetric.  XII: Tongue is symmetric, in midline, and there are no fasciculations.   MOTOR:   There is normal muscle bulk, tone and strength is full and symmetric in bilateral upper and lower extremities.   Deep tendon reflexes are symmetric and are 2/4 for biceps, triceps, brachioradialis, patellar and Achilles tendon reflexes.   Plantar responses are flexor bilaterally.   COORDINATION:   No evidence for dysmetria on finger to nose and heel to shin.   There is no action tremor.   SENSORY:   Sensation is intact to:  Light touch, temperature, pin prick and vibration.   Romberg is negative.   GAIT:   Gait is narrow based and is within normal limits including tandem, heel and toe walking.    Assessment     Adrián is a 8 year old boy with:  No diagnosis found.***    Plan     No orders of the defined types were placed in this encounter.      Current Outpatient Medications   Medication Sig Dispense Refill   • ergocalciferol (DRISDOL) 1.25 mg (50,000 units) capsule Take 1 capsule by mouth 1 day a week. 6 capsule 0   • propranolol (INDERAL) 10 MG tablet Take 1/2 tb twice daily x 1 week, then 1 tb twice daily. 60 tablet 2   • cetirizine (CETIRIZINE HCL CHILDRENS) 5 MG/5ML solution Take 10 mLs by mouth daily as needed (Allergies).  118 mL 11   • Lactase 4500 units Tab Take 4,500 Units by mouth daily as needed (Before eating meals or snacks with dairy/milk). 90 tablet 0   • ibuprofen (MOTRIN,ADVIL) 100 MG/5ML suspension Take 15.4 mLs by mouth every 6 hours as needed for Pain. (Please round dose to 15 mL.) 240 mL 0   • acetaminophen, TYLENOL, 80 MG/0.8ML solution Take 10 mg/kg by mouth every 4 hours as needed for Fever.     • Multiple Vitamins-Minerals (MULTIVITAMIN PO) Take 1 tablet by mouth daily.       No current facility-administered medications for this visit.        There are no discontinued medications.    · There are no Patient Instructions on file for this visit.    · We discussed lifestyle factors that can help control headaches:  Regular meals, snacks, sleep, exercise and water intake.  · For treatment of acute headaches Adrián can take {HEADACHE MEDS:855168}.   · Recommend to continue daily headache prophylaxis with {PROPHYLACTIC MEDS:620398}.     · Reviewed lab results, imaging in detail with patient/family.  ***  · I discussed with patient/family:  Potential side effects of the medications and available alternative actions we could take at this point.  Reinforced importance of adherence to prescribed medications.  · Reviewed plan with family, they verbalized understanding.  · Patient will keep headache diary and bring it to next appointment.    · No follow-ups on file.  · Follow up with primary care physician for well child checks and as indicated.  · Family to call the Pediatric Neurology Clinic with any questions or concerns that arise before Adrián's next clinic appointment.  · We recommend return to PMD for yearly influenza immunizations and completion of recommended vaccination schedule.  · Copy of the note to:  Adwoa Lomeli MD.      Thanks very much for allowing me to participate in Adrián's care.  If there are   any further questions or concerns, please do not hesitate to contact me.     Sincerely,       Angela  MD [facility]  Board Certified:  Epilepsy  Board Certified:  Clinical Neurophysiology  Board Certified:  Neurology with Special Qualifications in [place] [address]  Phone: [phone]  Fax: [phone]  Pager: [phone]       PROVIDER:[TOKEN:[[id number]],FOLLOWUP:[1 week]],FREE:[LAST:[Radha],FIRST:[Cynthia],PHONE:[phone]],FAX:[(   )    -],ADDRESS:[[address] [place] [address]]]

## 2020-11-05 NOTE — SWALLOW BEDSIDE ASSESSMENT ADULT - ASR SWALLOW ASPIRATION MONITOR
fever/pneumonia/gurgly voice/cough/upper respiratory infection/change of breathing pattern/position upright (90Y)/throat clearing/oral hygiene

## 2020-11-05 NOTE — DISCHARGE NOTE PROVIDER - NSDCFUADDINST_GEN_ALL_CORE_FT
Follow up with your primary doctor, Dr. Garcia in 1 week after discharge from the hospital.   Follow up with your pulmonologist, Dr. Taylor in 1 week after discharge from the hospital. Follow up with your HOUSE CALL  primary doctor, Dr. Garcia in 1 week after discharge from the hospital.   Follow up with your pulmonologist, Dr. Taylor in 1 week after discharge from the hospital.

## 2020-11-05 NOTE — DIETITIAN INITIAL EVALUATION ADULT. - DIET TYPE
dysphagia 2, mechanical soft, nectar consistency fld no concentrated potassium/dysphagia 2, mechanical soft, nectar consistency fld/low sodium

## 2020-11-05 NOTE — PROGRESS NOTE ADULT - ASSESSMENT
86 yo male with PMH of COPD, dementia, HTN, HLD, CAD s/p CABG, CKD,  BPH, Depression, GERD, osteoporosis, peripheral neuropathy presents to the ED with cough and shortness of breath x 1 week. Admitted for COPD exacerbation with possible acute CHF exacerbation as well, and YOU on CKD.

## 2020-11-05 NOTE — PHYSICAL THERAPY INITIAL EVALUATION ADULT - TRANSFER TRAINING, PT EVAL
STG (3-5 sessions) sit to stand/stand to sit sup/ind STG (5 sessions) sit to stand/stand to sit sup /c rolling walker and independence

## 2020-11-05 NOTE — DIETITIAN INITIAL EVALUATION ADULT. - OTHER INFO
***In PROGRESS*** Pt sleeping soundly at time of visit, hx mild dementia. EMR reviewed. Multiple hospital admissions during the past year. Most recent admission seen by Registered Dietitian on 6/3/20. 153lbs(6/3). Now 143lbs per bedscale 11/5. S/p MBS study 6/4/20 with recommendation for dysphagia II mechanical soft diet with nectar thick liquids. At present Dash/TLC, low K, low phos diet rx; 100% po intake 11/5 per EMR. GI wdl. Noted left lateral heel stage II. Pt A+Ox3 during visit; hx mild dementia. EMR reviewed. Multiple hospital admissions during the past year. Most recent admission seen by Registered Dietitian on 6/3/20. Weight at that time 153lbs(6/3). UBW 150lbs. Now 140lbs per bedscale 11/5. Pt validates above weight loss over past few months. Drinks ensure (chocolate) once daily pta. S/p MBS study 6/4/20 with recommendation for dysphagia II mechanical soft diet with nectar thick liquids. At present Dash/TLC, low K, low phos diet rx; 100% po intake breakfast 11/5, 75% lunch 11/5. Pt reports no difficulty chewing/swallowing. Does not use thickened liquids pta. Consider SLP evaluation & diet downgrade per MD order. GI wdl. Noted left lateral heel stage II. Pt A+Ox3 during visit; hx mild dementia. EMR reviewed. Multiple hospital admissions during the past year. Most recent admission seen by Registered Dietitian on 6/3/20. Weight at that time 153lbs(6/3). UBW 150lbs. Now 140lbs per bedscale 11/5. Pt validates above weight loss over past few months. Drinks ensure (chocolate) once daily pta. S/p MBS study 6/4/20 with recommendation for dysphagia II mechanical soft diet with nectar thick liquids. At present Dash/TLC, low K, low phos diet rx; 100% po intake breakfast 11/5, 75% lunch 11/5. Pt reports no difficulty chewing/swallowing. Does not use thickened liquids pta.  Coughing observed throughout nutrition interview. Consider SLP evaluation & diet downgrade per MD order. GI wdl. Noted left lateral heel stage II. Pt A+Ox3 during visit; hx mild dementia. EMR reviewed. Multiple hospital admissions during the past year. Most recent admission seen by Registered Dietitian on 6/3/20. Weight at that time 153lbs(6/3). UBW 150lbs. Now 140.8 lbs per bedscale 11/5. Pt validates above weight loss over past few months. Drinks ensure (chocolate) once daily pta. S/p MBS study 6/4/20 with recommendation for dysphagia II mechanical soft diet with nectar thick liquids. At present Dash/TLC, low K, low phos diet rx; 100% po intake breakfast 11/5, 75% lunch 11/5. Pt reports no difficulty chewing/swallowing. Does not use thickened liquids pta however coughing observed throughout nutrition interview. Consider SLP evaluation & diet downgrade per MD order. GI wdl. Noted left lateral heel stage II.

## 2020-11-05 NOTE — DIETITIAN INITIAL EVALUATION ADULT. - PROBLEM SELECTOR PLAN 2
-YOU on CKD Stage 3, likely 2/2 dehydration, vague hx of kidney infection per wife, pt denies any urinary symptoms at this time  -Pt presenting with Cr 2.6, baseline 1.7  -s/p 1L NS bolus x1, will give very gentle fluid hydration @55cc/hr as pt appears dry on exam  -avoid overhydration   -UA and cx pending in ED  -avoid nephrotoxic medications  -F/u AM CMP.

## 2020-11-05 NOTE — PROGRESS NOTE ADULT - PROBLEM SELECTOR PLAN 10
DVT PPX: heparin 5000mg q 12 SQ    IMPROVE VTE Individual Risk Assessment          RISK                                                          Points  [  ] Previous VTE                                                3  [  ] Thrombophilia                                             2  [  ] Lower limb paralysis                                   2        (unable to hold up >15 seconds)    [  ] Current Cancer                                             2         (within 6 months)  [  ] Immobilization > 24 hrs                              1  [  ] ICU/CCU stay > 24 hours                             1  [ x] Age > 60                                                         1    IMPROVE VTE Score:         [   1      ]    #11 foot ulcers  -appear chronic, will have wound care RN check lesions and use off loading boots DVT PPX: heparin 5000mg q 12 SQ    #11 foot ulcers  -appear chronic, wound care RN consulted for lesions and use off loading boots

## 2020-11-05 NOTE — DISCHARGE NOTE PROVIDER - NSDCMRMEDTOKEN_GEN_ALL_CORE_FT
albuterol 90 mcg/inh inhalation aerosol: 2 puff(s) inhaled every 6 hours, As needed, Shortness of Breath and/or Wheezing  atorvastatin 80 mg oral tablet: 1 tab(s) orally once a day (at bedtime)  donepezil 10 mg oral tablet: 1 tab(s) orally once a day (at bedtime)  finasteride 5 mg oral tablet: 1 tab(s) orally once a day  Flovent  mcg/inh inhalation aerosol: 1 puff(s) inhaled 2 times a day  levalbuterol 45 mcg/inh inhalation aerosol: 1 puff(s) inhaled every 4 hours, As Needed  melatonin 5 mg oral tablet: 1 tab(s) orally once a day (at bedtime), As needed, Insomnia  metoprolol succinate 25 mg oral tablet, extended release: 1 tab(s) orally once a day  pantoprazole 40 mg oral delayed release tablet: 1 tab(s) orally once a day (before a meal)  sertraline 100 mg oral tablet: 1 tab(s) orally once a day  sucralfate 1 g/10 mL oral suspension: 10 milliliter(s) orally 4 times a day (before meals and at bedtime)  tamsulosin 0.4 mg oral capsule: 1 cap(s) orally once a day (at bedtime)   albuterol 90 mcg/inh inhalation aerosol: 2 puff(s) inhaled every 6 hours, As needed, Shortness of Breath and/or Wheezing  atorvastatin 80 mg oral tablet: 1 tab(s) orally once a day (at bedtime)  donepezil 10 mg oral tablet: 1 tab(s) orally once a day (at bedtime)  finasteride 5 mg oral tablet: 1 tab(s) orally once a day  Flovent  mcg/inh inhalation aerosol: 1 puff(s) inhaled 2 times a day  gentamicin 0.3% ophthalmic solution: 1 drop(s) to each affected eye 5 times a day  levalbuterol 45 mcg/inh inhalation aerosol: 1 puff(s) inhaled every 4 hours, As Needed  melatonin 5 mg oral tablet: 1 tab(s) orally once a day (at bedtime), As needed, Insomnia  metoprolol succinate 25 mg oral tablet, extended release: 1 tab(s) orally once a day  Multiple Vitamins with Minerals oral tablet: 1 tab(s) orally once a day  pantoprazole 40 mg oral delayed release tablet: 1 tab(s) orally once a day (before a meal)  sertraline 100 mg oral tablet: 1 tab(s) orally once a day  sucralfate 1 g/10 mL oral suspension: 10 milliliter(s) orally 4 times a day (before meals and at bedtime)  tamsulosin 0.4 mg oral capsule: 1 cap(s) orally once a day (at bedtime)  tiotropium 18 mcg inhalation capsule: 1 cap(s) inhaled once a day

## 2020-11-05 NOTE — PHYSICAL THERAPY INITIAL EVALUATION ADULT - IMPAIRMENTS FOUND, PT EVAL
gait, locomotion, and balance gait, locomotion, and balance/poor safety awareness/posture/aerobic capacity/endurance

## 2020-11-05 NOTE — SWALLOW BEDSIDE ASSESSMENT ADULT - SWALLOW EVAL: DIAGNOSIS
1. The patient demonstrated functional oral management of puree, nectar thick, and thin liquid textures marked by adequate bolus collection, transfer, and posterior transport. 2. The patient demonstrated a mild oral dysphagia for solids marked by adequate oral acceptance with delayed bolus collection, transfer, and posterior transport. 3. The patient demonstrated a mild pharyngeal dysphagia for puree, solid, and nectar thick liquids marked by a suspected delayed pharyngeal swallow trigger with reduced hyolaryngeal elevation upon digital palpation w/o evidence of airway penetration. 4. The patient demonstrated a moderate-severe pharyngeal dysphagia for thin liquids marked by a suspected delayed pharyngeal swallow trigger with reduced hyolaryngeal elevation upon digital palpation resulting in change in vocal quality to a 'wet' tone and throat clearing x1 suggestive of airway penetration.

## 2020-11-05 NOTE — PHYSICAL THERAPY INITIAL EVALUATION ADULT - TRANSFER SAFETY CONCERNS NOTED: SIT/STAND, REHAB EVAL
decreased proprioception/decreased weight-shifting ability/decreased balance during turns/decreased sequencing ability

## 2020-11-05 NOTE — CONSULT NOTE ADULT - SUBJECTIVE AND OBJECTIVE BOX
Patient is a 86y old  Male who presents with a chief complaint of Shortness of breath (2020 09:08)    HPI:  86 yo male with PMH of COPD not on home O2, mild dementia, HTN, HLD, CAD s/p triple CABG  and 2 subsequent stents, CKD 3,  BPH, Depression, GERD, osteoporosis, peripheral neuropathy presents to the ED with cough and shortness of breath x a few weeks. States the shortness of breath has been ongoing for the past few weeks but has worsened within the last few days. Spoke to wife Larisa Beebe ([phone]) who confirmed that the patient has been having a productive cough and has been having difficulty sleeping because he cant lay down flat. States he has chronic COPD and has a new PCP who comes to the house and has never been hospitalized previously for COPD exacerbation or intubated. Denies any recent sick contacts, recent travel, or any known covid exposure of late. He states he does not leave the house much as he is frail and has difficulty walking. He denies any fevers, chills, CP, palpitations, abd pain, N, V, D, urinary symptoms or numbness, weakness or swelling of the legs. Him and his wife state that they have not been able to see many of his specialists lately including his cardiologist and pulmonologist because they dont drive and because of recent events with COVID. They have 24/7 aids at home and request help finding specialists that make house calls. Per wife, she states that the pt was recently diagnosed with a "kidney infection" by his PCP but doesnt think he was given any medications for this and denies being referred to a nephrologist. Of note, pt was recently admitted to [facility] on CKD and COPD exacerbation from -, had received IV fluids, Duonebs, and IV solumedrol.     ED Course: received IV Zithromax 500mg x 1, 1L NS bolus x1, Duonebs x 1, IV Solumedrol 125mg x1  Vitals: T 98.2, HR 62, /90 -->140/90, O2 100% on 2L via NC  Significant Labs: Na 148, BUN 37, Cr 2.4, Blood Glucose 172, Albumin 3.1, Alk phos 121, eGFR 24, Pro-BNP 8953, negative COVID and RVP PCR  EKG: NSR 62 bpm with 1st degree HB, LAD, RBBB, no significant changes from prior  CXR: Slight bilateral atelectases are presently seen.  (2020 18:27)    Renal consult called for [facility] on CKD. Pt known to my associate Dr. Bernardo.   History obtained from chart and patient. Pt states he feels better.       PAST MEDICAL HISTORY:  Dementia  Myocardial infarction  COPD (chronic obstructive pulmonary disease)  Seasonal allergies  GERD (gastroesophageal reflux disease)  Benign prostatic hypertrophy  Hyperlipidemia  Hypertension  Asthma  Depression  Peripheral Neuropathy  Asthma  S/P AVR (Aortic Valve Replacement)  S/P CABG (Coronary Artery Bypass Graft)  Osteoporosis  CAD (Coronary Artery Disease)  HTN (Hypertension)  Dyslipidemia        PAST SURGICAL HISTORY:  S/P inguinal hernia repair  S/P appendectomy  S/P CABG (coronary artery bypass graft)  S/P CABG (Coronary Artery Bypass Graft)  S/P AVR (Aortic Valve Replacement)        FAMILY HISTORY:  Family history of renal failure (Sibling)    Family history of MI (myocardial infarction)   at 65    Family history of stroke   at 65    Family history of amyotrophic lateral sclerosis   at 67    Family history of stroke   at 67        SOCIAL HISTORY:    Allergies    amoxicillin (Unknown)  Levaquin (Unknown)  penicillin (Unknown)    Intolerances      Home Medications:  albuterol 90 mcg/inh inhalation aerosol: 2 puff(s) inhaled every 6 hours, As needed, Shortness of Breath and/or Wheezing (2020 20:07)  Flovent  mcg/inh inhalation aerosol: 1 puff(s) inhaled 2 times a day (2020 20:07)  levalbuterol 45 mcg/inh inhalation aerosol: 1 puff(s) inhaled every 4 hours, As Needed (2020 20:07)  metoprolol succinate 25 mg oral tablet, extended release: 1 tab(s) orally once a day (2020 20:07)  sertraline 100 mg oral tablet: 1 tab(s) orally once a day (2020 20:07)    MEDICATIONS  (STANDING):  ALBUTerol    90 MICROgram(s) HFA Inhaler 2 Puff(s) Inhalation every 8 hours  atorvastatin 80 milliGRAM(s) Oral at bedtime  dextrose 5%. 1000 milliLiter(s) (50 mL/Hr) IV Continuous <Continuous>  donepezil 10 milliGRAM(s) Oral at bedtime  finasteride 5 milliGRAM(s) Oral daily  heparin   Injectable 5000 Unit(s) SubCutaneous every 12 hours  metoprolol succinate ER 25 milliGRAM(s) Oral daily  pantoprazole    Tablet 40 milliGRAM(s) Oral before breakfast  sertraline 100 milliGRAM(s) Oral daily  sucralfate suspension 10 milliGRAM(s) Oral four times a day  tamsulosin 0.4 milliGRAM(s) Oral at bedtime  tiotropium 18 MICROgram(s) Capsule 1 Capsule(s) Inhalation daily    MEDICATIONS  (PRN):  melatonin 5 milliGRAM(s) Oral at bedtime PRN Insomnia      REVIEW OF SYSTEMS:  General: no distress  Respiratory: + SOB  Cardiovascular: No CP or Palpitations	  Gastrointestinal: No nausea, Vomiting. No diarrhea  Genitourinary: No urinary complaints	  Musculoskeletal: No new rash or lesions	  all other systems negative    T(F): 98 (20 @ 22:29), Max: 98.2 (20 @ 16:42)  HR: 77 (20 @ 22:29) (62 - 77)  BP: 158/83 (20 @ 22:29) (140/90 - 180/90)  RR: 17 (20 @ 22:29) (14 - 17)  SpO2: 97% (20 @ 22:29) (97% - 100%)  Wt(kg): --    PHYSICAL EXAM:  General: NAD  Respiratory: b/l air entry  Cardiovascular: S1 S2  Gastrointestinal: soft  Extremities: no edema            146<H>  |  112<H>  |  36<H>  ----------------------------<  147<H>  5.0   |  26  |  2.10<H>    Ca    9.3      2020 08:51  Mg     1.8         TPro  6.8  /  Alb  3.1<L>  /  TBili  0.3  /  DBili  x   /  AST  13<L>  /  ALT  13  /  AlkPhos  121<H>                            12.6   3.63  )-----------( 155      ( 2020 08:51 )             40.9       Potassium, Serum: 5.0 mmol/L ( @ 08:51)  Blood Urea Nitrogen, Serum: 36 mg/dL (11-05 @ 08:51)  Calcium, Total Serum: 9.3 mg/dL ( 08:51)  Hemoglobin: 12.6 g/dL ( 08:51)      Creatinine, Serum: 2.10 ( 08:51)  Creatinine, Serum: 2.40 ( 17:58)      Urinalysis Basic - ( 2020 21:55 )    Color: Yellow / Appearance: Clear / S.015 / pH: x  Gluc: x / Ketone: Negative  / Bili: Negative / Urobili: Negative   Blood: x / Protein: 30 mg/dL / Nitrite: Negative   Leuk Esterase: Negative / RBC: Negative /HPF / WBC Negative   Sq Epi: x / Non Sq Epi: Occasional / Bacteria: Occasional      LIVER FUNCTIONS - ( 2020 17:58 )  Alb: 3.1 g/dL / Pro: 6.8 g/dL / ALK PHOS: 121 U/L / ALT: 13 U/L / AST: 13 U/L / GGT: x           CARDIAC MARKERS ( 2020 17:58 )  <.015 ng/mL / x     / 38 U/L / x     / 1.9 ng/mL      Creatine Kinase, Serum: 38 U/L (20 @ 17:58)          I&O's Detail    2020 07:01  -  2020 07:00  --------------------------------------------------------  IN:    dextrose 5%: 500 mL  Total IN: 500 mL    OUT:  Total OUT: 0 mL    Total NET: 500 mL      < from: Xray Chest 1 View AP/PA (20 @ 17:22) >    EXAM:  XR CHEST AP OR PA 1V                            PROCEDURE DATE:  2020          INTERPRETATION:  AP chest on 2020 at 5:14 PM. Patient is short of breath and has cough.    Heart is magnified by technique. Sternotomy again noted.    Diffusely aneurysmal aorta again noted.    The above findings are similar to  of this year.    Present film shows slight linear atelectases are present at the left base and slight linear atelectases are also seen off the right hilum.    IMPRESSION: Slight bilateral atelectases are presently seen. Cardiac and aortic findings stable as above.        SUPRIYA KHAN M.D., ATTENDING RADIOLOGIST  This document has been electronically signed. 2020  6:00PM    < end of copied text >

## 2020-11-05 NOTE — SWALLOW BEDSIDE ASSESSMENT ADULT - PHARYNGEAL PHASE
Delayed pharyngeal swallow/Decreased laryngeal elevation Wet vocal quality post oral intake/Delayed pharyngeal swallow/Decreased laryngeal elevation/Throat clear post oral intake

## 2020-11-05 NOTE — DIETITIAN INITIAL EVALUATION ADULT. - PROBLEM SELECTOR PLAN 4
-Chronic, /90 on admission, down to 140/90  -Continue home metoprolol with hold parameters  -EKG showed NSR 62 bpm with 1st degree HB, LAD, and RBBB, unchanged from priors  -f/u TTE in AM  -Monitor routine hemodynamics

## 2020-11-05 NOTE — SWALLOW BEDSIDE ASSESSMENT ADULT - SWALLOW EVAL: RECOMMENDED FEEDING/EATING TECHNIQUES
crush medication (when feasible)/position upright (90 degrees)/small sips/bites/maintain upright posture during/after eating for 30 mins/no straws/oral hygiene/allow for swallow between intakes/alternate food with liquid

## 2020-11-05 NOTE — PROGRESS NOTE ADULT - SUBJECTIVE AND OBJECTIVE BOX
Patient is a 86y old  Male who presents with a chief complaint of Shortness of breath (2020 06:05)    FROM ADMISSION H&P:  84 yo male with PMH of COPD not on home O2, mild dementia, HTN, HLD, CAD s/p triple CABG  and 2 subsequent stents, CKD 3,  BPH, Depression, GERD, osteoporosis, peripheral neuropathy presents to the ED with cough and shortness of breath x a few weeks. States the shortness of breath has been ongoing for the past few weeks but has worsened within the last few days. Spoke to wife Larisa Beebe ([phone]) who confirmed that the patient has been having a productive cough and has been having difficulty sleeping because he cant lay down flat. States he has chronic COPD and has a new PCP who comes to the house and has never been hospitalized previously for COPD exacerbation or intubated. Denies any recent sick contacts, recent travel, or any known covid exposure of late. He states he does not leave the house much as he is frail and has difficulty walking. He denies any fevers, chills, CP, palpitations, abd pain, N, V, D, urinary symptoms or numbness, weakness or swelling of the legs. Him and his wife state that they have not been able to see many of his specialists lately including his cardiologist and pulmonologist because they dont drive and because of recent events with COVID. They have 24/7 aids at home and request help finding specialists that make house calls. Per wife, she states that the pt was recently diagnosed with a "kidney infection" by his PCP but doesnt think he was given any medications for this and denies being referred to a nephrologist. Of note, pt was recently admitted to [facility] for [facility] on CKD and COPD exacerbation from -, had received IV fluids, Duonebs, and IV solumedrol.     ED Course: received IV Zithromax 500mg x 1, 1L NS bolus x1, Duonebs x 1, IV Solumedrol 125mg x1  Vitals: T 98.2, HR 62, /90 -->140/90, O2 100% on 2L via NC  Significant Labs: Na 148, BUN 37, Cr 2.4, Blood Glucose 172, Albumin 3.1, Alk phos 121, eGFR 24, Pro-BNP 8953, negative COVID and RVP PCR  EKG: NSR 62 bpm with 1st degree HB, LAD, RBBB, no significant changes from prior  CXR: Slight bilateral atelectases are presently seen.     INTERVAL HPI:  2020: Patient seen and examined at bedside this morning. Patient states his shortness of breath has markedly improved today. He experienced some dyspnea on exertion when walking to the stretcher to have his ECHO performed this morning. He no longer has dyspnea at rest. He continues to have a dry cough that he has had for the past 3 weeks. He denies any sick contacts or recent travel and does not leave his house. He denies any chest pain, palpitations, nausea, vomiting, abdominal pain, dizziness, leg swelling. Last BM was yesterday prior to admission.     OVERNIGHT EVENTS: none    Home Medications:  albuterol 90 mcg/inh inhalation aerosol: 2 puff(s) inhaled every 6 hours, As needed, Shortness of Breath and/or Wheezing (2020 20:07)  Flovent  mcg/inh inhalation aerosol: 1 puff(s) inhaled 2 times a day (2020 20:07)  levalbuterol 45 mcg/inh inhalation aerosol: 1 puff(s) inhaled every 4 hours, As Needed (2020 20:07)  metoprolol succinate 25 mg oral tablet, extended release: 1 tab(s) orally once a day (2020 20:07)  sertraline 100 mg oral tablet: 1 tab(s) orally once a day (2020 20:07)      MEDICATIONS  (STANDING):  ALBUTerol    90 MICROgram(s) HFA Inhaler 2 Puff(s) Inhalation every 8 hours  atorvastatin 80 milliGRAM(s) Oral at bedtime  dextrose 5%. 1000 milliLiter(s) (50 mL/Hr) IV Continuous <Continuous>  donepezil 10 milliGRAM(s) Oral at bedtime  finasteride 5 milliGRAM(s) Oral daily  heparin   Injectable 5000 Unit(s) SubCutaneous every 12 hours  metoprolol succinate ER 25 milliGRAM(s) Oral daily  pantoprazole    Tablet 40 milliGRAM(s) Oral before breakfast  sertraline 100 milliGRAM(s) Oral daily  sucralfate suspension 10 milliGRAM(s) Oral four times a day  tamsulosin 0.4 milliGRAM(s) Oral at bedtime  tiotropium 18 MICROgram(s) Capsule 1 Capsule(s) Inhalation daily    MEDICATIONS  (PRN):  melatonin 5 milliGRAM(s) Oral at bedtime PRN Insomnia      amoxicillin (Unknown)  Levaquin (Unknown)  penicillin (Unknown)      Social History:  Social History:    Marital Status:  (  x )    (   ) Single    (   )    (  )   Occupation:   Lives with: (  ) alone  (  ) children   ( x ) spouse   (  ) parents  ( x ) other --  aids    Substance Use (street drugs): (  ) never used  (  ) other:  Tobacco Usage:  (   ) never smoked   ( x  ) former smoker   (   ) current smoker  (     ) pack year  (        ) last cigarette date  Alcohol Usage: socially      Health Management     For female:   Last Mammo: (     ) No  (    ) Yes  (    ) Normal  (    ) Date   Last Pap: (     ) No  (    ) Yes  (    ) Normal   (    ) Date     For male:  Last prostate exam: (     ) No  (    ) Yes  (    ) Date  (    ) Normal    Immunization Hx:   (  ) flu shot                               (     ) date --> has not received this year but usually gets every year  (  ) pneumonia shot               (     ) date --> unknown however pt states UTD  ( x ) tetanus                               (   Aug 2020  ) date     (    x ) Advanced Directives: (     ) None    (   x   ) DNR    ( x    ) DNI    (   x  ) Health Care Proxy: wife Larisa Shelley (2020 18:27)      REVIEW OF SYSTEMS:  CONSTITUTIONAL: No fever, No chills, No fatigue, No myalgia, No Body ache, No Weakness  EYES: No eye pain,  No visual disturbances, No discharge, No Redness  ENMT: No ear pain, No nose bleed, No vertigo; No sinus pain, No throat pain, No Congestion  NECK: No pain, No stiffness  RESPIRATORY: Admits cough and dyspnea on exertion. No wheezing, No hemoptysis.  CARDIOVASCULAR: No chest pain, No palpitations  GASTROINTESTINAL: No abdominal pain, No epigastric pain. No nausea, No vomiting, No diarrhea, No constipation; [  ] BM- none  GENITOURINARY: No dysuria, No frequency, No urgency, No hematuria, No incontinence  NEUROLOGICAL: No headaches, No dizziness, No numbness, No tingling, No tremors, No weakness  EXTREMITIES: No Swelling, No Pain, No Edema  SKIN: [ x ] No itching, burning, rashes, or lesions   MUSCULOSKELETAL: No joint pain, No joint swelling; No muscle pain, No back pain, No extremity pain  PSYCHIATRIC: No depression, No anxiety, No mood swings, No difficulty sleeping at night  PAIN SCALE: [ x ] None  [  ] Other-  ROS Unable to obtain due to: [ x ] Dementia  [  ] Lethargy  [  ] Sedated  [  ] Non verbal  REST OF REVIEW OF SYSTEMS: [ x ] Normal     Vital Signs Last 24 Hrs  T(C): 36.7 (2020 22:29), Max: 36.8 (2020 16:42)  T(F): 98 (2020 22:29), Max: 98.2 (2020 16:42)  HR: 77 (:29) (62 - 77)  BP: 158/83 (2020 22:29) (140/90 - 180/90)  BP(mean): --  RR: 17 (2020 22:29) (14 - 17)  SpO2: 97% (:29) (97% - 100%)    CAPILLARY BLOOD GLUCOSE          I&O's Summary    2020 07:01  -  2020 07:00  --------------------------------------------------------  IN: 500 mL / OUT: 0 mL / NET: 500 mL      PHYSICAL EXAM:  GENERAL:  [ x ] NAD, [ x ] Well appearing, [  ] Agitated, [  ] Drowsy, [  ] Lethargy, [  ] Confused   HEAD:  [ x ] Normal, [  ] Other  EYES:  [ x ] EOMI, [ x ] PERRLA, [ x ] Conjunctiva and sclera clear normal, [  ] Other, [  ] Pallor, [  ] Discharge  ENMT:  [ x ] Normal, [ x ] Moist mucous membranes, [ x ] Good dentition, [ x ] No thrush  NECK:  [ x ] Supple, [ x ] No JVD, [ x ] Normal thyroid, [  ] Lymphadenopathy, [  ] Other  CHEST/LUNG:  [  ] Clear to auscultation bilaterally, [  ] Breath Sounds equal B/L / decreased, [ x ] Poor effort, [ x ] Rales LLL and RLL, [ x ] No rhonchi, [ x ] No wheezing  HEART:  [ x ] Regular rate and rhythm, [  ] Tachycardia, [  ] Bradycardia, [  ] Irregular, [ x ] No murmurs, No rubs, No gallops, [  ] PPM in place (Mfr:  )  ABDOMEN:  [ x ] Soft, [ x ] Nontender, [ x ] Nondistended, [ x ] No mass, [ x ] Bowel sounds present, [  ] Obese  NERVOUS SYSTEM:  [ x ] Alert & Oriented x2, [ x ] Nonfocal, [  ] Confusion, [  ] Encephalopathic, [  ] Sedated, [  ] Unable to assess, [ x ] Dementia, [  ] Other-  EXTREMITIES:  [ x ] 2+ Peripheral Pulses, No clubbing, No cyanosis,  [  ] Edema B/L lower EXT, [  ] PVD stasis skin changes B/L lower EXT, [  ] Wound  LYMPH:  No lymphadenopathy noted  SKIN:  [  ] No rashes or lesions, [ x ] Pressure ulcers L heel pressure ulcer, R heel erythema and scab, [  ] Ecchymosis, [  ] Skin tears, [  ] Other    DIET: Diet, DASH/TLC:   Sodium & Cholesterol Restricted  No Concentrated Potassium  No Concentrated Phosphorus (20 @ 20:43)      LABS:                        14.2   7.38  )-----------( 149      ( 2020 17:21 )             46.6     2020 17:58    148    |  118    |  37     ----------------------------<  172    4.2     |  25     |  2.40     Ca    9.1        2020 17:58    TPro  6.8    /  Alb  3.1    /  TBili  0.3    /  DBili  x      /  AST  13     /  ALT  13     /  AlkPhos  121    2020 17:58    PT/INR - ( 2020 17:58 )   PT: 11.7 sec;   INR: 1.00 ratio         PTT - ( 2020 17:58 )  PTT:38.8 sec  Urinalysis Basic - ( 2020 21:55 )    Color: Yellow / Appearance: Clear / S.015 / pH: x  Gluc: x / Ketone: Negative  / Bili: Negative / Urobili: Negative   Blood: x / Protein: 30 mg/dL / Nitrite: Negative   Leuk Esterase: Negative / RBC: Negative /HPF / WBC Negative   Sq Epi: x / Non Sq Epi: Occasional / Bacteria: Occasional          CARDIAC MARKERS ( 2020 17:58 )  <.015 ng/mL / x     / 38 U/L / x     / 1.9 ng/mL         Anemia Panel:      Thyroid Panel:            Serum Pro-Brain Natriuretic Peptide: 8953 pg/mL (20 @ 17:58)      RADIOLOGY & ADDITIONAL TESTS:      HEALTH ISSUES - PROBLEM Dx:  COPD (chronic obstructive pulmonary disease)  COPD (chronic obstructive pulmonary disease)    Hypernatremia  Hypernatremia    Dementia  Dementia    Need for prophylactic measure  Need for prophylactic measure    Benign prostatic hypertrophy  Benign prostatic hypertrophy    GERD (gastroesophageal reflux disease)  GERD (gastroesophageal reflux disease)    Depression  Depression    Hyperlipidemia  Hyperlipidemia    Hypertension  Hypertension    CAD (Coronary Artery Disease)  CAD (Coronary Artery Disease)    YOU (acute kidney injury)  YOU (acute kidney injury)    COPD exacerbation  COPD exacerbation          Consultant(s) Notes Reviewed:  [ x ] YES     Care Discussed with [ x ] Consultants, [  ] Patient, [ x ] Family, [ x ] HCP, [  ] , [  ] Social Service, [  ] RN, [  ] Physical Therapy, [  ] Palliative Care Team  DVT PPX: [  ] Lovenox, [ x ] SC Heparin, [  ] Coumadin, [  ] Xarelto, [  ] Eliquis, [  ] Pradaxa, [  ] IV Heparin drip, [  ] SCD, [  ] Ambulation, [  ] Contraindicated 2/2 GI Bleed, [  ] Contraindicated 2/2  Bleed, [  ] Contraindicated 2/2 Brain Bleed  Advanced Directive: [ x ] None; palliative consult for ALEX wife, Larisa Shelley would like DNR/DNI [  ] DNR/DNI Patient is a 86y old  Male who presents with a chief complaint of Shortness of breath (2020 06:05)    FROM ADMISSION H&P:  84 yo male with PMH of COPD not on home O2, mild dementia, HTN, HLD, CAD s/p triple CABG  and 2 subsequent stents, CKD 3,  BPH, Depression, GERD, osteoporosis, peripheral neuropathy presents to the ED with cough and shortness of breath x a few weeks. States the shortness of breath has been ongoing for the past few weeks but has worsened within the last few days. Spoke to wife Larisa Beebe ([phone]) who confirmed that the patient has been having a productive cough and has been having difficulty sleeping because he cant lay down flat. States he has chronic COPD and has a new PCP who comes to the house and has never been hospitalized previously for COPD exacerbation or intubated. Denies any recent sick contacts, recent travel, or any known covid exposure of late. He states he does not leave the house much as he is frail and has difficulty walking. He denies any fevers, chills, CP, palpitations, abd pain, N, V, D, urinary symptoms or numbness, weakness or swelling of the legs. Him and his wife state that they have not been able to see many of his specialists lately including his cardiologist and pulmonologist because they dont drive and because of recent events with COVID. They have 24/7 aids at home and request help finding specialists that make house calls. Per wife, she states that the pt was recently diagnosed with a "kidney infection" by his PCP but doesnt think he was given any medications for this and denies being referred to a nephrologist. Of note, pt was recently admitted to [facility] for YOU on CKD and COPD exacerbation from -, had received IV fluids, Duonebs, and IV solumedrol.     ED Course: received IV Zithromax 500mg x 1, 1L NS bolus x1, Duonebs x 1, IV Solumedrol 125mg x1  Vitals: T 98.2, HR 62, /90 -->140/90, O2 100% on 2L via NC  Significant Labs: Na 148, BUN 37, Cr 2.4, Blood Glucose 172, Albumin 3.1, Alk phos 121, eGFR 24, Pro-BNP 8953, negative COVID and RVP PCR  EKG: NSR 62 bpm with 1st degree HB, LAD, RBBB, no significant changes from prior  CXR: Slight bilateral atelectases are presently seen.     INTERVAL HPI:  2020: Patient seen and examined at bedside this morning. Patient states his shortness of breath has markedly improved today. He experienced some dyspnea on exertion when walking to the stretcher to have his ECHO performed this morning. He no longer has dyspnea at rest. He continues to have a dry cough that he has had for the past 3 weeks. He denies any sick contacts or recent travel and does not leave his house. He denies any chest pain, palpitations, nausea, vomiting, abdominal pain, dizziness, leg swelling. Last BM was yesterday prior to admission.     OVERNIGHT EVENTS: none    Home Medications:  albuterol 90 mcg/inh inhalation aerosol: 2 puff(s) inhaled every 6 hours, As needed, Shortness of Breath and/or Wheezing (2020 20:07)  Flovent  mcg/inh inhalation aerosol: 1 puff(s) inhaled 2 times a day (2020 20:07)  levalbuterol 45 mcg/inh inhalation aerosol: 1 puff(s) inhaled every 4 hours, As Needed (2020 20:07)  metoprolol succinate 25 mg oral tablet, extended release: 1 tab(s) orally once a day (2020 20:07)  sertraline 100 mg oral tablet: 1 tab(s) orally once a day (2020 20:07)      MEDICATIONS  (STANDING):  ALBUTerol    90 MICROgram(s) HFA Inhaler 2 Puff(s) Inhalation every 8 hours  atorvastatin 80 milliGRAM(s) Oral at bedtime  dextrose 5%. 1000 milliLiter(s) (50 mL/Hr) IV Continuous <Continuous>  donepezil 10 milliGRAM(s) Oral at bedtime  finasteride 5 milliGRAM(s) Oral daily  heparin   Injectable 5000 Unit(s) SubCutaneous every 12 hours  metoprolol succinate ER 25 milliGRAM(s) Oral daily  pantoprazole    Tablet 40 milliGRAM(s) Oral before breakfast  sertraline 100 milliGRAM(s) Oral daily  sucralfate suspension 10 milliGRAM(s) Oral four times a day  tamsulosin 0.4 milliGRAM(s) Oral at bedtime  tiotropium 18 MICROgram(s) Capsule 1 Capsule(s) Inhalation daily    MEDICATIONS  (PRN):  melatonin 5 milliGRAM(s) Oral at bedtime PRN Insomnia      amoxicillin (Unknown)  Levaquin (Unknown)  penicillin (Unknown)      Social History:  Social History:    Marital Status:  (  x )    (   ) Single    (   )    (  )   Occupation:   Lives with: (  ) alone  (  ) children   ( x ) spouse   (  ) parents  ( x ) other --  aids    Substance Use (street drugs): (  ) never used  (  ) other:  Tobacco Usage:  (   ) never smoked   ( x  ) former smoker   (   ) current smoker  (     ) pack year  (        ) last cigarette date  Alcohol Usage: socially      Health Management     For female:   Last Mammo: (     ) No  (    ) Yes  (    ) Normal  (    ) Date   Last Pap: (     ) No  (    ) Yes  (    ) Normal   (    ) Date     For male:  Last prostate exam: (     ) No  (    ) Yes  (    ) Date  (    ) Normal    Immunization Hx:   (  ) flu shot                               (     ) date --> has not received this year but usually gets every year  (  ) pneumonia shot               (     ) date --> unknown however pt states UTD  ( x ) tetanus                               (   Aug 2020  ) date     (    x ) Advanced Directives: (     ) None    (   x   ) DNR    ( x    ) DNI    (   x  ) Health Care Proxy: wife Larisa Shelley (2020 18:27)      REVIEW OF SYSTEMS:  CONSTITUTIONAL: No fever, No chills, No fatigue, No myalgia, No Body ache, No Weakness  EYES: No eye pain,  No visual disturbances, No discharge, No Redness  ENMT: No ear pain, No nose bleed, No vertigo; No sinus pain, No throat pain, No Congestion  NECK: No pain, No stiffness  RESPIRATORY: Admits cough and dyspnea on exertion. No wheezing, No hemoptysis.  CARDIOVASCULAR: No chest pain, No palpitations  GASTROINTESTINAL: No abdominal pain, No epigastric pain. No nausea, No vomiting, No diarrhea, No constipation; [  ] BM- none  GENITOURINARY: No dysuria, No frequency, No urgency, No hematuria, No incontinence  NEUROLOGICAL: No headaches, No dizziness, No numbness, No tingling, No tremors, No weakness  EXTREMITIES: No Swelling, No Pain, No Edema  SKIN: [ x ] No itching, burning, rashes, or lesions   MUSCULOSKELETAL: No joint pain, No joint swelling; No muscle pain, No back pain, No extremity pain  PSYCHIATRIC: No depression, No anxiety, No mood swings, No difficulty sleeping at night  PAIN SCALE: [ x ] None  [  ] Other-  ROS Unable to obtain due to: [ x ] Dementia  [  ] Lethargy  [  ] Sedated  [  ] Non verbal  REST OF REVIEW OF SYSTEMS: [ x ] Normal     Vital Signs Last 24 Hrs  T(C): 36.7 (2020 22:29), Max: 36.8 (2020 16:42)  T(F): 98 (2020 22:29), Max: 98.2 (2020 16:42)  HR: 77 (:29) (62 - 77)  BP: 158/83 (2020 22:29) (140/90 - 180/90)  BP(mean): --  RR: 17 (2020 22:29) (14 - 17)  SpO2: 97% (:29) (97% - 100%)    CAPILLARY BLOOD GLUCOSE          I&O's Summary    2020 07:01  -  2020 07:00  --------------------------------------------------------  IN: 500 mL / OUT: 0 mL / NET: 500 mL      PHYSICAL EXAM:  GENERAL:  [ x ] NAD, [ x ] Well appearing, [  ] Agitated, [  ] Drowsy, [  ] Lethargy, [  ] Confused   HEAD:  [ x ] Normal, [  ] Other  EYES:  [ x ] EOMI, [ x ] PERRLA, [ x ] Conjunctiva and sclera clear normal, [  ] Other, [  ] Pallor, [  ] Discharge  ENMT:  [ x ] Normal, [ x ] Moist mucous membranes, [ x ] Good dentition, [ x ] No thrush  NECK:  [ x ] Supple, [ x ] No JVD, [ x ] Normal thyroid, [  ] Lymphadenopathy, [  ] Other  CHEST/LUNG:  [  ] Clear to auscultation bilaterally, [  ] Breath Sounds equal B/L / decreased, [ x ] Poor effort, [ x ] Rales LLL and RLL, [ x ] No rhonchi, [ x ] No wheezing  HEART:  [ x ] Regular rate and rhythm, [  ] Tachycardia, [  ] Bradycardia, [  ] Irregular, [ x ] No murmurs, No rubs, No gallops, [  ] PPM in place (Mfr:  )  ABDOMEN:  [ x ] Soft, [ x ] Nontender, [ x ] Nondistended, [ x ] No mass, [ x ] Bowel sounds present, [  ] Obese  NERVOUS SYSTEM:  [ x ] Alert & Oriented x2, [ x ] Nonfocal, [  ] Confusion, [  ] Encephalopathic, [  ] Sedated, [  ] Unable to assess, [ x ] Dementia, [  ] Other-  EXTREMITIES:  [ x ] 2+ Peripheral Pulses, No clubbing, No cyanosis,  [  ] Edema B/L lower EXT, [  ] PVD stasis skin changes B/L lower EXT, [  ] Wound  LYMPH:  No lymphadenopathy noted  SKIN:  [  ] No rashes or lesions, [ x ] Pressure ulcers L heel pressure ulcer, R heel erythema and scab, [  ] Ecchymosis, [  ] Skin tears, [  ] Other    DIET: Diet, DASH/TLC:   Sodium & Cholesterol Restricted  No Concentrated Potassium  No Concentrated Phosphorus (20 @ 20:43)      LABS:                        12.6   3.63  )-----------( 155      ( 2020 08:51 )             40.9     2020 08:51    146    |  112    |  36     ----------------------------<  147    5.0     |  26     |  2.10     Ca    9.3        2020 08:51  Mg     1.8       2020 08:51    TPro  6.8    /  Alb  3.1    /  TBili  0.3    /  DBili  x      /  AST  13     /  ALT  13     /  AlkPhos  121    2020 17:58    PT/INR - ( 2020 17:58 )   PT: 11.7 sec;   INR: 1.00 ratio         PTT - ( 2020 17:58 )  PTT:38.8 sec  Urinalysis Basic - ( 2020 21:55 )    Color: Yellow / Appearance: Clear / S.015 / pH: x  Gluc: x / Ketone: Negative  / Bili: Negative / Urobili: Negative   Blood: x / Protein: 30 mg/dL / Nitrite: Negative   Leuk Esterase: Negative / RBC: Negative /HPF / WBC Negative   Sq Epi: x / Non Sq Epi: Occasional / Bacteria: Occasional          CARDIAC MARKERS ( 2020 17:58 )  <.015 ng/mL / x     / 38 U/L / x     / 1.9 ng/mL         Anemia Panel:      Thyroid Panel:            Serum Pro-Brain Natriuretic Peptide: 8953 pg/mL (20 @ 17:58)       RADIOLOGY & ADDITIONAL TESTS:      HEALTH ISSUES - PROBLEM Dx:  COPD (chronic obstructive pulmonary disease)  COPD (chronic obstructive pulmonary disease)    Hypernatremia  Hypernatremia    Dementia  Dementia    Need for prophylactic measure  Need for prophylactic measure    Benign prostatic hypertrophy  Benign prostatic hypertrophy    GERD (gastroesophageal reflux disease)  GERD (gastroesophageal reflux disease)    Depression  Depression    Hyperlipidemia  Hyperlipidemia    Hypertension  Hypertension    CAD (Coronary Artery Disease)  CAD (Coronary Artery Disease)    YOU (acute kidney injury)  YOU (acute kidney injury)    COPD exacerbation  COPD exacerbation          Consultant(s) Notes Reviewed:  [ x ] YES     Care Discussed with [ x ] Consultants, [  ] Patient, [ x ] Family, [ x ] HCP, [  ] , [  ] Social Service, [  ] RN, [  ] Physical Therapy, [  ] Palliative Care Team  DVT PPX: [  ] Lovenox, [ x ] SC Heparin, [  ] Coumadin, [  ] Xarelto, [  ] Eliquis, [  ] Pradaxa, [  ] IV Heparin drip, [  ] SCD, [  ] Ambulation, [  ] Contraindicated 2/2 GI Bleed, [  ] Contraindicated 2/2  Bleed, [  ] Contraindicated 2/2 Brain Bleed  Advanced Directive: [ x ] None; palliative consult for MOLST wife, Larisa Shelley would like DNR/DNI [  ] DNR/DNI Patient is a 86y old  Male who presents with a chief complaint of Shortness of breath (2020 06:05)    FROM ADMISSION H&P:  84 yo male with PMH of COPD not on home O2, mild dementia, HTN, HLD, CAD s/p triple CABG  and 2 subsequent stents, CKD 3,  BPH, Depression, GERD, osteoporosis, peripheral neuropathy presents to the ED with cough and shortness of breath x a few weeks. States the shortness of breath has been ongoing for the past few weeks but has worsened within the last few days. Spoke to wife Larisa Beebe ([phone]) who confirmed that the patient has been having a productive cough and has been having difficulty sleeping because he cant lay down flat. States he has chronic COPD and has a new PCP who comes to the house and has never been hospitalized previously for COPD exacerbation or intubated. Denies any recent sick contacts, recent travel, or any known covid exposure of late. He states he does not leave the house much as he is frail and has difficulty walking. He denies any fevers, chills, CP, palpitations, abd pain, N, V, D, urinary symptoms or numbness, weakness or swelling of the legs. Him and his wife state that they have not been able to see many of his specialists lately including his cardiologist and pulmonologist because they dont drive and because of recent events with COVID. They have 24/7 aids at home and request help finding specialists that make house calls. Per wife, she states that the pt was recently diagnosed with a "kidney infection" by his PCP but doesnt think he was given any medications for this and denies being referred to a nephrologist. Of note, pt was recently admitted to [facility] for YOU on CKD and COPD exacerbation from -, had received IV fluids, Duonebs, and IV solumedrol.     ED Course: received IV Zithromax 500mg x 1, 1L NS bolus x1, Duonebs x 1, IV Solumedrol 125mg x1  Vitals: T 98.2, HR 62, /90 -->140/90, O2 100% on 2L via NC  Significant Labs: Na 148, BUN 37, Cr 2.4, Blood Glucose 172, Albumin 3.1, Alk phos 121, eGFR 24, Pro-BNP 8953, negative COVID and RVP PCR  EKG: NSR 62 bpm with 1st degree HB, LAD, RBBB, no significant changes from prior  CXR: Slight bilateral atelectases are presently seen.     INTERVAL HPI:  2020: Patient seen and examined at bedside this morning. Patient states his shortness of breath has markedly improved today. He experienced some dyspnea on exertion when walking to the stretcher to have his ECHO performed this morning. He no longer has dyspnea at rest. He continues to have a dry cough that he has had for the past 3 weeks. He denies any sick contacts or recent travel and does not leave his house. He denies any chest pain, palpitations, nausea, vomiting, abdominal pain, dizziness, leg swelling. Last BM was yesterday prior to admission.     OVERNIGHT EVENTS: none    Home Medications:  albuterol 90 mcg/inh inhalation aerosol: 2 puff(s) inhaled every 6 hours, As needed, Shortness of Breath and/or Wheezing (2020 20:07)  Flovent  mcg/inh inhalation aerosol: 1 puff(s) inhaled 2 times a day (2020 20:07)  levalbuterol 45 mcg/inh inhalation aerosol: 1 puff(s) inhaled every 4 hours, As Needed (2020 20:07)  metoprolol succinate 25 mg oral tablet, extended release: 1 tab(s) orally once a day (2020 20:07)  sertraline 100 mg oral tablet: 1 tab(s) orally once a day (2020 20:07)      MEDICATIONS  (STANDING):  ALBUTerol    90 MICROgram(s) HFA Inhaler 2 Puff(s) Inhalation every 8 hours  atorvastatin 80 milliGRAM(s) Oral at bedtime  dextrose 5%. 1000 milliLiter(s) (50 mL/Hr) IV Continuous <Continuous>  donepezil 10 milliGRAM(s) Oral at bedtime  finasteride 5 milliGRAM(s) Oral daily  heparin   Injectable 5000 Unit(s) SubCutaneous every 12 hours  metoprolol succinate ER 25 milliGRAM(s) Oral daily  pantoprazole    Tablet 40 milliGRAM(s) Oral before breakfast  sertraline 100 milliGRAM(s) Oral daily  sucralfate suspension 10 milliGRAM(s) Oral four times a day  tamsulosin 0.4 milliGRAM(s) Oral at bedtime  tiotropium 18 MICROgram(s) Capsule 1 Capsule(s) Inhalation daily    MEDICATIONS  (PRN):  melatonin 5 milliGRAM(s) Oral at bedtime PRN Insomnia      amoxicillin (Unknown)  Levaquin (Unknown)  penicillin (Unknown)      Social History:  Social History:    Marital Status:  (  x )    (   ) Single    (   )    (  )   Occupation:   Lives with: (  ) alone  (  ) children   ( x ) spouse   (  ) parents  ( x ) other --  aids    Substance Use (street drugs): (  ) never used  (  ) other:  Tobacco Usage:  (   ) never smoked   ( x  ) former smoker   (   ) current smoker  (     ) pack year  (        ) last cigarette date  Alcohol Usage: socially      Health Management     For female:   Last Mammo: (     ) No  (    ) Yes  (    ) Normal  (    ) Date   Last Pap: (     ) No  (    ) Yes  (    ) Normal   (    ) Date     For male:  Last prostate exam: (     ) No  (    ) Yes  (    ) Date  (    ) Normal    Immunization Hx:   (  ) flu shot                               (     ) date --> has not received this year but usually gets every year  (  ) pneumonia shot               (     ) date --> unknown however pt states UTD  ( x ) tetanus                               (   Aug 2020  ) date     (    x ) Advanced Directives: (     ) None    (   x   ) DNR    ( x    ) DNI    (   x  ) Health Care Proxy: wife Larisa Shelley (2020 18:27)      REVIEW OF SYSTEMS:  CONSTITUTIONAL: No fever, No chills, No fatigue, No myalgia, No Body ache, No Weakness  EYES: No eye pain,  No visual disturbances, No discharge, No Redness  ENMT: No ear pain, No nose bleed, No vertigo; No sinus pain, No throat pain, No Congestion  NECK: No pain, No stiffness  RESPIRATORY: Admits cough and dyspnea on exertion. No wheezing, No hemoptysis.  CARDIOVASCULAR: No chest pain, No palpitations  GASTROINTESTINAL: No abdominal pain, No epigastric pain. No nausea, No vomiting, No diarrhea, No constipation; [  ] BM- none  GENITOURINARY: No dysuria, No frequency, No urgency, No hematuria, No incontinence  NEUROLOGICAL: No headaches, No dizziness, No numbness, No tingling, No tremors, No weakness  EXTREMITIES: No Swelling, No Pain, No Edema  SKIN: [ x ] No itching, burning, rashes, or lesions   MUSCULOSKELETAL: No joint pain, No joint swelling; No muscle pain, No back pain, No extremity pain  PSYCHIATRIC: No depression, No anxiety, No mood swings, No difficulty sleeping at night  PAIN SCALE: [ x ] None  [  ] Other-  ROS Unable to obtain due to: [ x ] Dementia  [  ] Lethargy  [  ] Sedated  [  ] Non verbal  REST OF REVIEW OF SYSTEMS: [ x ] Normal     Vital Signs Last 24 Hrs  T(C): 36.7 (2020 22:29), Max: 36.8 (2020 16:42)  T(F): 98 (2020 22:29), Max: 98.2 (2020 16:42)  HR: 77 (:29) (62 - 77)  BP: 158/83 (2020 22:29) (140/90 - 180/90)  BP(mean): --  RR: 17 (2020 22:29) (14 - 17)  SpO2: 97% (:29) (97% - 100%)    CAPILLARY BLOOD GLUCOSE          I&O's Summary    2020 07:01  -  2020 07:00  --------------------------------------------------------  IN: 500 mL / OUT: 0 mL / NET: 500 mL      PHYSICAL EXAM:  GENERAL:  [ x ] NAD, [ x ] Well appearing, [  ] Agitated, [  ] Drowsy, [  ] Lethargy, [  ] Confused   HEAD:  [ x ] Normal, [  ] Other  EYES:  [ x ] EOMI, [ x ] PERRLA, [ x ] Conjunctiva and sclera clear normal, [  ] Other, [  ] Pallor, [  ] Discharge  ENMT:  [ x ] Normal, [ x ] Moist mucous membranes, [ x ] Good dentition, [ x ] No thrush  NECK:  [ x ] Supple, [ x ] No JVD, [ x ] Normal thyroid, [  ] Lymphadenopathy, [  ] Other  CHEST/LUNG:  [  ] Clear to auscultation bilaterally, [  ] Breath Sounds equal B/L / decreased, [ x ] Poor effort, [ x ] No rales, [ x ] No rhonchi, [ x ] B/L expiratory wheezing at lung bases  HEART:  [ x ] Regular rate and rhythm, [  ] Tachycardia, [  ] Bradycardia, [  ] Irregular, [ x ] No murmurs, No rubs, No gallops, [  ] PPM in place (Mfr:  )  ABDOMEN:  [ x ] Soft, [ x ] Nontender, [ x ] Nondistended, [ x ] No mass, [ x ] Bowel sounds present, [  ] Obese  NERVOUS SYSTEM:  [ x ] Alert & Oriented x2, [ x ] Nonfocal, [  ] Confusion, [  ] Encephalopathic, [  ] Sedated, [  ] Unable to assess, [ x ] Dementia, [  ] Other-  EXTREMITIES:  [ x ] 2+ Peripheral Pulses, No clubbing, No cyanosis,  [  ] Edema B/L lower EXT, [  ] PVD stasis skin changes B/L lower EXT, [  ] Wound  LYMPH:  No lymphadenopathy noted  SKIN:  [  ] No rashes or lesions, [ x ] Pressure ulcers L heel pressure ulcer, R heel erythema and scab, [  ] Ecchymosis, [  ] Skin tears, [  ] Other    DIET: Diet, DASH/TLC:   Sodium & Cholesterol Restricted  No Concentrated Potassium  No Concentrated Phosphorus (20 @ 20:43)      LABS:                        12.6   3.63  )-----------( 155      ( 2020 08:51 )             40.9     2020 08:51    146    |  112    |  36     ----------------------------<  147    5.0     |  26     |  2.10     Ca    9.3        2020 08:51  Mg     1.8       2020 08:51    TPro  6.8    /  Alb  3.1    /  TBili  0.3    /  DBili  x      /  AST  13     /  ALT  13     /  AlkPhos  121    2020 17:58    PT/INR - ( 2020 17:58 )   PT: 11.7 sec;   INR: 1.00 ratio         PTT - ( 2020 17:58 )  PTT:38.8 sec  Urinalysis Basic - ( 2020 21:55 )    Color: Yellow / Appearance: Clear / S.015 / pH: x  Gluc: x / Ketone: Negative  / Bili: Negative / Urobili: Negative   Blood: x / Protein: 30 mg/dL / Nitrite: Negative   Leuk Esterase: Negative / RBC: Negative /HPF / WBC Negative   Sq Epi: x / Non Sq Epi: Occasional / Bacteria: Occasional          CARDIAC MARKERS ( 2020 17:58 )  <.015 ng/mL / x     / 38 U/L / x     / 1.9 ng/mL         Anemia Panel:      Thyroid Panel:            Serum Pro-Brain Natriuretic Peptide: 8953 pg/mL (20 @ 17:58)       RADIOLOGY & ADDITIONAL TESTS:      HEALTH ISSUES - PROBLEM Dx:  COPD (chronic obstructive pulmonary disease)  COPD (chronic obstructive pulmonary disease)    Hypernatremia  Hypernatremia    Dementia  Dementia    Need for prophylactic measure  Need for prophylactic measure    Benign prostatic hypertrophy  Benign prostatic hypertrophy    GERD (gastroesophageal reflux disease)  GERD (gastroesophageal reflux disease)    Depression  Depression    Hyperlipidemia  Hyperlipidemia    Hypertension  Hypertension    CAD (Coronary Artery Disease)  CAD (Coronary Artery Disease)    YOU (acute kidney injury)  YOU (acute kidney injury)    COPD exacerbation  COPD exacerbation          Consultant(s) Notes Reviewed:  [ x ] YES     Care Discussed with [ x ] Consultants, [  ] Patient, [ x ] Family, [ x ] HCP, [  ] , [  ] Social Service, [  ] RN, [  ] Physical Therapy, [  ] Palliative Care Team  DVT PPX: [  ] Lovenox, [ x ] SC Heparin, [  ] Coumadin, [  ] Xarelto, [  ] Eliquis, [  ] Pradaxa, [  ] IV Heparin drip, [  ] SCD, [  ] Ambulation, [  ] Contraindicated 2/2 GI Bleed, [  ] Contraindicated 2/2  Bleed, [  ] Contraindicated 2/2 Brain Bleed  Advanced Directive: [ x ] None; [facility] consult for MOLST wife, Larisa Shelley would like DNR/DNI [  ] DNR/DNI Patient is a 86y old  Male who presents with a chief complaint of Shortness of breath (2020 06:05)    FROM ADMISSION H&P:  84 yo male with PMH of COPD not on home O2, mild dementia, HTN, HLD, CAD s/p triple CABG  and 2 subsequent stents, CKD 3,  BPH, Depression, GERD, osteoporosis, peripheral neuropathy presents to the ED with cough and shortness of breath x a few weeks. States the shortness of breath has been ongoing for the past few weeks but has worsened within the last few days. Spoke to wife Larisa Beebe ([phone]) who confirmed that the patient has been having a productive cough and has been having difficulty sleeping because he cant lay down flat. States he has chronic COPD and has a new PCP who comes to the house and has never been hospitalized previously for COPD exacerbation or intubated. Denies any recent sick contacts, recent travel, or any known covid exposure of late. He states he does not leave the house much as he is frail and has difficulty walking. He denies any fevers, chills, CP, palpitations, abd pain, N, V, D, urinary symptoms or numbness, weakness or swelling of the legs. Him and his wife state that they have not been able to see many of his specialists lately including his cardiologist and pulmonologist because they dont drive and because of recent events with COVID. They have 24/7 aids at home and request help finding specialists that make house calls. Per wife, she states that the pt was recently diagnosed with a "kidney infection" by his PCP but doesnt think he was given any medications for this and denies being referred to a nephrologist. Of note, pt was recently admitted to [facility] for YOU on CKD and COPD exacerbation from -, had received IV fluids, Duonebs, and IV solumedrol.     ED Course: received IV Zithromax 500mg x 1, 1L NS bolus x1, Duonebs x 1, IV Solumedrol 125mg x1  Vitals: T 98.2, HR 62, /90 -->140/90, O2 100% on 2L via NC  Significant Labs: Na 148, BUN 37, Cr 2.4, Blood Glucose 172, Albumin 3.1, Alk phos 121, eGFR 24, Pro-BNP 8953, negative COVID and RVP PCR  EKG: NSR 62 bpm with 1st degree HB, LAD, RBBB, no significant changes from prior  CXR: Slight bilateral atelectases are presently seen.     INTERVAL HPI:  2020: Patient seen and examined at bedside this morning. Patient states his shortness of breath has markedly improved today. He experienced some dyspnea on exertion when walking to the stretcher to have his ECHO performed this morning. He no longer has dyspnea at rest. He continues to have a dry cough that he has had for the past 3 weeks. He denies any sick contacts or recent travel and does not leave his house. He denies any chest pain, palpitations, nausea, vomiting, abdominal pain, dizziness, leg swelling. Last BM was yesterday prior to admission.  [facility] called, Plan for MBS, As d/w wife  Mary Anne-   or JANE -social  worker for pt, [phone]    OVERNIGHT EVENTS: none    Home Medications:  albuterol 90 mcg/inh inhalation aerosol: 2 puff(s) inhaled every 6 hours, As needed, Shortness of Breath and/or Wheezing (2020 20:07)  Flovent  mcg/inh inhalation aerosol: 1 puff(s) inhaled 2 times a day (2020 20:07)  levalbuterol 45 mcg/inh inhalation aerosol: 1 puff(s) inhaled every 4 hours, As Needed (2020 20:07)  metoprolol succinate 25 mg oral tablet, extended release: 1 tab(s) orally once a day (2020 20:07)  sertraline 100 mg oral tablet: 1 tab(s) orally once a day (2020 20:07)      MEDICATIONS  (STANDING):  ALBUTerol    90 MICROgram(s) HFA Inhaler 2 Puff(s) Inhalation every 8 hours  atorvastatin 80 milliGRAM(s) Oral at bedtime  dextrose 5%. 1000 milliLiter(s) (50 mL/Hr) IV Continuous <Continuous>  donepezil 10 milliGRAM(s) Oral at bedtime  finasteride 5 milliGRAM(s) Oral daily  heparin   Injectable 5000 Unit(s) SubCutaneous every 12 hours  metoprolol succinate ER 25 milliGRAM(s) Oral daily  pantoprazole    Tablet 40 milliGRAM(s) Oral before breakfast  sertraline 100 milliGRAM(s) Oral daily  sucralfate suspension 10 milliGRAM(s) Oral four times a day  tamsulosin 0.4 milliGRAM(s) Oral at bedtime  tiotropium 18 MICROgram(s) Capsule 1 Capsule(s) Inhalation daily    MEDICATIONS  (PRN):  melatonin 5 milliGRAM(s) Oral at bedtime PRN Insomnia      amoxicillin (Unknown)  Levaquin (Unknown)  penicillin (Unknown)      Social History:  Social History:    Marital Status:  (  x )    (   ) Single    (   )    (  )   Occupation:   Lives with: (  ) alone  (  ) children   ( x ) spouse   (  ) parents  ( x ) other --  aids    Substance Use (street drugs): (  ) never used  (  ) other:  Tobacco Usage:  (   ) never smoked   ( x  ) former smoker   (   ) current smoker  (     ) pack year  (        ) last cigarette date  Alcohol Usage: socially      Health Management   For male:  Last prostate exam: (     ) No  (    ) Yes  (    ) Date  (    ) Normal    Immunization Hx:   (  ) flu shot                               (     ) date --> has not received this year but usually gets every year  (  ) pneumonia shot               (     ) date --> unknown however pt states UTD  ( x ) tetanus                               (   Aug 2020  ) date     (    x ) Advanced Directives: (     ) None    (   x   ) DNR    ( x    ) DNI    (   x  ) Health Care Proxy: wife Larisa Shelley (2020 18:27)      REVIEW OF SYSTEMS: c/o cough & SOB  CONSTITUTIONAL: No fever, No chills, No fatigue, No myalgia, No Body ache, No Weakness  EYES: No eye pain,  No visual disturbances, No discharge, No Redness  ENMT: No ear pain, No nose bleed, No vertigo; No sinus pain, No throat pain, No Congestion  NECK: No pain, No stiffness  RESPIRATORY: Admits cough and dyspnea on exertion. No wheezing, No hemoptysis.  CARDIOVASCULAR: No chest pain, No palpitations  GASTROINTESTINAL: No abdominal pain, No epigastric pain. No nausea, No vomiting, No diarrhea, No constipation; [  ] BM- none  GENITOURINARY: No dysuria, No frequency, No urgency, No hematuria, No incontinence  NEUROLOGICAL: No headaches, No dizziness, No numbness, No tingling, No tremors, No weakness  EXTREMITIES: No Swelling, No Pain, No Edema  SKIN: [ x ] No itching, burning, rashes, or lesions   MUSCULOSKELETAL: No joint pain, No joint swelling; No muscle pain, No back pain, No extremity pain  PSYCHIATRIC: No depression, No anxiety, No mood swings, No difficulty sleeping at night  PAIN SCALE: [ x ] None  [  ] Other-  ROS Unable to obtain due to: [ x ] Dementia  [  ] Lethargy  [  ] Sedated  [  ] Non verbal  REST OF REVIEW OF SYSTEMS: [ x ] Normal     Vital Signs Last 24 Hrs  T(C): 36.7 (2020 22:29), Max: 36.8 (2020 16:42)  T(F): 98 (2020 22:29), Max: 98.2 (2020 16:42)  HR: 77 (2020 22:29) (62 - 77)  BP: 158/83 (2020 22:29) (140/90 - 180/90)  BP(mean): --  RR: 17 (2020 22:29) (14 - 17)  SpO2: 97% (2020 22:29) (97% - 100%)    CAPILLARY BLOOD GLUCOSE          I&O's Summary    2020 07:01  -  2020 07:00  --------------------------------------------------------  IN: 500 mL / OUT: 0 mL / NET: 500 mL      PHYSICAL EXAM:  GENERAL:  [ x ] NAD, [ x ] Well appearing, [  ] Agitated, [  ] Drowsy, [  ] Lethargy, [  ] Confused   HEAD:  [ x ] Normal, [  ] Other  EYES:  [ x ] EOMI, [ x ] PERRLA, [ x ] Conjunctiva and sclera clear normal, [  ] Other, [  ] Pallor, [ x ] Discharge/ Redness Left eye  ENMT:  [ x ] Normal, [ x ] Moist mucous membranes, [ x ] Good dentition, [ x ] No thrush  NECK:  [ x ] Supple, [ x ] No JVD, [ x ] Normal thyroid, [  ] Lymphadenopathy, [  ] Other  CHEST/LUNG:  [  ] Clear to auscultation bilaterally, [ x ] Breath Sounds equal B/L / decreased, [ x ] Poor effort, [ x ] No rales, [ x ] No rhonchi, [ x ] B/L expiratory wheezing at lung bases  HEART:  [ x ] Regular rate and rhythm, [  ] Tachycardia, [  ] Bradycardia, [  ] Irregular, [ x ] No murmurs, No rubs, No gallops, [  ] PPM in place (Mfr:  )  ABDOMEN:  [ x ] Soft, [ x ] Nontender, [ x ] Nondistended, [ x ] No mass, [ x ] Bowel sounds present, [  ] Obese  NERVOUS SYSTEM:  [ x ] Alert & Oriented x2, [ x ] Nonfocal, [  ] Confusion, [  ] Encephalopathic, [  ] Sedated, [  ] Unable to assess, [ x ] Dementia, [ x ] Other-forgetful  EXTREMITIES:  [ x ] 2+ Peripheral Pulses, No clubbing, No cyanosis,  [  ] Edema B/L lower EXT, [  ] PVD stasis skin changes B/L lower EXT, [  ] Wound  LYMPH:  No lymphadenopathy noted  SKIN:  [  ] No rashes or lesions, [ x ] Pressure ulcers L heel pressure ulcer, R heel erythema and scab, [  ] Ecchymosis, [  ] Skin tears, [  ] Other    DIET: Diet, DASH/TLC:   Sodium & Cholesterol Restricted  No Concentrated Potassium  No Concentrated Phosphorus (20 @ 20:43)      LABS:                        12.6   3.63  )-----------( 155      ( 2020 08:51 )             40.9     2020 08:51    146    |  112    |  36     ----------------------------<  147    5.0     |  26     |  2.10     Ca    9.3        2020 08:51  Mg     1.8       2020 08:51    TPro  6.8    /  Alb  3.1    /  TBili  0.3    /  DBili  x      /  AST  13     /  ALT  13     /  AlkPhos  121    2020 17:58    PT/INR - ( 2020 17:58 )   PT: 11.7 sec;   INR: 1.00 ratio         PTT - ( 2020 17:58 )  PTT:38.8 sec  Urinalysis Basic - ( 2020 21:55 )    Color: Yellow / Appearance: Clear / S.015 / pH: x  Gluc: x / Ketone: Negative  / Bili: Negative / Urobili: Negative   Blood: x / Protein: 30 mg/dL / Nitrite: Negative   Leuk Esterase: Negative / RBC: Negative /HPF / WBC Negative   Sq Epi: x / Non Sq Epi: Occasional / Bacteria: Occasional          CARDIAC MARKERS ( 2020 17:58 )  <.015 ng/mL / x     / 38 U/L / x     / 1.9 ng/mL    Serum Pro-Brain Natriuretic Peptide: 8953 pg/mL (20 @ 17:58)       RADIOLOGY & ADDITIONAL TESTS: none      HEALTH ISSUES - PROBLEM Dx:  COPD (chronic obstructive pulmonary disease)  COPD (chronic obstructive pulmonary disease)    Hypernatremia  Hypernatremia    Dementia  Dementia    Need for prophylactic measure  Need for prophylactic measure    Benign prostatic hypertrophy  Benign prostatic hypertrophy    GERD (gastroesophageal reflux disease)  GERD (gastroesophageal reflux disease)    Depression  Depression    Hyperlipidemia  Hyperlipidemia    Hypertension  Hypertension    CAD (Coronary Artery Disease)  CAD (Coronary Artery Disease)    YOU (acute kidney injury)  YOU (acute kidney injury)    COPD exacerbation  COPD exacerbation          Consultant(s) Notes Reviewed:  [ x ] YES     Care Discussed with [ x ] Consultants, [ x ] Patient, [ x ] Family, [ x ] HCP, [  ] , [  ] Social Service, [ x ] RN, [  ] Physical Therapy, [ x ] Palliative Care Team  DVT PPX: [  ] Lovenox, [ x ] SC Heparin, [  ] Coumadin, [  ] Xarelto, [  ] Eliquis, [  ] Pradaxa, [  ] IV Heparin drip, [  ] SCD, [  ] Ambulation, [  ] Contraindicated 2/2 GI Bleed, [  ] Contraindicated 2/2  Bleed, [  ] Contraindicated 2/2 Brain Bleed  Advanced Directive: [ x ] None; [facility] consult for ALEX wife, Larisa Shelley would like DNR/DNI [  ] DNR/DNI

## 2020-11-05 NOTE — SWALLOW BEDSIDE ASSESSMENT ADULT - ASR SWALLOW REFERRAL
# Discharge Note #  History reviewed, issues discussed with RN, patient examined.      # Interval History #  Nursery course has been un-remarkable  Infant is doing well.   Feeding, voiding, and stooling well.    # Physical Examination #  General Appearance: comfortable, no distress  Head: anterior fontanelle open and flat  Chest: no grunting, flaring or retractions; good air entry, clear to auscultation  Heart: RRR, nl S1 S2, no murmur  Abdomen: soft, non-distended, no belgica, no organomegaly  : normal   Ext: Full range of motion. Hips stable. Well perfused  Neuro: good tone, moves all extremities  Skin: no lesions, no jaundice  # Measurements #  Vital signs: stable  Weight:  2915     g  # Studies #  Bilirubin  6.4    @    29    hours of age  Blood type:  O+C-  Hearing screen: passed  CHD screen: passed   cardiac consult: VSD closed    #Assesment #  Well 1d Male infant, [ x ]VD  [  ]c/s   Weight loss 2   %  Bilirubin level not requiring phototherapy  maternal GBS, treated  fetal VSD, now closed  circumcision pending    #Plan #  Discussion of dx with parents  Complete screening tests before discharge  Discharge home with mother  Follow up with PMD within  3  days Nutrition to continue to follow during this admission, as schedule permits.

## 2020-11-05 NOTE — PHYSICAL THERAPY INITIAL EVALUATION ADULT - GENERAL OBSERVATIONS, REHAB EVAL
Pt rec'd supine in 1 east bed sleeping, NAD or c/o & easily awaken. Pt is very calm & cooperative /c PT eval.

## 2020-11-05 NOTE — PHYSICAL THERAPY INITIAL EVALUATION ADULT - GAIT DEVIATIONS NOTED, PT EVAL
decreased marcel/decreased velocity of limb motion/over-all steady but noted /c deconditioned and/decreased weight-shifting ability

## 2020-11-05 NOTE — PROGRESS NOTE ADULT - PROBLEM SELECTOR PLAN 1
Patient presenting with productive cough and SOB for last few weeks, not on home O2  - BIBEMS with aerosol mask on 15L subsequently placed on 2L NC and then placed on RA  - Continues to saturate well on RA   - Per pulm (Dr. Isidro), SOB likely multifactorial, could be COPD exacerbation or CHF exacerbation   - BNP elevated on admission to 8593, elevated compared to prior admission in 8/2020  - TTE performed, f/u official read  - s/p IV Zithromax 500mg x 1, IV Solumedrol 125mg x 1, Duoneb 3mL neb x1 in ED  - D/c antibiotics as per pulm  - F/u BCx, UCx   - CXR showed Slight bilateral atelectases are presently seen  - Continue albuterol and tiotropium inhaler regimen Patient presenting with productive cough and SOB for last few weeks, not on home O2  - BIBEMS with aerosol mask on 15L subsequently placed on 2L NC and then placed on RA  - Continues to saturate well on RA   - Per pulm (Dr. Isidro), SOB likely multifactorial, could be COPD exacerbation or CHF exacerbation   - SOB may also be 2/2 to dysphagia  - Speech and swallow: dysphagia 3 nectar thick, MBS tomorrow   - BNP elevated on admission to 8593, elevated compared to prior admission in 8/2020  - TTE performed, f/u official read  - s/p IV Zithromax 500mg x 1, IV Solumedrol 125mg x 1, Duoneb 3mL neb x1 in ED  - D/c antibiotics as per pulm  - F/u BCx, UCx   - CXR showed Slight bilateral atelectases are presently seen  - Continue albuterol and tiotropium inhaler regimen

## 2020-11-05 NOTE — PROGRESS NOTE ADULT - PROBLEM SELECTOR PLAN 2
YOU on CKD Stage 3, likely 2/2 dehydration, vague hx of kidney infection per wife, pt denies any urinary symptoms at this time  - Pt presenting with Cr 2.6, baseline 1.7  - Cr 2.1 today   - Continue gentle fluid hydration @55cc/hr as pt appears dry on exam; avoid overhydration   - UA WNL, f/u UCx  - Avoid nephrotoxic medications  - F/u daily BMP

## 2020-11-05 NOTE — PHYSICAL THERAPY INITIAL EVALUATION ADULT - PERTINENT HX OF CURRENT PROBLEM, REHAB EVAL
As per H&P:"84 yo male with PMH of COPD not on home O2, mild dementia, HTN, HLD, CAD s/p triple CABG 1994 and 2 subsequent stents, CKD 3,  BPH, Depression, GERD, osteoporosis, peripheral neuropathy presents to the ED with cough and shortness of breath x a few weeks. States the shortness of breath has been ongoing for the past few weeks but has worsened within the last few days. "

## 2020-11-05 NOTE — PROGRESS NOTE ADULT - PROBLEM SELECTOR PLAN 1
86 yo male with PMH of COPD, dementia, HTN, HLD, CAD s/p CABG, CKD,  BPH, Depression, GERD, osteoporosis, peripheral neuropathy presents to the ED with cough and shortness of breath. States the shortness of breath has been ongoing for the past week. Denies any recent sick contacts, recent travel, or any known covid exposure.  copd - proventil and spiriva inhaler regimen - seasonal regimen -   VBG c/w COPD - co2 retention -   heart disease - valv heart disease - proBNP elev on admission - I and O - monitor for vol overload -   ataxic gait - fall prec - PT assessment - gait training  mild Dementia - alert - verbal at present -   CKD YOU - I and O - serial renal indices -   nutritional optimization   sob - likely multifactorial - does not appear to be related to COPD ex - would explore CKD YOU Heart disease as etiol. 84 yo male with PMH of COPD, dementia, HTN, HLD, CAD s/p CABG, CKD,  BPH, Depression, GERD, osteoporosis, peripheral neuropathy presents to the ED with cough and shortness of breath. States the shortness of breath has been ongoing for the past week. Denies any recent sick contacts, recent travel, or any known covid exposure.  copd - proventil and spiriva inhaler regimen - seasonal vaccination-   VBG c/w COPD - co2 retention -   heart disease - valv heart disease - proBNP elev on admission - I and O - monitor for vol overload -   ataxic gait - fall prec - PT assessment - gait training  mild Dementia - alert - verbal at present -   CKD YOU - I and O - serial renal indices -   nutritional optimization   sob - likely multifactorial - does not appear to be related to COPD ex - would explore CKD YOU Heart disease as etiol.

## 2020-11-05 NOTE — SWALLOW BEDSIDE ASSESSMENT ADULT - COMMENTS
Consult received and chart reviewed. The patient was seen at bedside this afternoon for an initial assessment of swallow function, at which time he was awake and cooperative. Patient admitting to coughing following meals. The patient was last seen by this department during a previous hospital admission on 6/4/2020 for a Modified Barium Swallow Study (please see full report for details), at which time a dysphagia 2 with nectar thick liquid diet was recommended.    Per charting, the patient is an "86 yo male with PMH of COPD, dementia, HTN, HLD, CAD s/p CABG, CKD,  BPH, Depression, GERD, osteoporosis, peripheral neuropathy presents to the ED with cough and shortness of breath x 1 week. Admitted for COPD exacerbation with possible acute CHF exacerbation as well, and YOU on CKD."    CXR revealed, "Slight bilateral atelectases are presently seen. Cardiac and aortic findings stable as above."    Discussed results and recommendations from this evaluation with the patient, RN, and MD on unit.

## 2020-11-05 NOTE — DISCHARGE NOTE PROVIDER - NSDCCPCAREPLAN_GEN_ALL_CORE_FT
PRINCIPAL DISCHARGE DIAGNOSIS  Diagnosis: COPD exacerbation  Assessment and Plan of Treatment: You were admitted for COPD exacerbation causing your shortness of breath.  - Continue taking albuterol 2 puffs every 6 hours as needed, flovent 1 puff 2 times daily, Levalbuterol 1 puff every 4 hours as needed.      SECONDARY DISCHARGE DIAGNOSES  Diagnosis: Benign prostatic hypertrophy  Assessment and Plan of Treatment: - Continue taking finasteride 5mg daily and tamsulosin 0.4mg at bedtime.    Diagnosis: Hypertension  Assessment and Plan of Treatment: - Continue taking metoprolol succinate 25mg once daily    Diagnosis: Hyperlipidemia  Assessment and Plan of Treatment: - Continue taking atorvastatin 80mg daily.    Diagnosis: GERD (gastroesophageal reflux disease)  Assessment and Plan of Treatment: - Continue taking sucralafate 10mL four times daily and pantoprazole 40mg once daily at bedtime.    Diagnosis: Depression  Assessment and Plan of Treatment: - Continue taking sertraline 100mg daily.    Diagnosis: Dementia  Assessment and Plan of Treatment: - Continue taking donepezil 10mg once daily at bedtime.     PRINCIPAL DISCHARGE DIAGNOSIS  Diagnosis: COPD exacerbation  Assessment and Plan of Treatment: You were admitted for COPD exacerbation causing your shortness of breath.  - Continue taking albuterol 2 puffs every 6 hours as needed, flovent 1 puff 2 times daily, Levalbuterol 1 puff every 4 hours as needed.      SECONDARY DISCHARGE DIAGNOSES  Diagnosis: Depression  Assessment and Plan of Treatment: - Continue taking sertraline 100mg daily.    Diagnosis: Hypertension  Assessment and Plan of Treatment: - Continue taking metoprolol succinate 25mg once daily    Diagnosis: Hyperlipidemia  Assessment and Plan of Treatment: - Continue taking atorvastatin 80mg daily.    Diagnosis: GERD (gastroesophageal reflux disease)  Assessment and Plan of Treatment: - Continue taking sucralafate 10mL four times daily and pantoprazole 40mg once daily at bedtime.    Diagnosis: Benign prostatic hypertrophy  Assessment and Plan of Treatment: - Continue taking finasteride 5mg daily and tamsulosin 0.4mg at bedtime.    Diagnosis: Dementia  Assessment and Plan of Treatment: - Continue taking donepezil 10mg once daily at bedtime.     PRINCIPAL DISCHARGE DIAGNOSIS  Diagnosis: COPD exacerbation  Assessment and Plan of Treatment: You were admitted for COPD exacerbation causing your shortness of breath., CXR -No Pneumonia   - Continue taking albuterol 2 puffs every 6 hours as needed,   -Start SPIRIVA 1 cap INH daily   - Continue flovent 1 puff 2 times daily, Levalbuterol 1 puff every 4 hours as needed.      SECONDARY DISCHARGE DIAGNOSES  Diagnosis: Depression  Assessment and Plan of Treatment: - Continue taking sertraline 100mg daily.    Diagnosis: Hypertension  Assessment and Plan of Treatment: - Continue taking metoprolol succinate 25mg once daily    Diagnosis: Hyperlipidemia  Assessment and Plan of Treatment: - Continue taking atorvastatin 80mg daily.    Diagnosis: GERD (gastroesophageal reflux disease)  Assessment and Plan of Treatment: - Continue taking sucralafate 10mL four times daily and pantoprazole 40mg once daily at bedtime.    Diagnosis: Benign prostatic hypertrophy  Assessment and Plan of Treatment: - Continue taking finasteride 5mg daily and tamsulosin 0.4mg at bedtime.    Diagnosis: CAD (Coronary Artery Disease)  Assessment and Plan of Treatment: CAD (Coronary Artery Disease)  On Toprol XL & Atorva Statin  -Consider starting baby EC ASA 81 mg daily with food ,you can d/w your PMD or Cardiologist about this.  Follow up with your Cardiology as out pt    Diagnosis: Dementia  Assessment and Plan of Treatment: - Continue taking donepezil 10mg once daily at bedtime.

## 2020-11-05 NOTE — DISCHARGE NOTE PROVIDER - HOSPITAL COURSE
HPI:  86 yo male with PMH of COPD not on home O2, mild dementia, HTN, HLD, CAD s/p triple CABG 1994 and 2 subsequent stents, CKD 3,  BPH, Depression, GERD, osteoporosis, peripheral neuropathy presents to the ED with cough and shortness of breath x a few weeks. States the shortness of breath has been ongoing for the past few weeks but has worsened within the last few days. Spoke to wife Larisa Beebe ([phone]) who confirmed that the patient has been having a productive cough and has been having difficulty sleeping because he cant lay down flat. States he has chronic COPD and has a new PCP who comes to the house and has never been hospitalized previously for COPD exacerbation or intubated. Denies any recent sick contacts, recent travel, or any known covid exposure of late. He states he does not leave the house much as he is frail and has difficulty walking. He denies any fevers, chills, CP, palpitations, abd pain, N, V, D, urinary symptoms or numbness, weakness or swelling of the legs. Him and his wife state that they have not been able to see many of his specialists lately including his cardiologist and pulmonologist because they dont drive and because of recent events with COVID. They have 24/7 aids at home and request help finding specialists that make house calls. Per wife, she states that the pt was recently diagnosed with a "kidney infection" by his PCP but doesnt think he was given any medications for this and denies being referred to a nephrologist. Of note, pt was recently admitted to [facility] on CKD and COPD exacerbation from 8/21-8/24, had received IV fluids, Duonebs, and IV solumedrol.     ED Course: received IV Zithromax 500mg x 1, 1L NS bolus x1, Duonebs x 1, IV Solumedrol 125mg x1  Vitals: T 98.2, HR 62, /90 -->140/90, O2 100% on 2L via NC  Significant Labs: Na 148, BUN 37, Cr 2.4, Blood Glucose 172, Albumin 3.1, Alk phos 121, eGFR 24, Pro-BNP 8953, negative COVID and RVP PCR  EKG: NSR 62 bpm with 1st degree HB, LAD, RBBB, no significant changes from prior  CXR: Slight bilateral atelectases are presently seen.  (04 Nov 2020 18:27)      ---  HOSPITAL COURSE:   Patient was admitted for shortness of breath likely secondary to COPD exacerbation vs CHF exacerbation. Patient was empirically started on antibiotics which were discontinued as infection was unlikely. Patient was seen by pulmonology who continued the patient on his home inhaler routine. Patient's BNP was noted to be elevated to 8953 which was elevated compared to previous admission in August 2020. ECHO was performed and showed ***  Patient was seen by nephrology for [facility] on CKD Stage 3. Patient's creatinine slowly improved with gentle IV fluid administration.     ---  CONSULTANTS:   Pulmonology: Dr. Isidro  Nephrology: Dr. Haji        HPI:  86 yo male with PMH of COPD not on home O2, mild dementia, HTN, HLD, CAD s/p triple CABG 1994 and 2 subsequent stents, CKD 3,  BPH, Depression, GERD, osteoporosis, peripheral neuropathy presents to the ED with cough and shortness of breath x a few weeks. States the shortness of breath has been ongoing for the past few weeks but has worsened within the last few days. Spoke to wife Larisa Beebe ([phone]) who confirmed that the patient has been having a productive cough and has been having difficulty sleeping because he cant lay down flat. States he has chronic COPD and has a new PCP who comes to the house and has never been hospitalized previously for COPD exacerbation or intubated. Denies any recent sick contacts, recent travel, or any known covid exposure of late. He states he does not leave the house much as he is frail and has difficulty walking. He denies any fevers, chills, CP, palpitations, abd pain, N, V, D, urinary symptoms or numbness, weakness or swelling of the legs. Him and his wife state that they have not been able to see many of his specialists lately including his cardiologist and pulmonologist because they dont drive and because of recent events with COVID. They have 24/7 aids at home and request help finding specialists that make house calls. Per wife, she states that the pt was recently diagnosed with a "kidney infection" by his PCP but doesnt think he was given any medications for this and denies being referred to a nephrologist. Of note, pt was recently admitted [facility] on CKD and COPD exacerbation from 8/21-8/24, had received IV fluids, Duonebs, and IV solumedrol.     ED Course: received IV Zithromax 500mg x 1, 1L NS bolus x1, Duonebs x 1, IV Solumedrol 125mg x1  Vitals: T 98.2, HR 62, /90 -->140/90, O2 100% on 2L via NC  Significant Labs: Na 148, BUN 37, Cr 2.4, Blood Glucose 172, Albumin 3.1, Alk phos 121, eGFR 24, Pro-BNP 8953, negative COVID and RVP PCR  EKG: NSR 62 bpm with 1st degree HB, LAD, RBBB, no significant changes from prior  CXR: Slight bilateral atelectases are presently seen.  (04 Nov 2020 18:27)      ---  HOSPITAL COURSE:   Patient was admitted for shortness of breath likely secondary to COPD exacerbation , NO  CHF exacerbation. Patient was empirically started on antibiotics in which  ER  that were discontinued as per Dr Isidro  as infection was unlikely. Patient was seen by pulmonology who continued the patient on his home inhaler routine. Patient's BNP was noted to be elevated to 8953 which was elevated compared to previous admission in August 2020. 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 Patient's creatinine slowly improved with gentle IV fluid administration. palliative care eval done- pt is DNR/DNI with [facility] form. HCPT arranged.    ---  CONSULTANTS:   Pulmonology: Dr. Isidro  Nephrology: Dr. Haji        HPI:  84 yo male with PMH of COPD not on home O2, mild dementia, HTN, HLD, CAD s/p triple CABG 1994 and 2 subsequent stents, CKD 3,  BPH, Depression, GERD, osteoporosis, peripheral neuropathy presents to the ED with cough and shortness of breath x a few weeks. States the shortness of breath has been ongoing for the past few weeks but has worsened within the last few days. Spoke to wife Larisa Beebe ([phone]) who confirmed that the patient has been having a productive cough and has been having difficulty sleeping because he cant lay down flat. States he has chronic COPD and has a new PCP who comes to the house and has never been hospitalized previously for COPD exacerbation or intubated. Denies any recent sick contacts, recent travel, or any known covid exposure of late. He states he does not leave the house much as he is frail and has difficulty walking. He denies any fevers, chills, CP, palpitations, abd pain, N, V, D, urinary symptoms or numbness, weakness or swelling of the legs. Him and his wife state that they have not been able to see many of his specialists lately including his cardiologist and pulmonologist because they dont drive and because of recent events with COVID. They have 24/7 aids at home and request help finding specialists that make house calls. Per wife, she states that the pt was recently diagnosed with a "kidney infection" by his PCP but doesnt think he was given any medications for this and denies being referred to a nephrologist. Of note, pt was recently admitted [facility] on CKD and COPD exacerbation from 8/21-8/24, had received IV fluids, Duonebs, and IV solumedrol.     ED Course: received IV Zithromax 500mg x 1, 1L NS bolus x1, Duonebs x 1, IV Solumedrol 125mg x1  Vitals: T 98.2, HR 62, /90 -->140/90, O2 100% on 2L via NC  Significant Labs: Na 148, BUN 37, Cr 2.4, Blood Glucose 172, Albumin 3.1, Alk phos 121, eGFR 24, Pro-BNP 8953, negative COVID and RVP PCR  EKG: NSR 62 bpm with 1st degree HB, LAD, RBBB, no significant changes from prior  CXR: Slight bilateral atelectases are presently seen.  (04 Nov 2020 18:27)      ---  HOSPITAL COURSE:   Patient was admitted for shortness of breath likely secondary to COPD exacerbation , NO  CHF exacerbation. Patient was empirically started on antibiotics in which  ER  that were discontinued as per Dr Isidro  as infection was unlikely. Patient was seen by pulmonology who continued the patient on his home inhaler routine. Patient's BNP was noted to be elevated to 8953 which was elevated compared to previous admission in August 2020. 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  Patient was seen by nephrology Dr Frost for [facility] on CKD Stage 3. Patient's creatinine slowly improved with gentle IV fluid administration. palliative care eval done- pt is DNR/DNI with [facility] form. HCPT arranged.    ---  CONSULTANTS:   Pulmonology: Dr. Isidro  Nephrology: Dr. Haji

## 2020-11-05 NOTE — DIETITIAN INITIAL EVALUATION ADULT. - SIGNS/SYMPTOMS
as evidenced by COPD, left lateral heel stage II pressure ulcer. as evidenced by hx COPD, left lateral heel stage II pressure ulcer.

## 2020-11-05 NOTE — DIETITIAN INITIAL EVALUATION ADULT. - ETIOLOGY
Erika is here today for   Chief Complaint   Patient presents with   • Follow-up       Medication Refills needed today?  YES,   if you receive a prescription today would you like it to be sent to [facility]? NO      Patient would like communication of their results via:    Home Phone: [phone] (home)  Okay to leave a message containing results? Yes  Also would like us to contact the nursing staff at [facility] where pt resides.          Health Maintenance Summary     Topic Due On Due Status Completed On    Medicare Wellness Visit Dec 13, 2018 Due Soon Dec 13, 2017    IMMUNIZATION - DTaP/Tdap/Td Apr 4, 1996 Overdue Apr 3, 1996    Immunization-Influenza  Completed Oct 3, 2017      Completed Dec 28, 2015          Patient is due for topics as listed above, she wishes to decline at this time  Td/Tdap not covered by medicare unless there is an injury, vaccine will be postponed for now  Per medical record last medicare wellness visit was 12/13/17 so not due until 12/01/18. Will postpone and defer to PCP.           related to increased needs during wound healing and with catabolic illness

## 2020-11-05 NOTE — DISCHARGE NOTE PROVIDER - CARE PROVIDER_API CALL
Kervin Taylor  CRITICAL CARE MEDICINE  [facility] [address] [facility], [address]  [place] [address]  Phone: [phone]  Fax: [phone]  Follow Up Time: 1 week    Cynthia Garcia  [address] [place] [id number]  Phone: [phone]  Fax: (   )    -  Follow Up Time:

## 2020-11-05 NOTE — DIETITIAN INITIAL EVALUATION ADULT. - ADD RECOMMEND
MVI with minerals daily, 500mg Vit C daily. MVI with minerals daily, 500mg Vit C daily. SLP evaluation.

## 2020-11-05 NOTE — PROGRESS NOTE ADULT - PROBLEM SELECTOR PLAN 5
Patient with history of CAD and CABGx3 in 1994 with 2 subsequent stents  - Echo from 1/20/2020 w/ grossly normal LV function per chart review however on HIE shows technically difficult study with no recorded EF  - TTE performed, f/u offical read  - Likely more COPD however possible CHF exacerbation as well  - Continue on home metoprolol with hold parameters

## 2020-11-05 NOTE — PHYSICAL THERAPY INITIAL EVALUATION ADULT - BALANCE TRAINING, PT EVAL
STG (3-5 sessions) increase dynamic standing balance to good. STG (5 sessions) increase dynamic standing balance to Good /c rolling walker

## 2020-11-05 NOTE — PROGRESS NOTE ADULT - PROBLEM SELECTOR PLAN 4
Chronic, BP elevated on admission  -  now   - Continue home metoprolol with hold parameters  - Monitor routine hemodynamics

## 2020-11-05 NOTE — PHYSICAL THERAPY INITIAL EVALUATION ADULT - GAIT TRAINING, PT EVAL
STG (3-5 sessions) Amb 100 feet with rolling walker sup STG (5 sessions) Amb 100 feet with rolling walker /c supervision

## 2020-11-05 NOTE — DIETITIAN INITIAL EVALUATION ADULT. - PROBLEM SELECTOR PLAN 1
-Patient presenting with productive cough and SOB for last few weeks, not on home O2  -BIBEMS with aerosol mask on 15L subsequently placed on 2L NC and now oxygenating well on RA 98%  -likely 2/2 COPD exacerbation only as COVID and RVP neg however elevated ProBNP 8953 with HFpEF per chart review, will work up for CHF exacerbation with TTE  -wbc and lactate wnl, afebrile without hx of fevers per patient  -blood and urine cx pending in ED  -CXR showed Slight bilateral atelectases are presently seen.   -s/p IV Zithromax 500mg x 1, IV Solumedrol 125mg x 1, Duoneb 3mL neb x1 in ED  -unverified allergies, pt and wife unsure of pcn or flouroquinolone reactions, pt likely told as child  -will hold off abx for now as per pulm unlikely infectious etiology  -continue albuterol and tiotropium inhaler regimen as per pulm recs  -continue O2 supplementation if needed, keep SpO2 >88%  -most recent TTE 1/20 technically difficult study, no EF noted, will order repeat to evaluate   -Pulm(Dr. Isidro consulted), recs appreciated

## 2020-11-05 NOTE — PHYSICAL THERAPY INITIAL EVALUATION ADULT - ADDITIONAL COMMENTS
Patient lives with wife and 24/7 HHA who assists patient and wife in condo.  Patient has stair lift to second floor. Patient was able to ambulate independently with RW but requires some assistance with ADLS.  Patient states he has stall shower. Patient lives with wife and 24/7 HHA who assists patient and wife in condo. Patient has stair lift to second floor. Patient was able to ambulate independently with RW but requires some assistance with ADLS.  Patient states he has stall shower. Pt does not negotiate stairs at home.

## 2020-11-05 NOTE — DIETITIAN INITIAL EVALUATION ADULT. - PROBLEM SELECTOR PLAN 3
-N elevated 148 on admission, likely dehydration  -received 1L NS IV fluids in ED, will give gentle IV hydration with D5W @50cc/hr  -encourage PO hydration  -f/u AM BMP

## 2020-11-05 NOTE — PHYSICAL THERAPY INITIAL EVALUATION ADULT - STRENGTHENING, PT EVAL
Increase overall strength 1/2 grade in 3-5 sessions. LTG (2 weeks) Increase overall strength t/o BUE/BLE 1/2 grade

## 2020-11-05 NOTE — DIETITIAN INITIAL EVALUATION ADULT. - PROBLEM SELECTOR PLAN 10
DVT PPX: heparin 5000mg q 12 SQ    IMPROVE VTE Individual Risk Assessment          RISK                                                          Points  [  ] Previous VTE                                                3  [  ] Thrombophilia                                             2  [  ] Lower limb paralysis                                   2        (unable to hold up >15 seconds)    [  ] Current Cancer                                             2         (within 6 months)  [  ] Immobilization > 24 hrs                              1  [  ] ICU/CCU stay > 24 hours                             1  [ x] Age > 60                                                         1    IMPROVE VTE Score:         [   1      ]    #11 foot ulcers  -appear chronic, will have wound care RN check lesions and use off loading boots

## 2020-11-05 NOTE — PROGRESS NOTE ADULT - SUBJECTIVE AND OBJECTIVE BOX
Date/Time Patient Seen:  		  Referring MD:   Data Reviewed	       Patient is a 86y old  Male who presents with a chief complaint of Shortness of breath (04 Nov 2020 20:08)      Subjective/HPI     PAST MEDICAL & SURGICAL HISTORY:  Dementia  mild    Myocardial infarction  s/p CABGx3 in 1994 and 2 stents    COPD (chronic obstructive pulmonary disease)  no home O2    Seasonal allergies    GERD (gastroesophageal reflux disease)    Benign prostatic hypertrophy    Hyperlipidemia    Hypertension    Asthma  Also COPD component    Depression    Peripheral Neuropathy    Asthma    S/P AVR (Aortic Valve Replacement)    S/P CABG (Coronary Artery Bypass Graft)    Osteoporosis    CAD (Coronary Artery Disease)  MI, s/p CABG with 2 cardiac stents    HTN (Hypertension)    Dyslipidemia    S/P inguinal hernia repair    S/P appendectomy    S/P CABG (coronary artery bypass graft)  Done in 1994; 2 cardiac stents in 1998    S/P CABG (Coronary Artery Bypass Graft)    S/P AVR (Aortic Valve Replacement)  Pig valve (bioprosthetic); done 2010 at [place]          Medication list         MEDICATIONS  (STANDING):  ALBUTerol    90 MICROgram(s) HFA Inhaler 2 Puff(s) Inhalation every 8 hours  atorvastatin 80 milliGRAM(s) Oral at bedtime  dextrose 5%. 1000 milliLiter(s) (50 mL/Hr) IV Continuous <Continuous>  donepezil 10 milliGRAM(s) Oral at bedtime  finasteride 5 milliGRAM(s) Oral daily  heparin   Injectable 5000 Unit(s) SubCutaneous every 12 hours  metoprolol succinate ER 25 milliGRAM(s) Oral daily  pantoprazole    Tablet 40 milliGRAM(s) Oral before breakfast  sertraline 100 milliGRAM(s) Oral daily  sucralfate suspension 10 milliGRAM(s) Oral four times a day  tamsulosin 0.4 milliGRAM(s) Oral at bedtime  tiotropium 18 MICROgram(s) Capsule 1 Capsule(s) Inhalation daily    MEDICATIONS  (PRN):  melatonin 5 milliGRAM(s) Oral at bedtime PRN Insomnia         Vitals log        ICU Vital Signs Last 24 Hrs  T(C): 36.7 (04 Nov 2020 22:29), Max: 36.8 (04 Nov 2020 16:42)  T(F): 98 (04 Nov 2020 22:29), Max: 98.2 (04 Nov 2020 16:42)  HR: 77 (04 Nov 2020 22:29) (62 - 77)  BP: 158/83 (04 Nov 2020 22:29) (140/90 - 180/90)  BP(mean): --  ABP: --  ABP(mean): --  RR: 17 (04 Nov 2020 22:29) (14 - 17)  SpO2: 97% (04 Nov 2020 22:29) (97% - 100%)           Input and Output:  I&O's Detail    04 Nov 2020 07:01  -  05 Nov 2020 06:05  --------------------------------------------------------  IN:    dextrose 5%: 250 mL  Total IN: 250 mL    OUT:  Total OUT: 0 mL    Total NET: 250 mL          Lab Data                        14.2   7.38  )-----------( 149      ( 04 Nov 2020 17:21 )             46.6     11-04    148<H>  |  118<H>  |  37<H>  ----------------------------<  172<H>  4.2   |  25  |  2.40<H>    Ca    9.1      04 Nov 2020 17:58    TPro  6.8  /  Alb  3.1<L>  /  TBili  0.3  /  DBili  x   /  AST  13<L>  /  ALT  13  /  AlkPhos  121<H>  11-04      CARDIAC MARKERS ( 04 Nov 2020 17:58 )  <.015 ng/mL / x     / 38 U/L / x     / 1.9 ng/mL        Review of Systems	      Objective     Physical Examination    heart s1s2  lung dec BS  abd soft  head nc  frail  verbal  head at      Pertinent Lab findings & Imaging      Ava:  NO   Adequate UO     I&O's Detail    04 Nov 2020 07:01  -  05 Nov 2020 06:05  --------------------------------------------------------  IN:    dextrose 5%: 250 mL  Total IN: 250 mL    OUT:  Total OUT: 0 mL    Total NET: 250 mL               Discussed with:     Cultures:	        Radiology

## 2020-11-06 DIAGNOSIS — R78.81 BACTEREMIA: ICD-10-CM

## 2020-11-06 LAB
ANION GAP SERPL CALC-SCNC: 5 MMOL/L — SIGNIFICANT CHANGE UP (ref 5–17)
BUN SERPL-MCNC: 51 MG/DL — HIGH (ref 7–23)
CALCIUM SERPL-MCNC: 8.6 MG/DL — SIGNIFICANT CHANGE UP (ref 8.5–10.1)
CHLORIDE SERPL-SCNC: 107 MMOL/L — SIGNIFICANT CHANGE UP (ref 96–108)
CO2 SERPL-SCNC: 29 MMOL/L — SIGNIFICANT CHANGE UP (ref 22–31)
CREAT SERPL-MCNC: 2.3 MG/DL — HIGH (ref 0.5–1.3)
GLUCOSE SERPL-MCNC: 81 MG/DL — SIGNIFICANT CHANGE UP (ref 70–99)
GRAM STN FLD: SIGNIFICANT CHANGE UP
HCT VFR BLD CALC: 37.7 % — LOW (ref 39–50)
HGB BLD-MCNC: 11.8 G/DL — LOW (ref 13–17)
MCHC RBC-ENTMCNC: 24.9 PG — LOW (ref 27–34)
MCHC RBC-ENTMCNC: 31.3 GM/DL — LOW (ref 32–36)
MCV RBC AUTO: 79.5 FL — LOW (ref 80–100)
NRBC # BLD: 0 /100 WBCS — SIGNIFICANT CHANGE UP (ref 0–0)
PLATELET # BLD AUTO: 125 K/UL — LOW (ref 150–400)
POTASSIUM SERPL-MCNC: 3.8 MMOL/L — SIGNIFICANT CHANGE UP (ref 3.5–5.3)
POTASSIUM SERPL-SCNC: 3.8 MMOL/L — SIGNIFICANT CHANGE UP (ref 3.5–5.3)
RBC # BLD: 4.74 M/UL — SIGNIFICANT CHANGE UP (ref 4.2–5.8)
RBC # FLD: 15.9 % — HIGH (ref 10.3–14.5)
SODIUM SERPL-SCNC: 141 MMOL/L — SIGNIFICANT CHANGE UP (ref 135–145)
WBC # BLD: 6.85 K/UL — SIGNIFICANT CHANGE UP (ref 3.8–10.5)
WBC # FLD AUTO: 6.85 K/UL — SIGNIFICANT CHANGE UP (ref 3.8–10.5)

## 2020-11-06 PROCEDURE — 74230 X-RAY XM SWLNG FUNCJ C+: CPT | Mod: 26

## 2020-11-06 RX ADMIN — GENTAMICIN SULFATE 1 DROP(S): 3 SOLUTION/ DROPS OPHTHALMIC at 07:49

## 2020-11-06 RX ADMIN — Medication 25 MILLIGRAM(S): at 06:01

## 2020-11-06 RX ADMIN — DONEPEZIL HYDROCHLORIDE 10 MILLIGRAM(S): 10 TABLET, FILM COATED ORAL at 21:40

## 2020-11-06 RX ADMIN — FINASTERIDE 5 MILLIGRAM(S): 5 TABLET, FILM COATED ORAL at 12:26

## 2020-11-06 RX ADMIN — HEPARIN SODIUM 5000 UNIT(S): 5000 INJECTION INTRAVENOUS; SUBCUTANEOUS at 06:00

## 2020-11-06 RX ADMIN — ALBUTEROL 2 PUFF(S): 90 AEROSOL, METERED ORAL at 23:15

## 2020-11-06 RX ADMIN — Medication 10 MILLIGRAM(S): at 17:29

## 2020-11-06 RX ADMIN — ALBUTEROL 2 PUFF(S): 90 AEROSOL, METERED ORAL at 15:34

## 2020-11-06 RX ADMIN — SODIUM CHLORIDE 50 MILLILITER(S): 9 INJECTION, SOLUTION INTRAVENOUS at 06:07

## 2020-11-06 RX ADMIN — Medication 10 MILLIGRAM(S): at 23:14

## 2020-11-06 RX ADMIN — TIOTROPIUM BROMIDE 1 CAPSULE(S): 18 CAPSULE ORAL; RESPIRATORY (INHALATION) at 06:54

## 2020-11-06 RX ADMIN — ALBUTEROL 2 PUFF(S): 90 AEROSOL, METERED ORAL at 00:54

## 2020-11-06 RX ADMIN — SODIUM CHLORIDE 75 MILLILITER(S): 9 INJECTION, SOLUTION INTRAVENOUS at 15:36

## 2020-11-06 RX ADMIN — TAMSULOSIN HYDROCHLORIDE 0.4 MILLIGRAM(S): 0.4 CAPSULE ORAL at 21:40

## 2020-11-06 RX ADMIN — ATORVASTATIN CALCIUM 80 MILLIGRAM(S): 80 TABLET, FILM COATED ORAL at 21:40

## 2020-11-06 RX ADMIN — GENTAMICIN SULFATE 1 DROP(S): 3 SOLUTION/ DROPS OPHTHALMIC at 15:35

## 2020-11-06 RX ADMIN — GENTAMICIN SULFATE 1 DROP(S): 3 SOLUTION/ DROPS OPHTHALMIC at 23:14

## 2020-11-06 RX ADMIN — GENTAMICIN SULFATE 1 DROP(S): 3 SOLUTION/ DROPS OPHTHALMIC at 20:16

## 2020-11-06 RX ADMIN — Medication 10 MILLIGRAM(S): at 06:01

## 2020-11-06 RX ADMIN — PANTOPRAZOLE SODIUM 40 MILLIGRAM(S): 20 TABLET, DELAYED RELEASE ORAL at 06:01

## 2020-11-06 RX ADMIN — SERTRALINE 100 MILLIGRAM(S): 25 TABLET, FILM COATED ORAL at 12:26

## 2020-11-06 RX ADMIN — HEPARIN SODIUM 5000 UNIT(S): 5000 INJECTION INTRAVENOUS; SUBCUTANEOUS at 17:30

## 2020-11-06 RX ADMIN — SODIUM CHLORIDE 75 MILLILITER(S): 9 INJECTION, SOLUTION INTRAVENOUS at 12:30

## 2020-11-06 RX ADMIN — ALBUTEROL 2 PUFF(S): 90 AEROSOL, METERED ORAL at 07:51

## 2020-11-06 RX ADMIN — Medication 10 MILLIGRAM(S): at 12:25

## 2020-11-06 RX ADMIN — GENTAMICIN SULFATE 1 DROP(S): 3 SOLUTION/ DROPS OPHTHALMIC at 12:26

## 2020-11-06 NOTE — ADVANCED PRACTICE NURSE CONSULT - ASSESSMENT
RN assessed patient and found patient to have stage 2 pressure injuries at the bilateral heels: RN educated in the care of a patient with stage 1 and stage 2 pressure injuries   Alert-The patient is alert, awake and responds to voice. The patient is oriented to time, place, and person. The triage nurse is able to obtain subjective information.

## 2020-11-06 NOTE — PROGRESS NOTE ADULT - PROBLEM SELECTOR PLAN 5
Chronic, BP elevated on admission, currently controlled   - Continue home metoprolol with hold parameters  - Monitor routine hemodynamics

## 2020-11-06 NOTE — SWALLOW VFSS/MBS ASSESSMENT ADULT - DEMONSTRATES NEED FOR REFERRAL TO ANOTHER SERVICE
Nutrition to continue to follow during this admission to ensure adequate daily caloric intake, as schedule permits.

## 2020-11-06 NOTE — PROGRESS NOTE ADULT - PROBLEM SELECTOR PLAN 2
-Blood culture in aerobic bottle positive for gram positive pilar   -pt appears nontoxic, plan discussed with (JOSE Reed), will hold off on abx for now, F/u repeat blood cultures

## 2020-11-06 NOTE — PROGRESS NOTE ADULT - SUBJECTIVE AND OBJECTIVE BOX
Patient is a 86y old  Male who presents with a chief complaint of Shortness of breath (2020 14:05)      INTERVAL HPI:  2020: Patient seen and examined at bedside this morning. Patient states his shortness of breath has markedly improved today. He experienced some dyspnea on exertion when walking to the stretcher to have his ECHO performed this morning. He no longer has dyspnea at rest. He continues to have a dry cough that he has had for the past 3 weeks. He denies any sick contacts or recent travel and does not leave his house. He denies any chest pain, palpitations, nausea, vomiting, abdominal pain, dizziness, leg swelling. Last BM was yesterday prior to admission.  [facility] called, Plan for MBS, As d/w wife  Mary Anne-   or JANE -social  worker for pt, [phone]  2020:     OVERNIGHT EVENTS: Patient seen and examined at bedside this morning. Patient states his shortness of breath has improved. MBS done today, pt on soft diet with thin liquids. Blood culture + gram positive rods, pt appears non toxic, f/u repeat blood cultures.      Home Medications:  albuterol 90 mcg/inh inhalation aerosol: 2 puff(s) inhaled every 6 hours, As needed, Shortness of Breath and/or Wheezing (2020 20:07)  Flovent  mcg/inh inhalation aerosol: 1 puff(s) inhaled 2 times a day (2020 20:07)  levalbuterol 45 mcg/inh inhalation aerosol: 1 puff(s) inhaled every 4 hours, As Needed (2020 20:07)  metoprolol succinate 25 mg oral tablet, extended release: 1 tab(s) orally once a day (2020 20:07)  sertraline 100 mg oral tablet: 1 tab(s) orally once a day (2020 20:07)      MEDICATIONS  (STANDING):  ALBUTerol    90 MICROgram(s) HFA Inhaler 2 Puff(s) Inhalation every 8 hours  atorvastatin 80 milliGRAM(s) Oral at bedtime  dextrose 5%. 1000 milliLiter(s) (75 mL/Hr) IV Continuous <Continuous>  donepezil 10 milliGRAM(s) Oral at bedtime  finasteride 5 milliGRAM(s) Oral daily  gentamicin 0.3% Ophthalmic Solution 1 Drop(s) Both EYES five times a day  heparin   Injectable 5000 Unit(s) SubCutaneous every 12 hours  metoprolol succinate ER 25 milliGRAM(s) Oral daily  pantoprazole    Tablet 40 milliGRAM(s) Oral before breakfast  sertraline 100 milliGRAM(s) Oral daily  sucralfate suspension 10 milliGRAM(s) Oral four times a day  tamsulosin 0.4 milliGRAM(s) Oral at bedtime  tiotropium 18 MICROgram(s) Capsule 1 Capsule(s) Inhalation daily    MEDICATIONS  (PRN):  melatonin 5 milliGRAM(s) Oral at bedtime PRN Insomnia      amoxicillin (Unknown)  Levaquin (Unknown)  penicillin (Unknown)      Social History:  Social History:    Marital Status:  (  x )    (   ) Single    (   )    (  )   Occupation:   Lives with: (  ) alone  (  ) children   ( x ) spouse   (  ) parents  ( x ) other --  aids    Substance Use (street drugs): (  ) never used  (  ) other:  Tobacco Usage:  (   ) never smoked   ( x  ) former smoker   (   ) current smoker  (     ) pack year  (        ) last cigarette date  Alcohol Usage: socially      Health Management     For female:   Last Mammo: (     ) No  (    ) Yes  (    ) Normal  (    ) Date   Last Pap: (     ) No  (    ) Yes  (    ) Normal   (    ) Date     For male:  Last prostate exam: (     ) No  (    ) Yes  (    ) Date  (    ) Normal    Immunization Hx:   (  ) flu shot                               (     ) date --> has not received this year but usually gets every year  (  ) pneumonia shot               (     ) date --> unknown however pt states UTD  ( x ) tetanus                               (   Aug 2020  ) date     (    x ) Advanced Directives: (     ) None    (   x   ) DNR    ( x    ) DNI    (   x  ) Health Care Proxy: wife Larisa Shelley (2020 18:27)      REVIEW OF SYSTEMS: c/o cough & SOB  CONSTITUTIONAL: No fever, No chills, No fatigue, No myalgia, No Body ache, No Weakness  EYES: No eye pain,  No visual disturbances, No discharge, No Redness  ENMT: No ear pain, No nose bleed, No vertigo; No sinus pain, No throat pain, No Congestion  NECK: No pain, No stiffness  RESPIRATORY: Admits cough and dyspnea on exertion. No wheezing, No hemoptysis.  CARDIOVASCULAR: No chest pain, No palpitations  GASTROINTESTINAL: No abdominal pain, No epigastric pain. No nausea, No vomiting, No diarrhea, No constipation; [  ] BM- none  GENITOURINARY: No dysuria, No frequency, No urgency, No hematuria, No incontinence  NEUROLOGICAL: No headaches, No dizziness, No numbness, No tingling, No tremors, No weakness  EXTREMITIES: No Swelling, No Pain, No Edema  SKIN: [ x ] No itching, burning, rashes, or lesions   MUSCULOSKELETAL: No joint pain, No joint swelling; No muscle pain, No back pain, No extremity pain  PSYCHIATRIC: No depression, No anxiety, No mood swings, No difficulty sleeping at night  PAIN SCALE: [ x ] None  [  ] Other-  ROS Unable to obtain due to: [ x ] Dementia  [  ] Lethargy  [  ] Sedated  [  ] Non verbal  REST OF REVIEW OF SYSTEMS: [ x ] Normal     Vital Signs Last 24 Hrs  T(C): 36.4 (2020 14:00), Max: 36.7 (2020 21:06)  T(F): 97.5 (2020 14:00), Max: 98 (2020 21:06)  HR: 58 (2020 14:00) (58 - 73)  BP: 115/75 (2020 14:00) (115/75 - 178/95)  BP(mean): --  RR: 18 (2020 14:00) (17 - 18)  SpO2: 91% (2020 14:00) (91% - 93%)    CAPILLARY BLOOD GLUCOSE      I&O's Summary    2020 07:01  -  2020 07:00  --------------------------------------------------------  IN: 550 mL / OUT: 0 mL / NET: 550 mL      PHYSICAL EXAM:  GENERAL:  [ x ] NAD, [ x ] Well appearing, [  ] Agitated, [  ] Drowsy, [  ] Lethargy, [  ] Confused   HEAD:  [ x ] Normal, [  ] Other  EYES:  [ x ] EOMI, [ x ] PERRLA, [ x ] Conjunctiva and sclera clear normal, [  ] Other, [  ] Pallor, [ x ] Discharge/ Redness Left eye  ENMT:  [ x ] Normal, [ x ] Moist mucous membranes, [ x ] Good dentition, [ x ] No thrush  NECK:  [ x ] Supple, [ x ] No JVD, [ x ] Normal thyroid, [  ] Lymphadenopathy, [  ] Other  CHEST/LUNG:  [  ] Clear to auscultation bilaterally, [ x ] Breath Sounds equal B/L / decreased, [ x ] Poor effort, [ x ] No rales, [ x ] No rhonchi, [ x ] B/L expiratory wheezing at lung bases, improving   HEART:  [ x ] Regular rate and rhythm, [  ] Tachycardia, [  ] Bradycardia, [  ] Irregular, [ x ] No murmurs, No rubs, No gallops, [  ] PPM in place (Mfr:  )  ABDOMEN:  [ x ] Soft, [ x ] Nontender, [ x ] Nondistended, [ x ] No mass, [ x ] Bowel sounds present, [  ] Obese  NERVOUS SYSTEM:  [ x ] Alert & Oriented x2, [ x ] Nonfocal, [  ] Confusion, [  ] Encephalopathic, [  ] Sedated, [  ] Unable to assess, [ x ] Dementia, [ x ] Other-forgetful  EXTREMITIES:  [ x ] 2+ Peripheral Pulses, No clubbing, No cyanosis,  [  ] Edema B/L lower EXT, [  ] PVD stasis skin changes B/L lower EXT, [  ] Wound  LYMPH:  No lymphadenopathy noted  SKIN:  [  ] No rashes or lesions, [ x ] Pressure ulcers L heel pressure ulcer, R heel erythema and scab, [  ] Ecchymosis, [  ] Skin tears, [  ] Other    DIET: Diet, Dysphagia 3 Soft-Thin Liquids:   Low Sodium  Supplement Feeding Modality:  Oral  Ensure Enlive Servings Per Day:  1       Frequency:  Daily (20 @ 12:40)      LABS:                        11.8   6.85  )-----------( 125      ( 2020 08:44 )             37.7     2020 08:44    141    |  107    |  51     ----------------------------<  81     3.8     |  29     |  2.30     Ca    8.6        2020 08:44      PT/INR - ( 2020 17:58 )   PT: 11.7 sec;   INR: 1.00 ratio         PTT - ( 2020 17:58 )  PTT:38.8 sec  Urinalysis Basic - ( 2020 21:55 )    Color: Yellow / Appearance: Clear / S.015 / pH: x  Gluc: x / Ketone: Negative  / Bili: Negative / Urobili: Negative   Blood: x / Protein: 30 mg/dL / Nitrite: Negative   Leuk Esterase: Negative / RBC: Negative /HPF / WBC Negative   Sq Epi: x / Non Sq Epi: Occasional / Bacteria: Occasional      Culture Results:   <10,000 CFU/mL Normal Urogenital Alisa ( @ 01:23)  Culture Results:   Growth in aerobic bottle: Gram Positive Rods ( @ 22:35)  Culture Results:   No growth to date. ( @ 22:35)    culture blood  -- .Urine Clean Catch (Midstream) :23    culture urine  --   @ 01:23  culture blood  -- .Blood Blood-Peripheral  @ 22:35    culture urine  --   @ 22:35    CARDIAC MARKERS ( 2020 17:58 )  <.015 ng/mL / x     / 38 U/L / x     / 1.9 ng/mL        Culture - Urine (collected 2020 01:23)  Source: .Urine Clean Catch (Midstream)  Final Report (2020 20:41):    <10,000 CFU/mL Normal Urogenital Alisa    Culture - Blood (collected 2020 22:35)  Source: .Blood Blood-Peripheral  Preliminary Report (2020 23:01):    No growth to date.    Culture - Blood (collected 2020 22:35)  Source: .Blood Blood-Peripheral  Gram Stain (2020 10:36):    Growth in aerobic bottle: Gram Positive Rods  Preliminary Report (2020 10:36):    Growth in aerobic bottle: Gram Positive Rods         Serum Pro-Brain Natriuretic Peptide: 8953 pg/mL (20 @ 17:58)      RADIOLOGY & ADDITIONAL TESTS: no new imaging       HEALTH ISSUES - PROBLEM Dx:  COPD (chronic obstructive pulmonary disease)  COPD (chronic obstructive pulmonary disease)    Hypernatremia  Hypernatremia    Dementia  Dementia    Need for prophylactic measure  Need for prophylactic measure    Benign prostatic hypertrophy  Benign prostatic hypertrophy    GERD (gastroesophageal reflux disease)  GERD (gastroesophageal reflux disease)    Depression  Depression    Hyperlipidemia  Hyperlipidemia    Hypertension  Hypertension    CAD (Coronary Artery Disease)  CAD (Coronary Artery Disease)    YOU (acute kidney injury)  YOU (acute kidney injury)    COPD exacerbation  COPD exacerbation      Consultant(s) Notes Reviewed:  [X  ] YES     Care Discussed with [ x ] Consultants, [ X ] Patient, [ X ] Family, [  ] HCP, [  ] , [ X ] Social Service, [  ] RN, [  ] Physical Therapy, [  ] Palliative Care Team  DVT PPX: [  ] Lovenox, [ X ] SC Heparin, [  ] Coumadin, [  ] Xarelto, [  ] Eliquis, [  ] Pradaxa, [  ] IV Heparin drip, [  ] SCD, [  ] Ambulation, [  ] Contraindicated 2/2 GI Bleed, [  ] Contraindicated 2/2  Bleed, [  ] Contraindicated 2/2 Brain Bleed  Advanced Directive: [  ] None, [  ] DNR/DNI

## 2020-11-06 NOTE — SWALLOW VFSS/MBS ASSESSMENT ADULT - RECOMMENDED CONSISTENCY
1. Dysphagia 3 (soft solids) with thin liquids, as tolerated  2. Upright positioning during all PO intake  3. Small bites and small, single cup sips  4. Patient MUST perform TWO swallows for each bite/sip and alternate soft solids with thin liquids to assist in pharyngeal clearance  5. Maintain aspiration precautions  6. Maintain oral care

## 2020-11-06 NOTE — SWALLOW VFSS/MBS ASSESSMENT ADULT - ADDITIONAL INFORMATION
It should be noted that there was evidence of a predarkened line along the laryngeal surface of the epiglottis at baseline.

## 2020-11-06 NOTE — SWALLOW VFSS/MBS ASSESSMENT ADULT - DIAGNOSTIC IMPRESSIONS
1. The patient demonstrated functional oral management of puree, nectar thick, and thin liquid textures marked by adequate bolus collection, transfer, and AP transit time.  2. The patient demonstrated a mild oral dysphagia for solids marked by adequate oral acceptance with delayed bolus collection, transfer, and AP transit time. Trace lingual and palatal residue noted subsequent to deglutition which reduced with utilization of a liquid wash.  3. The patient demonstrated a mild pharyngeal dysphagia for puree, solid, nectar thick, and thin liquid textures marked by a delayed pharyngeal swallow trigger with reduced base of tongue retraction, reduced epiglottic deflection, reduced hyolaryngeal elevation, and reduced pharyngeal contractility resulting in trace stasis along the base of tongue, moderate stasis in the vallecula, trace stasis along the posterior pharyngeal wall, and mild-moderate stasis in the pryiforms which reduced with a secondary swallow. No laryngeal penetration/aspiration.

## 2020-11-06 NOTE — CONSULT NOTE ADULT - SUBJECTIVE AND OBJECTIVE BOX
[facility], Division of Infectious Diseases  MARKO Lanza, MIRIAM Turner  [phone]    ARABELLA ESPARZA  86y, Male  [id number]    HPI--  HPI:  Pt is a 85M w/ PMHx of HTN, HLD, CAD s/p 3vCABG '94 s/p PCI x2, COPD, CKD 3,  BPH, Depression, p/w cough/SOB x2 wks. Pt w/ SOB worsening which prompted evaluation, also +productive cough.   No sick contacts. Denies COVID exposure. No f/c/n/v/d.    Of note, pt was recently admitted to [facility] on CKD and COPD exacerbation from -, had received IV fluids, Duonebs, and IV solumedrol.   ED Course: received IV Zithromax 500mg x 1, 1L NS bolus x1, Duonebs x 1, IV Solumedrol 125mg x1  Vitals: T 98.2, HR 62, /90 -->140/90, O2 100% on 2L via NC  Significant Labs: Na 148, BUN 37, Cr 2.4, Blood Glucose 172, Albumin 3.1, Alk phos 121, eGFR 24, Pro-BNP 8953, negative COVID and RVP PCR  EKG: NSR 62 bpm with 1st degree HB, LAD, RBBB, no significant changes from prior  CXR: Slight bilateral atelectases are presently seen.     Pt seen and examined at bedside. NAOE  Pt feeling well, no complaints, no SOB.     BCx 1 set +GPR    Active Medications--  ALBUTerol    90 MICROgram(s) HFA Inhaler 2 Puff(s) Inhalation every 8 hours  atorvastatin 80 milliGRAM(s) Oral at bedtime  dextrose 5%. 1000 milliLiter(s) IV Continuous <Continuous>  donepezil 10 milliGRAM(s) Oral at bedtime  finasteride 5 milliGRAM(s) Oral daily  gentamicin 0.3% Ophthalmic Solution 1 Drop(s) Both EYES five times a day  heparin   Injectable 5000 Unit(s) SubCutaneous every 12 hours  melatonin 5 milliGRAM(s) Oral at bedtime PRN  metoprolol succinate ER 25 milliGRAM(s) Oral daily  pantoprazole    Tablet 40 milliGRAM(s) Oral before breakfast  sertraline 100 milliGRAM(s) Oral daily  sucralfate suspension 10 milliGRAM(s) Oral four times a day  tamsulosin 0.4 milliGRAM(s) Oral at bedtime  tiotropium 18 MICROgram(s) Capsule 1 Capsule(s) Inhalation daily    Antimicrobials:     Immunologic:     ROS:  CONSTITUTIONAL: No fevers or chills. No weakness or headache. No weight changes.  EYES/ENT: No visual or hearing changes. No sore throat or throat pain .  NECK: No pain or stiffness  RESPIRATORY: No cough, wheezing, or hemoptysis. No shortness of breath  CARDIOVASCULAR: No chest pain or palpitations  GASTROINTESTINAL: No abdominal pain. No nausea or vomiting. No diarrhea or constipation.  GENITOURINARY: No dysuria, frequency or hematuria  NEUROLOGICAL: No numbness or weakness  SKIN: No itching or rashes  PSYCHIATRIC: Pleasant. Appropriate affect    Allergies: amoxicillin (Unknown)  Levaquin (Unknown)  penicillin (Unknown)    PMH -- Dementia    Myocardial infarction    COPD (chronic obstructive pulmonary disease)    Seasonal allergies    GERD (gastroesophageal reflux disease)    Benign prostatic hypertrophy    Hyperlipidemia    Hypertension    Asthma    Depression    Peripheral Neuropathy    Asthma    S/P AVR (Aortic Valve Replacement)    S/P CABG (Coronary Artery Bypass Graft)    Osteoporosis    CAD (Coronary Artery Disease)    HTN (Hypertension)    Dyslipidemia      PSH -- S/P inguinal hernia repair    S/P appendectomy    S/P CABG (coronary artery bypass graft)    S/P CABG (Coronary Artery Bypass Graft)    S/P AVR (Aortic Valve Replacement)      FH -- Family history of renal failure (Sibling)    Family history of MI (myocardial infarction)    Family history of stroke    Family history of amyotrophic lateral sclerosis    Family history of stroke      Social History --  EtOH: denies   Tobacco: denies   Drug Use: denies     Travel/Environmental/Occupational History:    Physical Exam--  Vital Signs Last 24 Hrs  T(F): 97.5 (2020 14:00), Max: 98 (2020 21:06)  HR: 58 (2020 14:00) (58 - 73)  BP: 115/75 (2020 14:00) (115/75 - 178/95)  RR: 18 (2020 14:00) (17 - 18)  SpO2: 91% (2020 14:00) (91% - 93%)  General: nontoxic-appearing, no acute distress  HEENT: NC/AT, EOMI, anicteric, conjunctiva pink and moist, oropharynx clear, dentition fair  Neck: Not rigid. No sense of mass. No LAD  Lungs: Clear bilaterally without rales, wheezing or rhonchi  Heart: Regular rate and rhythm. No murmur, rub or gallop.  Abdomen: Soft. Nondistended. Nontender. Bowel sounds present. No organomegaly.  Back: No spinal tenderness. No costovertebral angle tenderness.  Extremities: No cyanosis or clubbing. No edema.   Skin: Warm. Dry. Good turgor. No rash. No vasculitic stigmata.  Psychiatric: Appropriate affect and mood for situation.     Laboratory & Imaging Data--  CBC:                       11.8   6.85  )-----------( 125      ( 2020 08:44 )             37.7     CMP:     141  |  107  |  51<H>  ----------------------------<  81  3.8   |  29  |  2.30<H>    Ca    8.6      2020 08:44  Mg     1.8         TPro  6.8  /  Alb  3.1<L>  /  TBili  0.3  /  DBili  x   /  AST  13<L>  /  ALT  13  /  AlkPhos  121<H>      LIVER FUNCTIONS - ( 2020 17:58 )  Alb: 3.1 g/dL / Pro: 6.8 g/dL / ALK PHOS: 121 U/L / ALT: 13 U/L / AST: 13 U/L / GGT: x           Urinalysis Basic - ( 2020 21:55 )    Color: Yellow / Appearance: Clear / S.015 / pH: x  Gluc: x / Ketone: Negative  / Bili: Negative / Urobili: Negative   Blood: x / Protein: 30 mg/dL / Nitrite: Negative   Leuk Esterase: Negative / RBC: Negative /HPF / WBC Negative   Sq Epi: x / Non Sq Epi: Occasional / Bacteria: Occasional      Microbiology:     Culture - Urine (collected 20 @ 01:23)  Source: .Urine Clean Catch (Midstream)  Final Report (20 @ 20:41):    <10,000 CFU/mL Normal Urogenital Alisa    Culture - Blood (collected 20 @ 22:35)  Source: .Blood Blood-Peripheral  Preliminary Report (20 @ 23:01):    No growth to date.    Culture - Blood (collected 20 @ 22:35)  Source: .Blood Blood-Peripheral  Gram Stain (20 @ 10:36):    Growth in aerobic bottle: Gram Positive Rods  Preliminary Report (20 @ 10:36):    Growth in aerobic bottle: Gram Positive Rods      Radiology:  < from: Xray Chest 1 View AP/PA (20 @ 17:22) >    EXAM:  XR CHEST AP OR PA 1V                            PROCEDURE DATE:  2020          INTERPRETATION:  AP chest on 2020 at 5:14 PM. Patient is short of breath and has cough.    Heart is magnified by technique. Sternotomy again noted.    Diffusely aneurysmal aorta again noted.    The above findings are similar to  of this year.    Present film shows slight linear atelectases are present at the left base and slight linear atelectases are also seen off the right hilum.    IMPRESSION: Slight bilateral atelectases are presently seen. Cardiac and aortic findings stable as above.            SUPRIYA KHAN M.D., ATTENDING RADIOLOGIST  This document has been electronically signed. 2020  6:00PM    < end of copied text >

## 2020-11-06 NOTE — PROGRESS NOTE ADULT - PROBLEM SELECTOR PLAN 4
-Resolved   -Sodium elevated 148 on admission, likely dehydration  - Continue gentle IV hydration with D5W  - Encourage PO hydration  - F/u AM BMP

## 2020-11-06 NOTE — SWALLOW VFSS/MBS ASSESSMENT ADULT - COMMENTS
The patient was received in the radiology suite this AM, at which time he was alert and cooperative. The patient is known to this department as he was seen for an initial bedside swallow evaluation on 11/5/20 (please see full report for details), at which time a dysphagia 3 with nectar thick liquid diet and a Modified Barium Swallow Study were recommended.    It should be noted that the patient was also seen by this department during a previous hospital admission on 6/4/2020 for a Modified Barium Swallow Study (please see full report for details), at which time a dysphagia 2 with nectar thick liquid diet was recommended.    Per charting, the patient is an "84 yo male with PMH of COPD, dementia, HTN, HLD, CAD s/p CABG, CKD,  BPH, Depression, GERD, osteoporosis, peripheral neuropathy presents to the ED with cough and shortness of breath x 1 week. Admitted for COPD exacerbation with possible acute CHF exacerbation as well, and YOU on CKD."    WBC is WFL. Most recent CXR revealed, "Slight bilateral atelectases are presently seen. Cardiac and aortic findings stable as above."    Discussed results and recommendations from this evaluation with the patient and call out to MD.

## 2020-11-06 NOTE — SWALLOW VFSS/MBS ASSESSMENT ADULT - ADDITIONAL RECOMMENDATIONS
This department to continue to follow during this admission, as schedule permits. Continued swallowing therapy is recommended upon discharge from [facility] (rehab vs home care vs outpatient services). SLP outpatient services can be scheduled through the [facility] at [phone].

## 2020-11-06 NOTE — GOALS OF CARE CONVERSATION - ADVANCED CARE PLANNING - CONVERSATION DETAILS
Spoke with pts wife /HCP Larisa. She cannot find [facility]ST from august,new one on chart. Consented to same DNR DNI no feedintube, limited med,send to hosp, use abx.

## 2020-11-06 NOTE — CONSULT NOTE ADULT - ASSESSMENT
[facility]  Chart Reviewed-Full Consult to follow for any immediate concerns please fell free to contact us directly at  [phone] and have us paged or text my cell # [phone]  Sandeep Dixon MD PhD  
Pt is a 85M w/ PMHx of HTN, HLD, CAD s/p 3vCABG '94 s/p PCI x2, COPD, CKD 3,  BPH, Depression, p/w cough/SOB x2 wks. Pt w/ SOB worsening which prompted evaluation, also +productive cough.     Atypical PNA viral vs bacterial  Imaging w/o obvious consolidation or findings of PNA  Pt appears clinically well  Sending RVP to evaluate for other viruses  BCx as below, unlikely related  Hold Abx  Monitor for fevers or additional clinical symptoms  *If pt clinically worsens would consider Abx therapy w/ ceftriaxone/azithromycin    +GPR Bacteremia  -pending speciation, GPR likely corynebacterium which is probably a contaminant  -need to repeat BCx to ensure clearance  -holding Abx for now    Penicillin Allergy  -reviewed w/ pt, rash in childhood; no hx of anaphylaxis  -ok to use cephalosporins in this patient, will monitor with first use to ensure no rxn
YOU, CKD 3: Prerenal azotemia  Dyspnea: COPD  Hypertension  Hypernatremia      Improving renal indices. Continue IV hydration with D5W. Rx for COPD. Low potassium diet. Monitor BP trend. Titrate BP meds as needed. Salt restriction. Avoid nephrotoxic meds as possible. Avoid ACEI, ARB, NSAIDs and IV contrast.   Will follow electrolytes and renal function trend. Further recommendations pending clinical course.   Thank you for the courtesy of this referral.

## 2020-11-06 NOTE — PROGRESS NOTE ADULT - PROBLEM SELECTOR PLAN 10
DVT PPX: heparin 5000mg q 12 SQ    #11 foot ulcers  -appear chronic, wound care RN consulted for lesions and use off loading boots  12 GERD continue pantoprazole

## 2020-11-06 NOTE — PROGRESS NOTE ADULT - PROBLEM SELECTOR PLAN 1
Patient presented with productive cough and SOB for last few weeks, not on home O2  - Continues to saturate well on RA   -s/p IV Zithromax 500mg x 1, IV Solumedrol 125mg x 1, Duoneb 3mL neb x1 in ED  - Per pulm (Dr. Isidro), SOB likely multifactorial, continue current regimen of Albuterol standing, Spiriva, abx and steroids D/Carl  -to R/o aspiration MBS done, pt now on soft diet with thin liquids   - TTE performed, EF 55 %   -Blood culture positive for gram positive pilar in aerobic bottle, f/u repeat blood cultures, ID (Zack) following, pt appears nontot  - CXR showed Slight bilateral atelectases are presently seen  - Continue albuterol and tiotropium inhaler regimen

## 2020-11-06 NOTE — PROGRESS NOTE ADULT - PROBLEM SELECTOR PLAN 3
YOU on CKD Stage 3, likely 2/2 dehydration, vague hx of kidney infection per wife, pt denies any urinary symptoms at this time  - Pt presenting with Cr 2.3, increased from yesterday baseline 1.7  - Continue gentle fluid hydration  - UA WNL, f/u UCx  - Avoid nephrotoxic medications  - F/u daily BMP

## 2020-11-06 NOTE — PROGRESS NOTE ADULT - ASSESSMENT
84 yo male with PMH of COPD, dementia, HTN, HLD, CAD s/p CABG, CKD,  BPH, Depression, GERD, osteoporosis, peripheral neuropathy presents to the ED with cough and shortness of breath x 1 week. Admitted for COPD exacerbation, r/o CHF,  YOU on CKD.

## 2020-11-06 NOTE — CONSULT NOTE ADULT - ATTENDING COMMENTS
Infectious Diseases will continue to follow.   Please call with any questions.   Devorah Reed M.D.  [facility], Division of Infectious Diseases [phone]  For over the weekend and after hours, please call [phone]  Dr. Alyssia Diaz will be covering the service this weekend

## 2020-11-06 NOTE — SWALLOW VFSS/MBS ASSESSMENT ADULT - ORAL PHASE
within functional limits Delayed oral transit time/Reduced anterior - posterior transport/Residue in oral cavity

## 2020-11-06 NOTE — PROGRESS NOTE ADULT - PROBLEM SELECTOR PLAN 6
Patient with history of CAD and CABGx3 in 1994 with 2 subsequent stents  - TTE performed, EF 55%   - Continue on home metoprolol with hold parameters

## 2020-11-06 NOTE — PROGRESS NOTE ADULT - SUBJECTIVE AND OBJECTIVE BOX
Patient is a 86y old  Male who presents with a chief complaint of Shortness of breath (2020 05:59)  Patient seen in follow up for CKD.        PAST MEDICAL HISTORY:  Dementia    Myocardial infarction    COPD (chronic obstructive pulmonary disease)    Seasonal allergies    GERD (gastroesophageal reflux disease)    Benign prostatic hypertrophy    Hyperlipidemia    Hypertension    Asthma    Depression    Peripheral Neuropathy    Asthma    S/P AVR (Aortic Valve Replacement)    S/P CABG (Coronary Artery Bypass Graft)    Osteoporosis    CAD (Coronary Artery Disease)    HTN (Hypertension)    Dyslipidemia      MEDICATIONS  (STANDING):  ALBUTerol    90 MICROgram(s) HFA Inhaler 2 Puff(s) Inhalation every 8 hours  atorvastatin 80 milliGRAM(s) Oral at bedtime  dextrose 5%. 1000 milliLiter(s) (50 mL/Hr) IV Continuous <Continuous>  donepezil 10 milliGRAM(s) Oral at bedtime  finasteride 5 milliGRAM(s) Oral daily  gentamicin 0.3% Ophthalmic Solution 1 Drop(s) Both EYES five times a day  heparin   Injectable 5000 Unit(s) SubCutaneous every 12 hours  metoprolol succinate ER 25 milliGRAM(s) Oral daily  pantoprazole    Tablet 40 milliGRAM(s) Oral before breakfast  sertraline 100 milliGRAM(s) Oral daily  sucralfate suspension 10 milliGRAM(s) Oral four times a day  tamsulosin 0.4 milliGRAM(s) Oral at bedtime  tiotropium 18 MICROgram(s) Capsule 1 Capsule(s) Inhalation daily    MEDICATIONS  (PRN):  melatonin 5 milliGRAM(s) Oral at bedtime PRN Insomnia    T(C): 36.7 (20 @ 04:44), Max: 36.7 (20 @ 22:29)  HR: 62 (20 @ 04:44) (53 - 77)  BP: 158/66 (20 @ 04:44) (157/80 - 178/95)  RR: 18 (20 @ 04:44) (17 - 18)  SpO2: 92% (20 @ 04:44) (92% - 97%)  Wt(kg): --  I&O's Detail    2020 07:01  -  2020 07:00  --------------------------------------------------------  IN:    dextrose 5%: 550 mL  Total IN: 550 mL    OUT:  Total OUT: 0 mL    Total NET: 550 mL          PHYSICAL EXAM:  General: No distress  Respiratory: b/l air entry  Cardiovascular: S1 S2  Gastrointestinal: soft  Extremities:  edema                              11.8   6.85  )-----------( 125      ( 2020 08:44 )             37.7         141  |  107  |  51<H>  ----------------------------<  81  3.8   |  29  |  2.30<H>    Ca    8.6      2020 08:44  Mg     1.8         TPro  6.8  /  Alb  3.1<L>  /  TBili  0.3  /  DBili  x   /  AST  13<L>  /  ALT  13  /  AlkPhos  121<H>      CARDIAC MARKERS ( 2020 17:58 )  <.015 ng/mL / x     / 38 U/L / x     / 1.9 ng/mL      LIVER FUNCTIONS - ( 2020 17:58 )  Alb: 3.1 g/dL / Pro: 6.8 g/dL / ALK PHOS: 121 U/L / ALT: 13 U/L / AST: 13 U/L / GGT: x           Urinalysis Basic - ( 2020 21:55 )    Color: Yellow / Appearance: Clear / S.015 / pH: x  Gluc: x / Ketone: Negative  / Bili: Negative / Urobili: Negative   Blood: x / Protein: 30 mg/dL / Nitrite: Negative   Leuk Esterase: Negative / RBC: Negative /HPF / WBC Negative   Sq Epi: x / Non Sq Epi: Occasional / Bacteria: Occasional        Sodium, Serum: 141 ( @ 08:44)  Sodium, Serum: 146 ( @ 08:51)  Sodium, Serum: 148 ( @ 17:58)    Creatinine, Serum: 2.30 ( @ 08:44)  Creatinine, Serum: 2.10 ( @ 08:51)  Creatinine, Serum: 2.40 ( 17:58)    Potassium, Serum: 3.8 ( @ 08:44)  Potassium, Serum: 5.0 ( @ 08:51)  Potassium, Serum: 4.2 ( @ 17:58)    Hemoglobin: 11.8 ( @ 08:44)  Hemoglobin: 12.6 ( @ 08:51)  Hemoglobin: 14.2 ( @ 17:21)

## 2020-11-06 NOTE — SWALLOW VFSS/MBS ASSESSMENT ADULT - NS SWALLOW VFSS REC ASPIR MON
oral hygiene/fever/change of breathing pattern/position upright (90Y)/cough/gurgly voice/pneumonia/throat clearing/upper respiratory infection

## 2020-11-06 NOTE — PROGRESS NOTE ADULT - ASSESSMENT
YOU, CKD 3: Prerenal azotemia  Dyspnea: COPD  Hypertension  Hypernatremia    Mild increase in creatinine today. Will increase IVF. Rx for COPD. Encouarge PO intake as tolerated. Monitor BP trend. Titrate BP meds as needed. Salt restriction.   Avoid nephrotoxic meds as possible. Avoid ACEI, ARB, NSAIDs and IV contrast. Will follow electrolytes and renal function trend.

## 2020-11-06 NOTE — PROGRESS NOTE ADULT - SUBJECTIVE AND OBJECTIVE BOX
Date/Time Patient Seen:  		  Referring MD:   Data Reviewed	       Patient is a 86y old  Male who presents with a chief complaint of Shortness of breath (05 Nov 2020 12:22)      Subjective/HPI     PAST MEDICAL & SURGICAL HISTORY:  Dementia  mild    Myocardial infarction  s/p CABGx3 in 1994 and 2 stents    COPD (chronic obstructive pulmonary disease)  no home O2    Seasonal allergies    GERD (gastroesophageal reflux disease)    Benign prostatic hypertrophy    Hyperlipidemia    Hypertension    Asthma  Also COPD component    Depression    Peripheral Neuropathy    Asthma    S/P AVR (Aortic Valve Replacement)    S/P CABG (Coronary Artery Bypass Graft)    Osteoporosis    CAD (Coronary Artery Disease)  MI, s/p CABG with 2 cardiac stents    HTN (Hypertension)    Dyslipidemia    S/P inguinal hernia repair    S/P appendectomy    S/P CABG (coronary artery bypass graft)  Done in 1994; 2 cardiac stents in 1998    S/P CABG (Coronary Artery Bypass Graft)    S/P AVR (Aortic Valve Replacement)  Pig valve (bioprosthetic); done 2010 at [place]          Medication list         MEDICATIONS  (STANDING):  ALBUTerol    90 MICROgram(s) HFA Inhaler 2 Puff(s) Inhalation every 8 hours  atorvastatin 80 milliGRAM(s) Oral at bedtime  dextrose 5%. 1000 milliLiter(s) (50 mL/Hr) IV Continuous <Continuous>  donepezil 10 milliGRAM(s) Oral at bedtime  finasteride 5 milliGRAM(s) Oral daily  gentamicin 0.3% Ophthalmic Solution 1 Drop(s) Both EYES five times a day  heparin   Injectable 5000 Unit(s) SubCutaneous every 12 hours  metoprolol succinate ER 25 milliGRAM(s) Oral daily  pantoprazole    Tablet 40 milliGRAM(s) Oral before breakfast  sertraline 100 milliGRAM(s) Oral daily  sucralfate suspension 10 milliGRAM(s) Oral four times a day  tamsulosin 0.4 milliGRAM(s) Oral at bedtime  tiotropium 18 MICROgram(s) Capsule 1 Capsule(s) Inhalation daily    MEDICATIONS  (PRN):  melatonin 5 milliGRAM(s) Oral at bedtime PRN Insomnia         Vitals log        ICU Vital Signs Last 24 Hrs  T(C): 36.7 (06 Nov 2020 04:44), Max: 36.7 (05 Nov 2020 21:06)  T(F): 98 (06 Nov 2020 04:44), Max: 98 (05 Nov 2020 21:06)  HR: 62 (06 Nov 2020 04:44) (53 - 63)  BP: 158/66 (06 Nov 2020 04:44) (157/80 - 178/95)  BP(mean): --  ABP: --  ABP(mean): --  RR: 18 (06 Nov 2020 04:44) (17 - 18)  SpO2: 92% (06 Nov 2020 04:44) (92% - 95%)           Input and Output:  I&O's Detail    04 Nov 2020 07:01  -  05 Nov 2020 07:00  --------------------------------------------------------  IN:    dextrose 5%: 500 mL  Total IN: 500 mL    OUT:  Total OUT: 0 mL    Total NET: 500 mL      05 Nov 2020 07:01  -  06 Nov 2020 05:59  --------------------------------------------------------  IN:    dextrose 5%: 450 mL  Total IN: 450 mL    OUT:  Total OUT: 0 mL    Total NET: 450 mL          Lab Data                        12.6   3.63  )-----------( 155      ( 05 Nov 2020 08:51 )             40.9     11-05    146<H>  |  112<H>  |  36<H>  ----------------------------<  147<H>  5.0   |  26  |  2.10<H>    Ca    9.3      05 Nov 2020 08:51  Mg     1.8     11-05    TPro  6.8  /  Alb  3.1<L>  /  TBili  0.3  /  DBili  x   /  AST  13<L>  /  ALT  13  /  AlkPhos  121<H>  11-04      CARDIAC MARKERS ( 04 Nov 2020 17:58 )  <.015 ng/mL / x     / 38 U/L / x     / 1.9 ng/mL        Review of Systems	      Objective     Physical Examination    heart s1s2  lung dec BS  abd soft  head nc  head at      Pertinent Lab findings & Imaging      Ava:  NO   Adequate UO     I&O's Detail    04 Nov 2020 07:01  -  05 Nov 2020 07:00  --------------------------------------------------------  IN:    dextrose 5%: 500 mL  Total IN: 500 mL    OUT:  Total OUT: 0 mL    Total NET: 500 mL      05 Nov 2020 07:01  -  06 Nov 2020 05:59  --------------------------------------------------------  IN:    dextrose 5%: 450 mL  Total IN: 450 mL    OUT:  Total OUT: 0 mL    Total NET: 450 mL               Discussed with:     Cultures:	        Radiology

## 2020-11-06 NOTE — PROGRESS NOTE ADULT - PROBLEM SELECTOR PLAN 1
86 yo male with PMH of COPD, dementia, HTN, HLD, CAD s/p CABG, CKD,  BPH, Depression, GERD, osteoporosis, peripheral neuropathy presents to the ED with cough and shortness of breath. States the shortness of breath has been ongoing for the past week. Denies any recent sick contacts, recent travel, or any known covid exposure.  copd - proventil and spiriva inhaler regimen - seasonal vaccination-   VBG c/w COPD - co2 retention -   heart disease - valv heart disease - proBNP elev on admission - I and O - monitor for vol overload -   ataxic gait - fall prec - PT assessment - gait training  mild Dementia - alert - verbal at present -   CKD YOU - I and O - serial renal indices - renal cx noted -   nutritional optimization   sob - likely multifactorial - does not appear to be related to COPD ex - would explore CKD YOU Heart disease as etiol   Dysphagia III diet rec

## 2020-11-06 NOTE — SWALLOW VFSS/MBS ASSESSMENT ADULT - RECOMMENDED FEEDING/EATING TECHNIQUES
allow for swallow between intakes/check mouth frequently for oral residue/pocketing/crush medication (when feasible)/hard swallow w/ each bite or sip/no straws/oral hygiene/provide rest periods between swallows/Patient MUST perform TWO swallows for each bite/sip/alternate food with liquid/maintain upright posture during/after eating for 30 mins/position upright (90 degrees)/small sips/bites

## 2020-11-07 ENCOUNTER — TRANSCRIPTION ENCOUNTER (OUTPATIENT)
Age: 85
End: 2020-11-07

## 2020-11-07 VITALS
RESPIRATION RATE: 18 BRPM | HEART RATE: 69 BPM | SYSTOLIC BLOOD PRESSURE: 138 MMHG | DIASTOLIC BLOOD PRESSURE: 79 MMHG | TEMPERATURE: 98 F | OXYGEN SATURATION: 93 %

## 2020-11-07 LAB
ANION GAP SERPL CALC-SCNC: 6 MMOL/L — SIGNIFICANT CHANGE UP (ref 5–17)
BASOPHILS # BLD AUTO: 0.06 K/UL — SIGNIFICANT CHANGE UP (ref 0–0.2)
BASOPHILS NFR BLD AUTO: 0.9 % — SIGNIFICANT CHANGE UP (ref 0–2)
BUN SERPL-MCNC: 53 MG/DL — HIGH (ref 7–23)
CALCIUM SERPL-MCNC: 9.1 MG/DL — SIGNIFICANT CHANGE UP (ref 8.5–10.1)
CHLORIDE SERPL-SCNC: 105 MMOL/L — SIGNIFICANT CHANGE UP (ref 96–108)
CO2 SERPL-SCNC: 30 MMOL/L — SIGNIFICANT CHANGE UP (ref 22–31)
CREAT SERPL-MCNC: 2 MG/DL — HIGH (ref 0.5–1.3)
CULTURE RESULTS: SIGNIFICANT CHANGE UP
EOSINOPHIL # BLD AUTO: 0.56 K/UL — HIGH (ref 0–0.5)
EOSINOPHIL NFR BLD AUTO: 8 % — HIGH (ref 0–6)
GLUCOSE SERPL-MCNC: 95 MG/DL — SIGNIFICANT CHANGE UP (ref 70–99)
HCT VFR BLD CALC: 42.4 % — SIGNIFICANT CHANGE UP (ref 39–50)
HGB BLD-MCNC: 12.9 G/DL — LOW (ref 13–17)
IMM GRANULOCYTES NFR BLD AUTO: 0.3 % — SIGNIFICANT CHANGE UP (ref 0–1.5)
LYMPHOCYTES # BLD AUTO: 2.28 K/UL — SIGNIFICANT CHANGE UP (ref 1–3.3)
LYMPHOCYTES # BLD AUTO: 32.7 % — SIGNIFICANT CHANGE UP (ref 13–44)
MCHC RBC-ENTMCNC: 24.5 PG — LOW (ref 27–34)
MCHC RBC-ENTMCNC: 30.4 GM/DL — LOW (ref 32–36)
MCV RBC AUTO: 80.6 FL — SIGNIFICANT CHANGE UP (ref 80–100)
MONOCYTES # BLD AUTO: 0.69 K/UL — SIGNIFICANT CHANGE UP (ref 0–0.9)
MONOCYTES NFR BLD AUTO: 9.9 % — SIGNIFICANT CHANGE UP (ref 2–14)
NEUTROPHILS # BLD AUTO: 3.36 K/UL — SIGNIFICANT CHANGE UP (ref 1.8–7.4)
NEUTROPHILS NFR BLD AUTO: 48.2 % — SIGNIFICANT CHANGE UP (ref 43–77)
NRBC # BLD: 0 /100 WBCS — SIGNIFICANT CHANGE UP (ref 0–0)
PLATELET # BLD AUTO: 121 K/UL — LOW (ref 150–400)
POTASSIUM SERPL-MCNC: 3.7 MMOL/L — SIGNIFICANT CHANGE UP (ref 3.5–5.3)
POTASSIUM SERPL-SCNC: 3.7 MMOL/L — SIGNIFICANT CHANGE UP (ref 3.5–5.3)
RBC # BLD: 5.26 M/UL — SIGNIFICANT CHANGE UP (ref 4.2–5.8)
RBC # FLD: 15.9 % — HIGH (ref 10.3–14.5)
SODIUM SERPL-SCNC: 141 MMOL/L — SIGNIFICANT CHANGE UP (ref 135–145)
SPECIMEN SOURCE: SIGNIFICANT CHANGE UP
WBC # BLD: 6.97 K/UL — SIGNIFICANT CHANGE UP (ref 3.8–10.5)
WBC # FLD AUTO: 6.97 K/UL — SIGNIFICANT CHANGE UP (ref 3.8–10.5)

## 2020-11-07 PROCEDURE — 94640 AIRWAY INHALATION TREATMENT: CPT

## 2020-11-07 PROCEDURE — 82550 ASSAY OF CK (CPK): CPT

## 2020-11-07 PROCEDURE — 71045 X-RAY EXAM CHEST 1 VIEW: CPT

## 2020-11-07 PROCEDURE — 97530 THERAPEUTIC ACTIVITIES: CPT

## 2020-11-07 PROCEDURE — 83880 ASSAY OF NATRIURETIC PEPTIDE: CPT

## 2020-11-07 PROCEDURE — 93005 ELECTROCARDIOGRAM TRACING: CPT

## 2020-11-07 PROCEDURE — 85027 COMPLETE CBC AUTOMATED: CPT

## 2020-11-07 PROCEDURE — 92610 EVALUATE SWALLOWING FUNCTION: CPT

## 2020-11-07 PROCEDURE — 86140 C-REACTIVE PROTEIN: CPT

## 2020-11-07 PROCEDURE — 93306 TTE W/DOPPLER COMPLETE: CPT

## 2020-11-07 PROCEDURE — 85025 COMPLETE CBC W/AUTO DIFF WBC: CPT

## 2020-11-07 PROCEDURE — 83605 ASSAY OF LACTIC ACID: CPT

## 2020-11-07 PROCEDURE — 0225U NFCT DS DNA&RNA 21 SARSCOV2: CPT

## 2020-11-07 PROCEDURE — 96361 HYDRATE IV INFUSION ADD-ON: CPT

## 2020-11-07 PROCEDURE — 83735 ASSAY OF MAGNESIUM: CPT

## 2020-11-07 PROCEDURE — 74230 X-RAY XM SWLNG FUNCJ C+: CPT

## 2020-11-07 PROCEDURE — 85610 PROTHROMBIN TIME: CPT

## 2020-11-07 PROCEDURE — 85730 THROMBOPLASTIN TIME PARTIAL: CPT

## 2020-11-07 PROCEDURE — 92611 MOTION FLUOROSCOPY/SWALLOW: CPT

## 2020-11-07 PROCEDURE — 83036 HEMOGLOBIN GLYCOSYLATED A1C: CPT

## 2020-11-07 PROCEDURE — A9698: CPT

## 2020-11-07 PROCEDURE — 81001 URINALYSIS AUTO W/SCOPE: CPT

## 2020-11-07 PROCEDURE — 99285 EMERGENCY DEPT VISIT HI MDM: CPT

## 2020-11-07 PROCEDURE — 36415 COLL VENOUS BLD VENIPUNCTURE: CPT

## 2020-11-07 PROCEDURE — 87086 URINE CULTURE/COLONY COUNT: CPT

## 2020-11-07 PROCEDURE — 96374 THER/PROPH/DIAG INJ IV PUSH: CPT

## 2020-11-07 PROCEDURE — 97162 PT EVAL MOD COMPLEX 30 MIN: CPT

## 2020-11-07 PROCEDURE — 82553 CREATINE MB FRACTION: CPT

## 2020-11-07 PROCEDURE — 80048 BASIC METABOLIC PNL TOTAL CA: CPT

## 2020-11-07 PROCEDURE — 97116 GAIT TRAINING THERAPY: CPT

## 2020-11-07 PROCEDURE — 84484 ASSAY OF TROPONIN QUANT: CPT

## 2020-11-07 PROCEDURE — 80053 COMPREHEN METABOLIC PANEL: CPT

## 2020-11-07 PROCEDURE — 82803 BLOOD GASES ANY COMBINATION: CPT

## 2020-11-07 PROCEDURE — 87040 BLOOD CULTURE FOR BACTERIA: CPT

## 2020-11-07 PROCEDURE — 86769 SARS-COV-2 COVID-19 ANTIBODY: CPT

## 2020-11-07 RX ORDER — GENTAMICIN SULFATE 3 MG/ML
1 SOLUTION/ DROPS OPHTHALMIC
Qty: 0 | Refills: 0 | DISCHARGE
Start: 2020-11-07

## 2020-11-07 RX ORDER — TIOTROPIUM BROMIDE 18 UG/1
1 CAPSULE ORAL; RESPIRATORY (INHALATION)
Qty: 14 | Refills: 0
Start: 2020-11-07 | End: 2020-11-20

## 2020-11-07 RX ORDER — MULTIVIT-MIN/FERROUS GLUCONATE 9 MG/15 ML
1 LIQUID (ML) ORAL
Qty: 0 | Refills: 0 | DISCHARGE
Start: 2020-11-07

## 2020-11-07 RX ADMIN — ALBUTEROL 2 PUFF(S): 90 AEROSOL, METERED ORAL at 15:35

## 2020-11-07 RX ADMIN — SODIUM CHLORIDE 75 MILLILITER(S): 9 INJECTION, SOLUTION INTRAVENOUS at 08:05

## 2020-11-07 RX ADMIN — FINASTERIDE 5 MILLIGRAM(S): 5 TABLET, FILM COATED ORAL at 11:51

## 2020-11-07 RX ADMIN — TIOTROPIUM BROMIDE 1 CAPSULE(S): 18 CAPSULE ORAL; RESPIRATORY (INHALATION) at 05:58

## 2020-11-07 RX ADMIN — ALBUTEROL 2 PUFF(S): 90 AEROSOL, METERED ORAL at 05:57

## 2020-11-07 RX ADMIN — Medication 10 MILLIGRAM(S): at 05:58

## 2020-11-07 RX ADMIN — PANTOPRAZOLE SODIUM 40 MILLIGRAM(S): 20 TABLET, DELAYED RELEASE ORAL at 05:57

## 2020-11-07 RX ADMIN — Medication 10 MILLIGRAM(S): at 11:52

## 2020-11-07 RX ADMIN — GENTAMICIN SULFATE 1 DROP(S): 3 SOLUTION/ DROPS OPHTHALMIC at 15:35

## 2020-11-07 RX ADMIN — GENTAMICIN SULFATE 1 DROP(S): 3 SOLUTION/ DROPS OPHTHALMIC at 11:51

## 2020-11-07 RX ADMIN — HEPARIN SODIUM 5000 UNIT(S): 5000 INJECTION INTRAVENOUS; SUBCUTANEOUS at 05:57

## 2020-11-07 RX ADMIN — SERTRALINE 100 MILLIGRAM(S): 25 TABLET, FILM COATED ORAL at 11:51

## 2020-11-07 RX ADMIN — GENTAMICIN SULFATE 1 DROP(S): 3 SOLUTION/ DROPS OPHTHALMIC at 08:02

## 2020-11-07 NOTE — PROGRESS NOTE ADULT - PROBLEM SELECTOR PLAN 1
Patient presented with productive cough and SOB for last few weeks, not on home O2  - Continues to saturate well on RA   - s/p IV Zithromax 500mg x 1, IV Solumedrol 125mg x 1, Duoneb 3mL neb x1 in ED  - Per pulm (Dr. Isidro), SOB likely multifactorial, continue current regimen of Albuterol standing, Spiriva, abx and steroids D/Carl  - to R/o aspiration MBS done, pt now on soft diet with thin liquids   - TTE performed, EF 55 %   - Blood culture positive for gram positive pilar in aerobic bottle, f/u repeat blood cultures, ID (Zack) following, pt appears nontoxic  - CXR showed Slight bilateral atelectases are presently seen  - Continue albuterol and tiotropium inhaler regimen

## 2020-11-07 NOTE — PROGRESS NOTE ADULT - PROBLEM SELECTOR PLAN 3
YOU on CKD Stage 3, likely 2/2 dehydration, vague hx of kidney infection per wife, pt denies any urinary symptoms at this time  - Pt presenting with Cr 2.3, baseline 1.7  - Cr 2.0 today, improving  - Continue gentle fluid hydration  - UA WNL, f/u UCx  - Avoid nephrotoxic medications  - F/u daily BMP

## 2020-11-07 NOTE — PROGRESS NOTE ADULT - PROBLEM SELECTOR PLAN 6
Hospitalist Progress Note    Patient: Chavo Birch MRN: [id number]  SSN: [id number]    YOB: 1958  Age: 64 y.o. 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 He had dental extraction on 6/4/20. He is going to have an AVR replacement when it can be scheduled. 6/7 - He feels good. Slept well. Mouth feels good today. Has been walking hallways. Denies CP/SOB. Denies F/C/N/V. Review of systems negative except stated above. Objective:     Visit Vitals  /62 (BP 1 Location: Left arm, BP Patient Position: At rest)   Pulse 84   Temp 97.6 °F (36.4 °C)   Resp 16   Ht 6' 1\" (1.854 m)   Wt 63.7 kg (140 lb 8 oz)   SpO2 93%   BMI 18.54 kg/m²      Oxygen Therapy  O2 Sat (%): 93 % (06/07/20 0904)  Pulse via Oximetry: 74 beats per minute (06/04/20 2034)  O2 Device: Room air (06/07/20 0708)  O2 Flow Rate (L/min): 2 l/min (06/04/20 1640)  FIO2 (%): 21 % (06/04/20 2034)      Intake and Output:     Intake/Output Summary (Last 24 hours) at 6/7/2020 0892  Last data filed at 6/7/2020 0908  Gross per 24 hour   Intake 1560 ml   Output 1800 ml   Net -240 ml         Physical Exam:   GENERAL: alert, cooperative, no distress, appears stated age  EYE: conjunctivae/corneas clear. PERRL. THROAT & NECK: normal and no erythema or exudates noted. LUNG: clear to auscultation bilaterally  HEART: regular rate and rhythm, S1S2, no murmur, no JVD  ABDOMEN: soft, non-tender, non-distended. Bowel sounds normal.   EXTREMITIES:  No edema, 2+ pedal/radial pulses bilaterally  SKIN: no rash or abnormalities  NEUROLOGIC: A&Ox3. Cranial nerves 2-12 grossly intact.     Lab/Data Review:  Recent Results (from the past 24 hour(s))   GLUCOSE, POC    Collection Time: 06/06/20 10:48 AM   Result Value Ref Range    Glucose (POC) 140 (H) 65 - 100 mg/dL GLUCOSE, POC    Collection Time: 06/06/20  3:50 PM   Result Value Ref Range    Glucose (POC) 136 (H) 65 - 100 mg/dL   GLUCOSE, POC    Collection Time: 06/06/20  9:13 PM   Result Value Ref Range    Glucose (POC) 117 (H) 65 - 100 mg/dL   GLUCOSE, POC    Collection Time: 06/07/20  6:12 AM   Result Value Ref Range    Glucose (POC) 107 (H) 65 - 100 mg/dL       Imaging:  Ct Chest W Cont    Result Date: 5/16/2020  CT Chest with contrast INDICATION: Chest pain, fever and elevated d-dimer. Elevated troponin. Question PE. Covid Rule out COMPARISON: Chest x-ray earlier today TECHNIQUE: Contiguous axial images were obtained from the neck base through the upper abdomen with intravenous contrast, 100 mL Isovue 370. Radiation dose reduction techniques were used for this study:  Our CT scanners use one or all of the following: Automated exposure control, adjustment of the mA and/or kVp according to patient's size, iterative reconstruction. FINDINGS: There is no pulmonary embolus. The heart is mildly enlarged. There is no pericardial effusion. There are changes of prior sternotomy. Prosthetic aortic valve is present. There is mild coronary artery calcification. There is slight ectasia of the ascending thoracic aorta. Negative for thoracic aortic dissection. There is no evidence of lymphadenopathy. There is no endobronchial lesion. There is minimal dependent subsegmental atelectasis. There is no focal pulmonary consolidation, pleural effusion or pneumothorax. No pulmonary edema. Included upper abdomen demonstrates micronodular contour of the liver, suggestive of cirrhosis. Spleen is enlarged. Surrounding bones are intact. IMPRESSION: 1. Negative for pulmonary embolism. 2. No acute pulmonary disease. Negative for pneumonia, pulmonary edema or pleural effusion. 3. Mild coronary artery calcification    Xr Chest Port    Result Date: 5/16/2020  Portable chest xray  COMPARISON: May 2, 2018 CLINICAL HISTORY: Chest pain, fever. FINDINGS: Heart appears mildly enlarged. Mediastinal contour is within normal limits. Stable postsurgical changes of sternotomy. There is no focal pulmonary consolidation, pneumothorax or pulmonary edema. No large pleural effusion. Minimal subsegmental atelectasis at the right lung base. Surrounding bones are stable. IMPRESSION: 1. No pulmonary consolidation or pulmonary edema. Ct Chest Soft Tissue Neck W Cont    Result Date: 5/27/2020  EXAM: CT NECK AND CHEST WITH CONTRAST INDICATION: S. mutans endocarditis with persistent bacteremia; evaluation for an abscess which might explain bacteremia. . COMPARISON: Chest CT 5/16/2020 TECHNIQUE:  CT imaging was performed of the neck and chest after intravenous injection of 100 mL Isovue 370. Intravenous contrast was used for better evaluation of solid organs and vascular structures. Coronal reformatted imaging provided. Radiation dose reduction techniques were used for this study. Our CT scanners use one or all of the following: Automated exposure control, adjustment of the mA and/or kV according to patient size, iterative reconstruction. FINDINGS: NECK: The bilateral bony orbits and intraorbital structures are unremarkable. The nasopharynx, oropharynx, epiglottis, laryngeal vestibule, hypopharynx, and larynx are within normal limits. The deep spaces of the neck are unremarkable. No fluid collection or phlegmon is clearly identified. The patient is partially edentulous. Periapical lucencies associated with many of the abdomen remaining teeth. No definite odontogenic abscess is present. No cervical adenopathy. The carotid arteries and jugular veins are within normal limits. Visualized intracranial structures appear normal. CHEST: Mediastinum and visualized thyroid: Normal. Heart: Aortic valve prosthesis noted. There is no pericardial effusion. There is mild global clinically. Multivessel coronary atherosclerotic calcifications.  Large Vessels: Aneurysmal dilatation of the ascending aorta measuring 4.1 cm AP. Pleura: Moderate right and small left pleural effusions, new compared to prior. Lungs: No consolidation or cavitary lesion evident. Airways: Normal. Lymph nodes: Mediastinal lymph nodes are mildly prominent in number but not pathologically enlarged. Bones/Soft tissues: No aggressive osseous lesion. Visualized abdomen: Normal.     IMPRESSION: 1. Partially edentulous patient with periapical lucencies associated with many of the remaining teeth. No definite odontogenic abscess present. No abscess within the deep spaces of the neck. 2.  Interval development of a moderate right and small left pleural effusion. No consolidation or cavitary lesion of the lungs. 3.  Changes of prior aortic valve replacement. Presumed post stenotic aneurysmal dilatation of the ascending aorta. Results for orders placed or performed during the hospital encounter of 20   2D ECHO COMPLETE ADULT (TTE) W OR [address] [facility]  One [address] [facility], [address] [facility]  [phone]    Transthoracic Echocardiogram  2D, M-mode, Doppler, and Color Doppler    Patient: Precious Gage  MR #: [id number]  : [id number]  Age: 64 years  Gender: Male  Study date: 19-May-2020  Account #: [de-identified]  Height: 73 in  Weight: 144.8 lb  BSA: 1.88 mï¾²  Status:Routine  Location: 310  BP: 113/ 68    Allergies: NO KNOWN ALLERGENS    Sonographer:  Viviane Dance, RD  Group:  [facility]  Referring Physician:  Leodis Simmonds, MD  Reading Physician:  DR. THONY TALLEY DO    INDICATIONS: Cardiomyopathy; Chest pain, chronic, high probability of CAD. PROCEDURE: This was a routine study. A transthoracic echocardiogram was  performed. The study included complete 2D imaging, M-mode, complete spectral  Doppler, and color Doppler. Intravenous contrast (Definity) was administered. Image quality was adequate. LEFT VENTRICLE: The ventricle was mildly dilated. Systolic function was  moderately reduced. Ejection fraction was estimated in the range of 40 % to   45  %. There was moderate diffuse hypokinesis. There was severe hypokinesis of   the  basal-mid anterolateral wall(s). There was hypokinesis of the basal-mid  inferolateral wall(s). Wall thickness was normal. The study was not   technically  sufficient to allow evaluation of LV diastolic function. RIGHT VENTRICLE: The ventricle was mildly dilated. Systolic function was   mildly  reduced. Estimated peak pressure was in the range of 45-50 mmHg. LEFT ATRIUM: The atrium was markedly dilated. RIGHT ATRIUM: The atrium was moderately to markedly dilated. SYSTEMIC VEINS: IVC: The inferior vena cava was mildly dilated. The  respirophasic change in diameter was less than 50%. AORTIC VALVE: A 25mm Magna Ease (4/18) in place. There is a moderate sized  mobile mass on the LVOT side of the aortic valve prosthesis. Appearance could  be consistent with prosthetic valve vegetation. The peak velocity was 3.79   m/s. The mean pressure gradient was 33 mmHg. The peak pressure gradient was 58   mmHg. DI:(0.17), SVi: (20.7), AT: (90ms). There was moderate stenosis. There   appeared  to be a vegetation or echogenic structure noted on the non coronary cusp of   the  AVR. Color and CW doppler was assessed, although no evidence of insufficiency  was noted. MITRAL VALVE: Diastolic mitral regurgitation noted. May be consequence of   first  degree AV block. There was mild annular calcification. There was no evidence  for vegetation. Transmitral velocity was minimally increased. There was no  evidence for stenosis. There was mild to moderate regurgitation. TRICUSPID VALVE: The valve structure was normal. There was no evidence for  vegetation. There was no evidence for stenosis. There was mild to moderate  regurgitation. PULMONIC VALVE: The valve structure was normal. There was no evidence for  stenosis.  There was no insufficiency. PERICARDIUM: There was no pericardial effusion. AORTA: The root exhibited normal size. There was dilatation of the ascending  aorta (41 mm). SUMMARY:    -  Left ventricle: The ventricle was mildly dilated. Systolic function was  moderately reduced. Ejection fraction was estimated in the range of 40 % to   45  %. There was moderate diffuse hypokinesis. There was severe hypokinesis of   the  basal-mid anterolateral wall(s). There was hypokinesis of the basal-mid  inferolateral wall(s). -  Right ventricle: The ventricle was mildly dilated. Systolic function was  mildly reduced. -  Left atrium: The atrium was markedly dilated. -  Aortic valve: A 25mm Magna Ease (4/18) in place. There is a moderate sized  mobile mass on the LVOT side of the aortic valve prosthesis. Appearance could  be consistent with prosthetic valve vegetation. There was moderate stenosis. The mean pressure gradient was 33 mmHg. -  Mitral valve: Diastolic mitral regurgitation noted. May be consequence of  first degree AV block. There was mild annular calcification. There was mild   to  moderate regurgitation. There was no evidence for vegetation.    -  Tricuspid valve: There was mild to moderate regurgitation.    -  Aorta, systemic arteries: There was dilatation of the ascending aorta (41  mm). -  Additional impressions: Findings discussed and images reviewed with Dr Amee Dejesus at time of dictation. SYSTEM MEASUREMENT TABLES    2D mode  Left Atrium Systolic Volume Index; Method of Disks, Biplane; 2D mode;: 56.4  ml/m2  IVS/LVPW (2D): 1  IVSd (2D): 0.9 cm  LVIDd (2D): 5.5 cm  LVIDs (2D): 4.4 cm  LVOT Area (2D): 3.5 cm2  LVPWd (2D): 0.9 cm    Unspecified Scan Mode  Peak Grad; Mean; Antegrade Flow: 49 mm[Hg]  Vmax; Antegrade Flow: 379 cm/s  LVOT Diam: 2.1 cm  Peak Grad; Mean; Antegrade Flow: 11 mm[Hg]  Vmax; Antegrade Flow: 165 cm/s    Prepared and signed by      [address] [place] [address],   Signed 19-May-2020 10:44:17 2D ECHO LIMTED ADULT W OR WO CONTR    Narrative    [place]  [address] [place], [address] [facility]  [phone]    Transthoracic Echocardiogram  2D and Doppler    Patient: Yomaira Vaughn  MR #: [id number]  : [id number]  Age: 64 years  Gender: Male  Study date: 28-May-2020  Account #: [de-identified]  Height: 72 in  Weight: 156.6 lb  BSA: 1.92 mï¾²  Status:Routine  Location: 310  BP: 117/ 76    Allergies: NO KNOWN ALLERGENS    Sonographer:  Shey Garcia [facility]  Group:  [facility]  Referring Physician:  Jenifer Pritchard MD [facility]  Reading Physician:  Jenifer Pritchard MD [facility]    INDICATIONS: FU bAVR endocarditis. PROCEDURE: This was a routine study. A transthoracic echocardiogram was  performed. The study included limited 2D imaging and limited spectral   Doppler. Image quality was adequate. LEFT VENTRICLE: Since last study, LV had dilated more and the function has  declined. The ventricle was moderately to markedly dilated. Systolic function  was markedly reduced. Ejection fraction was estimated in the range of 30 % to  35 %. AORTIC VALVE: A 25mm Magna Ease bioprosthetic AVR with mobile mass on LVOT   side  of the prosthesis as well as thickening / restriction of the leaflets. Findings  consistent with valvular vegetation. SUMMARY:    -  Left ventricle: Since last study, LV had dilated more and the function has  declined. The ventricle was moderately to markedly dilated. Systolic function  was markedly reduced. Ejection fraction was estimated in the range of 30 % to  35 %. -  Aortic valve: A 25mm Magna Ease bioprosthetic AVR with mobile mass on LVOT  side of the prosthesis as well as thickening / restriction of the leaflets. Findings consistent with valvular vegetation. Prepared and signed by    Jenifer Pritchard MD [facility]  Signed [id number] 16:50:28         Cultures:   All Micro Results     Procedure Component Value Units Date/Time    CULTURE, BLOOD [[id number]] Collected:  05/29/20 0412    Order Status:  Completed Specimen:  Blood Updated:  06/03/20 0938     Special Requests: --        RIGHT  Antecubital       Culture result: NO GROWTH 5 DAYS       CULTURE, BLOOD [[id number]] Collected:  05/29/20 0405    Order Status:  Completed Specimen:  Blood Updated:  06/03/20 0938     Special Requests: --        LEFT  Antecubital       Culture result: NO GROWTH 5 DAYS       CULTURE, BLOOD [[id number]] Collected:  05/24/20 1321    Order Status:  Completed Specimen:  Blood Updated:  05/29/20 0723     Special Requests: --        LEFT  Antecubital       Culture result: NO GROWTH 5 DAYS       CULTURE, BLOOD [[id number]]  (Abnormal)  (Susceptibility) Collected:  05/20/20 1424    Order Status:  Completed Specimen:  Blood Updated:  05/26/20 0834     Special Requests: --        RIGHT  FOREARM       GRAM STAIN GRAM POSITIVE COCCI         PEDIATRIC BOTTLE               RESULTS VERIFIED, PHONED TO AND READ BACK BY Prepair Sessions AT [facility] ON 5.24.20             Culture result: STREPTOCOCCUS MUTANS         REFER TO BIOFIRE PANEL [address] [facility] [id number]    CULTURE, BLOOD [[id number]] Collected:  05/20/20 1434    Order Status:  Completed Specimen:  Blood Updated:  05/25/20 0633     Special Requests: --        LEFT  Antecubital       Culture result: NO GROWTH 5 DAYS       BLOOD CULTURE ID PANEL [[id number]]  (Abnormal) Collected:  05/24/20 0105    Order Status:  Completed Specimen:  Blood Updated:  05/24/20 0647     Acc. no. from Micro Order [id number]     Streptococcus Detected        Comment: RESULTS VERIFIED, PHONED TO AND READ BACK BY  Prepair Sessions RN @7506 ON 5/24/20 AK. INTERPRETATION       Gram positive cocci. Identified by realtime PCR as Streptococcus species (not agalactiae, pyogenes, or pneumoniae). Comment: Recommend discontinuing IV vancomycin starting cefazolin or nafcillin if patient not on beta-lactam therapy. Infectious Diseases Consult recommended in adult patients.   THIS TEST DOES NOT REPLACE SENSITIVITY TESTING. CULTURE, BLOOD [[id number]]  (Abnormal) Collected:  05/18/20 1944    Order Status:  Completed Specimen:  Blood Updated:  05/23/20 0720     Special Requests: --        RIGHT  HAND       GRAM STAIN GRAM POSITIVE COCCI         PEDIATRIC BOTTLE               CRITICAL RESULT NOT CALLED DUE TO PREVIOUS NOTIFICATION OF CRITICAL RESULT WITHIN THE LAST 24 HOURS. Culture result:       ALPHA STREPTOCOCCUS, NOT S. PNEUMONIAE            FOR ID AND SUSCEPTIBILITY REFER TO CULTURE NO ACC [id number]    CULTURE, BLOOD [[id number]]  (Abnormal)  (Susceptibility) Collected:  05/18/20 1938    Order Status:  Completed Specimen:  Blood Updated:  05/23/20 0703     Special Requests: --        RIGHT  ARM       GRAM STAIN GRAM POS COCCI IN CHAINS         AEROBIC BOTTLE POSITIVE               RESULTS VERIFIED, PHONED TO AND READ BACK BY Vicente Hutton RN @ 1120 ON 5/20/20 BY [facility]           Culture result: STREPTOCOCCUS MUTANS         REFER TO [employer] PANEL ACCESSION [id number]    CULTURE, BLOOD [[id number]]     Order Status:  Canceled Specimen:  Blood     CULTURE, BLOOD [[id number]]     Order Status:  Canceled Specimen:  Blood     BLOOD CULTURE ID PANEL [[id number]]  (Abnormal) Collected:  05/18/20 1938    Order Status:  Completed Specimen:  Blood Updated:  05/20/20 1117     Acc. no. from Micro Order [id number]     Streptococcus Detected        Comment: RESULTS VERIFIED, PHONED TO AND READ BACK BY  Vicente Hutton RN @ 1120 ON 5/20/20 BY [facility]          INTERPRETATION       Gram positive cocci. Identified by realtime PCR as Streptococcus species (not agalactiae, pyogenes, or pneumoniae).            Comment: Consider discontinuation of IV vancomycin and using an anti-streptococcal beta-lactam (ceftriaxone/cefotaxime (peds))       EMERGENT DISEASE PANEL [[id number]] Collected:  05/16/20 1946    Order Status:  Completed Specimen:  Not Specified Updated:  05/18/20 1614     Emergent disease panel Not detected        Comment: Performed at [facility]               Assessment/Plan:     Principal Problem:    Endocarditis of prosthetic valve ([facility] [address] (6/2/2020)  - Mobile mass seen on ECHO  - Repeat ECHO on 6/8  - Blood cultures positive for Strep Mutans  - Blood cultures 5/29 NGTD  - S/P dental extraction on 6/4/20  - Will need to go for AVR replacement when scheduled  - Continue Ceftriaxone  - Appreciate all specialists    Active Problems:    NSTEMI (non-ST elevated myocardial infarction) ([facility] [address] (5/16/2020)  - Resolved  - No acute CP  - Continue ASA  - Continue Lipitor  - Continue Lisinopril      Petechiae (5/16/2020)  - Due to #1      S/P aortic valve replacement (4/1/2018)  - Will need replacement again      CAD (coronary artery disease) ()  - No acute CP  - Continue ASA  - Continue Lisinopril  - Continue Lipitor      Essential hypertension (9/21/2018)  - Stable  - Continue current medications      Diabetes mellitus, type II (HCC) (5/20/2020)  - A1C 7  - Continue Humalog SSI      Vitamin D deficiency (3/23/7772)      Diastolic CHF, chronic (HCC) (5/7/2018)  - No acute issues  - Continue Lasix IV  - Continue Lisinopril  - Continue Coreg      Anemia (5/16/2020)  - Stable      DIEGO on CPAP ()      Dyslipidemia ()  - Continue Lipitor      COVID-19 ruled out (5/16/2020)    Today's Plan: Continue Ceftriaxone. Await AVR replacement.     DIET FULL LIQUID  DIET NUTRITIONAL SUPPLEMENTS All Meals; Rick [employer]agg ( )    DVT Prophylaxis: SCDs    Discharge Plan: TBD      Signed By: Rogelio Monsivais DO     June 7, 2020 Patient with history of CAD and CABGx3 in 1994 with 2 subsequent stents  - TTE performed, EF 55%   - Continue on home metoprolol with hold parameters

## 2020-11-07 NOTE — PROGRESS NOTE ADULT - PROBLEM SELECTOR PLAN 1
planned for DC today  GOC documented  84 yo male with PMH of COPD, dementia, HTN, HLD, CAD s/p CABG, CKD,  BPH, Depression, GERD, osteoporosis, peripheral neuropathy presents to the ED with cough and shortness of breath. States the shortness of breath has been ongoing for the past week. Denies any recent sick contacts, recent travel, or any known covid exposure.  copd - proventil and spiriva inhaler regimen - seasonal vaccination-   VBG c/w COPD - co2 retention -   heart disease - valv heart disease - proBNP elev on admission - I and O - monitor for vol overload -   ataxic gait - fall prec - PT assessment - gait training  mild Dementia - alert - verbal at present -   CKD YOU - I and O - serial renal indices - renal cx noted -   nutritional optimization   sob - likely multifactorial - does not appear to be related to COPD ex - would explore CKD YOU Heart disease as etiol   Dysphagia III diet rec.

## 2020-11-07 NOTE — DISCHARGE NOTE NURSING/CASE MANAGEMENT/SOCIAL WORK - PATIENT PORTAL LINK FT
You can access the FollowMyHealth Patient Portal offered by [facility] by registering at the following website: [email or web address]. By joining [facility]’s FollowMyHealth portal, you will also be able to view your health information using other applications (apps) compatible with our system.

## 2020-11-07 NOTE — PROGRESS NOTE ADULT - ATTENDING COMMENTS
pt seen, examined, case & care plan d/w pt, residents at detail.  D/W ID- no Abx, repeat blood c/sx 2   D/C plan with HCPT in AM .
pt seen, examined, case & care plan d/w pt, residents, at detail. OK as per Dr Omalley  D/W Wife at detail. MOLST in Place, DNR/DNI  D/W Case management at detail.  D/C home today, Total d/c care time is 40 minutes.
Pt seen, examined, case & care plan d/w pt, residents at detail.  D/W Speech pathology- MBS, D-3 with [place] liquids  AM labs, D/W Wife at detail.  Palliative care eval.

## 2020-11-07 NOTE — PROGRESS NOTE ADULT - PROBLEM SELECTOR PLAN 4
Resolved   - Sodium elevated 148 on admission, likely dehydration  - Continue gentle IV hydration with D5W  - Encourage PO hydration  - F/u AM BMP

## 2020-11-07 NOTE — PROGRESS NOTE ADULT - ASSESSMENT
86 yo male with PMH of COPD, dementia, HTN, HLD, CAD s/p CABG, CKD,  BPH, Depression, GERD, osteoporosis, peripheral neuropathy presents to the ED with cough and shortness of breath x 1 week. Admitted for COPD exacerbation, r/o CHF,  YOU on CKD.

## 2020-11-07 NOTE — PROGRESS NOTE ADULT - PROBLEM SELECTOR PLAN 2
Blood culture in aerobic bottle positive for gram positive pilar   - Pt appears nontoxic, plan discussed with (JOSE Reed), will hold off on abx for now, F/u repeat blood cultures Blood culture in aerobic bottle positive for gram positive pilar   - Pt appears nontoxic, plan discussed with (JOSE Reed), will hold off on abx for now, F/u repeat blood cultures- contamination as d/w ID OK for D/C

## 2020-11-07 NOTE — PROGRESS NOTE ADULT - PROBLEM SELECTOR PROBLEM 1
COPD (chronic obstructive pulmonary disease)
COPD exacerbation

## 2020-11-07 NOTE — PROGRESS NOTE ADULT - SUBJECTIVE AND OBJECTIVE BOX
Date/Time Patient Seen:  		  Referring MD:   Data Reviewed	       Patient is a 86y old  Male who presents with a chief complaint of Shortness of breath (06 Nov 2020 16:45)      Subjective/HPI     PAST MEDICAL & SURGICAL HISTORY:  Dementia  mild    Myocardial infarction  s/p CABGx3 in 1994 and 2 stents    COPD (chronic obstructive pulmonary disease)  no home O2    Seasonal allergies    GERD (gastroesophageal reflux disease)    Benign prostatic hypertrophy    Hyperlipidemia    Hypertension    Asthma  Also COPD component    Depression    Peripheral Neuropathy    Asthma    S/P AVR (Aortic Valve Replacement)    S/P CABG (Coronary Artery Bypass Graft)    Osteoporosis    CAD (Coronary Artery Disease)  MI, s/p CABG with 2 cardiac stents    HTN (Hypertension)    Dyslipidemia    S/P inguinal hernia repair    S/P appendectomy    S/P CABG (coronary artery bypass graft)  Done in 1994; 2 cardiac stents in 1998    S/P CABG (Coronary Artery Bypass Graft)    S/P AVR (Aortic Valve Replacement)  Pig valve (bioprosthetic); done 2010 at [place]          Medication list         MEDICATIONS  (STANDING):  ALBUTerol    90 MICROgram(s) HFA Inhaler 2 Puff(s) Inhalation every 8 hours  atorvastatin 80 milliGRAM(s) Oral at bedtime  dextrose 5%. 1000 milliLiter(s) (75 mL/Hr) IV Continuous <Continuous>  donepezil 10 milliGRAM(s) Oral at bedtime  finasteride 5 milliGRAM(s) Oral daily  gentamicin 0.3% Ophthalmic Solution 1 Drop(s) Both EYES five times a day  heparin   Injectable 5000 Unit(s) SubCutaneous every 12 hours  metoprolol succinate ER 25 milliGRAM(s) Oral daily  pantoprazole    Tablet 40 milliGRAM(s) Oral before breakfast  sertraline 100 milliGRAM(s) Oral daily  sucralfate suspension 10 milliGRAM(s) Oral four times a day  tamsulosin 0.4 milliGRAM(s) Oral at bedtime  tiotropium 18 MICROgram(s) Capsule 1 Capsule(s) Inhalation daily    MEDICATIONS  (PRN):  melatonin 5 milliGRAM(s) Oral at bedtime PRN Insomnia         Vitals log        ICU Vital Signs Last 24 Hrs  T(C): 36.3 (07 Nov 2020 04:46), Max: 36.5 (06 Nov 2020 21:08)  T(F): 97.3 (07 Nov 2020 04:46), Max: 97.7 (06 Nov 2020 21:08)  HR: 58 (07 Nov 2020 05:54) (55 - 73)  BP: 156/88 (07 Nov 2020 05:54) (115/75 - 164/93)  BP(mean): --  ABP: --  ABP(mean): --  RR: 19 (07 Nov 2020 05:54) (17 - 19)  SpO2: 93% (07 Nov 2020 05:54) (90% - 93%)           Input and Output:  I&O's Detail    05 Nov 2020 07:01  -  06 Nov 2020 07:00  --------------------------------------------------------  IN:    dextrose 5%: 550 mL  Total IN: 550 mL    OUT:  Total OUT: 0 mL    Total NET: 550 mL      06 Nov 2020 07:01  -  07 Nov 2020 06:00  --------------------------------------------------------  IN:    dextrose 5%: 450 mL  Total IN: 450 mL    OUT:  Total OUT: 0 mL    Total NET: 450 mL          Lab Data                        11.8   6.85  )-----------( 125      ( 06 Nov 2020 08:44 )             37.7     11-06    141  |  107  |  51<H>  ----------------------------<  81  3.8   |  29  |  2.30<H>    Ca    8.6      06 Nov 2020 08:44  Mg     1.8     11-05              Review of Systems	      Objective     Physical Examination    on room air  head nc  heart s1s2  lung dec BS      Pertinent Lab findings & Imaging      Ava:  NO   Adequate UO     I&O's Detail    05 Nov 2020 07:01  -  06 Nov 2020 07:00  --------------------------------------------------------  IN:    dextrose 5%: 550 mL  Total IN: 550 mL    OUT:  Total OUT: 0 mL    Total NET: 550 mL      06 Nov 2020 07:01  -  07 Nov 2020 06:00  --------------------------------------------------------  IN:    dextrose 5%: 450 mL  Total IN: 450 mL    OUT:  Total OUT: 0 mL    Total NET: 450 mL               Discussed with:     Cultures:	        Radiology

## 2020-11-07 NOTE — PROGRESS NOTE ADULT - SUBJECTIVE AND OBJECTIVE BOX
Patient is a 86y old  Male who presents with a chief complaint of Shortness of breath (06 Nov 2020 14:05)    FROM ADMISSION H&P:  86 yo male with PMH of COPD not on home O2, mild dementia, HTN, HLD, CAD s/p triple CABG 1994 and 2 subsequent stents, CKD 3,  BPH, Depression, GERD, osteoporosis, peripheral neuropathy presents to the ED with cough and shortness of breath x a few weeks. States the shortness of breath has been ongoing for the past few weeks but has worsened within the last few days. Spoke to wife Larisa Beebe ([phone]) who confirmed that the patient has been having a productive cough and has been having difficulty sleeping because he cant lay down flat. States he has chronic COPD and has a new PCP who comes to the house and has never been hospitalized previously for COPD exacerbation or intubated. Denies any recent sick contacts, recent travel, or any known covid exposure of late. He states he does not leave the house much as he is frail and has difficulty walking. He denies any fevers, chills, CP, palpitations, abd pain, N, V, D, urinary symptoms or numbness, weakness or swelling of the legs. Him and his wife state that they have not been able to see many of his specialists lately including his cardiologist and pulmonologist because they dont drive and because of recent events with COVID. They have 24/7 aids at home and request help finding specialists that make house calls. Per wife, she states that the pt was recently diagnosed with a "kidney infection" by his PCP but doesnt think he was given any medications for this and denies being referred to a nephrologist. Of note, pt was recently admitted to [facility] for [facility] on CKD and COPD exacerbation from 8/21-8/24, had received IV fluids, Duonebs, and IV solumedrol.     ED Course: received IV Zithromax 500mg x 1, 1L NS bolus x1, Duonebs x 1, IV Solumedrol 125mg x1  Vitals: T 98.2, HR 62, /90 -->140/90, O2 100% on 2L via NC  Significant Labs: Na 148, BUN 37, Cr 2.4, Blood Glucose 172, Albumin 3.1, Alk phos 121, eGFR 24, Pro-BNP 8953, negative COVID and RVP PCR  EKG: NSR 62 bpm with 1st degree HB, LAD, RBBB, no significant changes from prior  CXR: Slight bilateral atelectases are presently seen.     INTERVAL HPI:  11/5/2020: Patient seen and examined at bedside this morning. Patient states his shortness of breath has markedly improved today. He experienced some dyspnea on exertion when walking to the stretcher to have his ECHO performed this morning. He no longer has dyspnea at rest. He continues to have a dry cough that he has had for the past 3 weeks. He denies any sick contacts or recent travel and does not leave his house. He denies any chest pain, palpitations, nausea, vomiting, abdominal pain, dizziness, leg swelling. Last BM was yesterday prior to admission.  [facility] called, Plan for MBS, As d/w wife  Mary Anne-   or JANE -social  worker for pt, [phone]  11/6/2020: Patient seen and examined at bedside this morning. Patient states his shortness of breath has improved. MBS done today, pt on soft diet with thin liquids. Blood culture + gram positive rods, pt appears non toxic, f/u repeat blood cultures.    11/7/2020: Patient seen and examined at bedside. Patient states shortness of breath is improved. Continues to be afebrile, no leukocytosis, awaiting repeat blood cultures. +BM last night.     OVERNIGHT EVENTS: No acute events    Home Medications:  albuterol 90 mcg/inh inhalation aerosol: 2 puff(s) inhaled every 6 hours, As needed, Shortness of Breath and/or Wheezing (04 Nov 2020 20:07)  Flovent  mcg/inh inhalation aerosol: 1 puff(s) inhaled 2 times a day (04 Nov 2020 20:07)  levalbuterol 45 mcg/inh inhalation aerosol: 1 puff(s) inhaled every 4 hours, As Needed (04 Nov 2020 20:07)  metoprolol succinate 25 mg oral tablet, extended release: 1 tab(s) orally once a day (04 Nov 2020 20:07)  sertraline 100 mg oral tablet: 1 tab(s) orally once a day (04 Nov 2020 20:07)      MEDICATIONS  (STANDING):  ALBUTerol    90 MICROgram(s) HFA Inhaler 2 Puff(s) Inhalation every 8 hours  atorvastatin 80 milliGRAM(s) Oral at bedtime  dextrose 5%. 1000 milliLiter(s) (75 mL/Hr) IV Continuous <Continuous>  donepezil 10 milliGRAM(s) Oral at bedtime  finasteride 5 milliGRAM(s) Oral daily  gentamicin 0.3% Ophthalmic Solution 1 Drop(s) Both EYES five times a day  heparin   Injectable 5000 Unit(s) SubCutaneous every 12 hours  metoprolol succinate ER 25 milliGRAM(s) Oral daily  pantoprazole    Tablet 40 milliGRAM(s) Oral before breakfast  sertraline 100 milliGRAM(s) Oral daily  sucralfate suspension 10 milliGRAM(s) Oral four times a day  tamsulosin 0.4 milliGRAM(s) Oral at bedtime  tiotropium 18 MICROgram(s) Capsule 1 Capsule(s) Inhalation daily    MEDICATIONS  (PRN):  melatonin 5 milliGRAM(s) Oral at bedtime PRN Insomnia      amoxicillin (Unknown)  Levaquin (Unknown)  penicillin (Unknown)      Social History:  Social History:    Marital Status:  (  x )    (   ) Single    (   )    (  )   Occupation:   Lives with: (  ) alone  (  ) children   ( x ) spouse   (  ) parents  ( x ) other -- 24/7 aids    Substance Use (street drugs): (  ) never used  (  ) other:  Tobacco Usage:  (   ) never smoked   ( x  ) former smoker   (   ) current smoker  (     ) pack year  (   1987     ) last cigarette date  Alcohol Usage: socially      Health Management     For female:   Last Mammo: (     ) No  (    ) Yes  (    ) Normal  (    ) Date   Last Pap: (     ) No  (    ) Yes  (    ) Normal   (    ) Date     For male:  Last prostate exam: (     ) No  (    ) Yes  (    ) Date  (    ) Normal    Immunization Hx:   (  ) flu shot                               (     ) date --> has not received this year but usually gets every year  (  ) pneumonia shot               (     ) date --> unknown however pt states UTD  ( x ) tetanus                               (   Aug 2020  ) date     (    x ) Advanced Directives: (     ) None    (   x   ) DNR    ( x    ) DNI    (   x  ) Health Care Proxy: wife Larisa Shelley (04 Nov 2020 18:27)      REVIEW OF SYSTEMS: c/o cough & SOB  CONSTITUTIONAL: No fever, No chills, No fatigue, No myalgia, No Body ache, No Weakness  EYES: No eye pain,  No visual disturbances, No discharge, No Redness  ENMT: No ear pain, No nose bleed, No vertigo; No sinus pain, No throat pain, No Congestion  NECK: No pain, No stiffness  RESPIRATORY: Admits improved cough and dyspnea on exertion. No wheezing, No hemoptysis.  CARDIOVASCULAR: No chest pain, No palpitations  GASTROINTESTINAL: No abdominal pain, No epigastric pain. No nausea, No vomiting, No diarrhea, No constipation; [  ] BM- none  GENITOURINARY: No dysuria, No frequency, No urgency, No hematuria, No incontinence  NEUROLOGICAL: No headaches, No dizziness, No numbness, No tingling, No tremors, No weakness  EXTREMITIES: No Swelling, No Pain, No Edema  SKIN: [ x ] No itching, burning, rashes, or lesions   MUSCULOSKELETAL: No joint pain, No joint swelling; No muscle pain, No back pain, No extremity pain  PSYCHIATRIC: No depression, No anxiety, No mood swings, No difficulty sleeping at night  PAIN SCALE: [ x ] None  [  ] Other-  ROS Unable to obtain due to: [ x ] Dementia  [  ] Lethargy  [  ] Sedated  [  ] Non verbal  REST OF REVIEW OF SYSTEMS: [ x ] Normal     Vital Signs Last 24 Hrs  T(C): 36.3 (07 Nov 2020 04:46), Max: 36.5 (06 Nov 2020 21:08)  T(F): 97.3 (07 Nov 2020 04:46), Max: 97.7 (06 Nov 2020 21:08)  HR: 58 (07 Nov 2020 05:54) (55 - 64)  BP: 156/88 (07 Nov 2020 05:54) (115/75 - 164/93)  BP(mean): --  RR: 19 (07 Nov 2020 05:54) (17 - 19)  SpO2: 93% (07 Nov 2020 05:54) (90% - 93%)    CAPILLARY BLOOD GLUCOSE          I&O's Summary    06 Nov 2020 07:01  -  07 Nov 2020 07:00  --------------------------------------------------------  IN: 1350 mL / OUT: 0 mL / NET: 1350 mL      PHYSICAL EXAM:  GENERAL:  [ x ] NAD, [ x ] Well appearing, [  ] Agitated, [  ] Drowsy, [  ] Lethargy, [  ] Confused   HEAD:  [ x ] Normal, [  ] Other  EYES:  [ x ] EOMI, [ x ] PERRLA, [ x ] Conjunctiva and sclera clear normal, [  ] Other, [  ] Pallor, [ x ] Discharge/ Redness Left eye  ENMT:  [ x ] Normal, [ x ] Moist mucous membranes, [ x ] Good dentition, [ x ] No thrush  NECK:  [ x ] Supple, [ x ] No JVD, [ x ] Normal thyroid, [  ] Lymphadenopathy, [  ] Other  CHEST/LUNG:  [  ] Clear to auscultation bilaterally, [ x ] Breath Sounds equal B/L / decreased, [ x ] Poor effort, [ x ] No rales, [ x ] No rhonchi, [ x ] B/L expiratory wheezing at lung bases, improving   HEART:  [ x ] Regular rate and rhythm, [  ] Tachycardia, [  ] Bradycardia, [  ] Irregular, [ x ] No murmurs, No rubs, No gallops, [  ] PPM in place (Mfr:  )  ABDOMEN:  [ x ] Soft, [ x ] Nontender, [ x ] Nondistended, [ x ] No mass, [ x ] Bowel sounds present, [  ] Obese  NERVOUS SYSTEM:  [ x ] Alert & Oriented x2, [ x ] Nonfocal, [  ] Confusion, [  ] Encephalopathic, [  ] Sedated, [  ] Unable to assess, [ x ] Dementia, [ x ] Other-forgetful  EXTREMITIES:  [ x ] 2+ Peripheral Pulses, No clubbing, No cyanosis,  [  ] Edema B/L lower EXT, [  ] PVD stasis skin changes B/L lower EXT, [  ] Wound  LYMPH:  No lymphadenopathy noted  SKIN:  [  ] No rashes or lesions, [ x ] Pressure ulcers L heel pressure ulcer, R heel erythema and scab, [  ] Ecchymosis, [  ] Skin tears, [  ] Other    DIET: Diet, Dysphagia 3 Soft-Thin Liquids:   Low Sodium  Supplement Feeding Modality:  Oral  Ensure Enlive Servings Per Day:  1       Frequency:  Daily (11-06-20 @ 12:40)      LABS:                        12.9   6.97  )-----------( x        ( 07 Nov 2020 09:38 )             42.4     07 Nov 2020 09:38    141    |  105    |  53     ----------------------------<  95     3.7     |  30     |  2.00     Ca    9.1        07 Nov 2020 09:38          Culture Results:   <10,000 CFU/mL Normal Urogenital Alisa (11-05 @ 01:23)  Culture Results:   Growth in aerobic bottle: Gram Positive Rods (11-04 @ 22:35)  Culture Results:   No growth to date. (11-04 @ 22:35)      CARDIAC MARKERS ( 04 Nov 2020 17:58 )  <.015 ng/mL / x     / 38 U/L / x     / 1.9 ng/mL        Culture - Urine (collected 05 Nov 2020 01:23)  Source: .Urine Clean Catch (Midstream)  Final Report (05 Nov 2020 20:41):    <10,000 CFU/mL Normal Urogenital Alisa    Culture - Blood (collected 04 Nov 2020 22:35)  Source: .Blood Blood-Peripheral  Preliminary Report (05 Nov 2020 23:01):    No growth to date.    Culture - Blood (collected 04 Nov 2020 22:35)  Source: .Blood Blood-Peripheral  Gram Stain (06 Nov 2020 10:36):    Growth in aerobic bottle: Gram Positive Rods  Preliminary Report (06 Nov 2020 10:36):    Growth in aerobic bottle: Gram Positive Rods       Anemia Panel:      Thyroid Panel:            Serum Pro-Brain Natriuretic Peptide: 8953 pg/mL (11-04-20 @ 17:58)    RADIOLOGY & ADDITIONAL TESTS: no new imaging       HEALTH ISSUES - PROBLEM Dx:  COPD (chronic obstructive pulmonary disease)  COPD (chronic obstructive pulmonary disease)    Hypernatremia  Hypernatremia    Dementia  Dementia    Need for prophylactic measure  Need for prophylactic measure    Benign prostatic hypertrophy  Benign prostatic hypertrophy    GERD (gastroesophageal reflux disease)  GERD (gastroesophageal reflux disease)    Depression  Depression    Hyperlipidemia  Hyperlipidemia    Hypertension  Hypertension    CAD (Coronary Artery Disease)  CAD (Coronary Artery Disease)    YOU (acute kidney injury)  YOU (acute kidney injury)    COPD exacerbation  COPD exacerbation      Consultant(s) Notes Reviewed:  [X  ] YES     Care Discussed with [ x ] Consultants, [ X ] Patient, [ X ] Family, [  ] HCP, [  ] , [ X ] Social Service, [  ] RN, [  ] Physical Therapy, [  ] Palliative Care Team  DVT PPX: [  ] Lovenox, [ X ] SC Heparin, [  ] Coumadin, [  ] Xarelto, [  ] Eliquis, [  ] Pradaxa, [  ] IV Heparin drip, [  ] SCD, [  ] Ambulation, [  ] Contraindicated 2/2 GI Bleed, [  ] Contraindicated 2/2  Bleed, [  ] Contraindicated 2/2 Brain Bleed  Advanced Directive: [  ] None, [ x ] DNR/DNI Patient is a 86y old  Male who presents with a chief complaint of Shortness of breath (06 Nov 2020 14:05)    FROM ADMISSION H&P:  86 yo male with PMH of COPD not on home O2, mild dementia, HTN, HLD, CAD s/p triple CABG 1994 and 2 subsequent stents, CKD 3,  BPH, Depression, GERD, osteoporosis, peripheral neuropathy presents to the ED with cough and shortness of breath x a few weeks. States the shortness of breath has been ongoing for the past few weeks but has worsened within the last few days. Spoke to wife Larisa Beebe ([phone]) who confirmed that the patient has been having a productive cough and has been having difficulty sleeping because he cant lay down flat. States he has chronic COPD and has a new PCP who comes to the house and has never been hospitalized previously for COPD exacerbation or intubated. Denies any recent sick contacts, recent travel, or any known covid exposure of late. He states he does not leave the house much as he is frail and has difficulty walking. He denies any fevers, chills, CP, palpitations, abd pain, N, V, D, urinary symptoms or numbness, weakness or swelling of the legs. Him and his wife state that they have not been able to see many of his specialists lately including his cardiologist and pulmonologist because they dont drive and because of recent events with COVID. They have 24/7 aids at home and request help finding specialists that make house calls. Per wife, she states that the pt was recently diagnosed with a "kidney infection" by his PCP but doesnt think he was given any medications for this and denies being referred to a nephrologist. Of note, pt was recently [facility] on CKD and COPD exacerbation from 8/21-8/24, had received IV fluids, Duonebs, and IV solumedrol.     ED Course: received IV Zithromax 500mg x 1, 1L NS bolus x1, Duonebs x 1, IV Solumedrol 125mg x1  Vitals: T 98.2, HR 62, /90 -->140/90, O2 100% on 2L via NC  Significant Labs: Na 148, BUN 37, Cr 2.4, Blood Glucose 172, Albumin 3.1, Alk phos 121, eGFR 24, Pro-BNP 8953, negative COVID and RVP PCR  EKG: NSR 62 bpm with 1st degree HB, LAD, RBBB, no significant changes from prior  CXR: Slight bilateral atelectases are presently seen.     INTERVAL HPI:  11/5/2020: Patient seen and examined at bedside this morning. Patient states his shortness of breath has markedly improved today. He experienced some dyspnea on exertion when walking to the stretcher to have his ECHO performed this morning. He no longer has dyspnea at rest. He continues to have a dry cough that he has had for the past 3 weeks. He denies any sick contacts or recent travel and does not leave his house. He denies any chest pain, palpitations, nausea, vomiting, abdominal pain, dizziness, leg swelling. Last BM was yesterday prior to admission.  [facility] called, Plan for MBS, As d/w wife  Mary Anne-   or JANE -social  worker for pt, [phone]  11/6/2020: Patient seen and examined at bedside this morning. Patient states his shortness of breath has improved. MBS done today, pt on soft diet with thin liquids. Blood culture + gram positive rods, pt appears non toxic, f/u repeat blood cultures.    11/7/2020: Patient seen and examined at bedside. Patient states shortness of breath is improved. Continues to be afebrile, no leukocytosis, awaiting repeat blood cultures. +BM last night.     OVERNIGHT EVENTS: No acute events    Home Medications:  albuterol 90 mcg/inh inhalation aerosol: 2 puff(s) inhaled every 6 hours, As needed, Shortness of Breath and/or Wheezing (04 Nov 2020 20:07)  Flovent  mcg/inh inhalation aerosol: 1 puff(s) inhaled 2 times a day (04 Nov 2020 20:07)  levalbuterol 45 mcg/inh inhalation aerosol: 1 puff(s) inhaled every 4 hours, As Needed (04 Nov 2020 20:07)  metoprolol succinate 25 mg oral tablet, extended release: 1 tab(s) orally once a day (04 Nov 2020 20:07)  sertraline 100 mg oral tablet: 1 tab(s) orally once a day (04 Nov 2020 20:07)      MEDICATIONS  (STANDING):  ALBUTerol    90 MICROgram(s) HFA Inhaler 2 Puff(s) Inhalation every 8 hours  atorvastatin 80 milliGRAM(s) Oral at bedtime  dextrose 5%. 1000 milliLiter(s) (75 mL/Hr) IV Continuous <Continuous>  donepezil 10 milliGRAM(s) Oral at bedtime  finasteride 5 milliGRAM(s) Oral daily  gentamicin 0.3% Ophthalmic Solution 1 Drop(s) Both EYES five times a day  heparin   Injectable 5000 Unit(s) SubCutaneous every 12 hours  metoprolol succinate ER 25 milliGRAM(s) Oral daily  pantoprazole    Tablet 40 milliGRAM(s) Oral before breakfast  sertraline 100 milliGRAM(s) Oral daily  sucralfate suspension 10 milliGRAM(s) Oral four times a day  tamsulosin 0.4 milliGRAM(s) Oral at bedtime  tiotropium 18 MICROgram(s) Capsule 1 Capsule(s) Inhalation daily    MEDICATIONS  (PRN):  melatonin 5 milliGRAM(s) Oral at bedtime PRN Insomnia      amoxicillin (Unknown)  Levaquin (Unknown)  penicillin (Unknown)      Social History:  Social History:    Marital Status:  (  x )    (   ) Single    (   )    (  )   Occupation:   Lives with: (  ) alone  (  ) children   ( x ) spouse   (  ) parents  ( x ) other -- 24/7 aids    Substance Use (street drugs): (  ) never used  (  ) other:  Tobacco Usage:  (   ) never smoked   ( x  ) former smoker   (   ) current smoker  (     ) pack year  (   1987     ) last cigarette date  Alcohol Usage: socially      Health Management     For female:   Last Mammo: (     ) No  (    ) Yes  (    ) Normal  (    ) Date   Last Pap: (     ) No  (    ) Yes  (    ) Normal   (    ) Date     For male:  Last prostate exam: (     ) No  (    ) Yes  (    ) Date  (    ) Normal    Immunization Hx:   (  ) flu shot                               (     ) date --> has not received this year but usually gets every year  (  ) pneumonia shot               (     ) date --> unknown however pt states UTD  ( x ) tetanus                               (   Aug 2020  ) date     (    x ) Advanced Directives: (     ) None    (   x   ) DNR    ( x    ) DNI    (   x  ) Health Care Proxy: wife Larisa Shelley (04 Nov 2020 18:27)      REVIEW OF SYSTEMS: No cough & SOB  CONSTITUTIONAL: No fever, No chills, No fatigue, No myalgia, No Body ache, No Weakness  EYES: No eye pain,  No visual disturbances, No discharge, No Redness  ENMT: No ear pain, No nose bleed, No vertigo; No sinus pain, No throat pain, No Congestion  NECK: No pain, No stiffness  RESPIRATORY: Admits improved cough and dyspnea on exertion. No wheezing, No hemoptysis.  CARDIOVASCULAR: No chest pain, No palpitations  GASTROINTESTINAL: No abdominal pain, No epigastric pain. No nausea, No vomiting, No diarrhea, No constipation; [  ] BM- none  GENITOURINARY: No dysuria, No frequency, No urgency, No hematuria, No incontinence  NEUROLOGICAL: No headaches, No dizziness, No numbness, No tingling, No tremors, No weakness  EXTREMITIES: No Swelling, No Pain, No Edema  SKIN: [ x ] No itching, burning, rashes, or lesions   MUSCULOSKELETAL: No joint pain, No joint swelling; No muscle pain, No back pain, No extremity pain  PSYCHIATRIC: No depression, No anxiety, No mood swings, No difficulty sleeping at night  PAIN SCALE: [ x ] None  [  ] Other-  ROS Unable to obtain due to: [ x ] Dementia  [  ] Lethargy  [  ] Sedated  [  ] Non verbal  REST OF REVIEW OF SYSTEMS: [ x ] Normal     Vital Signs Last 24 Hrs  T(C): 36.3 (07 Nov 2020 04:46), Max: 36.5 (06 Nov 2020 21:08)  T(F): 97.3 (07 Nov 2020 04:46), Max: 97.7 (06 Nov 2020 21:08)  HR: 58 (07 Nov 2020 05:54) (55 - 64)  BP: 156/88 (07 Nov 2020 05:54) (115/75 - 164/93)  BP(mean): --  RR: 19 (07 Nov 2020 05:54) (17 - 19)  SpO2: 93% (07 Nov 2020 05:54) (90% - 93%)    CAPILLARY BLOOD GLUCOSE          I&O's Summary    06 Nov 2020 07:01  -  07 Nov 2020 07:00  --------------------------------------------------------  IN: 1350 mL / OUT: 0 mL / NET: 1350 mL      PHYSICAL EXAM:  GENERAL:  [ x ] NAD, [ x ] Well appearing, [  ] Agitated, [  ] Drowsy, [  ] Lethargy, [  ] Confused   HEAD:  [ x ] Normal, [  ] Other  EYES:  [ x ] EOMI, [ x ] PERRLA, [ x ] Conjunctiva and sclera clear normal, [  ] Other, [  ] Pallor, [ x ] Discharge/ Redness Left eye  ENMT:  [ x ] Normal, [ x ] Moist mucous membranes, [ x ] Good dentition, [ x ] No thrush  NECK:  [ x ] Supple, [ x ] No JVD, [ x ] Normal thyroid, [  ] Lymphadenopathy, [  ] Other  CHEST/LUNG:  [  ] Clear to auscultation bilaterally, [ x ] Breath Sounds equal B/L / decreased, [ x ] Poor effort, [ x ] No rales, [ x ] No rhonchi, [ x ] B/L expiratory wheezing at lung bases, improving   HEART:  [ x ] Regular rate and rhythm, [  ] Tachycardia, [  ] Bradycardia, [  ] Irregular, [ x ] No murmurs, No rubs, No gallops, [  ] PPM in place (Mfr:  )  ABDOMEN:  [ x ] Soft, [ x ] Nontender, [ x ] Nondistended, [ x ] No mass, [ x ] Bowel sounds present, [  ] Obese  NERVOUS SYSTEM:  [ x ] Alert & Oriented x2, [ x ] Nonfocal, [  ] Confusion, [  ] Encephalopathic, [  ] Sedated, [  ] Unable to assess, [ x ] Dementia, [ x ] Other-forgetful  EXTREMITIES:  [ x ] 2+ Peripheral Pulses, No clubbing, No cyanosis,  [  ] Edema B/L lower EXT, [  ] PVD stasis skin changes B/L lower EXT, [  ] Wound  LYMPH:  No lymphadenopathy noted  SKIN:  [  ] No rashes or lesions, [ x ] Pressure ulcers L heel pressure ulcer, R heel erythema and scab, [  ] Ecchymosis, [  ] Skin tears, [  ] Other    DIET: Diet, Dysphagia 3 Soft-Thin Liquids:   Low Sodium  Supplement Feeding Modality:  Oral  Ensure Enlive Servings Per Day:  1       Frequency:  Daily (11-06-20 @ 12:40)      LABS:                        12.9   6.97  )-----------( x        ( 07 Nov 2020 09:38 )             42.4     07 Nov 2020 09:38    141    |  105    |  53     ----------------------------<  95     3.7     |  30     |  2.00     Ca    9.1        07 Nov 2020 09:38          Culture Results:   <10,000 CFU/mL Normal Urogenital Alisa (11-05 @ 01:23)  Culture Results:   Growth in aerobic bottle: Gram Positive Rods (11-04 @ 22:35)  Culture Results:   No growth to date. (11-04 @ 22:35)      CARDIAC MARKERS ( 04 Nov 2020 17:58 )  <.015 ng/mL / x     / 38 U/L / x     / 1.9 ng/mL        Culture - Urine (collected 05 Nov 2020 01:23)  Source: .Urine Clean Catch (Midstream)  Final Report (05 Nov 2020 20:41):    <10,000 CFU/mL Normal Urogenital Alisa    Culture - Blood (collected 04 Nov 2020 22:35)  Source: .Blood Blood-Peripheral  Preliminary Report (05 Nov 2020 23:01):    No growth to date.    Culture - Blood (collected 04 Nov 2020 22:35)  Source: .Blood Blood-Peripheral  Gram Stain (06 Nov 2020 10:36):    Growth in aerobic bottle: Gram Positive Rods  Preliminary Report (06 Nov 2020 10:36):    Growth in aerobic bottle: Gram Positive Rods      Serum Pro-Brain Natriuretic Peptide: 8953 pg/mL (11-04-20 @ 17:58)    RADIOLOGY & ADDITIONAL TESTS: no new imaging       HEALTH ISSUES - PROBLEM Dx:  COPD (chronic obstructive pulmonary disease)  COPD (chronic obstructive pulmonary disease)    Hypernatremia  Hypernatremia    Dementia  Dementia    Need for prophylactic measure  Need for prophylactic measure    Benign prostatic hypertrophy  Benign prostatic hypertrophy    GERD (gastroesophageal reflux disease)  GERD (gastroesophageal reflux disease)    Depression  Depression    Hyperlipidemia  Hyperlipidemia    Hypertension  Hypertension    CAD (Coronary Artery Disease)  CAD (Coronary Artery Disease)    YOU (acute kidney injury)  YOU (acute kidney injury)    COPD exacerbation  COPD exacerbation      Consultant(s) Notes Reviewed:  [X  ] YES     Care Discussed with [ x ] Consultants, [ X ] Patient, [ X ] Family, [  ] HCP, [ x ] , [  ] Social Service, [  ] RN, [  ] Physical Therapy, [  ] Palliative Care Team  DVT PPX: [  ] Lovenox, [ X ] SC Heparin, [  ] Coumadin, [  ] Xarelto, [  ] Eliquis, [  ] Pradaxa, [  ] IV Heparin drip, [  ] SCD, [  ] Ambulation, [  ] Contraindicated 2/2 GI Bleed, [  ] Contraindicated 2/2  Bleed, [  ] Contraindicated 2/2 Brain Bleed  Advanced Directive: [  ] None, [ x ] DNR/DNI

## 2020-11-09 LAB
CULTURE RESULTS: SIGNIFICANT CHANGE UP
SPECIMEN SOURCE: SIGNIFICANT CHANGE UP

## 2020-11-13 ENCOUNTER — NON-APPOINTMENT (OUTPATIENT)
Age: 85
End: 2020-11-13

## 2020-11-16 ENCOUNTER — NON-APPOINTMENT (OUTPATIENT)
Age: 85
End: 2020-11-16

## 2020-11-16 RX ORDER — MULTIVITAMIN
TABLET ORAL DAILY
Refills: 0 | Status: ACTIVE | COMMUNITY
Start: 2020-11-16

## 2020-11-16 RX ORDER — PANTOPRAZOLE 40 MG/1
40 TABLET, DELAYED RELEASE ORAL DAILY
Qty: 30 | Refills: 0 | Status: ACTIVE | COMMUNITY
Start: 2020-11-16

## 2020-11-16 RX ORDER — SUCRALFATE 1 G/1
1 TABLET ORAL
Refills: 0 | Status: ACTIVE | COMMUNITY
Start: 2020-11-16

## 2020-11-16 RX ORDER — ALBUTEROL SULFATE 90 UG/1
108 (90 BASE) AEROSOL, METERED RESPIRATORY (INHALATION) EVERY 6 HOURS
Refills: 0 | Status: ACTIVE | COMMUNITY
Start: 2020-11-16

## 2020-11-16 RX ORDER — SERTRALINE HYDROCHLORIDE 100 MG/1
100 TABLET, FILM COATED ORAL DAILY
Refills: 0 | Status: ACTIVE | COMMUNITY
Start: 2020-11-16

## 2020-11-16 RX ORDER — FINASTERIDE 5 MG/1
5 TABLET, FILM COATED ORAL DAILY
Refills: 0 | Status: ACTIVE | COMMUNITY
Start: 2020-11-16

## 2020-11-16 RX ORDER — FLUTICASONE PROPIONATE 220 UG/1
220 AEROSOL, METERED RESPIRATORY (INHALATION)
Refills: 0 | Status: ACTIVE | COMMUNITY
Start: 2020-11-16

## 2020-11-16 RX ORDER — CHLORHEXIDINE GLUCONATE 4 %
5 LIQUID (ML) TOPICAL
Refills: 0 | Status: ACTIVE | COMMUNITY
Start: 2020-11-16

## 2020-11-16 RX ORDER — TIOTROPIUM BROMIDE 18 UG/1
18 CAPSULE ORAL; RESPIRATORY (INHALATION) DAILY
Refills: 0 | Status: ACTIVE | COMMUNITY
Start: 2020-11-16

## 2020-11-16 RX ORDER — METOPROLOL SUCCINATE 25 MG/1
25 TABLET, EXTENDED RELEASE ORAL DAILY
Refills: 0 | Status: ACTIVE | COMMUNITY
Start: 2020-11-16

## 2020-11-16 RX ORDER — LEVALBUTEROL TARTRATE 45 UG/1
45 AEROSOL, METERED ORAL EVERY 4 HOURS
Refills: 0 | Status: ACTIVE | COMMUNITY
Start: 2020-11-16

## 2020-11-16 RX ORDER — DONEPEZIL HYDROCHLORIDE 10 MG/1
10 TABLET ORAL DAILY
Refills: 0 | Status: ACTIVE | COMMUNITY
Start: 2020-11-16

## 2020-11-16 RX ORDER — GENTAMICIN SULFATE 3 MG/ML
0.3 SOLUTION OPHTHALMIC
Refills: 0 | Status: ACTIVE | COMMUNITY
Start: 2020-11-16

## 2020-11-23 LAB — PO2 BLDV: <44 MMHG — SIGNIFICANT CHANGE UP (ref 25–45)

## 2020-12-02 ENCOUNTER — NON-APPOINTMENT (OUTPATIENT)
Age: 85
End: 2020-12-02

## 2021-01-19 NOTE — DIETITIAN INITIAL EVALUATION ADULT. - PROBLEM SELECTOR PLAN 4
Alert and oriented to person, place and time Chronic  - Continue home norvasc 5mg daily, with hold parameters  - Continue home Toprol XL 25mg daily, with hold parameters  - Monitor routine hemodynamics

## 2021-02-11 NOTE — ED ADULT NURSE NOTE - BREATH SOUNDS, RIGHT
HPI  Patient is a 72y old Male who presents with a chief complaint of UTI (2021 09:39)    Currently admitted to medicine with the primary diagnosis of Urinary tract infection with hematuria, site unspecified       Today is hospital day 1d.     INTERVAL HPI / OVERNIGHT EVENTS:  Patient was examined and seen at bedside. This morning he is resting comfortably in bed   denies any abdominal pain or back pain  states he had problems with akhtar in early morning which resolved after flushing  doesnt want to have any other akhtar catheter placed - states it caused too much pain    ROS: Otherwise unremarkable     PAST MEDICAL & SURGICAL HISTORY  CAD (coronary artery disease)    No significant past surgical history      ALLERGIES  penicillins (Unknown)    MEDICATIONS  STANDING MEDICATIONS  atorvastatin 20 milliGRAM(s) Oral at bedtime  chlorhexidine 4% Liquid 1 Application(s) Topical <User Schedule>  levoFLOXacin IVPB 750 milliGRAM(s) IV Intermittent every 48 hours  tamsulosin 0.4 milliGRAM(s) Oral at bedtime    PRN MEDICATIONS    VITALS:  T(F): 97.6  HR: 89  BP: 139/89  RR: 18  SpO2: 98%    PHYSICAL EXAM  GEN: NAD, Resting comfortably in bed  PULM: Clear to auscultation bilaterally, No wheezes  CVS: Regular rate and rhythm, S1-S2, no murmurs  ABD: Soft, non-tender, non-distended, no guarding  EXT: No edema  NEURO: A&Ox3, no focal deficits    LABS                        14.1   8.72  )-----------( 295      ( 10 Feb 2021 15:30 )             40.5     02-10    139  |  107  |  43<H>  ----------------------------<  131<H>  4.8   |  18  |  2.1<H>    Ca    9.7      10 Feb 2021 15:30    TPro  7.3  /  Alb  4.2  /  TBili  0.4  /  DBili  x   /  AST  16  /  ALT  15  /  AlkPhos  88  02-10    PT/INR - ( 10 Feb 2021 15:30 )   PT: 13.20 sec;   INR: 1.15 ratio         PTT - ( 10 Feb 2021 15:30 )  PTT:37.0 sec  Urinalysis Basic - ( 10 Feb 2021 16:50 )    Color: Light Orange / Appearance: Slightly Turbid / S.013 / pH: x  Gluc: x / Ketone: Negative  / Bili: Negative / Urobili: <2 mg/dL   Blood: x / Protein: 30 mg/dL / Nitrite: Negative   Leuk Esterase: Moderate / RBC: 48 /HPF / WBC 40 /HPF   Sq Epi: x / Non Sq Epi: 1 /HPF / Bacteria: Many            Culture - Urine (collected 2021 20:30)  Source: .Urine Catheterized  Preliminary Report (2021 07:23):    >100,000 CFU/ml Klebsiella oxytoca/Raoutella ornithinolytica          RADIOLOGY     diminished/wheezes

## 2021-04-06 NOTE — DIETITIAN INITIAL EVALUATION ADULT. - PROBLEM SELECTOR PROBLEM 1
Clindamycin Pregnancy And Lactation Text: This medication can be used in pregnancy if certain situations. Clindamycin is also present in breast milk. GI bleed

## 2021-04-26 NOTE — PROGRESS NOTE ADULT - PROBLEM SELECTOR PROBLEM 5
New Prescription: erythromycin (erythromycin): ointment: 5 mg/gram (0.5 %) a small amount at bedtime into affected eye 04- COPD (chronic obstructive pulmonary disease)

## 2021-04-26 NOTE — ED ADULT NURSE NOTE - ALCOHOL PRE SCREEN (AUDIT - C)
Message left at [facility]--to fax medication list to .  Direct line given for call back.    Statement Selected

## 2021-05-21 NOTE — PROVIDER CONTACT NOTE (OTHER) - ASSESSMENT
Pt is a&ox2 and confused. No c/o of pain, discomfort. No s/s of acute distress.
spontaneous/unlabored

## 2021-06-04 NOTE — PATIENT PROFILE ADULT - OVER THE PAST TWO WEEKS HAVE YOU FELT DOWN, DEPRESSED OR HOPELESS?
Cem Kevin  [address]  [place] [address]  [phone] (home)     Procedure cardiologist:  Dr. Morrison or Dr. Wright  PCP:  Bryson Worthington MD  H&P completed by:  Dr. Lee, 12/4/19  Admit date 12/6/19  Arrival time:  0630  Anticoagulation: None  CPAP: No  Previous PCI: No  Bypass Grafts: No  Renal Issues: No  Diabetic?: No  Device?: No  Type:  n/a    Angiogram Teaching    Reason for Visit:  Patient seen for pre-procedure education in preparation for: Cors poss PCI    Procedure Prep:  Primary Cardiologist note dated: 12/4/19  EKG results obtained, dated: am of procedure  Hemogram results obtained: am of procedure  Basic Metabolic Panel results obtained: am of procedure  Lipid Profile results obtained: am of procedure    Patient Education  Explained indications/risks for diagnostic evaluation, including one or more of the following:  left heart catheterization, right heart catheterization, coronary angiogram, left ventriculogram, percutaneous arteritomy closure, less than 0.1% of acute myocardial infarction and CVA or death or any of the following to the degree that it could threaten life:  allergic reaction, arrhythmia, renal failure, hemorrhage, thrombosis and infection  Explained indications/risks for therapeutic interventions, including one or more of the following: PTCA, artherectomy, stent, intravascular ultrasound, valvuloplasty, intraaortic balloon pump, 2% or less risk of MI, less than 1% risk of CVA, emergency heart surgery, death and less than 4 % risk of vascular injury requiring surgical repair or blood transfusion.  These risks are in addition to baseline risks associated with a Diagnostic Evaluation.  Patient states understanding of procedure and risks and agrees to proceed.    Pre-procedure instructions  Patient instructed to be NPO after midnight.  Patient instructed to shower the evening before or the morning of the procedure.  Patient instructed to arrange for  transportation home following procedure from a responsible family member of friend. No driving for at least 24 hours.  Patient instructed to have a responsible adult with them for 24 hours post-procedure.  Post-procedure follow up process.  Conscious sedation discussed.    Pre-procedure medication instructions  Patient instructed on antiplatelet medication.  Continue medications as scheduled, with a small amount of water on the day of the procedure unless indicated.  Patient instructed to take 325 mg of Aspirin am of procedure: Yes  Other medication: instructed to only take Aspirin, Amlodipine, Fluoxetine a.m. of the procedure.    *PATIENTS RECORDS AVAILABLE IN [facility] UNLESS OTHERWISE INDICATED*      Patient Active Problem List   Diagnosis     Angina pectoris, crescendo (H)     Essential hypertension     Pure hypercholesterolemia     Class 2 obesity due to excess calories in adult       Current Outpatient Medications   Medication Sig Dispense Refill     amLODIPine (NORVASC) 10 MG tablet Take 10 mg by mouth.       aspirin 325 MG tablet Take 325 mg by mouth.       atorvastatin (LIPITOR) 40 MG tablet Take 40 mg by mouth.       cetirizine (ZYRTEC) 10 MG tablet Take 10 mg by mouth.       cholecalciferol, vitamin D3, 2,000 unit Tab Take 2,000 Units by mouth.       cyanocobalamin (VITAMIN B-12) 100 MCG tablet Take 100 mcg by mouth daily.       FLUoxetine (PROZAC) 20 MG capsule Take 20 mg by mouth.       glucosam-msm-chond-hrb149-hyal (GLUCOS-CHOND-MSM, WITH ANTIOX,) 500-500-66.7 mg Tab Take 1 tablet by mouth.       hydroCHLOROthiazide (HYDRODIURIL) 25 MG tablet Take 25 mg by mouth.       meloxicam (MOBIC) 7.5 MG tablet Take 7.5 mg by mouth daily.       moxifloxacin (VIGAMOX) 0.5 % ophthalmic solution Apply 1 drop to eye.       multivitamin (ONE A DAY) per tablet Take 1 tablet by mouth.       nepafenac (NEVANAC) 0.1 % ophthalmic suspension Apply 1 drop to eye.       nitroglycerin (NITROSTAT) 0.6 MG SL tablet Place 0.6 mg  under the tongue every 5 (five) minutes as needed for chest pain.       prednisoLONE acetate (PRED-FORTE) 1 % ophthalmic suspension Apply 1 drop to eye.       No current facility-administered medications for this visit.        Allergies: IV Vasotec    ADDISON Sommers                            no

## 2021-07-06 NOTE — PHYSICAL THERAPY INITIAL EVALUATION ADULT - WORK/LEISURE ACTIVITY, REHAB EVAL
[Time Spent: ___ minutes] : I have spent [unfilled] minutes of time on the encounter. needs device and assist

## 2021-07-13 NOTE — ED ADULT NURSE NOTE - PMH
Pharmacy Automatic Renal Dosing Protocol - Antimicrobials    Indication for Antimicrobials: SSTI (OM)    Current Regimen of Each Antimicrobial:  Vancomycin 1750 mg x 1 then 1250 mg Q12H (Start Date 21; Day # )  Zosyn  3.375g IV q 8h (start: 7/10, day )    Previous Antimicrobial Therapy:    Vancomycin Goal Level: 15-20 mcg/ml    Vancomycin Levels  Date Dose & Interval Measured (mcg/mL) Steady State (mcg/mL)    @ 0929 1000mg q 12h 10.0 10.8    1116 1250mg q12h 12.9 16.8           Date & time of next level:     Significant Cultures:   : blood- NGTD - prelim    foot wound   anaerobic    Radiology / Imaging results: (X-ray, CT scan or MRI):       Labs:  Recent Labs     21  0453 21  0312 21  0520   CREA 0.84 0.75 0.73   BUN 8 9 8   WBC  --  3.5* 4.4     Temp (24hrs), Av.1 °F (36.7 °C), Min:97.9 °F (36.6 °C), Max:99 °F (37.2 °C)      Paralysis, amputations, malnutrition:   Creatinine Clearance (mL/min) or Dialysis: 85.6 ml/in    Impression/Plan:   Continue zosyn as above  Extrapolated vancomycin tr/AUC is 16.8/564. Continue vancomycin 1250mg IV q12h  BMP daily  Stop date currently 7 days but cultures pending     Pharmacy will follow daily and adjust medications as appropriate for renal function and/or serum levels. Thank you,  Singh Perry, CHAVAD      Recommended duration of therapy  [email or web address]/[facility]/Pharmacy/Clinical%[email or web address]%[email or web address]. docx    Renal Dosing  [email or web address]/[facility]/Pharmacy/Clinical%20Companion/Renal%[email or web address]. pdf Benign prostatic hypertrophy    CAD (Coronary Artery Disease)  MI, s/p CABG with 2 cardiac stents  COPD (chronic obstructive pulmonary disease)    Dementia    Depression    GERD (gastroesophageal reflux disease)    Hyperlipidemia    Hypertension    Myocardial infarction    Osteoporosis    Peripheral Neuropathy    Seasonal allergies

## 2021-07-20 NOTE — PATIENT PROFILE ADULT - NSPROSPHOSPCHAPLAINYN_GEN_A_NUR
Mom calls requesting us to send pt's amoxicillin script from yesterday [facility]. They are having issues filling it a [facility]. Mom will call to tell [facility] to disregard. Script sent   no

## 2021-07-22 NOTE — ED CLERICAL - NS ED CLERK NOTE PRE-ARRIVAL INFOMATION; PCP NAME
What Type Of Note Output Would You Prefer (Optional)?: Bullet Format Clinical Call Center How Severe Is Your Skin Lesion?: mild Is This A New Presentation, Or A Follow-Up?: Skin Lesion

## 2021-08-06 NOTE — DIETITIAN INITIAL EVALUATION ADULT. - PROBLEM/PLAN-4
Discharge instructions reviewed. Patient verbalized understanding. Prescription x2 given to patient. Patient will follow up with PCP.      Yakelin Green RN  08/06/21 1000
DISPLAY PLAN FREE TEXT

## 2021-08-17 NOTE — ED ADULT TRIAGE NOTE - MODE OF ARRIVAL
Show Applicator Variable?: Yes Render Note In Bullet Format When Appropriate: No Consent: The patient's consent was obtained including but not limited to risks of crusting, scabbing, blistering, scarring, darker or lighter pigmentary change, recurrence, incomplete removal and infection. EMS Number Of Freeze-Thaw Cycles: 2 freeze-thaw cycles Medical Necessity Clause: This procedure was medically necessary because the lesions that were treated were: Detail Level: Detailed Duration Of Freeze Thaw-Cycle (Seconds): 5-10 Medical Necessity Information: It is in your best interest to select a reason for this procedure from the list below. All of these items fulfill various CMS LCD requirements except the new and changing color options. Post-Care Instructions: I reviewed with the patient in detail post-care instructions. Patient is to wear sunprotection, and avoid picking at any of the treated lesions. Pt may apply Vaseline to crusted or scabbing areas.

## 2021-11-13 NOTE — DIETITIAN INITIAL EVALUATION ADULT. - INCREASED NUTRIENT NEEDS
Morphine 2 mg IV given. 11/13/21 1729   Pain 1   Pain Scale 1 Numeric (0 - 10)   Pain Intensity 1 8   Patient Stated Pain Goal 0   Pain Onset 1 acute   Pain Location 1 Back   Pain Orientation 1 Medial   Pain Description 1 Aching;  Sharp   Pain Intervention(s) 1 Medication (see MAR) (kcal/protein)

## 2022-02-27 NOTE — PROVIDER CONTACT NOTE (CRITICAL VALUE NOTIFICATION) - TEST AND RESULT REPORTED:
Blood cultures 6/2: First set: growth in aerobic bottle gram negative rods. Second set: Growth in aerobic bottle gram negative rods. No stridor. Decreased lung sounds throughout with end expiratory wheezing.

## 2022-05-05 NOTE — PHYSICAL THERAPY INITIAL EVALUATION ADULT - ADDITIONAL COMMENTS
Computed Tomography Angiography (CTA)     CTA can make 3D images, such as the carotid arteries shown here.     Computed tomography angiography (CTA) is an imaging test. It uses X-rays and computer technology to make detailed pictures of your arteries. Before the test, an X-ray dye (contrast medium) is injected into your vein. The dye makes it easier to see your blood vessels on the X-ray. Pictures are then taken with the CT scanner. A computer turns the images into 2-D and 3-D pictures.  Why CTA is done  CTA may be used to:  · Check arteries in your belly, neck, lungs, pelvis, kidneys, or brain.  · Look for a ballooning of the blood vessel wall (aneurysm) or a tear (dissection).  · Check if a tube (stent) used to keep an artery open is working well.  · Find damage to your arteries due to injuries.  · Collect details on blood vessels that supply blood to tumors.  Getting ready for your test  Tell your health care provider if you:  · Have diabetes  · Have kidney disease  · Are allergic to X-ray dye or other medicines  · Are pregnant or think you may be pregnant  · Are taking any medicines, herbs, or supplements, including prescription drugs, street drugs, and over-the-counter medicines such as aspirin or ibuprofen    You may be told not to eat or drink anything for a few hours before the CTA. Follow any other instructions from your health care provider.  During your test  · You will be asked to remove any hair clips, jewelry, false teeth, or other metal items that could show up on the X-ray.  · You will lie down on the scanning table. An IV line will be put in a vein in your arm or hand.  · The scanning table will be properly placed. The part of your body being checked will be inside the doughnut-shaped CT scanner.  · One image may be taken first to be sure you are in the proper position for the test.  · The IV will be hooked up to an automatic injection machine. This controls how often and how fast the X-ray dye is  injected. The injection may continue during part of the exam.  · The dye will be put into your vein through the IV line. You may feel warmth through your body when the dye is injected.  · You can’t move while the X-rays are being taken. Pillows and foam pads may be used to help you stay still. You will be told to hold your breath for 10 to 25 seconds at a time.  · The whole procedure may take 10 to 25 minutes.  After your test  · Drink plenty of fluids to help flush the X-ray dye from your body.  · You may eat as soon as you want to.  Risks of CTA  All procedures have some risks. A CTA has some possible minor risks. These include:  · Problems due to the X-ray dye, such as an allergic reaction or kidney damage  · Skin damage from leaking X-ray dye near where the IV was put in   Date Last Reviewed: 10/1/2017  © 8663-2920 [employer]. [address] [place] [address], [place] [address]. All rights reserved. This information is not intended as a substitute for professional medical care. Always follow your healthcare professional's instructions.         Patient lives with wife and 24/7 HHA who assists patient and wife in condo.  Patient has stair lift to second floor. Patient was able to ambulate independently with RW but requires some assistance with ADLS.  Patient states he has stall shower.

## 2022-05-10 NOTE — CONSULT NOTE ADULT - PROBLEM SELECTOR PROBLEM 3
Seasonal allergies

## 2022-11-28 NOTE — CONSULT NOTE ADULT - ASSESSMENT
Head, normocephalic, atraumatic, Face within normal limits ugib vs lgib    daily cbc   transfuse prn   bleeding scan  add carafate   check iron studies   ppi once a day  check stool occult blood  further recommendations pending above  may need  upper gastrointestinal endoscopy if bleeding persists

## 2023-01-04 NOTE — PHYSICAL THERAPY INITIAL EVALUATION ADULT - ASSISTIVE DEVICE FOR TRANSFER: SIT/STAND, REHAB EVAL
rolling walker
Franko Henao  [facility]  [facility] [address]  Scheduled Appointment: 01/17/2023

## 2023-03-03 NOTE — ED ADULT NURSE NOTE - NS ED NURSE DISCH DISPOSITION
Annie Oseguera presents to [facility] for     Chief Complaint  Chief Complaint   Patient presents with   • Diverticulitis     1 week follow up, got better on Sunday but Monday night came back and was so much worse       PCP:  Vesna Bangura MD    Subjective        Abdominal Pain  This is a new problem. The current episode started 1 to 4 weeks ago. The problem has been waxing and waning. The pain is located in the RLQ, LLQ and suprapubic region. The pain is at a severity of 5/10. The quality of the pain is aching, cramping and colicky. The abdominal pain does not radiate. Pertinent negatives include no anorexia, arthralgias, belching, constipation, diarrhea, dysuria, fever, flatus, frequency, hematochezia, melena, myalgias, nausea, vomiting or weight loss. Nothing aggravates the pain. Relieved by: clear liquid diet. She has tried acetaminophen (cl liq diet) for the symptoms. The treatment provided mild relief. divertic     She was seen for abdominal pain on 2/24/2023.  Pain was improving significantly at that time.  Clear liquid diet.  However her urine dip in office showed leuks and nitrites so she was treated with Macrobid.  Urine culture came back negative so antibiotic was stopped.  She did complete 4 days of Macrobid.  Her pain was better on Sunday but then Monday night came back and was much worse than it was previously.  She does still feel as though this is similar to previous episodes of diverticulitis.  She has had a colonoscopy in the past that showed significant diverticular disease.         Review of Systems   Constitutional: Positive for fatigue. Negative for fever and unexpected weight loss.   Eyes: Negative for blurred vision, double vision and visual disturbance.   Respiratory: Negative for cough, shortness of breath and wheezing.    Cardiovascular: Negative for chest pain, palpitations and leg swelling.   Gastrointestinal: Positive for abdominal pain. Negative for  anorexia, constipation, diarrhea, flatus, hematochezia, melena, nausea and vomiting.   Genitourinary: Negative for difficulty urinating, dysuria, frequency and urgency.   Musculoskeletal: Negative for arthralgias and myalgias.   Skin: Negative for color change and rash.   Neurological: Negative for dizziness, weakness and headache.   Hematological: Negative for adenopathy. Does not bruise/bleed easily.         Allergies   Allergen Reactions   • Sulfa Antibiotics Shortness Of Breath and Rash   • Penicillins Hives     Current Outpatient Medications on File Prior to Visit   Medication Sig Dispense Refill   • AzaSite 1 % ophthalmic solution Administer 1 drop to both eyes 2 (Two) Times a Day.     • Estring 2 MG vaginal ring 2 mg Every 3 (Three) Months.     • lisinopril (PRINIVIL,ZESTRIL) 5 MG tablet Take 1 tablet by mouth Daily. 90 tablet 3   • Methylcellulose, Laxative, (CITRUCEL PO) Take 2 tablets by mouth Every Night.     • Omega-3 Fatty Acids (fish oil) 1000 MG capsule capsule 2,800 mg Daily.     • omeprazole (priLOSEC) 20 MG capsule Take 1 capsule by mouth Daily. 90 capsule 3   • Probiotic Product (PROBIOTIC MULTI-ENZYME PO) Take  by mouth.     • Ascorbic Acid (VITAMIN C PO) Take 1,000 mg by mouth Daily. Lipisomal     • B Complex Vitamins (vitamin b complex) capsule capsule Take 1 capsule by mouth Daily.     • cholecalciferol (VITAMIN D3) 1.25 MG (12000 UT) capsule 2,000 Units Daily.     • COLLAGEN PO Take 3 capsules by mouth Daily. Marine collagen     • MAGNESIUM PO Take 200 mg by mouth Daily.       No current facility-administered medications on file prior to visit.         The following portions of the patient's history were reviewed and updated as appropriate: allergies, current medications, past family history, past medical history, past social history, past surgical history and problem list and are available for review within electronic record    Objective     Result Review :         Recent Results (from the  "past 168 hour(s))   POC Urinalysis Dipstick, Automated    Collection Time: 02/24/23 11:00 AM    Specimen: Urine   Result Value Ref Range    Color Yellow Yellow, Straw, Dark Yellow, Melisa    Clarity, UA Clear Clear    Specific Gravity  1.030 1.005 - 1.030    pH, Urine 5.0 5.0 - 8.0    Leukocytes Moderate (2+) (A) Negative    Nitrite, UA Positive (A) Negative    Protein, POC Trace (A) Negative mg/dL    Glucose, UA 1+ (A) Negative mg/dL    Ketones, UA 3+ (A) Negative    Urobilinogen, UA 8 E.U./dL (A) Normal, 0.2 E.U./dL    Bilirubin Negative Negative    Blood, UA Negative Negative    Lot Number [id number]     Expiration Date 03/25/2024    Urine Culture - Urine, Urine, Clean Catch    Collection Time: 02/24/23  4:17 PM    Specimen: Urine, Clean Catch   Result Value Ref Range    Urine Culture No growth                 Vital Signs:   /70 (BP Location: Left arm, Patient Position: Sitting, Cuff Size: Adult)   Pulse 96   Temp 96.8 °F (36 °C) (Infrared)   Resp 16   Ht 155.7 cm (61.3\")   Wt 56.6 kg (124 lb 12.8 oz)   SpO2 99%   BMI 23.35 kg/m²         Physical Exam  Constitutional:       Appearance: Normal appearance. She is well-developed.   HENT:      Head: Normocephalic and atraumatic.   Eyes:      General: No scleral icterus.     Conjunctiva/sclera: Conjunctivae normal.   Cardiovascular:      Rate and Rhythm: Normal rate and regular rhythm.      Heart sounds: Normal heart sounds.   Pulmonary:      Effort: Pulmonary effort is normal. No respiratory distress.      Breath sounds: Normal breath sounds.   Abdominal:      General: Abdomen is flat. Bowel sounds are normal. There is no distension.      Palpations: Abdomen is soft. There is no hepatomegaly or splenomegaly.      Tenderness: There is abdominal tenderness (mild) in the suprapubic area and left lower quadrant. There is no right CVA tenderness, left CVA tenderness, guarding or rebound. Negative signs include Herr's sign, Rovsing's sign, McBurney's sign " and psoas sign.   Musculoskeletal:         General: Normal range of motion.      Cervical back: Normal range of motion.      Right lower leg: No edema.      Left lower leg: No edema.   Skin:     General: Skin is warm and dry.   Neurological:      General: No focal deficit present.      Mental Status: She is alert and oriented to person, place, and time.   Psychiatric:         Attention and Perception: Attention normal.         Mood and Affect: Mood and affect normal.         Behavior: Behavior normal. Behavior is cooperative.         Thought Content: Thought content normal.         Cognition and Memory: Cognition normal.         Judgment: Judgment normal.                 Assessment and Plan      Diagnoses and all orders for this visit:    1. Diverticulitis (Primary)  -     metroNIDAZOLE (Flagyl) 500 MG tablet; Take 1 tablet by mouth 3 (Three) Times a Day for 7 days.  Dispense: 21 tablet; Refill: 0  -     ciprofloxacin (CIPRO) 750 MG tablet; Take 1 tablet by mouth 2 (Two) Times a Day for 7 days.  Dispense: 14 tablet; Refill: 0    We will go ahead and treat with antibiotics for likely diverticulitis.  If symptoms do not improve, she will need a CT scan or need to be seen at the ER.  She should continue her clear liquid diet and advance as tolerated.  Encouraged to avoid dehydration.    Follow Up     Patient was given instructions and counseling regarding her condition or for health maintenance advice. Please see specific information pulled into the AVS if appropriate.   Any new medication's adverse effects have been discussed in detail with patient  Patient was encouraged to keep me informed of any acute changes, lack of improvement, or any new concerning symptoms.    Return if symptoms worsen or fail to improve.          Dictated Utilizing Dragon Dictation   Please note that portions of this note were completed with a voice recognition program.   Part of this note may be an electronic transcription/translation of  spoken language to printed text using the Dragon Dictation System.      Admitted

## 2023-08-29 NOTE — ED PROVIDER NOTE - PMH
Dr. Jim Santana updated on SVE, FHT tracing and CTX pattern. Patient very uncomfortable. Orders received to turn Pitocin off. Benign prostatic hypertrophy    CAD (Coronary Artery Disease)  MI, s/p CABG with 2 cardiac stents  COPD (chronic obstructive pulmonary disease)    Depression    GERD (gastroesophageal reflux disease)    Hyperlipidemia    Hypertension    Myocardial infarction    Osteoporosis    Peripheral Neuropathy    Seasonal allergies

## 2023-10-09 NOTE — PROGRESS NOTE ADULT - PROBLEM SELECTOR PLAN 1
E. Coli Sepsis POA  Likely from urine  Continue ceftriaxone  Change to keflex on discharge  ID f/u Male

## 2023-11-29 NOTE — H&P ADULT - I HAVE PERSONALLY SEEN, EXAMINED, AND PARTICIPATED IN THE CARE OF THIS PATIENT.  I HAVE REVIEWED ALL PERTINENT CLINICAL INFORMATION, INCLUDING HISTORY, PHYSICAL EXAM, PLAN AND THE MEDICAL/PA/NP STUDENT’S NOTE AND AGREE EXCEPT AS NOTED.
Ambulate as tolerate with walker, Right below knee amputation stump  If feeling dizzy/lightheaded sit down  
Statement Selected

## 2023-12-01 NOTE — DISCHARGE NOTE PROVIDER - NSDCHC_MEDRECSTATUS_GEN_ALL_CORE
Request was already sent 11/29/2023 for 1 month until patient is seen by new PCP. RX refused.   Admission Reconciliation is Completed  Discharge Reconciliation is Not Complete Admission Reconciliation is Completed  Discharge Reconciliation is Completed

## 2024-07-02 NOTE — PHYSICAL THERAPY INITIAL EVALUATION ADULT - PRECAUTIONS/LIMITATIONS, REHAB EVAL
I called the patient at this time to discuss enrollment in high risk care transitions program.  Attempt #3, and I left a voicemail with my call back number.  At this time I will close the case as I have been unable to make contact with this patient.  
fall precautions

## 2024-12-30 NOTE — CONSULT NOTE ADULT - PROBLEM/RECOMMENDATION-4
[facility] [place] [address] [place] [address] [place] [address]  Phone: [phone]    Tony Mabry accompanied Zunilda White to the emergency department on 12/30/2024. They may return to work on 12/31/2024.        Thank you for choosing [facility].    Tommie Milian APRN    
DISPLAY PLAN FREE TEXT

## 2025-05-02 NOTE — DIETITIAN INITIAL EVALUATION ADULT. - ENERGY NEEDS
LOV: 3/5/2025  Next OV: 6/5/2025  [facility]   
Wt: 150lbs (stated), Ht: 69in, BMI: 22.1, IBW: 160lbs (+/-10%), %IBW: 94